# Patient Record
Sex: MALE | Race: WHITE | NOT HISPANIC OR LATINO | Employment: OTHER | URBAN - METROPOLITAN AREA
[De-identification: names, ages, dates, MRNs, and addresses within clinical notes are randomized per-mention and may not be internally consistent; named-entity substitution may affect disease eponyms.]

---

## 2017-01-04 ENCOUNTER — OFFICE VISIT (OUTPATIENT)
Dept: FAMILY MEDICINE CLINIC | Facility: CLINIC | Age: 57
End: 2017-01-04

## 2017-01-04 VITALS
WEIGHT: 274 LBS | HEART RATE: 74 BPM | TEMPERATURE: 97.6 F | DIASTOLIC BLOOD PRESSURE: 88 MMHG | SYSTOLIC BLOOD PRESSURE: 130 MMHG | RESPIRATION RATE: 16 BRPM | OXYGEN SATURATION: 98 % | BODY MASS INDEX: 39.22 KG/M2 | HEIGHT: 70 IN

## 2017-01-04 DIAGNOSIS — I10 BENIGN ESSENTIAL HYPERTENSION: ICD-10-CM

## 2017-01-04 DIAGNOSIS — E11.9 CONTROLLED TYPE 2 DIABETES MELLITUS WITHOUT COMPLICATION, WITHOUT LONG-TERM CURRENT USE OF INSULIN (HCC): Primary | ICD-10-CM

## 2017-01-04 DIAGNOSIS — E78.49 OTHER HYPERLIPIDEMIA: ICD-10-CM

## 2017-01-04 LAB — HBA1C MFR BLD: 8.3 %

## 2017-01-04 PROCEDURE — 83036 HEMOGLOBIN GLYCOSYLATED A1C: CPT | Performed by: FAMILY MEDICINE

## 2017-01-04 PROCEDURE — 90662 IIV NO PRSV INCREASED AG IM: CPT | Performed by: FAMILY MEDICINE

## 2017-01-04 PROCEDURE — 36415 COLL VENOUS BLD VENIPUNCTURE: CPT | Performed by: FAMILY MEDICINE

## 2017-01-04 PROCEDURE — 99214 OFFICE O/P EST MOD 30 MIN: CPT | Performed by: FAMILY MEDICINE

## 2017-01-04 PROCEDURE — 90471 IMMUNIZATION ADMIN: CPT | Performed by: FAMILY MEDICINE

## 2017-01-04 RX ORDER — ESOMEPRAZOLE MAGNESIUM 40 MG/1
40 CAPSULE, DELAYED RELEASE ORAL
Qty: 30 CAPSULE | Refills: 5 | Status: SHIPPED | OUTPATIENT
Start: 2017-01-04 | End: 2017-07-13 | Stop reason: SDUPTHER

## 2017-01-04 RX ORDER — DANTROLENE SODIUM 100 MG/1
100 CAPSULE ORAL 2 TIMES DAILY
Qty: 60 CAPSULE | Refills: 5 | Status: SHIPPED | OUTPATIENT
Start: 2017-01-04 | End: 2017-08-10 | Stop reason: SDUPTHER

## 2017-01-04 RX ORDER — AMLODIPINE BESYLATE AND BENAZEPRIL HYDROCHLORIDE 5; 10 MG/1; MG/1
1 CAPSULE ORAL DAILY
Qty: 30 CAPSULE | Refills: 5 | Status: SHIPPED | OUTPATIENT
Start: 2017-01-04 | End: 2017-09-19 | Stop reason: SDUPTHER

## 2017-01-04 RX ORDER — ATORVASTATIN CALCIUM 10 MG/1
10 TABLET, FILM COATED ORAL DAILY
Qty: 30 TABLET | Refills: 5 | Status: SHIPPED | OUTPATIENT
Start: 2017-01-04 | End: 2017-11-15 | Stop reason: SDUPTHER

## 2017-01-04 RX ORDER — CITALOPRAM 20 MG/1
20 TABLET ORAL DAILY
Qty: 30 TABLET | Refills: 5 | Status: SHIPPED | OUTPATIENT
Start: 2017-01-04 | End: 2017-08-16 | Stop reason: SDUPTHER

## 2017-01-04 RX ORDER — GLYBURIDE 5 MG/1
5 TABLET ORAL 2 TIMES DAILY WITH MEALS
Qty: 60 TABLET | Refills: 5 | Status: SHIPPED | OUTPATIENT
Start: 2017-01-04 | End: 2017-10-21 | Stop reason: SDUPTHER

## 2017-01-04 RX ORDER — BACLOFEN 20 MG/1
20 TABLET ORAL 2 TIMES DAILY
Qty: 60 TABLET | Refills: 5 | Status: SHIPPED | OUTPATIENT
Start: 2017-01-04 | End: 2017-07-13 | Stop reason: SDUPTHER

## 2017-01-04 NOTE — MR AVS SNAPSHOT
Kiran Belcher   1/4/2017 12:00 PM   Office Visit    Provider:  Nadira Fernandez DO   Department:  [facility]   Dept Phone:  [phone]                Your Full Care Plan              Where to Get Your Medications      These medications were sent to [facility] - [address] - [phone] PH - [phone] FX  [address] [address]     Phone:  [phone]     amLODIPine-benazepril 5-10 MG per capsule    atorvastatin 10 MG tablet    baclofen 20 MG tablet    citalopram 20 MG tablet    dantrolene 100 MG capsule    esomeprazole 40 MG capsule    glyBURIDE 5 MG tablet    metFORMIN 500 MG tablet            Your Updated Medication List          This list is accurate as of: 1/4/17 12:11 PM.  Always use your most recent med list.                ACCU-CHEK MAX PLUS test strip   Generic drug:  glucose blood       amLODIPine-benazepril 5-10 MG per capsule   Commonly known as:  LOTREL 5-10   Take 1 capsule by mouth Daily.       atorvastatin 10 MG tablet   Commonly known as:  LIPITOR   Take 1 tablet by mouth Daily.       baclofen 20 MG tablet   Commonly known as:  LIORESAL   Take 1 tablet by mouth 2 (Two) Times a Day.       citalopram 20 MG tablet   Commonly known as:  CeleXA   Take 1 tablet by mouth Daily.       dantrolene 100 MG capsule   Commonly known as:  DANTRIUM   Take 1 capsule by mouth 2 (Two) Times a Day.       esomeprazole 40 MG capsule   Commonly known as:  nexIUM   Take 1 capsule by mouth Every Morning Before Breakfast.       glyBURIDE 5 MG tablet   Commonly known as:  DIAbeta   Take 1 tablet by mouth 2 (Two) Times a Day With Meals.       metFORMIN 500 MG tablet   Commonly known as:  GLUCOPHAGE   Take 2 tablets by mouth 2 (Two) Times a Day With Meals.       TOVIAZ 4 MG tablet sustained-release 24 hour capsule   Generic drug:  fesoterodine fumarate               We Performed the Following     "CBC & Differential     Comprehensive Metabolic Panel     High Dose FluZone     Lipid Panel     POC Glycosylated Hemoglobin (Hb A1C)       You Were Diagnosed With        Codes Comments    Controlled type 2 diabetes mellitus without complication, without long-term current use of insulin    -  Primary ICD-10-CM: E11.9  ICD-9-CM: 250.00     Other hyperlipidemia     ICD-10-CM: E78.4  ICD-9-CM: 272.4     Benign essential hypertension     ICD-10-CM: I10  ICD-9-CM: 401.1       Instructions     None    Patient Instructions History      [employer] Signup     [employer] allows you to send messages to your doctor, view your test results, renew your prescriptions, schedule appointments, and more. To sign up, go to [employer] and click on the Sign Up Now link in the New User? box. Enter your [employer] Activation Code exactly as it appears below along with the last four digits of your Social Security Number and your Date of Birth () to complete the sign-up process. If you do not sign up before the expiration date, you must request a new code.    [employer] Activation Code: [email or web address]  Expires: 2017 12:10 PM    If you have questions, you can email [email or web address] or call [phone] to talk to our [employer] staff. Remember, [employer] is NOT to be used for urgent needs. For medical emergencies, dial 911.               Other Info from Your Visit           Your Appointments     May 15, 2017  1:00 PM EDT   Follow Up with Nadira Fernandez DO   [facility] (--)    [facility] [address] [facility] [address]  [facility] [address]   [phone]           Arrive 15 minutes prior to appointment.              Allergies     No Known Allergies      Reason for Visit     Diabetes           Vital Signs     Blood Pressure Pulse Temperature Respirations Height Weight    130/88 74 97.6 °F (36.4 °C) 16 70\" (177.8 cm) 274 lb (124 kg)    Oxygen Saturation Body Mass Index Smoking Status    "          98% 39.31 kg/m2 Never Smoker         Problems and Diagnoses Noted     Benign essential hypertension    High cholesterol or triglycerides    Controlled type 2 diabetes mellitus without complication, without long-term current use of insulin    -  Primary      Immunizations Administered     Name Date    Influenza Split High Dose Preservative Free IM       Results     POC Glycosylated Hemoglobin (Hb A1C)      Component Value Standard Range & Units    Hemoglobin A1C 8.3 %

## 2017-01-04 NOTE — PROGRESS NOTES
Subjective   Kiran Belcher is a 56 y.o. male.     Diabetes   He presents for his follow-up diabetic visit. He has type 2 diabetes mellitus. His disease course has been stable. Pertinent negatives for hypoglycemia include no confusion, dizziness, mood changes or nervousness/anxiousness. Pertinent negatives for diabetes include no blurred vision, no chest pain, no fatigue, no foot paresthesias, no polydipsia, no polyphagia, no polyuria, no visual change and no weakness. There are no hypoglycemic complications. Symptoms are stable. There are no diabetic complications. Risk factors for coronary artery disease include diabetes mellitus, dyslipidemia, obesity, male sex, hypertension and family history. Current diabetic treatment includes oral agent (dual therapy). He is compliant with treatment all of the time. His weight is stable. He is following a generally healthy diet. When asked about meal planning, he reported none. He has not had a previous visit with a dietitian. He participates in exercise intermittently. There is no change in his home blood glucose trend. An ACE inhibitor/angiotensin II receptor blocker is being taken. He does not see a podiatrist.Eye exam is not current.   Hypertension   This is a chronic problem. The current episode started more than 1 year ago. The problem is unchanged. The problem is controlled. Pertinent negatives include no anxiety, blurred vision, chest pain, malaise/fatigue, neck pain, palpitations, peripheral edema, PND or shortness of breath. There are no associated agents to hypertension. Risk factors for coronary artery disease include diabetes mellitus, dyslipidemia, family history, obesity, male gender and sedentary lifestyle. Past treatments include ACE inhibitors and calcium channel blockers. The current treatment provides significant improvement. There are no compliance problems.    Hyperlipidemia   This is a chronic problem. The current episode started more than 1 year ago.  The problem is controlled. Recent lipid tests were reviewed and are normal. Exacerbating diseases include diabetes. There are no known factors aggravating his hyperlipidemia. Pertinent negatives include no chest pain, focal weakness, leg pain, myalgias or shortness of breath. Current antihyperlipidemic treatment includes statins. The current treatment provides significant improvement of lipids. There are no compliance problems.  Risk factors for coronary artery disease include diabetes mellitus, dyslipidemia, family history, obesity, male sex and hypertension.        The following portions of the patient's history were reviewed and updated as appropriate: allergies, current medications, past family history, past medical history, past social history, past surgical history and problem list.    Review of Systems   Constitutional: Negative for appetite change, chills, diaphoresis, fatigue, fever, malaise/fatigue and unexpected weight change.   HENT: Negative for congestion, ear pain, mouth sores, postnasal drip, rhinorrhea, sinus pressure, sneezing, sore throat and trouble swallowing.    Eyes: Negative for blurred vision, pain, redness and visual disturbance.   Respiratory: Negative for apnea, cough, chest tightness, shortness of breath and wheezing.    Cardiovascular: Negative for chest pain, palpitations, leg swelling and PND.   Gastrointestinal: Negative for abdominal distention, blood in stool, constipation, diarrhea and nausea.   Endocrine: Negative for cold intolerance, polydipsia, polyphagia and polyuria.   Genitourinary: Negative for difficulty urinating, dysuria, enuresis, flank pain and urgency.   Musculoskeletal: Negative for arthralgias, back pain, joint swelling, myalgias and neck pain.   Skin: Negative for color change, rash and wound.   Neurological: Negative for dizziness, focal weakness, syncope, weakness, light-headedness and numbness.   Hematological: Negative for adenopathy.   Psychiatric/Behavioral:  Negative for agitation, behavioral problems and confusion. The patient is not nervous/anxious.        Objective   Physical Exam   Constitutional: He is oriented to person, place, and time. He appears well-developed and well-nourished. No distress.   HENT:   Right Ear: External ear normal.   Left Ear: External ear normal.   Nose: Nose normal.   Mouth/Throat: Oropharynx is clear and moist.   Eyes: Conjunctivae and EOM are normal. Pupils are equal, round, and reactive to light.   Neck: Normal range of motion. Neck supple. No thyromegaly present.   Cardiovascular: Normal rate, regular rhythm and normal heart sounds.    No murmur heard.  Pulmonary/Chest: Effort normal and breath sounds normal. No respiratory distress. He has no wheezes.   Abdominal: Soft. Bowel sounds are normal. He exhibits no distension and no mass. There is no tenderness. There is no rebound and no guarding. No hernia.   Musculoskeletal: Normal range of motion. He exhibits no edema or tenderness.   Lymphadenopathy:     He has no cervical adenopathy.   Neurological: He is alert and oriented to person, place, and time. He has normal reflexes.   Skin: Skin is warm and dry. No rash noted. He is not diaphoretic. No erythema. No pallor.   Psychiatric: He has a normal mood and affect. His behavior is normal. Judgment and thought content normal.   Nursing note and vitals reviewed.      Assessment/Plan   Kiran was seen today for diabetes.    Diagnoses and all orders for this visit:    Controlled type 2 diabetes mellitus without complication, without long-term current use of insulin  -     POC Glycosylated Hemoglobin (Hb A1C)  -     CBC & Differential    Other hyperlipidemia  -     Comprehensive Metabolic Panel  -     Lipid Panel    Benign essential hypertension    Other orders  -     High Dose FluZone  -     metFORMIN (GLUCOPHAGE) 500 MG tablet; Take 2 tablets by mouth 2 (Two) Times a Day With Meals.  -     glyBURIDE (DIAbeta) 5 MG tablet; Take 1 tablet by  mouth 2 (Two) Times a Day With Meals.  -     esomeprazole (nexIUM) 40 MG capsule; Take 1 capsule by mouth Every Morning Before Breakfast.  -     dantrolene (DANTRIUM) 100 MG capsule; Take 1 capsule by mouth 2 (Two) Times a Day.  -     citalopram (CeleXA) 20 MG tablet; Take 1 tablet by mouth Daily.  -     baclofen (LIORESAL) 20 MG tablet; Take 1 tablet by mouth 2 (Two) Times a Day.  -     atorvastatin (LIPITOR) 10 MG tablet; Take 1 tablet by mouth Daily.  -     amLODIPine-benazepril (LOTREL 5-10) 5-10 MG per capsule; Take 1 capsule by mouth Daily.

## 2017-01-05 LAB
ALBUMIN SERPL-MCNC: 4.2 G/DL (ref 3.5–5.5)
ALBUMIN/GLOB SERPL: 1.8 {RATIO} (ref 1.1–2.5)
ALP SERPL-CCNC: 77 IU/L (ref 39–117)
ALT SERPL-CCNC: 75 IU/L (ref 0–44)
AST SERPL-CCNC: 57 IU/L (ref 0–40)
BASOPHILS # BLD AUTO: 0 X10E3/UL (ref 0–0.2)
BASOPHILS NFR BLD AUTO: 0 %
BILIRUB SERPL-MCNC: 0.4 MG/DL (ref 0–1.2)
BUN SERPL-MCNC: 12 MG/DL (ref 6–24)
BUN/CREAT SERPL: 18 (ref 9–20)
CALCIUM SERPL-MCNC: 9 MG/DL (ref 8.7–10.2)
CHLORIDE SERPL-SCNC: 98 MMOL/L (ref 96–106)
CHOLEST SERPL-MCNC: 149 MG/DL (ref 100–199)
CO2 SERPL-SCNC: 23 MMOL/L (ref 18–29)
CREAT SERPL-MCNC: 0.67 MG/DL (ref 0.76–1.27)
EOSINOPHIL # BLD AUTO: 0.2 X10E3/UL (ref 0–0.4)
EOSINOPHIL NFR BLD AUTO: 2 %
ERYTHROCYTE [DISTWIDTH] IN BLOOD BY AUTOMATED COUNT: 13.3 % (ref 12.3–15.4)
GLOBULIN SER CALC-MCNC: 2.4 G/DL (ref 1.5–4.5)
GLUCOSE SERPL-MCNC: 176 MG/DL (ref 65–99)
HCT VFR BLD AUTO: 39.4 % (ref 37.5–51)
HDLC SERPL-MCNC: 29 MG/DL
HGB BLD-MCNC: 13.3 G/DL (ref 12.6–17.7)
IMM GRANULOCYTES # BLD: 0 X10E3/UL (ref 0–0.1)
IMM GRANULOCYTES NFR BLD: 0 %
LDLC SERPL CALC-MCNC: 80 MG/DL (ref 0–99)
LYMPHOCYTES # BLD AUTO: 1.9 X10E3/UL (ref 0.7–3.1)
LYMPHOCYTES NFR BLD AUTO: 22 %
MCH RBC QN AUTO: 30.9 PG (ref 26.6–33)
MCHC RBC AUTO-ENTMCNC: 33.8 G/DL (ref 31.5–35.7)
MCV RBC AUTO: 92 FL (ref 79–97)
MONOCYTES # BLD AUTO: 0.4 X10E3/UL (ref 0.1–0.9)
MONOCYTES NFR BLD AUTO: 5 %
NEUTROPHILS # BLD AUTO: 6.2 X10E3/UL (ref 1.4–7)
NEUTROPHILS NFR BLD AUTO: 71 %
PLATELET # BLD AUTO: 283 X10E3/UL (ref 150–379)
POTASSIUM SERPL-SCNC: 4.1 MMOL/L (ref 3.5–5.2)
PROT SERPL-MCNC: 6.6 G/DL (ref 6–8.5)
RBC # BLD AUTO: 4.3 X10E6/UL (ref 4.14–5.8)
SODIUM SERPL-SCNC: 140 MMOL/L (ref 134–144)
TRIGL SERPL-MCNC: 202 MG/DL (ref 0–149)
VLDLC SERPL CALC-MCNC: 40 MG/DL (ref 5–40)
WBC # BLD AUTO: 8.8 X10E3/UL (ref 3.4–10.8)

## 2017-05-15 ENCOUNTER — OFFICE VISIT (OUTPATIENT)
Dept: FAMILY MEDICINE CLINIC | Facility: CLINIC | Age: 57
End: 2017-05-15

## 2017-05-15 VITALS
DIASTOLIC BLOOD PRESSURE: 92 MMHG | SYSTOLIC BLOOD PRESSURE: 142 MMHG | HEIGHT: 70 IN | HEART RATE: 76 BPM | RESPIRATION RATE: 16 BRPM | OXYGEN SATURATION: 98 %

## 2017-05-15 DIAGNOSIS — E78.49 OTHER HYPERLIPIDEMIA: ICD-10-CM

## 2017-05-15 DIAGNOSIS — I10 ESSENTIAL HYPERTENSION: ICD-10-CM

## 2017-05-15 DIAGNOSIS — E11.9 CONTROLLED TYPE 2 DIABETES MELLITUS WITHOUT COMPLICATION, WITHOUT LONG-TERM CURRENT USE OF INSULIN (HCC): Primary | ICD-10-CM

## 2017-05-15 LAB
ALBUMIN SERPL-MCNC: 4.4 G/DL (ref 3.2–4.8)
ALBUMIN/GLOB SERPL: 1.6 G/DL (ref 1.5–2.5)
ALP SERPL-CCNC: 83 U/L (ref 25–100)
ALT SERPL-CCNC: 74 U/L (ref 7–40)
AST SERPL-CCNC: 56 U/L (ref 0–33)
BASOPHILS # BLD AUTO: 0.02 10*3/MM3 (ref 0–0.2)
BASOPHILS NFR BLD AUTO: 0.3 % (ref 0–1)
BILIRUB SERPL-MCNC: 0.5 MG/DL (ref 0.3–1.2)
BUN SERPL-MCNC: 11 MG/DL (ref 9–23)
BUN/CREAT SERPL: 18.3 (ref 7–25)
CALCIUM SERPL-MCNC: 9.8 MG/DL (ref 8.7–10.4)
CHLORIDE SERPL-SCNC: 102 MMOL/L (ref 99–109)
CHOLEST SERPL-MCNC: 164 MG/DL (ref 0–200)
CO2 SERPL-SCNC: 22 MMOL/L (ref 20–31)
CREAT SERPL-MCNC: 0.6 MG/DL (ref 0.6–1.3)
EOSINOPHIL # BLD AUTO: 0.25 10*3/MM3 (ref 0.1–0.3)
EOSINOPHIL NFR BLD AUTO: 3.2 % (ref 0–3)
ERYTHROCYTE [DISTWIDTH] IN BLOOD BY AUTOMATED COUNT: 13.4 % (ref 11.3–14.5)
GLOBULIN SER CALC-MCNC: 2.7 GM/DL
GLUCOSE SERPL-MCNC: 164 MG/DL (ref 70–100)
HBA1C MFR BLD: 8.5 %
HCT VFR BLD AUTO: 40.5 % (ref 38.9–50.9)
HDLC SERPL-MCNC: 33 MG/DL (ref 40–60)
HGB BLD-MCNC: 13.3 G/DL (ref 13.1–17.5)
IMM GRANULOCYTES # BLD: 0.03 10*3/MM3 (ref 0–0.03)
IMM GRANULOCYTES NFR BLD: 0.4 % (ref 0–0.6)
LDLC SERPL CALC-MCNC: 91 MG/DL (ref 0–100)
LYMPHOCYTES # BLD AUTO: 1.94 10*3/MM3 (ref 0.6–4.8)
LYMPHOCYTES NFR BLD AUTO: 24.9 % (ref 24–44)
MCH RBC QN AUTO: 31.5 PG (ref 27–31)
MCHC RBC AUTO-ENTMCNC: 32.8 G/DL (ref 32–36)
MCV RBC AUTO: 96 FL (ref 80–99)
MONOCYTES # BLD AUTO: 0.5 10*3/MM3 (ref 0–1)
MONOCYTES NFR BLD AUTO: 6.4 % (ref 0–12)
NEUTROPHILS # BLD AUTO: 5.04 10*3/MM3 (ref 1.5–8.3)
NEUTROPHILS NFR BLD AUTO: 64.8 % (ref 41–71)
PLATELET # BLD AUTO: 258 10*3/MM3 (ref 150–450)
POTASSIUM SERPL-SCNC: 4.2 MMOL/L (ref 3.5–5.5)
PROT SERPL-MCNC: 7.1 G/DL (ref 5.7–8.2)
RBC # BLD AUTO: 4.22 10*6/MM3 (ref 4.2–5.76)
SODIUM SERPL-SCNC: 141 MMOL/L (ref 132–146)
TRIGL SERPL-MCNC: 202 MG/DL (ref 0–150)
TSH SERPL DL<=0.005 MIU/L-ACNC: 1.03 MIU/ML (ref 0.35–5.35)
VLDLC SERPL CALC-MCNC: 40.4 MG/DL
WBC # BLD AUTO: 7.78 10*3/MM3 (ref 3.5–10.8)

## 2017-05-15 PROCEDURE — 99214 OFFICE O/P EST MOD 30 MIN: CPT | Performed by: FAMILY MEDICINE

## 2017-05-15 PROCEDURE — 36415 COLL VENOUS BLD VENIPUNCTURE: CPT | Performed by: FAMILY MEDICINE

## 2017-05-15 PROCEDURE — 83036 HEMOGLOBIN GLYCOSYLATED A1C: CPT | Performed by: FAMILY MEDICINE

## 2017-05-15 RX ORDER — METHENAMINE HIPPURATE 1000 MG/1
TABLET ORAL
COMMUNITY
Start: 2017-04-10 | End: 2018-04-05

## 2017-07-07 RX ORDER — BLOOD SUGAR DIAGNOSTIC
STRIP MISCELLANEOUS
Qty: 100 EACH | Refills: 5 | Status: SHIPPED | OUTPATIENT
Start: 2017-07-07 | End: 2018-01-29 | Stop reason: SDUPTHER

## 2017-07-13 RX ORDER — BACLOFEN 20 MG/1
TABLET ORAL
Qty: 60 TABLET | Refills: 2 | Status: SHIPPED | OUTPATIENT
Start: 2017-07-13 | End: 2017-10-21 | Stop reason: SDUPTHER

## 2017-07-13 RX ORDER — ESOMEPRAZOLE MAGNESIUM 40 MG/1
CAPSULE, DELAYED RELEASE ORAL
Qty: 30 CAPSULE | Refills: 2 | Status: SHIPPED | OUTPATIENT
Start: 2017-07-13 | End: 2017-10-21 | Stop reason: SDUPTHER

## 2017-07-24 ENCOUNTER — OFFICE VISIT (OUTPATIENT)
Dept: FAMILY MEDICINE CLINIC | Facility: CLINIC | Age: 57
End: 2017-07-24

## 2017-07-24 VITALS
RESPIRATION RATE: 16 BRPM | DIASTOLIC BLOOD PRESSURE: 88 MMHG | OXYGEN SATURATION: 98 % | TEMPERATURE: 97.6 F | SYSTOLIC BLOOD PRESSURE: 126 MMHG | HEART RATE: 72 BPM

## 2017-07-24 DIAGNOSIS — I10 ESSENTIAL HYPERTENSION: ICD-10-CM

## 2017-07-24 DIAGNOSIS — E78.49 OTHER HYPERLIPIDEMIA: ICD-10-CM

## 2017-07-24 DIAGNOSIS — E11.9 CONTROLLED TYPE 2 DIABETES MELLITUS WITHOUT COMPLICATION, WITHOUT LONG-TERM CURRENT USE OF INSULIN (HCC): Primary | ICD-10-CM

## 2017-07-24 DIAGNOSIS — L98.9 LESION OF SKIN OF FACE: ICD-10-CM

## 2017-07-24 LAB — HBA1C MFR BLD: 6.8 %

## 2017-07-24 PROCEDURE — 99214 OFFICE O/P EST MOD 30 MIN: CPT | Performed by: FAMILY MEDICINE

## 2017-07-24 PROCEDURE — 83036 HEMOGLOBIN GLYCOSYLATED A1C: CPT | Performed by: FAMILY MEDICINE

## 2017-07-24 NOTE — PROGRESS NOTES
Subjective   Kiran Belcher is a 56 y.o. male.     Diabetes   He presents for his follow-up diabetic visit. He has type 2 diabetes mellitus. His disease course has been stable. There are no hypoglycemic associated symptoms. Pertinent negatives for hypoglycemia include no confusion, dizziness, mood changes or nervousness/anxiousness. Pertinent negatives for diabetes include no blurred vision, no chest pain, no fatigue, no foot paresthesias, no polydipsia, no polyphagia, no polyuria, no visual change and no weakness. There are no hypoglycemic complications. Symptoms are stable. There are no diabetic complications. Risk factors for coronary artery disease include diabetes mellitus, dyslipidemia, obesity, male sex, hypertension and family history. Current diabetic treatment includes oral agent (dual therapy). He is compliant with treatment all of the time. His weight is stable. He is following a generally healthy diet. When asked about meal planning, he reported none. He has not had a previous visit with a dietitian. He participates in exercise intermittently. There is no change in his home blood glucose trend. An ACE inhibitor/angiotensin II receptor blocker is being taken. He does not see a podiatrist.Eye exam is not current.   Hypertension   This is a chronic problem. The current episode started more than 1 year ago. The problem is unchanged. The problem is controlled. Pertinent negatives include no anxiety, blurred vision, chest pain, malaise/fatigue, neck pain, palpitations, peripheral edema, PND or shortness of breath. There are no associated agents to hypertension. Risk factors for coronary artery disease include diabetes mellitus, dyslipidemia, family history, obesity, male gender and sedentary lifestyle. Past treatments include ACE inhibitors and calcium channel blockers. The current treatment provides significant improvement. There are no compliance problems.    Hyperlipidemia   This is a chronic problem.  The current episode started more than 1 year ago. The problem is controlled. Recent lipid tests were reviewed and are normal. Exacerbating diseases include diabetes. There are no known factors aggravating his hyperlipidemia. Pertinent negatives include no chest pain, focal weakness, leg pain, myalgias or shortness of breath. Current antihyperlipidemic treatment includes statins. The current treatment provides significant improvement of lipids. There are no compliance problems.  Risk factors for coronary artery disease include diabetes mellitus, dyslipidemia, family history, obesity, male sex and hypertension.        The following portions of the patient's history were reviewed and updated as appropriate: allergies, current medications, past family history, past medical history, past social history, past surgical history and problem list.    Review of Systems   Constitutional: Negative for appetite change, chills, diaphoresis, fatigue, fever, malaise/fatigue and unexpected weight change.   HENT: Negative for congestion, ear pain, mouth sores, postnasal drip, rhinorrhea, sinus pressure, sneezing, sore throat and trouble swallowing.    Eyes: Negative for blurred vision, pain, redness and visual disturbance.   Respiratory: Negative for apnea, cough, chest tightness, shortness of breath and wheezing.    Cardiovascular: Negative for chest pain, palpitations, leg swelling and PND.   Gastrointestinal: Negative for abdominal distention, blood in stool, constipation, diarrhea and nausea.   Endocrine: Negative for cold intolerance, polydipsia, polyphagia and polyuria.   Genitourinary: Negative for difficulty urinating, dysuria, enuresis, flank pain and urgency.   Musculoskeletal: Negative for arthralgias, back pain, joint swelling, myalgias and neck pain.   Skin: Negative for color change, rash and wound.   Neurological: Negative for dizziness, focal weakness, syncope, weakness, light-headedness and numbness.   Hematological:  Negative for adenopathy.   Psychiatric/Behavioral: Negative for agitation, behavioral problems and confusion. The patient is not nervous/anxious.        Objective   Physical Exam   Constitutional: He is oriented to person, place, and time. He appears well-developed and well-nourished. No distress.   HENT:   Right Ear: External ear normal.   Left Ear: External ear normal.   Nose: Nose normal.   Mouth/Throat: Oropharynx is clear and moist.   Eyes: Conjunctivae and EOM are normal. Pupils are equal, round, and reactive to light.   Neck: Normal range of motion. Neck supple. No thyromegaly present.   Cardiovascular: Normal rate, regular rhythm and normal heart sounds.    No murmur heard.  Pulmonary/Chest: Effort normal and breath sounds normal. No respiratory distress. He has no wheezes.   Abdominal: Soft. Bowel sounds are normal. He exhibits no distension and no mass. There is no tenderness. There is no rebound and no guarding. No hernia.   Musculoskeletal: Normal range of motion. He exhibits no edema or tenderness.   Lymphadenopathy:     He has no cervical adenopathy.   Neurological: He is alert and oriented to person, place, and time. He has normal reflexes.   Skin: Skin is warm and dry. No rash noted. He is not diaphoretic. No erythema. No pallor.   Large skin tag under right eye   Psychiatric: He has a normal mood and affect. His behavior is normal. Judgment and thought content normal.   Nursing note and vitals reviewed.      Assessment/Plan   Kiran was seen today for diabetes.    Diagnoses and all orders for this visit:    Controlled type 2 diabetes mellitus without complication, without long-term current use of insulin  -     POC Glycosylated Hemoglobin (Hb A1C)    Essential hypertension    Other hyperlipidemia    Lesion of skin of face  -     Ambulatory Referral to Dermatology        I personally spent over half of a total 25 minutes face to face with the patient in counseling and discussion and/or coordination  of care as described above.

## 2017-08-10 RX ORDER — DANTROLENE SODIUM 100 MG/1
CAPSULE ORAL
Qty: 60 CAPSULE | Refills: 4 | Status: SHIPPED | OUTPATIENT
Start: 2017-08-10 | End: 2018-01-29 | Stop reason: SDUPTHER

## 2017-08-16 RX ORDER — CITALOPRAM 20 MG/1
TABLET ORAL
Qty: 30 TABLET | Refills: 2 | Status: SHIPPED | OUTPATIENT
Start: 2017-08-16 | End: 2017-11-22 | Stop reason: SDUPTHER

## 2017-09-19 RX ORDER — AMLODIPINE BESYLATE AND BENAZEPRIL HYDROCHLORIDE 5; 10 MG/1; MG/1
CAPSULE ORAL
Qty: 30 CAPSULE | Refills: 4 | Status: SHIPPED | OUTPATIENT
Start: 2017-09-19 | End: 2017-12-29 | Stop reason: SDUPTHER

## 2017-10-23 RX ORDER — GLYBURIDE 5 MG/1
TABLET ORAL
Qty: 60 TABLET | Refills: 1 | Status: SHIPPED | OUTPATIENT
Start: 2017-10-23 | End: 2017-12-29 | Stop reason: SDUPTHER

## 2017-10-23 RX ORDER — ESOMEPRAZOLE MAGNESIUM 40 MG/1
CAPSULE, DELAYED RELEASE ORAL
Qty: 30 CAPSULE | Refills: 1 | Status: SHIPPED | OUTPATIENT
Start: 2017-10-23 | End: 2017-12-29 | Stop reason: SDUPTHER

## 2017-10-23 RX ORDER — BACLOFEN 20 MG/1
TABLET ORAL
Qty: 60 TABLET | Refills: 1 | Status: SHIPPED | OUTPATIENT
Start: 2017-10-23 | End: 2017-12-29 | Stop reason: SDUPTHER

## 2017-10-30 ENCOUNTER — OFFICE VISIT (OUTPATIENT)
Dept: FAMILY MEDICINE CLINIC | Facility: CLINIC | Age: 57
End: 2017-10-30

## 2017-10-30 VITALS
WEIGHT: 272 LBS | OXYGEN SATURATION: 97 % | BODY MASS INDEX: 39.03 KG/M2 | HEART RATE: 76 BPM | DIASTOLIC BLOOD PRESSURE: 74 MMHG | SYSTOLIC BLOOD PRESSURE: 118 MMHG | RESPIRATION RATE: 16 BRPM

## 2017-10-30 DIAGNOSIS — E11.9 CONTROLLED TYPE 2 DIABETES MELLITUS WITHOUT COMPLICATION, WITHOUT LONG-TERM CURRENT USE OF INSULIN (HCC): Primary | ICD-10-CM

## 2017-10-30 DIAGNOSIS — Z23 IMMUNIZATION DUE: ICD-10-CM

## 2017-10-30 DIAGNOSIS — E78.49 OTHER HYPERLIPIDEMIA: ICD-10-CM

## 2017-10-30 DIAGNOSIS — I10 ESSENTIAL HYPERTENSION: ICD-10-CM

## 2017-10-30 DIAGNOSIS — C64.1 RENAL CELL CARCINOMA OF RIGHT KIDNEY (HCC): ICD-10-CM

## 2017-10-30 LAB — HBA1C MFR BLD: 6.4 %

## 2017-10-30 PROCEDURE — 90662 IIV NO PRSV INCREASED AG IM: CPT | Performed by: FAMILY MEDICINE

## 2017-10-30 PROCEDURE — 36415 COLL VENOUS BLD VENIPUNCTURE: CPT | Performed by: FAMILY MEDICINE

## 2017-10-30 PROCEDURE — 83036 HEMOGLOBIN GLYCOSYLATED A1C: CPT | Performed by: FAMILY MEDICINE

## 2017-10-30 PROCEDURE — G0008 ADMIN INFLUENZA VIRUS VAC: HCPCS | Performed by: FAMILY MEDICINE

## 2017-10-30 PROCEDURE — 99214 OFFICE O/P EST MOD 30 MIN: CPT | Performed by: FAMILY MEDICINE

## 2017-10-30 NOTE — PROGRESS NOTES
Subjective   Kiran Belcher is a 57 y.o. male.     Diabetes   He presents for his follow-up diabetic visit. He has type 2 diabetes mellitus. His disease course has been stable. There are no hypoglycemic associated symptoms. Pertinent negatives for hypoglycemia include no confusion, dizziness, mood changes or nervousness/anxiousness. Pertinent negatives for diabetes include no blurred vision, no chest pain, no fatigue, no foot paresthesias, no polydipsia, no polyphagia, no polyuria, no visual change and no weakness. There are no hypoglycemic complications. Symptoms are stable. There are no diabetic complications. Risk factors for coronary artery disease include diabetes mellitus, dyslipidemia, obesity, male sex, hypertension and family history. Current diabetic treatment includes oral agent (dual therapy). He is compliant with treatment all of the time. His weight is stable. He is following a generally healthy diet. When asked about meal planning, he reported none. He has not had a previous visit with a dietitian. He participates in exercise intermittently. There is no change in his home blood glucose trend. An ACE inhibitor/angiotensin II receptor blocker is being taken. He does not see a podiatrist.Eye exam is not current.   Hypertension   This is a chronic problem. The current episode started more than 1 year ago. The problem is unchanged. The problem is controlled. Pertinent negatives include no anxiety, blurred vision, chest pain, malaise/fatigue, neck pain, palpitations, peripheral edema, PND or shortness of breath. There are no associated agents to hypertension. Risk factors for coronary artery disease include diabetes mellitus, dyslipidemia, family history, obesity, male gender and sedentary lifestyle. Past treatments include ACE inhibitors and calcium channel blockers. The current treatment provides significant improvement. There are no compliance problems.    Hyperlipidemia   This is a chronic problem.  The current episode started more than 1 year ago. The problem is controlled. Recent lipid tests were reviewed and are normal. Exacerbating diseases include diabetes. There are no known factors aggravating his hyperlipidemia. Pertinent negatives include no chest pain, focal weakness, leg pain, myalgias or shortness of breath. Current antihyperlipidemic treatment includes statins. The current treatment provides significant improvement of lipids. There are no compliance problems.  Risk factors for coronary artery disease include diabetes mellitus, dyslipidemia, family history, obesity, male sex and hypertension.   Needs referred to Urology. Has recently been diagnosed with possible renal cell carcinoma. Pt. Wants second opinion.    The following portions of the patient's history were reviewed and updated as appropriate: allergies, current medications, past family history, past medical history, past social history, past surgical history and problem list.    Review of Systems   Constitutional: Negative.  Negative for fatigue and malaise/fatigue.   HENT: Negative.    Eyes: Negative.  Negative for blurred vision.   Respiratory: Negative.  Negative for shortness of breath.    Cardiovascular: Negative.  Negative for chest pain, palpitations and PND.   Gastrointestinal: Negative.    Endocrine: Negative.  Negative for polydipsia, polyphagia and polyuria.   Genitourinary: Negative.    Musculoskeletal: Negative.  Negative for myalgias and neck pain.   Skin: Negative.    Allergic/Immunologic: Negative.    Neurological: Negative.  Negative for dizziness, focal weakness and weakness.   Hematological: Negative.    Psychiatric/Behavioral: Negative.  Negative for confusion. The patient is not nervous/anxious.    All other systems reviewed and are negative.      Objective   Physical Exam   Constitutional: He is oriented to person, place, and time. He appears well-developed and well-nourished. No distress.   HENT:   Right Ear: External  ear normal.   Left Ear: External ear normal.   Nose: Nose normal.   Mouth/Throat: Oropharynx is clear and moist.   Eyes: Conjunctivae and EOM are normal. Pupils are equal, round, and reactive to light.   Neck: Normal range of motion. Neck supple. No thyromegaly present.   Cardiovascular: Normal rate, regular rhythm and normal heart sounds.    No murmur heard.  Pulmonary/Chest: Effort normal and breath sounds normal. No respiratory distress. He has no wheezes.   Abdominal: Soft. Bowel sounds are normal. He exhibits no distension and no mass. There is no tenderness. There is no rebound and no guarding. No hernia.   Musculoskeletal: Normal range of motion. He exhibits no edema or tenderness.   Lymphadenopathy:     He has no cervical adenopathy.   Neurological: He is alert and oriented to person, place, and time. He has normal reflexes.   Skin: Skin is warm and dry. No rash noted. He is not diaphoretic. No erythema. No pallor.   Psychiatric: He has a normal mood and affect. His behavior is normal. Judgment and thought content normal.   Nursing note and vitals reviewed.      Assessment/Plan   Kiran was seen today for diabetes.    Diagnoses and all orders for this visit:    Controlled type 2 diabetes mellitus without complication, without long-term current use of insulin  -     POC Glycosylated Hemoglobin (Hb A1C)  -     CBC & Differential  -     Comprehensive Metabolic Panel    Essential hypertension    Other hyperlipidemia  -     Lipid Panel    Renal cell carcinoma of right kidney  -     Ambulatory Referral to Urology    Immunization due  -     Flu Vaccine High Dose PF 65YR+ ([id number])        I personally spent over half of a total 25 minutes face to face with the patient in counseling and discussion and/or coordination of care as described above.

## 2017-10-31 LAB
ALBUMIN SERPL-MCNC: 4.4 G/DL (ref 3.2–4.8)
ALBUMIN/GLOB SERPL: 1.8 G/DL (ref 1.5–2.5)
ALP SERPL-CCNC: 86 U/L (ref 25–100)
ALT SERPL-CCNC: 63 U/L (ref 7–40)
AST SERPL-CCNC: 37 U/L (ref 0–33)
BASOPHILS # BLD AUTO: 0.01 10*3/MM3 (ref 0–0.2)
BASOPHILS NFR BLD AUTO: 0.1 % (ref 0–1)
BILIRUB SERPL-MCNC: 0.5 MG/DL (ref 0.3–1.2)
BUN SERPL-MCNC: 12 MG/DL (ref 9–23)
BUN/CREAT SERPL: 20 (ref 7–25)
CALCIUM SERPL-MCNC: 9.3 MG/DL (ref 8.7–10.4)
CHLORIDE SERPL-SCNC: 102 MMOL/L (ref 99–109)
CHOLEST SERPL-MCNC: 151 MG/DL (ref 0–200)
CO2 SERPL-SCNC: 21 MMOL/L (ref 20–31)
CREAT SERPL-MCNC: 0.6 MG/DL (ref 0.6–1.3)
EOSINOPHIL # BLD AUTO: 0.45 10*3/MM3 (ref 0–0.3)
EOSINOPHIL NFR BLD AUTO: 5.3 % (ref 0–3)
ERYTHROCYTE [DISTWIDTH] IN BLOOD BY AUTOMATED COUNT: 13.4 % (ref 11.3–14.5)
GFR SERPLBLD CREATININE-BSD FMLA CKD-EPI: 140 ML/MIN/1.73
GFR SERPLBLD CREATININE-BSD FMLA CKD-EPI: >150 ML/MIN/1.73
GLOBULIN SER CALC-MCNC: 2.4 GM/DL
GLUCOSE SERPL-MCNC: 97 MG/DL (ref 70–100)
HCT VFR BLD AUTO: 40 % (ref 38.9–50.9)
HDLC SERPL-MCNC: 33 MG/DL (ref 40–60)
HGB BLD-MCNC: 12.9 G/DL (ref 13.1–17.5)
IMM GRANULOCYTES # BLD: 0.02 10*3/MM3 (ref 0–0.03)
IMM GRANULOCYTES NFR BLD: 0.2 % (ref 0–0.6)
LDLC SERPL CALC-MCNC: 81 MG/DL (ref 0–100)
LYMPHOCYTES # BLD AUTO: 2.2 10*3/MM3 (ref 0.6–4.8)
LYMPHOCYTES NFR BLD AUTO: 25.8 % (ref 24–44)
MCH RBC QN AUTO: 30.6 PG (ref 27–31)
MCHC RBC AUTO-ENTMCNC: 32.3 G/DL (ref 32–36)
MCV RBC AUTO: 95 FL (ref 80–99)
MONOCYTES # BLD AUTO: 0.49 10*3/MM3 (ref 0–1)
MONOCYTES NFR BLD AUTO: 5.7 % (ref 0–12)
NEUTROPHILS # BLD AUTO: 5.37 10*3/MM3 (ref 1.5–8.3)
NEUTROPHILS NFR BLD AUTO: 62.9 % (ref 41–71)
PLATELET # BLD AUTO: 285 10*3/MM3 (ref 150–450)
POTASSIUM SERPL-SCNC: 4.1 MMOL/L (ref 3.5–5.5)
PROT SERPL-MCNC: 6.8 G/DL (ref 5.7–8.2)
RBC # BLD AUTO: 4.21 10*6/MM3 (ref 4.2–5.76)
SODIUM SERPL-SCNC: 137 MMOL/L (ref 132–146)
TRIGL SERPL-MCNC: 187 MG/DL (ref 0–150)
VLDLC SERPL CALC-MCNC: 37.4 MG/DL
WBC # BLD AUTO: 8.54 10*3/MM3 (ref 3.5–10.8)

## 2017-11-16 ENCOUNTER — OFFICE VISIT (OUTPATIENT)
Dept: SLEEP MEDICINE | Facility: HOSPITAL | Age: 57
End: 2017-11-16

## 2017-11-16 VITALS
HEIGHT: 70 IN | HEART RATE: 78 BPM | WEIGHT: 272 LBS | BODY MASS INDEX: 38.94 KG/M2 | SYSTOLIC BLOOD PRESSURE: 140 MMHG | DIASTOLIC BLOOD PRESSURE: 84 MMHG | OXYGEN SATURATION: 96 %

## 2017-11-16 DIAGNOSIS — G47.33 OSA ON CPAP: Primary | ICD-10-CM

## 2017-11-16 DIAGNOSIS — Z99.89 OSA ON CPAP: Primary | ICD-10-CM

## 2017-11-16 DIAGNOSIS — IMO0001 CLASS 2 OBESITY DUE TO EXCESS CALORIES WITH SERIOUS COMORBIDITY AND BODY MASS INDEX (BMI) OF 39.0 TO 39.9 IN ADULT: ICD-10-CM

## 2017-11-16 PROCEDURE — 99213 OFFICE O/P EST LOW 20 MIN: CPT | Performed by: INTERNAL MEDICINE

## 2017-11-16 RX ORDER — ATORVASTATIN CALCIUM 10 MG/1
TABLET, FILM COATED ORAL
Qty: 90 TABLET | Refills: 3 | Status: SHIPPED | OUTPATIENT
Start: 2017-11-16 | End: 2018-01-29 | Stop reason: SDUPTHER

## 2017-11-16 NOTE — PROGRESS NOTES
Subjective:     Chief Complaint:   Chief Complaint   Patient presents with   • Follow-up       HPI:    Kiran Belcher is a 57 y.o. male here for follow-up of obstructive sleep apnea.    He has obstructive sleep apnea which is being treated with CPAP at a pressure of 12.  He is having no problem with his mask or machine and he denies any daytime somnolence.  He is here for an annual follow-up.    Further details are as follows:    Since last visit sleep problem has: remained the same  Currently using PAP: yes Hours of usage during the night: 9    Amount of sleep per night : 9 hours  Average length of time it takes to fall asleep : 60 minutes  Number of awakenings per night : 2     He feels fatigue (tiredness, exhaustion, lethargy) in the daytime even when not sleepy? Infrequently  He feels sleepy (or struggles to stay awake) in the daytime? No    Washington Scale scored as 4/24.    Type of mask: nasal pillow    He (since starting PAP or since last visit) has problems with the following:   Pressure from the mask 0 - No Problem  Skin irritation from the mask 0 - No Problem  Mask coming off face 0 - No Problem  Air leaks from the mask 0 - No Problem  Dry mouth or throat 0 - No Problem  Nasal congestion 0 - No Problem    I reviewed his PAP download:  Average pressure: 12  Average AHI:  2  Average minutes in large leak per night: 0      Current medications are:   Current Outpatient Prescriptions:   •  ACCU-CHEK MAX PLUS test strip, USE TO TEST BLOOD SUGAR ONCE DAILY, Disp: 100 each, Rfl: 5  •  amLODIPine-benazepril (LOTREL 5-10) 5-10 MG per capsule, TAKE ONE CAPSULE BY MOUTH DAILY, Disp: 30 capsule, Rfl: 4  •  atorvastatin (LIPITOR) 10 MG tablet, TAKE ONE TABLET BY MOUTH DAILY, Disp: 90 tablet, Rfl: 3  •  baclofen (LIORESAL) 20 MG tablet, TAKE ONE TABLET BY MOUTH TWICE A DAY, Disp: 60 tablet, Rfl: 1  •  citalopram (CeleXA) 20 MG tablet, TAKE ONE TABLET BY MOUTH DAILY, Disp: 30 tablet, Rfl: 2  •  dantrolene (DANTRIUM)  100 MG capsule, TAKE ONE CAPSULE BY MOUTH TWICE A DAY, Disp: 60 capsule, Rfl: 4  •  esomeprazole (nexIUM) 40 MG capsule, TAKE ONE CAPSULE BY MOUTH EVERY MORNING BEFORE BREAKFAST, Disp: 30 capsule, Rfl: 1  •  fesoterodine fumarate (TOVIAZ) 4 MG tablet sustained-release 24 hour capsule, Take  by mouth., Disp: , Rfl:   •  glyBURIDE (DIAbeta) 5 MG tablet, TAKE ONE TABLET BY MOUTH TWICE A DAY WITH MEALS, Disp: 60 tablet, Rfl: 1  •  metFORMIN (GLUCOPHAGE) 500 MG tablet, TAKE TWO TABLETS BY MOUTH TWICE A DAY WITH MEALS, Disp: 120 tablet, Rfl: 1  •  methenamine (HIPREX) 1 G tablet, , Disp: , Rfl: .      The patient's relevant past medical, surgical, family and social history were reviewed and updated in Epic as appropriate.     ROS:    Review of Systems   Constitutional: Negative for fatigue.   HENT: Negative for congestion.    Respiratory: Positive for apnea.          Objective:    Physical Exam   Constitutional: He is oriented to person, place, and time. He appears well-developed and well-nourished.   HENT:   Head: Normocephalic and atraumatic.   Mouth/Throat: Oropharynx is clear and moist.   Class IV airway   Neck: Neck supple. No thyromegaly present.   Cardiovascular: Normal rate and regular rhythm.  Exam reveals no gallop and no friction rub.    No murmur heard.  Pulmonary/Chest: Effort normal. No respiratory distress. He has no wheezes. He has no rales.   Musculoskeletal: He exhibits no edema.   Neurological: He is alert and oriented to person, place, and time.   Skin: Skin is warm and dry.   Psychiatric: He has a normal mood and affect. His behavior is normal.   Vitals reviewed.      Data:    Patient's PAP download was personally reviewed including raw data and results.    Assessment:    Problem List Items Addressed This Visit        Pulmonary Problems    NISA on CPAP - Primary    Relevant Orders    PAP Therapy       Other    Obesity          Successful treatment of obstructive sleep apnea with CPAP 12 based upon his  clinical response and download.    Plan:     1. No change in CPAP settings necessary  2. Continue with nasal pillow mask  3. Long-term weight loss  4. I renewed supplies for the next year  5. Routine follow-up      Discussed in detail with the patient.  He will call prior to his follow up visit for any new problems.    Signed by  Low Cummings MD

## 2017-11-22 RX ORDER — CITALOPRAM 20 MG/1
TABLET ORAL
Qty: 30 TABLET | Refills: 3 | Status: SHIPPED | OUTPATIENT
Start: 2017-11-22 | End: 2018-01-29 | Stop reason: SDUPTHER

## 2017-12-31 ENCOUNTER — TELEPHONE (OUTPATIENT)
Dept: FAMILY MEDICINE CLINIC | Facility: CLINIC | Age: 57
End: 2017-12-31

## 2017-12-31 RX ORDER — BACLOFEN 20 MG/1
TABLET ORAL
Qty: 60 TABLET | Refills: 0 | Status: SHIPPED | OUTPATIENT
Start: 2017-12-31 | End: 2018-01-29 | Stop reason: SDUPTHER

## 2017-12-31 RX ORDER — ESOMEPRAZOLE MAGNESIUM 40 MG/1
CAPSULE, DELAYED RELEASE ORAL
Qty: 30 CAPSULE | Refills: 0 | Status: SHIPPED | OUTPATIENT
Start: 2017-12-31 | End: 2018-01-29 | Stop reason: SDUPTHER

## 2017-12-31 RX ORDER — AMLODIPINE BESYLATE AND BENAZEPRIL HYDROCHLORIDE 5; 10 MG/1; MG/1
CAPSULE ORAL
Qty: 90 CAPSULE | Refills: 3 | Status: SHIPPED | OUTPATIENT
Start: 2017-12-31 | End: 2018-01-29 | Stop reason: SDUPTHER

## 2017-12-31 RX ORDER — GLYBURIDE 5 MG/1
TABLET ORAL
Qty: 60 TABLET | Refills: 0 | Status: SHIPPED | OUTPATIENT
Start: 2017-12-31 | End: 2018-01-29 | Stop reason: SDUPTHER

## 2017-12-31 NOTE — TELEPHONE ENCOUNTER
----- Message from Dede Hernandez sent at 12/29/2017 12:48 PM EST -----  Regarding: refill  Contact: [phone]  NEEDS REFILL ON metFORMIN (GLUCOPHAGE) 500 MG tablet TAKE TWO TABLETS BY MOUTH TWICE A DAY WITH MEALS, AND, glyBURIDE (DIAbeta) 5 MG tablet TAKE ONE TABLET BY MOUTH TWICE A DAY WITH MEALS, AND baclofen (LIORESAL) 20 MG tablet TAKE ONE TABLET BY MOUTH TWICE A DAY AND, esomeprazole (nexIUM) 40 MG capsule TAKE ONE CAPSULE BY MOUTH EVERY MORNING BEFORE BREAKFAST.    [place] [facility]

## 2018-01-29 ENCOUNTER — OFFICE VISIT (OUTPATIENT)
Dept: FAMILY MEDICINE CLINIC | Facility: CLINIC | Age: 58
End: 2018-01-29

## 2018-01-29 VITALS
OXYGEN SATURATION: 98 % | HEART RATE: 72 BPM | DIASTOLIC BLOOD PRESSURE: 82 MMHG | RESPIRATION RATE: 16 BRPM | SYSTOLIC BLOOD PRESSURE: 136 MMHG

## 2018-01-29 DIAGNOSIS — E78.49 OTHER HYPERLIPIDEMIA: ICD-10-CM

## 2018-01-29 DIAGNOSIS — I10 BENIGN ESSENTIAL HYPERTENSION: ICD-10-CM

## 2018-01-29 DIAGNOSIS — K21.9 GASTROESOPHAGEAL REFLUX DISEASE WITHOUT ESOPHAGITIS: ICD-10-CM

## 2018-01-29 DIAGNOSIS — E11.9 CONTROLLED TYPE 2 DIABETES MELLITUS WITHOUT COMPLICATION, WITHOUT LONG-TERM CURRENT USE OF INSULIN (HCC): ICD-10-CM

## 2018-01-29 DIAGNOSIS — Z00.00 HEALTH CARE MAINTENANCE: Primary | ICD-10-CM

## 2018-01-29 LAB — HBA1C MFR BLD: 7.1 %

## 2018-01-29 PROCEDURE — 36415 COLL VENOUS BLD VENIPUNCTURE: CPT | Performed by: FAMILY MEDICINE

## 2018-01-29 PROCEDURE — 99396 PREV VISIT EST AGE 40-64: CPT | Performed by: FAMILY MEDICINE

## 2018-01-29 PROCEDURE — G0439 PPPS, SUBSEQ VISIT: HCPCS | Performed by: FAMILY MEDICINE

## 2018-01-29 PROCEDURE — 96160 PT-FOCUSED HLTH RISK ASSMT: CPT | Performed by: FAMILY MEDICINE

## 2018-01-29 PROCEDURE — 83036 HEMOGLOBIN GLYCOSYLATED A1C: CPT | Performed by: FAMILY MEDICINE

## 2018-01-29 RX ORDER — GLYBURIDE 5 MG/1
5 TABLET ORAL 2 TIMES DAILY WITH MEALS
Qty: 180 TABLET | Refills: 1 | Status: SHIPPED | OUTPATIENT
Start: 2018-01-29 | End: 2018-08-08 | Stop reason: SDUPTHER

## 2018-01-29 RX ORDER — AMLODIPINE BESYLATE AND BENAZEPRIL HYDROCHLORIDE 5; 10 MG/1; MG/1
1 CAPSULE ORAL DAILY
Qty: 90 CAPSULE | Refills: 1 | Status: SHIPPED | OUTPATIENT
Start: 2018-01-29 | End: 2018-12-07 | Stop reason: SDUPTHER

## 2018-01-29 RX ORDER — ATORVASTATIN CALCIUM 10 MG/1
10 TABLET, FILM COATED ORAL DAILY
Qty: 90 TABLET | Refills: 3 | Status: SHIPPED | OUTPATIENT
Start: 2018-01-29 | End: 2018-12-07 | Stop reason: SDUPTHER

## 2018-01-29 RX ORDER — ESOMEPRAZOLE MAGNESIUM 40 MG/1
40 CAPSULE, DELAYED RELEASE ORAL
Qty: 90 CAPSULE | Refills: 1 | Status: SHIPPED | OUTPATIENT
Start: 2018-01-29 | End: 2018-08-14 | Stop reason: SDUPTHER

## 2018-01-29 RX ORDER — DANTROLENE SODIUM 100 MG/1
100 CAPSULE ORAL
Qty: 180 CAPSULE | Refills: 1 | Status: SHIPPED | OUTPATIENT
Start: 2018-01-29 | End: 2018-08-14 | Stop reason: SDUPTHER

## 2018-01-29 RX ORDER — BACLOFEN 20 MG/1
20 TABLET ORAL
Qty: 180 TABLET | Refills: 1 | Status: SHIPPED | OUTPATIENT
Start: 2018-01-29 | End: 2018-08-14 | Stop reason: SDUPTHER

## 2018-01-29 RX ORDER — CITALOPRAM 20 MG/1
20 TABLET ORAL DAILY
Qty: 90 TABLET | Refills: 1 | Status: ON HOLD | OUTPATIENT
Start: 2018-01-29 | End: 2018-09-24

## 2018-01-29 NOTE — PROGRESS NOTES
QUICK REFERENCE INFORMATION:  The ABCs of the Annual Wellness Visit    Subsequent Medicare Wellness Visit    HEALTH RISK ASSESSMENT    1960    Recent Hospitalizations:  No hospitalization(s) within the last year..        Current Medical Providers:  Patient Care Team:  Nadira Fernandez DO as PCP - General  Urology  Sleep Specialist      Smoking Status:  History   Smoking Status   • Never Smoker   Smokeless Tobacco   • Never Used       Alcohol Consumption:  History   Alcohol Use No       Depression Screen:   PHQ-2/PHQ-9 Depression Screening 1/29/2018   Little interest or pleasure in doing things 0   Feeling down, depressed, or hopeless 0   Total Score 0       Health Habits and Functional and Cognitive Screening:  Functional & Cognitive Status 1/29/2018   Do you have difficulty preparing food and eating? No   Do you have difficulty bathing yourself, getting dressed or grooming yourself? No   Do you have difficulty using the toilet? No   Do you have difficulty moving around from place to place? No   Do you have trouble with steps or getting out of a bed or a chair? No   In the past year have you fallen or experienced a near fall? No   Current Diet Well Balanced Diet   Dental Exam Up to date   Eye Exam Up to date   Exercise (times per week) 2 times per week   Current Exercise Activities Include Cardiovasular Workout on Exercise Equipment   Do you need help using the phone?  No   Are you deaf or do you have serious difficulty hearing?  No   Do you need help with transportation? No   Do you need help shopping? No   Do you need help preparing meals?  No   Do you need help with housework?  No   Do you need help with laundry? No   Do you need help taking your medications? No   Do you need help managing money? No   Have you felt unusual stress, anger or loneliness in the last month? No   Who do you live with? Spouse   If you need help, do you have trouble finding someone available to you? No   Have you been bothered in the  last four weeks by sexual problems? No   Do you have difficulty concentrating, remembering or making decisions? No           Does the patient have evidence of cognitive impairment? No    Aspirin use counseling: Taking ASA appropriately as indicated      Recent Lab Results:  CMP:  Lab Results   Component Value Date    GLU 97 10/30/2017    BUN 12 10/30/2017    CREATININE 0.60 10/30/2017    EGFRIFNONA 140 10/30/2017    EGFRIFAFRI >150 10/30/2017    BCR 20.0 10/30/2017     10/30/2017    K 4.1 10/30/2017    CO2 21.0 10/30/2017    CALCIUM 9.3 10/30/2017    PROTENTOTREF 6.8 10/30/2017    ALBUMIN 4.40 10/30/2017    LABGLOBREF 2.4 10/30/2017    LABIL2 1.8 10/30/2017    BILITOT 0.5 10/30/2017    ALKPHOS 86 10/30/2017    AST 37 (H) 10/30/2017    ALT 63 (H) 10/30/2017     Lipid Panel:  Lab Results   Component Value Date    TRIG 187 (H) 10/30/2017    HDL 33 (L) 10/30/2017    VLDL 37.4 10/30/2017     HbA1c:  Lab Results   Component Value Date    HGBA1C 7.1 01/29/2018       Visual Acuity:   Visual Acuity Screening    Right eye Left eye Both eyes   Without correction:      With correction: 20/20 20/20 20/20   Comments: Per optometry    Hearing Screening Comments: Normal bilat finger rub test    Age-appropriate Screening Schedule:  Refer to the list below for future screening recommendations based on patient's age, sex and/or medical conditions. Orders for these recommended tests are listed in the plan section. The patient has been provided with a written plan.    Health Maintenance   Topic Date Due   • DIABETIC EYE EXAM  01/04/2018   • HEMOGLOBIN A1C  04/30/2018   • URINE MICROALBUMIN  07/24/2018   • LIPID PANEL  10/30/2018   • DIABETIC FOOT EXAM  01/29/2019   • COLONOSCOPY  07/01/2026   • TDAP/TD VACCINES (2 - Td) 07/01/2026   • PNEUMOCOCCAL VACCINE (19-64 MEDIUM RISK)  Completed   • INFLUENZA VACCINE  Completed        Subjective   History of Present Illness    Kiran Belcher is a 57 y.o. male who presents for an  Subsequent Wellness Visit.    The following portions of the patient's history were reviewed and updated as appropriate: allergies, current medications, past family history, past medical history, past social history, past surgical history and problem list.    Outpatient Medications Prior to Visit   Medication Sig Dispense Refill   • fesoterodine fumarate (TOVIAZ) 4 MG tablet sustained-release 24 hour capsule Take  by mouth.     • methenamine (HIPREX) 1 G tablet      • ACCU-CHEK MAX PLUS test strip USE TO TEST BLOOD SUGAR ONCE DAILY 100 each 5   • amLODIPine-benazepril (LOTREL 5-10) 5-10 MG per capsule TAKE ONE CAPSULE BY MOUTH DAILY 90 capsule 3   • atorvastatin (LIPITOR) 10 MG tablet TAKE ONE TABLET BY MOUTH DAILY 90 tablet 3   • baclofen (LIORESAL) 20 MG tablet TAKE ONE TABLET BY MOUTH TWICE A DAY 60 tablet 0   • citalopram (CeleXA) 20 MG tablet TAKE ONE TABLET BY MOUTH DAILY 30 tablet 3   • dantrolene (DANTRIUM) 100 MG capsule TAKE ONE CAPSULE BY MOUTH TWICE A DAY 60 capsule 4   • esomeprazole (nexIUM) 40 MG capsule TAKE ONE CAPSULE BY MOUTH EVERY MORNING BEFORE BREAKFAST 30 capsule 0   • glyBURIDE (DIAbeta) 5 MG tablet TAKE ONE TABLET BY MOUTH TWICE A DAY WITH MEALS 60 tablet 0   • metFORMIN (GLUCOPHAGE) 500 MG tablet TAKE TWO TABLETS BY MOUTH TWICE A DAY WITH MEALS 120 tablet 0     No facility-administered medications prior to visit.        Patient Active Problem List   Diagnosis   • Benign essential hypertension   • Depression   • Gastroesophageal reflux disease without esophagitis   • Hyperlipidemia   • NISA on CPAP   • Uncontrolled type 2 diabetes mellitus   • Spasm   • Obesity       Advance Care Planning:  has NO advance directive - not interested in additional information    Identification of Risk Factors:  Risk factors include: weight , unhealthy diet, cardiovascular risk, inactivity, increased fall risk and polypharmacy.    Review of Systems   Constitutional: Negative.    HENT: Negative.    Eyes:  Negative.    Respiratory: Negative.    Cardiovascular: Negative.    Gastrointestinal: Negative.    Endocrine: Negative.    Genitourinary: Negative.    Musculoskeletal: Negative.    Skin: Negative.    Allergic/Immunologic: Negative.    Neurological: Negative.    Hematological: Negative.    Psychiatric/Behavioral: Negative.    All other systems reviewed and are negative.      Compared to one year ago, the patient feels his physical health is the same.  Compared to one year ago, the patient feels his mental health is the same.    Objective     Physical Exam   Constitutional: He is oriented to person, place, and time. He appears well-developed and well-nourished. No distress.   HENT:   Right Ear: External ear normal.   Left Ear: External ear normal.   Nose: Nose normal.   Mouth/Throat: Oropharynx is clear and moist.   Eyes: Conjunctivae and EOM are normal. Pupils are equal, round, and reactive to light.   Neck: Normal range of motion. Neck supple. No thyromegaly present.   Cardiovascular: Normal rate, regular rhythm and normal heart sounds.    No murmur heard.  Pulmonary/Chest: Effort normal and breath sounds normal. No respiratory distress. He has no wheezes.   Abdominal: Soft. Bowel sounds are normal. He exhibits no distension and no mass. There is no tenderness. There is no rebound and no guarding. No hernia.   Musculoskeletal: Normal range of motion. He exhibits no edema or tenderness.    Kiran had a diabetic foot exam performed today.   During the foot exam he had a monofilament test performed.    Neurological Sensory Findings - Unaltered hot/cold right ankle/foot discrimination and unaltered hot/cold left ankle/foot discrimination. Unaltered sharp/dull right ankle/foot discrimination and unaltered sharp/dull left ankle/foot discrimination. No right ankle/foot altered proprioception and no left ankle/foot altered proprioception    Vascular Status -  His exam exhibits no right foot edema.Right foot vasculature  normal: normal. His exam exhibits no left foot edema.Left foot vasculature normal: normal.  Lymphadenopathy:     He has no cervical adenopathy.   Neurological: He is alert and oriented to person, place, and time. He has normal reflexes.   Skin: Skin is warm and dry. No rash noted. He is not diaphoretic. No erythema. No pallor.   Psychiatric: He has a normal mood and affect. His behavior is normal. Judgment and thought content normal.   Nursing note and vitals reviewed.      Vitals:    01/29/18 1305   BP: 136/82   Pulse: 72   Resp: 16   SpO2: 98%   PainSc: 0-No pain       There is no height or weight on file to calculate BMI.  Discussed the patient's BMI with him. BMI is above normal parameters. Follow-up plan includes:  nutrition counseling.    Assessment/Plan   Patient Self-Management and Personalized Health Advice  The patient has been provided with information about: diet, exercise, weight management, prevention of cardiac or vascular disease and fall prevention and preventive services including:   · Diabetes screening, see lab orders, Fall Risk assessment done, Nutrition counseling provided.    Visit Diagnoses:    ICD-10-CM ICD-9-CM   1. Health care maintenance Z00.00 V70.0   2. Controlled type 2 diabetes mellitus without complication, without long-term current use of insulin E11.9 250.00   3. Benign essential hypertension I10 401.1   4. Other hyperlipidemia E78.4 272.4   5. Gastroesophageal reflux disease without esophagitis K21.9 530.81       Orders Placed This Encounter   Procedures   • Comprehensive Metabolic Panel   • Lipid Panel   • TSH   • POC Glycosylated Hemoglobin (Hb A1C)   • CBC & Differential     Order Specific Question:   Manual Differential     Answer:   No       Outpatient Encounter Prescriptions as of 1/29/2018   Medication Sig Dispense Refill   • amLODIPine-benazepril (LOTREL 5-10) 5-10 MG per capsule Take 1 capsule by mouth Daily. 90 capsule 1   • atorvastatin (LIPITOR) 10 MG tablet Take 1  tablet by mouth Daily. 90 tablet 3   • baclofen (LIORESAL) 20 MG tablet Take 1 tablet by mouth 2 (Two) Times a Day. 180 tablet 1   • citalopram (CeleXA) 20 MG tablet Take 1 tablet by mouth Daily. 90 tablet 1   • dantrolene (DANTRIUM) 100 MG capsule Take 1 capsule by mouth 2 (Two) Times a Day. 180 capsule 1   • esomeprazole (nexIUM) 40 MG capsule Take 1 capsule by mouth Every Morning Before Breakfast. 90 capsule 1   • fesoterodine fumarate (TOVIAZ) 4 MG tablet sustained-release 24 hour capsule Take  by mouth.     • glucose blood (ACCU-CHEK MAX PLUS) test strip 1 each by Other route 2 (Two) Times a Day. Use as instructed 200 each 3   • glyBURIDE (DIAbeta) 5 MG tablet Take 1 tablet by mouth 2 (Two) Times a Day With Meals. 180 tablet 1   • metFORMIN (GLUCOPHAGE) 500 MG tablet Take 2 tabs bid 360 tablet 1   • methenamine (HIPREX) 1 G tablet      • [DISCONTINUED] ACCU-CHEK MAX PLUS test strip USE TO TEST BLOOD SUGAR ONCE DAILY 100 each 5   • [DISCONTINUED] amLODIPine-benazepril (LOTREL 5-10) 5-10 MG per capsule TAKE ONE CAPSULE BY MOUTH DAILY 90 capsule 3   • [DISCONTINUED] atorvastatin (LIPITOR) 10 MG tablet TAKE ONE TABLET BY MOUTH DAILY 90 tablet 3   • [DISCONTINUED] baclofen (LIORESAL) 20 MG tablet TAKE ONE TABLET BY MOUTH TWICE A DAY 60 tablet 0   • [DISCONTINUED] citalopram (CeleXA) 20 MG tablet TAKE ONE TABLET BY MOUTH DAILY 30 tablet 3   • [DISCONTINUED] dantrolene (DANTRIUM) 100 MG capsule TAKE ONE CAPSULE BY MOUTH TWICE A DAY 60 capsule 4   • [DISCONTINUED] esomeprazole (nexIUM) 40 MG capsule TAKE ONE CAPSULE BY MOUTH EVERY MORNING BEFORE BREAKFAST 30 capsule 0   • [DISCONTINUED] glyBURIDE (DIAbeta) 5 MG tablet TAKE ONE TABLET BY MOUTH TWICE A DAY WITH MEALS 60 tablet 0   • [DISCONTINUED] metFORMIN (GLUCOPHAGE) 500 MG tablet TAKE TWO TABLETS BY MOUTH TWICE A DAY WITH MEALS 120 tablet 0     No facility-administered encounter medications on file as of 1/29/2018.        Reviewed use of high risk medication in  the elderly: yes  Reviewed for potential of harmful drug interactions in the elderly: yes    Follow Up:  Return in about 4 months (around 5/29/2018).     An After Visit Summary and PPPS with all of these plans were given to the patient.

## 2018-01-30 LAB
ALBUMIN SERPL-MCNC: 4.3 G/DL (ref 3.2–4.8)
ALBUMIN/GLOB SERPL: 1.7 G/DL (ref 1.5–2.5)
ALP SERPL-CCNC: 84 U/L (ref 25–100)
ALT SERPL-CCNC: 80 U/L (ref 7–40)
AST SERPL-CCNC: 56 U/L (ref 0–33)
BASOPHILS # BLD AUTO: 0.02 10*3/MM3 (ref 0–0.2)
BASOPHILS NFR BLD AUTO: 0.2 % (ref 0–1)
BILIRUB SERPL-MCNC: 0.5 MG/DL (ref 0.3–1.2)
BUN SERPL-MCNC: 12 MG/DL (ref 9–23)
BUN/CREAT SERPL: 20 (ref 7–25)
CALCIUM SERPL-MCNC: 9.2 MG/DL (ref 8.7–10.4)
CHLORIDE SERPL-SCNC: 98 MMOL/L (ref 99–109)
CHOLEST SERPL-MCNC: 159 MG/DL (ref 0–200)
CO2 SERPL-SCNC: 27 MMOL/L (ref 20–31)
CREAT SERPL-MCNC: 0.6 MG/DL (ref 0.6–1.3)
EOSINOPHIL # BLD AUTO: 0.46 10*3/MM3 (ref 0–0.3)
EOSINOPHIL NFR BLD AUTO: 5.2 % (ref 0–3)
ERYTHROCYTE [DISTWIDTH] IN BLOOD BY AUTOMATED COUNT: 13.3 % (ref 11.3–14.5)
GFR SERPLBLD CREATININE-BSD FMLA CKD-EPI: 140 ML/MIN/1.73
GFR SERPLBLD CREATININE-BSD FMLA CKD-EPI: >150 ML/MIN/1.73
GLOBULIN SER CALC-MCNC: 2.6 GM/DL
GLUCOSE SERPL-MCNC: 100 MG/DL (ref 70–100)
HCT VFR BLD AUTO: 41 % (ref 38.9–50.9)
HDLC SERPL-MCNC: 32 MG/DL (ref 40–60)
HGB BLD-MCNC: 13.6 G/DL (ref 13.1–17.5)
IMM GRANULOCYTES # BLD: 0.02 10*3/MM3 (ref 0–0.03)
IMM GRANULOCYTES NFR BLD: 0.2 % (ref 0–0.6)
LDLC SERPL CALC-MCNC: 85 MG/DL (ref 0–100)
LYMPHOCYTES # BLD AUTO: 1.96 10*3/MM3 (ref 0.6–4.8)
LYMPHOCYTES NFR BLD AUTO: 22.3 % (ref 24–44)
MCH RBC QN AUTO: 31.3 PG (ref 27–31)
MCHC RBC AUTO-ENTMCNC: 33.2 G/DL (ref 32–36)
MCV RBC AUTO: 94.5 FL (ref 80–99)
MONOCYTES # BLD AUTO: 0.48 10*3/MM3 (ref 0–1)
MONOCYTES NFR BLD AUTO: 5.5 % (ref 0–12)
NEUTROPHILS # BLD AUTO: 5.84 10*3/MM3 (ref 1.5–8.3)
NEUTROPHILS NFR BLD AUTO: 66.6 % (ref 41–71)
PLATELET # BLD AUTO: 262 10*3/MM3 (ref 150–450)
POTASSIUM SERPL-SCNC: 4 MMOL/L (ref 3.5–5.5)
PROT SERPL-MCNC: 6.9 G/DL (ref 5.7–8.2)
RBC # BLD AUTO: 4.34 10*6/MM3 (ref 4.2–5.76)
SODIUM SERPL-SCNC: 136 MMOL/L (ref 132–146)
TRIGL SERPL-MCNC: 208 MG/DL (ref 0–150)
TSH SERPL DL<=0.005 MIU/L-ACNC: 0.93 MIU/ML (ref 0.35–5.35)
VLDLC SERPL CALC-MCNC: 41.6 MG/DL
WBC # BLD AUTO: 8.78 10*3/MM3 (ref 3.5–10.8)

## 2018-04-05 ENCOUNTER — OFFICE VISIT (OUTPATIENT)
Dept: FAMILY MEDICINE CLINIC | Facility: CLINIC | Age: 58
End: 2018-04-05

## 2018-04-05 VITALS
HEIGHT: 70 IN | DIASTOLIC BLOOD PRESSURE: 80 MMHG | BODY MASS INDEX: 38.94 KG/M2 | RESPIRATION RATE: 16 BRPM | OXYGEN SATURATION: 97 % | HEART RATE: 102 BPM | TEMPERATURE: 97.8 F | SYSTOLIC BLOOD PRESSURE: 124 MMHG | WEIGHT: 272 LBS

## 2018-04-05 DIAGNOSIS — J01.00 ACUTE MAXILLARY SINUSITIS, RECURRENCE NOT SPECIFIED: Primary | ICD-10-CM

## 2018-04-05 PROCEDURE — 99213 OFFICE O/P EST LOW 20 MIN: CPT | Performed by: NURSE PRACTITIONER

## 2018-04-05 RX ORDER — AMOXICILLIN 500 MG/1
500 TABLET, FILM COATED ORAL 2 TIMES DAILY
Qty: 20 TABLET | Refills: 0 | Status: SHIPPED | OUTPATIENT
Start: 2018-04-05 | End: 2018-05-17

## 2018-04-05 NOTE — PROGRESS NOTES
Subjective   Kiran Belcher is a 57 y.o. male.     History of Present Illness 4 day history of cough, nasal drainage, sore throat. No fever, sob, some wheezing. Treated with Robitussin and anti-histamine. Wife is sick too. He always gets bronchitis if he gets a head cold.    The following portions of the patient's history were reviewed and updated as appropriate: allergies, current medications, past family history, past medical history, past social history, past surgical history and problem list.    Review of Systems   Constitutional: Negative for appetite change, fever and unexpected weight loss.   HENT: Positive for congestion, postnasal drip, rhinorrhea, sinus pressure, sneezing and sore throat. Negative for nosebleeds and trouble swallowing.    Eyes: Negative for visual disturbance.   Respiratory: Positive for cough. Negative for shortness of breath and wheezing.    Cardiovascular: Negative for chest pain, palpitations and leg swelling.   Gastrointestinal: Negative for abdominal pain, blood in stool, constipation, diarrhea, nausea and vomiting.   Endocrine: Negative for polydipsia, polyphagia and polyuria.   Genitourinary: Negative for urinary incontinence, dysuria, frequency and hematuria.   Musculoskeletal: Negative for arthralgias, gait problem, joint swelling and myalgias.   Skin: Negative for rash.   Neurological: Negative for dizziness, seizures, syncope, numbness and headaches.   Hematological: Negative for adenopathy. Does not bruise/bleed easily.   Psychiatric/Behavioral: Negative for sleep disturbance and depressed mood. The patient is not nervous/anxious.        Objective   Physical Exam   Constitutional: He is oriented to person, place, and time. He appears well-developed and well-nourished. No distress.   HENT:   Head: Normocephalic and atraumatic.   Right Ear: Tympanic membrane and external ear normal.   Left Ear: Tympanic membrane and external ear normal.   Nose: Nose normal.   Mouth/Throat:  Oropharynx is clear and moist. No oropharyngeal exudate.   Eyes: Conjunctivae are normal. Pupils are equal, round, and reactive to light. Right eye exhibits no discharge. Left eye exhibits no discharge. No scleral icterus.   Neck: Neck supple. No tracheal deviation present. No thyromegaly present.   Cardiovascular: Normal rate, regular rhythm and normal heart sounds.  Exam reveals no gallop and no friction rub.    No murmur heard.  Pulmonary/Chest: Effort normal. No respiratory distress. He has no wheezes.   Decreased breath sounds and effort bilat.   Abdominal: Soft. Bowel sounds are normal. He exhibits no distension and no mass. There is no tenderness.   Musculoskeletal: He exhibits no edema or deformity.   Lymphadenopathy:     He has no cervical adenopathy.   Neurological: He is alert and oriented to person, place, and time. Coordination normal.   Skin: Skin is warm and dry. Capillary refill takes less than 2 seconds. No rash noted. No erythema.   Psychiatric: He has a normal mood and affect. His speech is normal and behavior is normal. Judgment and thought content normal.   Nursing note and vitals reviewed.        Assessment/Plan   Kiran was seen today for cough and nasal congestion.    Diagnoses and all orders for this visit:    Acute maxillary sinusitis, recurrence not specified  -     amoxicillin (AMOXIL) 500 MG tablet; Take 1 tablet by mouth 2 (Two) Times a Day.    Discussed the nature of the disease including, risks, complications, implications, management, safe and proper use of medications. Encouraged therapeutic lifestyle changes including low calorie diet with plenty of fruits and vegetables, daily exercise, medication compliance, and keeping scheduled follow up appointments with me and any other providers. Encouraged patient to have appointment for complete physical, fasting labs, appropriate screenings, and immunizations on an annual basis.  Follow up symptoms persist or worsen.

## 2018-05-17 ENCOUNTER — OFFICE VISIT (OUTPATIENT)
Dept: FAMILY MEDICINE CLINIC | Facility: CLINIC | Age: 58
End: 2018-05-17

## 2018-05-17 VITALS
DIASTOLIC BLOOD PRESSURE: 84 MMHG | HEIGHT: 70 IN | SYSTOLIC BLOOD PRESSURE: 120 MMHG | WEIGHT: 270 LBS | TEMPERATURE: 98.6 F | BODY MASS INDEX: 38.65 KG/M2 | OXYGEN SATURATION: 97 % | HEART RATE: 71 BPM | RESPIRATION RATE: 18 BRPM

## 2018-05-17 DIAGNOSIS — H61.21 CERUMEN DEBRIS ON TYMPANIC MEMBRANE, RIGHT: Primary | ICD-10-CM

## 2018-05-17 PROCEDURE — 99213 OFFICE O/P EST LOW 20 MIN: CPT | Performed by: NURSE PRACTITIONER

## 2018-05-17 PROCEDURE — 69210 REMOVE IMPACTED EAR WAX UNI: CPT | Performed by: NURSE PRACTITIONER

## 2018-05-17 NOTE — PROGRESS NOTES
"Subjective   Kiran Belcher is a 57 y.o. male.   Chief Complaint   Patient presents with   • Earache      History of Present Illness   Right ear has been stopped up for 3-4 weeks. Have been using peroxide to irrigate. Decreased hearing in right ear.     The following portions of the patient's history were reviewed and updated as appropriate: allergies, current medications, past family history, past medical history, past social history, past surgical history and problem list.    Review of Systems   Constitutional: Negative.    HENT: Positive for hearing loss.         Hearing loss x 3-4 weeks.    Eyes: Negative.    Gastrointestinal: Negative.    Neurological: Negative.        Objective   No Known Allergies  Visit Vitals  /84   Pulse 71   Temp 98.6 °F (37 °C) (Tympanic)   Resp 18   Ht 177.8 cm (70\")   Wt 122 kg (270 lb)   SpO2 97%   BMI 38.74 kg/m²       Physical Exam   Constitutional: He is oriented to person, place, and time. He appears well-developed and well-nourished.   HENT:   Right Ear: Decreased hearing is noted.   Right ear - hearing loss   Used [employer] ear wash system.   Removed small amount of cerumen with a cerumen spoon.   TM intact.   Patient tolerated procedure well.   Reports able to hear better in the right ear.      Cardiovascular: Normal rate and regular rhythm.    Pulmonary/Chest: Effort normal and breath sounds normal.   Neurological: He is alert and oriented to person, place, and time.   Skin: Skin is warm. Capillary refill takes less than 2 seconds.   Psychiatric: He has a normal mood and affect. His behavior is normal.   Vitals reviewed.      Assessment/Plan   Kiran was seen today for earache.    Diagnoses and all orders for this visit:    Cerumen debris on tympanic membrane, right    removed cerumen to right.   Follow up as needed.       See cerumen instructions.          Patient Instructions   Earwax Buildup  Your ears make a substance called earwax. It may also be called cerumen. " Sometimes, too much earwax builds up in your ear canal. This can cause ear pain and make it harder for you to hear.  CAUSES  This condition is caused by too much earwax production or buildup.  RISK FACTORS  The following factors may make you more likely to develop this condition:  · Cleaning your ears often with swabs.  · Having narrow ear canals.  · Having earwax that is overly thick or sticky.  · Having eczema.  · Being dehydrated.  SYMPTOMS  Symptoms of this condition include:  · Reduced hearing.  · Ear drainage.  · Ear pain.  · Ear itch.  · A feeling of fullness in the ear or feeling that the ear is plugged.  · Ringing in the ear.  · Coughing.  DIAGNOSIS  Your health care provider can diagnose this condition based on your symptoms and medical history. Your health care provider will also do an ear exam to look inside your ear with a scope (otoscope). You may also have a hearing test.  TREATMENT  Treatment for this condition includes:  · Over-the-counter or prescription ear drops to soften the earwax.  · Earwax removal by a health care provider. This may be done:  ¨ By flushing the ear with body-temperature water.  ¨ With a medical instrument that has a loop at the end (earwax curette).  ¨ With a suction device.  HOME CARE INSTRUCTIONS  · Take over-the-counter and prescription medicines only as told by your health care provider.  · Do not put any objects, including an ear swab, into your ear. You can clean the opening of your ear canal with a washcloth.  · Drink enough water to keep your urine clear or pale yellow.  · If you have frequent earwax buildup or you use hearing aids, consider seeing your health care provider every 6-12 months for routine preventive ear cleanings. Keep all follow-up visits as told by your health care provider.  SEEK MEDICAL CARE IF:  · You have ear pain.  · Your condition does not improve with treatment.  · You have hearing loss.  · You have blood, pus, or other fluid coming from your  ear.  This information is not intended to replace advice given to you by your health care provider. Make sure you discuss any questions you have with your health care provider.  Document Released: 01/25/2006 Document Revised: 04/10/2017 Document Reviewed: 08/03/2016  [employer] Interactive Patient Education © 2017 [employer]        Sun Vasquez, APRN

## 2018-05-17 NOTE — PATIENT INSTRUCTIONS
Earwax Buildup  Your ears make a substance called earwax. It may also be called cerumen. Sometimes, too much earwax builds up in your ear canal. This can cause ear pain and make it harder for you to hear.  CAUSES  This condition is caused by too much earwax production or buildup.  RISK FACTORS  The following factors may make you more likely to develop this condition:  · Cleaning your ears often with swabs.  · Having narrow ear canals.  · Having earwax that is overly thick or sticky.  · Having eczema.  · Being dehydrated.  SYMPTOMS  Symptoms of this condition include:  · Reduced hearing.  · Ear drainage.  · Ear pain.  · Ear itch.  · A feeling of fullness in the ear or feeling that the ear is plugged.  · Ringing in the ear.  · Coughing.  DIAGNOSIS  Your health care provider can diagnose this condition based on your symptoms and medical history. Your health care provider will also do an ear exam to look inside your ear with a scope (otoscope). You may also have a hearing test.  TREATMENT  Treatment for this condition includes:  · Over-the-counter or prescription ear drops to soften the earwax.  · Earwax removal by a health care provider. This may be done:  ¨ By flushing the ear with body-temperature water.  ¨ With a medical instrument that has a loop at the end (earwax curette).  ¨ With a suction device.  HOME CARE INSTRUCTIONS  · Take over-the-counter and prescription medicines only as told by your health care provider.  · Do not put any objects, including an ear swab, into your ear. You can clean the opening of your ear canal with a washcloth.  · Drink enough water to keep your urine clear or pale yellow.  · If you have frequent earwax buildup or you use hearing aids, consider seeing your health care provider every 6-12 months for routine preventive ear cleanings. Keep all follow-up visits as told by your health care provider.  SEEK MEDICAL CARE IF:  · You have ear pain.  · Your condition does not improve with  treatment.  · You have hearing loss.  · You have blood, pus, or other fluid coming from your ear.  This information is not intended to replace advice given to you by your health care provider. Make sure you discuss any questions you have with your health care provider.  Document Released: 01/25/2006 Document Revised: 04/10/2017 Document Reviewed: 08/03/2016  [employer] Interactive Patient Education © 2017 [employer]

## 2018-05-22 DIAGNOSIS — H61.23 BILATERAL IMPACTED CERUMEN: Primary | ICD-10-CM

## 2018-05-30 ENCOUNTER — OFFICE VISIT (OUTPATIENT)
Dept: FAMILY MEDICINE CLINIC | Facility: CLINIC | Age: 58
End: 2018-05-30

## 2018-05-30 VITALS
RESPIRATION RATE: 16 BRPM | SYSTOLIC BLOOD PRESSURE: 126 MMHG | BODY MASS INDEX: 38.6 KG/M2 | TEMPERATURE: 97.6 F | OXYGEN SATURATION: 98 % | DIASTOLIC BLOOD PRESSURE: 82 MMHG | WEIGHT: 269 LBS | HEART RATE: 82 BPM

## 2018-05-30 DIAGNOSIS — E11.9 TYPE 2 DIABETES MELLITUS WITHOUT COMPLICATION, WITHOUT LONG-TERM CURRENT USE OF INSULIN (HCC): Primary | ICD-10-CM

## 2018-05-30 DIAGNOSIS — E78.2 MIXED HYPERLIPIDEMIA: ICD-10-CM

## 2018-05-30 DIAGNOSIS — I10 BENIGN ESSENTIAL HYPERTENSION: ICD-10-CM

## 2018-05-30 LAB — HBA1C MFR BLD: 7 %

## 2018-05-30 PROCEDURE — 83036 HEMOGLOBIN GLYCOSYLATED A1C: CPT | Performed by: FAMILY MEDICINE

## 2018-05-30 PROCEDURE — 99214 OFFICE O/P EST MOD 30 MIN: CPT | Performed by: FAMILY MEDICINE

## 2018-05-30 PROCEDURE — 36415 COLL VENOUS BLD VENIPUNCTURE: CPT | Performed by: FAMILY MEDICINE

## 2018-05-30 NOTE — PROGRESS NOTES
Subjective   Kiran Belcher is a 57 y.o. male.     Diabetes   He presents for his follow-up diabetic visit. He has type 2 diabetes mellitus. His disease course has been stable. There are no hypoglycemic associated symptoms. Pertinent negatives for hypoglycemia include no dizziness, mood changes or nervousness/anxiousness. Pertinent negatives for diabetes include no blurred vision, no chest pain, no fatigue, no foot paresthesias, no polydipsia, no polyphagia, no polyuria, no visual change and no weakness. There are no hypoglycemic complications. Symptoms are stable. There are no diabetic complications. Risk factors for coronary artery disease include diabetes mellitus, dyslipidemia, obesity, male sex, hypertension and family history. Current diabetic treatment includes oral agent (dual therapy). He is compliant with treatment all of the time. His weight is stable. He is following a generally healthy diet. When asked about meal planning, he reported none. He has not had a previous visit with a dietitian. He participates in exercise intermittently. There is no change in his home blood glucose trend. An ACE inhibitor/angiotensin II receptor blocker is being taken. He does not see a podiatrist.Eye exam is not current.   Hypertension   This is a chronic problem. The current episode started more than 1 year ago. The problem is unchanged. The problem is controlled. Pertinent negatives include no anxiety, blurred vision, chest pain, malaise/fatigue, neck pain, palpitations, peripheral edema, PND or shortness of breath. There are no associated agents to hypertension. Risk factors for coronary artery disease include diabetes mellitus, dyslipidemia, family history, obesity, male gender and sedentary lifestyle. Current antihypertension treatment includes ACE inhibitors and calcium channel blockers. The current treatment provides significant improvement. There are no compliance problems.    Hyperlipidemia   This is a chronic  problem. The current episode started more than 1 year ago. The problem is controlled. Recent lipid tests were reviewed and are normal. Exacerbating diseases include diabetes. There are no known factors aggravating his hyperlipidemia. Pertinent negatives include no chest pain, focal weakness, leg pain, myalgias or shortness of breath. Current antihyperlipidemic treatment includes statins. The current treatment provides significant improvement of lipids. There are no compliance problems.  Risk factors for coronary artery disease include diabetes mellitus, dyslipidemia, family history, obesity, male sex and hypertension.        The following portions of the patient's history were reviewed and updated as appropriate: allergies, current medications, past family history, past medical history, past social history, past surgical history and problem list.    Review of Systems   Constitutional: Negative.  Negative for fatigue and malaise/fatigue.   HENT: Negative.    Eyes: Negative.  Negative for blurred vision, double vision and visual disturbance.   Respiratory: Negative.  Negative for cough, chest tightness, shortness of breath and wheezing.    Cardiovascular: Negative.  Negative for chest pain, palpitations and PND.   Gastrointestinal: Negative.  Negative for abdominal distention, abdominal pain, blood in stool, constipation, diarrhea and nausea.   Endocrine: Negative.  Negative for cold intolerance, heat intolerance, polydipsia, polyphagia and polyuria.   Genitourinary: Negative.  Negative for urinary incontinence, dysuria, frequency and urgency.   Musculoskeletal: Negative.  Negative for myalgias and neck pain.   Skin: Negative.  Negative for rash.   Allergic/Immunologic: Negative.    Neurological: Negative.  Negative for dizziness, focal weakness and weakness.   Hematological: Negative.  Negative for adenopathy.   Psychiatric/Behavioral: Negative.  Negative for suicidal ideas and depressed mood. The patient is not  nervous/anxious.    All other systems reviewed and are negative.      Objective   Physical Exam   Constitutional: He is oriented to person, place, and time. He appears well-developed and well-nourished.   HENT:   Head: Normocephalic.   Right Ear: External ear normal.   Left Ear: External ear normal.   Nose: Nose normal.   Mouth/Throat: Oropharynx is clear and moist. No oropharyngeal exudate.   Eyes: Conjunctivae and EOM are normal. Pupils are equal, round, and reactive to light.   Neck: Normal range of motion. Neck supple. No thyromegaly present.   Cardiovascular: Normal rate, regular rhythm, normal heart sounds and intact distal pulses.    No murmur heard.  Pulmonary/Chest: Effort normal and breath sounds normal. No respiratory distress. He exhibits no tenderness.   Abdominal: Soft. Bowel sounds are normal. He exhibits no distension and no mass. There is no tenderness. There is no rebound and no guarding.   Musculoskeletal: Normal range of motion.   Lymphadenopathy:     He has no cervical adenopathy.   Neurological: He is alert and oriented to person, place, and time. He has normal reflexes. He displays normal reflexes. He exhibits normal muscle tone. Coordination normal.   Skin: Skin is warm and dry. No rash noted. He is not diaphoretic. No erythema.   Psychiatric: He has a normal mood and affect. His behavior is normal. Judgment and thought content normal.   Nursing note and vitals reviewed.        Assessment/Plan   Kiran was seen today for diabetes, hypertension and hyperlipidemia.    Diagnoses and all orders for this visit:    Type 2 diabetes mellitus without complication, without long-term current use of insulin  -     POC Glycosylated Hemoglobin (Hb A1C)  -     CBC & Differential  -     Comprehensive Metabolic Panel  -     Lipid Panel    Benign essential hypertension    Mixed hyperlipidemia    Discussed the nature of the disease including, risks, complications, implications, management, safe and proper use  of medications. Encouraged therapeutic lifestyle changes including low calorie diet with plenty of fruits and vegetables, daily exercise, medication compliance, and keeping scheduled follow up appointments with me and any other providers. Encouraged patient to have appointment for complete physical, fasting labs, appropriate screenings, and immunizations on an annual basis.  I personally spent over half of a total 25 minutes face to face with the patient in counseling and discussion and/or coordination of care as described above.

## 2018-05-31 LAB
ALBUMIN SERPL-MCNC: 4.4 G/DL (ref 3.2–4.8)
ALBUMIN/GLOB SERPL: 1.6 G/DL (ref 1.5–2.5)
ALP SERPL-CCNC: 79 U/L (ref 25–100)
ALT SERPL-CCNC: 86 U/L (ref 7–40)
AST SERPL-CCNC: 58 U/L (ref 0–33)
BASOPHILS # BLD AUTO: 0.01 10*3/MM3 (ref 0–0.2)
BASOPHILS NFR BLD AUTO: 0.1 % (ref 0–1)
BILIRUB SERPL-MCNC: 0.6 MG/DL (ref 0.3–1.2)
BUN SERPL-MCNC: 13 MG/DL (ref 9–23)
BUN/CREAT SERPL: 18.6 (ref 7–25)
CALCIUM SERPL-MCNC: 9.3 MG/DL (ref 8.7–10.4)
CHLORIDE SERPL-SCNC: 101 MMOL/L (ref 99–109)
CHOLEST SERPL-MCNC: 154 MG/DL (ref 0–200)
CO2 SERPL-SCNC: 31 MMOL/L (ref 20–31)
CREAT SERPL-MCNC: 0.7 MG/DL (ref 0.6–1.3)
EOSINOPHIL # BLD AUTO: 0.23 10*3/MM3 (ref 0–0.3)
EOSINOPHIL NFR BLD AUTO: 3 % (ref 0–3)
ERYTHROCYTE [DISTWIDTH] IN BLOOD BY AUTOMATED COUNT: 13.3 % (ref 11.3–14.5)
GFR SERPLBLD CREATININE-BSD FMLA CKD-EPI: 117 ML/MIN/1.73
GFR SERPLBLD CREATININE-BSD FMLA CKD-EPI: 142 ML/MIN/1.73
GLOBULIN SER CALC-MCNC: 2.7 GM/DL
GLUCOSE SERPL-MCNC: 116 MG/DL (ref 70–100)
HCT VFR BLD AUTO: 39.9 % (ref 38.9–50.9)
HDLC SERPL-MCNC: 32 MG/DL (ref 40–60)
HGB BLD-MCNC: 13.1 G/DL (ref 13.1–17.5)
IMM GRANULOCYTES # BLD: 0.01 10*3/MM3 (ref 0–0.03)
IMM GRANULOCYTES NFR BLD: 0.1 % (ref 0–0.6)
LDLC SERPL CALC-MCNC: 84 MG/DL (ref 0–100)
LYMPHOCYTES # BLD AUTO: 1.94 10*3/MM3 (ref 0.6–4.8)
LYMPHOCYTES NFR BLD AUTO: 25.3 % (ref 24–44)
MCH RBC QN AUTO: 31 PG (ref 27–31)
MCHC RBC AUTO-ENTMCNC: 32.8 G/DL (ref 32–36)
MCV RBC AUTO: 94.3 FL (ref 80–99)
MONOCYTES # BLD AUTO: 0.48 10*3/MM3 (ref 0–1)
MONOCYTES NFR BLD AUTO: 6.3 % (ref 0–12)
NEUTROPHILS # BLD AUTO: 5.01 10*3/MM3 (ref 1.5–8.3)
NEUTROPHILS NFR BLD AUTO: 65.2 % (ref 41–71)
PLATELET # BLD AUTO: 258 10*3/MM3 (ref 150–450)
POTASSIUM SERPL-SCNC: 4.2 MMOL/L (ref 3.5–5.5)
PROT SERPL-MCNC: 7.1 G/DL (ref 5.7–8.2)
RBC # BLD AUTO: 4.23 10*6/MM3 (ref 4.2–5.76)
SODIUM SERPL-SCNC: 139 MMOL/L (ref 132–146)
TRIGL SERPL-MCNC: 190 MG/DL (ref 0–150)
VLDLC SERPL CALC-MCNC: 38 MG/DL
WBC # BLD AUTO: 7.68 10*3/MM3 (ref 3.5–10.8)

## 2018-08-09 RX ORDER — GLYBURIDE 5 MG/1
TABLET ORAL
Qty: 180 TABLET | Refills: 0 | Status: SHIPPED | OUTPATIENT
Start: 2018-08-09 | End: 2018-08-14 | Stop reason: SDUPTHER

## 2018-08-15 RX ORDER — ESOMEPRAZOLE MAGNESIUM 40 MG/1
CAPSULE, DELAYED RELEASE ORAL
Qty: 90 CAPSULE | Refills: 0 | Status: SHIPPED | OUTPATIENT
Start: 2018-08-15 | End: 2018-12-07 | Stop reason: SDUPTHER

## 2018-08-15 RX ORDER — GLYBURIDE 5 MG/1
TABLET ORAL
Qty: 180 TABLET | Refills: 0 | Status: SHIPPED | OUTPATIENT
Start: 2018-08-15 | End: 2019-04-12 | Stop reason: SDUPTHER

## 2018-08-15 RX ORDER — DANTROLENE SODIUM 100 MG/1
CAPSULE ORAL
Qty: 180 CAPSULE | Refills: 0 | Status: SHIPPED | OUTPATIENT
Start: 2018-08-15 | End: 2018-12-07 | Stop reason: SDUPTHER

## 2018-08-15 RX ORDER — BACLOFEN 20 MG/1
TABLET ORAL
Qty: 180 TABLET | Refills: 0 | Status: SHIPPED | OUTPATIENT
Start: 2018-08-15 | End: 2018-12-07

## 2018-09-12 ENCOUNTER — OFFICE VISIT (OUTPATIENT)
Dept: FAMILY MEDICINE CLINIC | Facility: CLINIC | Age: 58
End: 2018-09-12

## 2018-09-12 VITALS
HEART RATE: 111 BPM | HEIGHT: 70 IN | SYSTOLIC BLOOD PRESSURE: 154 MMHG | DIASTOLIC BLOOD PRESSURE: 72 MMHG | RESPIRATION RATE: 18 BRPM | OXYGEN SATURATION: 95 % | BODY MASS INDEX: 38.65 KG/M2 | WEIGHT: 270 LBS | TEMPERATURE: 100.2 F

## 2018-09-12 DIAGNOSIS — H65.01 RIGHT ACUTE SEROUS OTITIS MEDIA, RECURRENCE NOT SPECIFIED: Primary | ICD-10-CM

## 2018-09-12 DIAGNOSIS — J01.00 ACUTE NON-RECURRENT MAXILLARY SINUSITIS: ICD-10-CM

## 2018-09-12 PROCEDURE — 99214 OFFICE O/P EST MOD 30 MIN: CPT | Performed by: NURSE PRACTITIONER

## 2018-09-12 RX ORDER — METHENAMINE HIPPURATE 1000 MG/1
1 TABLET ORAL DAILY
COMMUNITY
Start: 2018-08-15 | End: 2020-06-16

## 2018-09-12 RX ORDER — FESOTERODINE FUMARATE 8 MG/1
TABLET, FILM COATED, EXTENDED RELEASE ORAL
COMMUNITY
Start: 2018-06-15 | End: 2018-12-07

## 2018-09-12 RX ORDER — CEFDINIR 300 MG/1
300 CAPSULE ORAL 2 TIMES DAILY
Qty: 20 CAPSULE | Refills: 0 | Status: SHIPPED | OUTPATIENT
Start: 2018-09-12 | End: 2018-09-18 | Stop reason: HOSPADM

## 2018-09-12 NOTE — PATIENT INSTRUCTIONS
Otitis Media, Adult  Otitis media occurs when there is inflammation and fluid in the middle ear. Your middle ear is a part of the ear that contains bones for hearing as well as air that helps send sounds to your brain.  What are the causes?  This condition is caused by a blockage in the eustachian tube. This tube drains fluid from the ear to the back of the nose (nasopharynx). A blockage in this tube can be caused by an object or by swelling (edema) in the tube. Problems that can cause a blockage include:  · A cold or other upper respiratory infection.  · Allergies.  · An irritant, such as tobacco smoke.  · Enlarged adenoids. The adenoids are areas of soft tissue located high in the back of the throat, behind the nose and the roof of the mouth.  · A mass in the nasopharynx.  · Damage to the ear caused by pressure changes (barotrauma).    What are the signs or symptoms?  Symptoms of this condition include:  · Ear pain.  · A fever.  · Decreased hearing.  · A headache.  · Tiredness (lethargy).  · Fluid leaking from the ear.  · Ringing in the ear.    How is this diagnosed?  This condition is diagnosed with a physical exam. During the exam your health care provider will use an instrument called an otoscope to look into your ear and check for redness, swelling, and fluid. He or she will also ask about your symptoms.  Your health care provider may also order tests, such as:  · A test to check the movement of the eardrum (pneumatic otoscopy). This test is done by squeezing a small amount of air into the ear.  · A test that changes air pressure in the middle ear to check how well the eardrum moves and whether the eustachian tube is working (tympanogram).    How is this treated?  This condition usually goes away on its own within 3-5 days. But if the condition is caused by a bacteria infection and does not go away own its own, or keeps coming back, your health care provider may:  · Prescribe antibiotic medicines to treat the  infection.  · Prescribe or recommend medicines to control pain.    Follow these instructions at home:  · Take over-the-counter and prescription medicines only as told by your health care provider.  · If you were prescribed an antibiotic medicine, take it as told by your health care provider. Do not stop taking the antibiotic even if you start to feel better.  · Keep all follow-up visits as told by your health care provider. This is important.  Contact a health care provider if:  · You have bleeding from your nose.  · There is a lump on your neck.  · You are not getting better in 5 days.  · You feel worse instead of better.  Get help right away if:  · You have severe pain that is not controlled with medicine.  · You have swelling, redness, or pain around your ear.  · You have stiffness in your neck.  · A part of your face is paralyzed.  · The bone behind your ear (mastoid) is tender when you touch it.  · You develop a severe headache.  Summary  · Otitis media is redness, soreness, and swelling of the middle ear.  · This condition usually goes away on its own within 3-5 days.  · If the problem does not go away in 3-5 days, your health care provider may prescribe or recommend medicines to treat your symptoms.  · If you were prescribed an antibiotic medicine, take it as told by your health care provider.  This information is not intended to replace advice given to you by your health care provider. Make sure you discuss any questions you have with your health care provider.  Document Released: 09/22/2005 Document Revised: 12/08/2017 Document Reviewed: 12/08/2017  [employer] Interactive Patient Education © 2018 [employer]  Cefdinir capsules  What is this medicine?  CEFDINIR (SEF di ner) is a cephalosporin antibiotic. It is used to treat certain kinds of bacterial infections. It will not work for colds, flu, or other viral infections.  This medicine may be used for other purposes; ask your health care provider or  pharmacist if you have questions.  COMMON BRAND NAME(S): Scott  What should I tell my health care provider before I take this medicine?  They need to know if you have any of these conditions:  -bleeding problems  -kidney disease  -stomach or intestine problems (especially colitis)  -an unusual or allergic reaction to cefdinir, other cephalosporin antibiotics, penicillin, penicillamine, other foods, dyes or preservatives  -pregnant or trying to get pregnant  -breast-feeding  How should I use this medicine?  Take this medicine by mouth. Swallow it with a drink of water. Follow the directions on the prescription label. You can take it with or without food. If it upsets your stomach it may help to take it with food. Take your doses at regular intervals. Do not take it more often than directed. Finish all the medicine you are prescribed even if you think your infection is better.  Talk to your pediatrician regarding the use of this medicine in children. Special care may be needed.  Overdosage: If you think you have taken too much of this medicine contact a poison control center or emergency room at once.  NOTE: This medicine is only for you. Do not share this medicine with others.  What if I miss a dose?  If you miss a dose, take it as soon as you can. If it is almost time for your next dose, take only that dose. Do not take double or extra doses.  What may interact with this medicine?  -antacids that contain aluminum or magnesium  -iron supplements  -other antibiotics  -probenecid  This list may not describe all possible interactions. Give your health care provider a list of all the medicines, herbs, non-prescription drugs, or dietary supplements you use. Also tell them if you smoke, drink alcohol, or use illegal drugs. Some items may interact with your medicine.  What should I watch for while using this medicine?  Tell your doctor or health care professional if your symptoms do not get better in a few days.  If you  are diabetic you may get a false-positive result for sugar in your urine. Check with your doctor or health care professional before you change your diet or the dose of your diabetes medicine.  What side effects may I notice from receiving this medicine?  Side effects that you should report to your doctor or health care professional as soon as possible:  -allergic reactions like skin rash, itching or hives, swelling of the face, lips, or tongue  -bloody or watery diarrhea  -breathing problems  -fever  -redness, blistering, peeling or loosening of the skin, including inside the mouth  -seizures  -trouble passing urine or change in the amount of urine  -unusual bleeding or bruising  -unusually weak or tired  Side effects that usually do not require medical attention (report to your doctor or health care professional if they continue or are bothersome):  -constipation  -diarrhea  -dizziness  -dry mouth  -headache  -loss of appetite  -nausea, vomiting  -stomach pain  -stool discoloration  -tiredness  -vaginal discharge, itching, or odor in women  This list may not describe all possible side effects. Call your doctor for medical advice about side effects. You may report side effects to FDA at 3-037-FDA-6741.  Where should I keep my medicine?  Keep out of the reach of children.  Store at room temperature between 15 and 30 degrees C (59 and 86 degrees F). Throw the medicine away after the expiration date.  NOTE: This sheet is a summary. It may not cover all possible information. If you have questions about this medicine, talk to your doctor, pharmacist, or health care provider.  © 2018 [employer] (2017-04-24 15:52:44)

## 2018-09-13 ENCOUNTER — APPOINTMENT (OUTPATIENT)
Dept: GENERAL RADIOLOGY | Facility: HOSPITAL | Age: 58
End: 2018-09-13

## 2018-09-13 ENCOUNTER — HOSPITAL ENCOUNTER (INPATIENT)
Facility: HOSPITAL | Age: 58
LOS: 4 days | Discharge: HOME | End: 2018-09-18
Attending: EMERGENCY MEDICINE | Admitting: INTERNAL MEDICINE

## 2018-09-13 DIAGNOSIS — A41.9 SEPSIS, DUE TO UNSPECIFIED ORGANISM: ICD-10-CM

## 2018-09-13 DIAGNOSIS — Z74.09 IMPAIRED FUNCTIONAL MOBILITY, BALANCE, GAIT, AND ENDURANCE: ICD-10-CM

## 2018-09-13 DIAGNOSIS — H60.501 ACUTE OTITIS EXTERNA OF RIGHT EAR, UNSPECIFIED TYPE: ICD-10-CM

## 2018-09-13 DIAGNOSIS — Z86.39 HISTORY OF DIABETES MELLITUS, TYPE II: ICD-10-CM

## 2018-09-13 DIAGNOSIS — Z78.9 FAILURE OF OUTPATIENT TREATMENT: Primary | ICD-10-CM

## 2018-09-13 LAB
ALBUMIN SERPL-MCNC: 4.68 G/DL (ref 3.2–4.8)
ALBUMIN/GLOB SERPL: 1.5 G/DL (ref 1.5–2.5)
ALP SERPL-CCNC: 94 U/L (ref 25–100)
ALT SERPL W P-5'-P-CCNC: 42 U/L (ref 7–40)
ANION GAP SERPL CALCULATED.3IONS-SCNC: 11 MMOL/L (ref 3–11)
AST SERPL-CCNC: 28 U/L (ref 0–33)
BASOPHILS # BLD AUTO: 0.02 10*3/MM3 (ref 0–0.2)
BASOPHILS NFR BLD AUTO: 0.1 % (ref 0–1)
BILIRUB SERPL-MCNC: 0.5 MG/DL (ref 0.3–1.2)
BUN BLD-MCNC: 11 MG/DL (ref 9–23)
BUN/CREAT SERPL: 13.9 (ref 7–25)
CALCIUM SPEC-SCNC: 9.7 MG/DL (ref 8.7–10.4)
CHLORIDE SERPL-SCNC: 94 MMOL/L (ref 99–109)
CO2 SERPL-SCNC: 26 MMOL/L (ref 20–31)
CREAT BLD-MCNC: 0.79 MG/DL (ref 0.6–1.3)
DEPRECATED RDW RBC AUTO: 45.3 FL (ref 37–54)
EOSINOPHIL # BLD AUTO: 0.16 10*3/MM3 (ref 0–0.3)
EOSINOPHIL NFR BLD AUTO: 0.9 % (ref 0–3)
ERYTHROCYTE [DISTWIDTH] IN BLOOD BY AUTOMATED COUNT: 13.2 % (ref 11.3–14.5)
GFR SERPL CREATININE-BSD FRML MDRD: 102 ML/MIN/1.73
GLOBULIN UR ELPH-MCNC: 3 GM/DL
GLUCOSE BLD-MCNC: 188 MG/DL (ref 70–100)
HCT VFR BLD AUTO: 40 % (ref 38.9–50.9)
HGB BLD-MCNC: 13.1 G/DL (ref 13.1–17.5)
IMM GRANULOCYTES # BLD: 0.08 10*3/MM3 (ref 0–0.03)
IMM GRANULOCYTES NFR BLD: 0.4 % (ref 0–0.6)
LIPASE SERPL-CCNC: 39 U/L (ref 6–51)
LYMPHOCYTES # BLD AUTO: 1.66 10*3/MM3 (ref 0.6–4.8)
LYMPHOCYTES NFR BLD AUTO: 9.2 % (ref 24–44)
MCH RBC QN AUTO: 30.9 PG (ref 27–31)
MCHC RBC AUTO-ENTMCNC: 32.8 G/DL (ref 32–36)
MCV RBC AUTO: 94.3 FL (ref 80–99)
MONOCYTES # BLD AUTO: 1.32 10*3/MM3 (ref 0–1)
MONOCYTES NFR BLD AUTO: 7.3 % (ref 0–12)
NEUTROPHILS # BLD AUTO: 14.8 10*3/MM3 (ref 1.5–8.3)
NEUTROPHILS NFR BLD AUTO: 82.5 % (ref 41–71)
PLATELET # BLD AUTO: 330 10*3/MM3 (ref 150–450)
PMV BLD AUTO: 9.7 FL (ref 6–12)
POTASSIUM BLD-SCNC: 3.6 MMOL/L (ref 3.5–5.5)
PROT SERPL-MCNC: 7.7 G/DL (ref 5.7–8.2)
RBC # BLD AUTO: 4.24 10*6/MM3 (ref 4.2–5.76)
SODIUM BLD-SCNC: 131 MMOL/L (ref 132–146)
TROPONIN I SERPL-MCNC: <0.006 NG/ML
WBC NRBC COR # BLD: 17.96 10*3/MM3 (ref 3.5–10.8)

## 2018-09-13 PROCEDURE — 87040 BLOOD CULTURE FOR BACTERIA: CPT | Performed by: EMERGENCY MEDICINE

## 2018-09-13 PROCEDURE — 71046 X-RAY EXAM CHEST 2 VIEWS: CPT

## 2018-09-13 PROCEDURE — 99285 EMERGENCY DEPT VISIT HI MDM: CPT

## 2018-09-13 PROCEDURE — 83690 ASSAY OF LIPASE: CPT | Performed by: EMERGENCY MEDICINE

## 2018-09-13 PROCEDURE — 85025 COMPLETE CBC W/AUTO DIFF WBC: CPT | Performed by: EMERGENCY MEDICINE

## 2018-09-13 PROCEDURE — 83880 ASSAY OF NATRIURETIC PEPTIDE: CPT | Performed by: NURSE PRACTITIONER

## 2018-09-13 PROCEDURE — 84484 ASSAY OF TROPONIN QUANT: CPT | Performed by: EMERGENCY MEDICINE

## 2018-09-13 PROCEDURE — 93005 ELECTROCARDIOGRAM TRACING: CPT | Performed by: EMERGENCY MEDICINE

## 2018-09-13 PROCEDURE — 69210 REMOVE IMPACTED EAR WAX UNI: CPT

## 2018-09-13 PROCEDURE — 80053 COMPREHEN METABOLIC PANEL: CPT | Performed by: EMERGENCY MEDICINE

## 2018-09-13 NOTE — PROGRESS NOTES
Subjective   Kiran Belcher is a 57 y.o. male.     Earache    There is pain in the right ear. This is a new problem. The current episode started in the past 7 days. Episode frequency: daily. The problem has been gradually worsening. There has been no fever. The pain is mild. Associated symptoms include coughing, headaches and a sore throat. Pertinent negatives include no abdominal pain, diarrhea, ear discharge, neck pain, rash or vomiting. He has tried nothing for the symptoms.       The following portions of the patient's history were reviewed and updated as appropriate: allergies, current medications, past family history, past medical history, past social history, past surgical history and problem list.     No Known Allergies     Current Outpatient Prescriptions on File Prior to Visit   Medication Sig   • amLODIPine-benazepril (LOTREL 5-10) 5-10 MG per capsule Take 1 capsule by mouth Daily.   • atorvastatin (LIPITOR) 10 MG tablet Take 1 tablet by mouth Daily.   • baclofen (LIORESAL) 20 MG tablet TAKE ONE TABLET BY MOUTH TWICE A DAY   • citalopram (CeleXA) 20 MG tablet Take 1 tablet by mouth Daily.   • dantrolene (DANTRIUM) 100 MG capsule TAKE ONE CAPSULE BY MOUTH TWICE A DAY   • esomeprazole (nexIUM) 40 MG capsule TAKE ONE CAPSULE BY MOUTH EVERY MORNING BEFORE BREAKFAST   • fesoterodine fumarate (TOVIAZ) 4 MG tablet sustained-release 24 hour capsule Take  by mouth.   • glucose blood (ACCU-CHEK MAX PLUS) test strip 1 each by Other route 2 (Two) Times a Day. Use as instructed   • glyBURIDE (DIAbeta) 5 MG tablet TAKE ONE TABLET BY MOUTH TWICE A DAY WITH MEALS   • metFORMIN (GLUCOPHAGE) 500 MG tablet TAKE TWO TABLETS BY MOUTH TWICE A DAY     No current facility-administered medications on file prior to visit.      Review of Systems   Constitutional: Negative for appetite change, chills, diaphoresis, fatigue and fever.   HENT: Positive for congestion, ear pain, postnasal drip, sinus pressure and sore throat.  Negative for ear discharge, facial swelling and trouble swallowing.    Respiratory: Positive for cough. Negative for chest tightness, shortness of breath, wheezing and stridor.    Cardiovascular: Negative.    Gastrointestinal: Negative for abdominal distention, abdominal pain, constipation, diarrhea and vomiting.   Musculoskeletal: Negative for arthralgias, myalgias and neck pain.   Skin: Negative for rash.   Neurological: Positive for headaches. Negative for dizziness.       Objective   Physical Exam   Constitutional: He is oriented to person, place, and time. He appears well-developed and well-nourished. He is cooperative. No distress.   HENT:   Head: Normocephalic and atraumatic.   Right Ear: External ear normal. Tympanic membrane is bulging. Tympanic membrane is not erythematous. A middle ear effusion is present.   Left Ear: External ear normal. Tympanic membrane is injected and bulging. A middle ear effusion is present.   Nose: Mucosal edema present. Right sinus exhibits maxillary sinus tenderness. Left sinus exhibits maxillary sinus tenderness.   Mouth/Throat: Uvula is midline and mucous membranes are normal. Posterior oropharyngeal erythema (moderate with cobblestoning) present.   Neck: Normal range of motion. Neck supple. No thyromegaly present.   Cardiovascular: Normal rate, regular rhythm and normal heart sounds.    Pulmonary/Chest: Effort normal and breath sounds normal.   Lymphadenopathy:     He has no cervical adenopathy.   Neurological: He is alert and oriented to person, place, and time.   Skin: Skin is warm and dry. No rash noted. He is not diaphoretic.   Psychiatric: He has a normal mood and affect. His behavior is normal. Judgment and thought content normal.   Vitals reviewed.    Vitals:    09/12/18 1709   BP: 154/72   Pulse: 111   Resp: 18   Temp: 100.2 °F (37.9 °C)   SpO2: 95%     Assessment/Plan   Kiran was seen today for earache.    Diagnoses and all orders for this visit:    Right acute  serous otitis media, recurrence not specified  -     cefdinir (OMNICEF) 300 MG capsule; Take 1 capsule by mouth 2 (Two) Times a Day for 10 days.    Acute non-recurrent maxillary sinusitis  -     cefdinir (OMNICEF) 300 MG capsule; Take 1 capsule by mouth 2 (Two) Times a Day for 10 days.    Discussed the nature of the medical condition(s) risks, complications, implications, management, safe and proper use of medications. Encouraged medication compliance, and keeping scheduled follow up appointments with me and any other providers.

## 2018-09-14 ENCOUNTER — APPOINTMENT (OUTPATIENT)
Dept: CT IMAGING | Facility: HOSPITAL | Age: 58
End: 2018-09-14

## 2018-09-14 PROBLEM — A41.9 SEPSIS (HCC): Status: ACTIVE | Noted: 2018-09-14

## 2018-09-14 PROBLEM — Z87.828 HISTORY OF SPINAL CORD INJURY: Chronic | Status: ACTIVE | Noted: 2018-09-14

## 2018-09-14 PROBLEM — Z87.828 HISTORY OF SPINAL CORD INJURY: Status: ACTIVE | Noted: 2018-09-14

## 2018-09-14 PROBLEM — Z78.9 FAILURE OF OUTPATIENT TREATMENT: Status: ACTIVE | Noted: 2018-09-14

## 2018-09-14 PROBLEM — N28.9 KIDNEY DISORDER: Chronic | Status: ACTIVE | Noted: 2018-09-14

## 2018-09-14 PROBLEM — H61.23 EXCESSIVE CERUMEN IN BOTH EAR CANALS: Chronic | Status: ACTIVE | Noted: 2018-09-14

## 2018-09-14 PROBLEM — H60.91 RIGHT OTITIS EXTERNA: Status: ACTIVE | Noted: 2018-09-14

## 2018-09-14 LAB
ANION GAP SERPL CALCULATED.3IONS-SCNC: 8 MMOL/L (ref 3–11)
BACTERIA UR QL AUTO: ABNORMAL /HPF
BILIRUB UR QL STRIP: ABNORMAL
BNP SERPL-MCNC: 8 PG/ML (ref 0–100)
BUN BLD-MCNC: 11 MG/DL (ref 9–23)
BUN/CREAT SERPL: 13.8 (ref 7–25)
CALCIUM SPEC-SCNC: 9.2 MG/DL (ref 8.7–10.4)
CHLORIDE SERPL-SCNC: 98 MMOL/L (ref 99–109)
CLARITY UR: CLEAR
CO2 SERPL-SCNC: 27 MMOL/L (ref 20–31)
COLOR UR: ABNORMAL
CREAT BLD-MCNC: 0.8 MG/DL (ref 0.6–1.3)
CRP SERPL-MCNC: 8.14 MG/DL (ref 0–1)
DEPRECATED RDW RBC AUTO: 44.7 FL (ref 37–54)
ERYTHROCYTE [DISTWIDTH] IN BLOOD BY AUTOMATED COUNT: 13.2 % (ref 11.3–14.5)
ERYTHROCYTE [SEDIMENTATION RATE] IN BLOOD: 39 MM/HR (ref 0–20)
GFR SERPL CREATININE-BSD FRML MDRD: 100 ML/MIN/1.73
GLUCOSE BLD-MCNC: 127 MG/DL (ref 70–100)
GLUCOSE BLDC GLUCOMTR-MCNC: 124 MG/DL (ref 70–130)
GLUCOSE BLDC GLUCOMTR-MCNC: 127 MG/DL (ref 70–130)
GLUCOSE BLDC GLUCOMTR-MCNC: 148 MG/DL (ref 70–130)
GLUCOSE BLDC GLUCOMTR-MCNC: 153 MG/DL (ref 70–130)
GLUCOSE BLDC GLUCOMTR-MCNC: 175 MG/DL (ref 70–130)
GLUCOSE UR STRIP-MCNC: NEGATIVE MG/DL
HBA1C MFR BLD: 7 % (ref 4.8–5.6)
HCT VFR BLD AUTO: 38.2 % (ref 38.9–50.9)
HGB BLD-MCNC: 12.6 G/DL (ref 13.1–17.5)
HGB UR QL STRIP.AUTO: NEGATIVE
HYALINE CASTS UR QL AUTO: ABNORMAL /LPF
KETONES UR QL STRIP: ABNORMAL
LEUKOCYTE ESTERASE UR QL STRIP.AUTO: ABNORMAL
MAGNESIUM SERPL-MCNC: 1.8 MG/DL (ref 1.3–2.7)
MCH RBC QN AUTO: 30.7 PG (ref 27–31)
MCHC RBC AUTO-ENTMCNC: 33 G/DL (ref 32–36)
MCV RBC AUTO: 93.2 FL (ref 80–99)
NITRITE UR QL STRIP: NEGATIVE
PH UR STRIP.AUTO: 6.5 [PH] (ref 5–8)
PLATELET # BLD AUTO: 309 10*3/MM3 (ref 150–450)
PMV BLD AUTO: 9.6 FL (ref 6–12)
POTASSIUM BLD-SCNC: 3.8 MMOL/L (ref 3.5–5.5)
PROCALCITONIN SERPL-MCNC: 0.22 NG/ML
PROT UR QL STRIP: ABNORMAL
RBC # BLD AUTO: 4.1 10*6/MM3 (ref 4.2–5.76)
RBC # UR: ABNORMAL /HPF
REF LAB TEST METHOD: ABNORMAL
SODIUM BLD-SCNC: 133 MMOL/L (ref 132–146)
SP GR UR STRIP: 1.03 (ref 1–1.03)
SQUAMOUS #/AREA URNS HPF: ABNORMAL /HPF
TSH SERPL DL<=0.05 MIU/L-ACNC: 2.26 MIU/ML (ref 0.35–5.35)
UROBILINOGEN UR QL STRIP: ABNORMAL
WBC NRBC COR # BLD: 15.79 10*3/MM3 (ref 3.5–10.8)
WBC UR QL AUTO: ABNORMAL /HPF

## 2018-09-14 PROCEDURE — 82962 GLUCOSE BLOOD TEST: CPT

## 2018-09-14 PROCEDURE — 81001 URINALYSIS AUTO W/SCOPE: CPT | Performed by: EMERGENCY MEDICINE

## 2018-09-14 PROCEDURE — G0378 HOSPITAL OBSERVATION PER HR: HCPCS

## 2018-09-14 PROCEDURE — 97162 PT EVAL MOD COMPLEX 30 MIN: CPT | Performed by: PHYSICAL THERAPIST

## 2018-09-14 PROCEDURE — 94660 CPAP INITIATION&MGMT: CPT

## 2018-09-14 PROCEDURE — 94799 UNLISTED PULMONARY SVC/PX: CPT

## 2018-09-14 PROCEDURE — 80048 BASIC METABOLIC PNL TOTAL CA: CPT | Performed by: NURSE PRACTITIONER

## 2018-09-14 PROCEDURE — 99220 PR INITIAL OBSERVATION CARE/DAY 70 MINUTES: CPT | Performed by: INTERNAL MEDICINE

## 2018-09-14 PROCEDURE — 85027 COMPLETE CBC AUTOMATED: CPT | Performed by: NURSE PRACTITIONER

## 2018-09-14 PROCEDURE — 0 IOPAMIDOL PER 1 ML: Performed by: EMERGENCY MEDICINE

## 2018-09-14 PROCEDURE — 25010000002 HEPARIN (PORCINE) PER 1000 UNITS: Performed by: NURSE PRACTITIONER

## 2018-09-14 PROCEDURE — 63710000001 INSULIN LISPRO (HUMAN) PER 5 UNITS: Performed by: NURSE PRACTITIONER

## 2018-09-14 PROCEDURE — 84443 ASSAY THYROID STIM HORMONE: CPT | Performed by: NURSE PRACTITIONER

## 2018-09-14 PROCEDURE — 86140 C-REACTIVE PROTEIN: CPT | Performed by: NURSE PRACTITIONER

## 2018-09-14 PROCEDURE — G8979 MOBILITY GOAL STATUS: HCPCS | Performed by: PHYSICAL THERAPIST

## 2018-09-14 PROCEDURE — 25010000002 PIPERACILLIN SOD-TAZOBACTAM PER 1 G: Performed by: EMERGENCY MEDICINE

## 2018-09-14 PROCEDURE — 25010000002 VANCOMYCIN PER 500 MG: Performed by: EMERGENCY MEDICINE

## 2018-09-14 PROCEDURE — 83036 HEMOGLOBIN GLYCOSYLATED A1C: CPT | Performed by: NURSE PRACTITIONER

## 2018-09-14 PROCEDURE — 84145 PROCALCITONIN (PCT): CPT | Performed by: NURSE PRACTITIONER

## 2018-09-14 PROCEDURE — G8978 MOBILITY CURRENT STATUS: HCPCS | Performed by: PHYSICAL THERAPIST

## 2018-09-14 PROCEDURE — 87086 URINE CULTURE/COLONY COUNT: CPT | Performed by: EMERGENCY MEDICINE

## 2018-09-14 PROCEDURE — 85652 RBC SED RATE AUTOMATED: CPT | Performed by: NURSE PRACTITIONER

## 2018-09-14 PROCEDURE — 70481 CT ORBIT/EAR/FOSSA W/DYE: CPT

## 2018-09-14 PROCEDURE — 83735 ASSAY OF MAGNESIUM: CPT | Performed by: NURSE PRACTITIONER

## 2018-09-14 RX ORDER — METHENAMINE HIPPURATE 1000 MG/1
1 TABLET ORAL 2 TIMES DAILY
Status: DISCONTINUED | OUTPATIENT
Start: 2018-09-14 | End: 2018-09-18 | Stop reason: HOSPADM

## 2018-09-14 RX ORDER — ATORVASTATIN CALCIUM 10 MG/1
10 TABLET, FILM COATED ORAL DAILY
Status: DISCONTINUED | OUTPATIENT
Start: 2018-09-14 | End: 2018-09-18 | Stop reason: HOSPADM

## 2018-09-14 RX ORDER — FLUTICASONE PROPIONATE 50 MCG
2 SPRAY, SUSPENSION (ML) NASAL DAILY
Status: DISCONTINUED | OUTPATIENT
Start: 2018-09-14 | End: 2018-09-18 | Stop reason: HOSPADM

## 2018-09-14 RX ORDER — PANTOPRAZOLE SODIUM 40 MG/1
40 TABLET, DELAYED RELEASE ORAL
Status: DISCONTINUED | OUTPATIENT
Start: 2018-09-14 | End: 2018-09-18 | Stop reason: HOSPADM

## 2018-09-14 RX ORDER — LISINOPRIL 10 MG/1
10 TABLET ORAL
Status: DISCONTINUED | OUTPATIENT
Start: 2018-09-14 | End: 2018-09-18 | Stop reason: HOSPADM

## 2018-09-14 RX ORDER — FAMOTIDINE 20 MG/1
20 TABLET, FILM COATED ORAL 2 TIMES DAILY PRN
Status: DISCONTINUED | OUTPATIENT
Start: 2018-09-14 | End: 2018-09-18 | Stop reason: HOSPADM

## 2018-09-14 RX ORDER — CIPROFLOXACIN AND DEXAMETHASONE 3; 1 MG/ML; MG/ML
4 SUSPENSION/ DROPS AURICULAR (OTIC) 2 TIMES DAILY
Status: DISCONTINUED | OUTPATIENT
Start: 2018-09-14 | End: 2018-09-18 | Stop reason: HOSPADM

## 2018-09-14 RX ORDER — BISACODYL 10 MG
10 SUPPOSITORY, RECTAL RECTAL DAILY PRN
Status: DISCONTINUED | OUTPATIENT
Start: 2018-09-14 | End: 2018-09-18 | Stop reason: HOSPADM

## 2018-09-14 RX ORDER — CITALOPRAM 20 MG/1
20 TABLET ORAL DAILY
Status: DISCONTINUED | OUTPATIENT
Start: 2018-09-14 | End: 2018-09-15

## 2018-09-14 RX ORDER — ACETAMINOPHEN 325 MG/1
650 TABLET ORAL ONCE
Status: COMPLETED | OUTPATIENT
Start: 2018-09-14 | End: 2018-09-14

## 2018-09-14 RX ORDER — ACETAMINOPHEN 325 MG/1
650 TABLET ORAL EVERY 4 HOURS PRN
Status: DISCONTINUED | OUTPATIENT
Start: 2018-09-14 | End: 2018-09-18 | Stop reason: HOSPADM

## 2018-09-14 RX ORDER — BACLOFEN 10 MG/1
20 TABLET ORAL 2 TIMES DAILY
Status: DISCONTINUED | OUTPATIENT
Start: 2018-09-14 | End: 2018-09-18 | Stop reason: HOSPADM

## 2018-09-14 RX ORDER — DOCUSATE SODIUM 100 MG/1
100 CAPSULE, LIQUID FILLED ORAL DAILY
Status: DISCONTINUED | OUTPATIENT
Start: 2018-09-14 | End: 2018-09-18 | Stop reason: HOSPADM

## 2018-09-14 RX ORDER — ECHINACEA PURPUREA EXTRACT 125 MG
2 TABLET ORAL AS NEEDED
Status: DISCONTINUED | OUTPATIENT
Start: 2018-09-14 | End: 2018-09-18 | Stop reason: HOSPADM

## 2018-09-14 RX ORDER — ONDANSETRON 2 MG/ML
4 INJECTION INTRAMUSCULAR; INTRAVENOUS EVERY 6 HOURS PRN
Status: DISCONTINUED | OUTPATIENT
Start: 2018-09-14 | End: 2018-09-18 | Stop reason: HOSPADM

## 2018-09-14 RX ORDER — DANTROLENE SODIUM 25 MG/1
100 CAPSULE ORAL 2 TIMES DAILY
Status: DISCONTINUED | OUTPATIENT
Start: 2018-09-14 | End: 2018-09-18 | Stop reason: HOSPADM

## 2018-09-14 RX ORDER — AMLODIPINE BESYLATE 5 MG/1
5 TABLET ORAL
Status: DISCONTINUED | OUTPATIENT
Start: 2018-09-14 | End: 2018-09-18 | Stop reason: HOSPADM

## 2018-09-14 RX ORDER — OXYBUTYNIN CHLORIDE 5 MG/1
5 TABLET ORAL 2 TIMES DAILY
Status: DISCONTINUED | OUTPATIENT
Start: 2018-09-14 | End: 2018-09-18 | Stop reason: HOSPADM

## 2018-09-14 RX ORDER — NICOTINE POLACRILEX 4 MG
15 LOZENGE BUCCAL
Status: DISCONTINUED | OUTPATIENT
Start: 2018-09-14 | End: 2018-09-18 | Stop reason: HOSPADM

## 2018-09-14 RX ORDER — ONDANSETRON 4 MG/1
4 TABLET, FILM COATED ORAL EVERY 6 HOURS PRN
Status: DISCONTINUED | OUTPATIENT
Start: 2018-09-14 | End: 2018-09-18 | Stop reason: HOSPADM

## 2018-09-14 RX ORDER — CETIRIZINE HYDROCHLORIDE 10 MG/1
10 TABLET ORAL NIGHTLY
Status: DISCONTINUED | OUTPATIENT
Start: 2018-09-14 | End: 2018-09-18 | Stop reason: HOSPADM

## 2018-09-14 RX ORDER — SODIUM CHLORIDE 0.9 % (FLUSH) 0.9 %
1-10 SYRINGE (ML) INJECTION AS NEEDED
Status: DISCONTINUED | OUTPATIENT
Start: 2018-09-14 | End: 2018-09-18 | Stop reason: HOSPADM

## 2018-09-14 RX ORDER — HEPARIN SODIUM 5000 [USP'U]/ML
5000 INJECTION, SOLUTION INTRAVENOUS; SUBCUTANEOUS EVERY 12 HOURS SCHEDULED
Status: DISCONTINUED | OUTPATIENT
Start: 2018-09-14 | End: 2018-09-18 | Stop reason: HOSPADM

## 2018-09-14 RX ORDER — DEXTROSE MONOHYDRATE 25 G/50ML
25 INJECTION, SOLUTION INTRAVENOUS
Status: DISCONTINUED | OUTPATIENT
Start: 2018-09-14 | End: 2018-09-18 | Stop reason: HOSPADM

## 2018-09-14 RX ADMIN — HEPARIN SODIUM 5000 UNITS: 5000 INJECTION, SOLUTION INTRAVENOUS; SUBCUTANEOUS at 08:56

## 2018-09-14 RX ADMIN — BACLOFEN 20 MG: 10 TABLET ORAL at 21:40

## 2018-09-14 RX ADMIN — OXYBUTYNIN CHLORIDE 5 MG: 5 TABLET ORAL at 08:55

## 2018-09-14 RX ADMIN — HEPARIN SODIUM 5000 UNITS: 5000 INJECTION, SOLUTION INTRAVENOUS; SUBCUTANEOUS at 21:40

## 2018-09-14 RX ADMIN — INSULIN LISPRO 2 UNITS: 100 INJECTION, SOLUTION INTRAVENOUS; SUBCUTANEOUS at 21:40

## 2018-09-14 RX ADMIN — DANTROLENE SODIUM 100 MG: 25 CAPSULE ORAL at 08:57

## 2018-09-14 RX ADMIN — VANCOMYCIN HYDROCHLORIDE 3000 MG: 1 INJECTION, POWDER, LYOPHILIZED, FOR SOLUTION INTRAVENOUS at 01:56

## 2018-09-14 RX ADMIN — METHENAMINE HIPPURATE 1 G: 1 TABLET ORAL at 08:57

## 2018-09-14 RX ADMIN — DOCUSATE SODIUM 100 MG: 100 CAPSULE, LIQUID FILLED ORAL at 08:56

## 2018-09-14 RX ADMIN — FLUTICASONE PROPIONATE 2 SPRAY: 50 SPRAY, METERED NASAL at 08:57

## 2018-09-14 RX ADMIN — ACETAMINOPHEN 650 MG: 325 TABLET ORAL at 21:54

## 2018-09-14 RX ADMIN — AMLODIPINE BESYLATE 5 MG: 5 TABLET ORAL at 08:55

## 2018-09-14 RX ADMIN — TAZOBACTAM SODIUM AND PIPERACILLIN SODIUM 4.5 G: 500; 4 INJECTION, SOLUTION INTRAVENOUS at 00:27

## 2018-09-14 RX ADMIN — CETIRIZINE HYDROCHLORIDE 10 MG: 10 TABLET, FILM COATED ORAL at 21:40

## 2018-09-14 RX ADMIN — INSULIN LISPRO 2 UNITS: 100 INJECTION, SOLUTION INTRAVENOUS; SUBCUTANEOUS at 12:42

## 2018-09-14 RX ADMIN — ATORVASTATIN CALCIUM 10 MG: 10 TABLET, FILM COATED ORAL at 08:55

## 2018-09-14 RX ADMIN — LISINOPRIL 10 MG: 10 TABLET ORAL at 08:55

## 2018-09-14 RX ADMIN — IOPAMIDOL 50 ML: 755 INJECTION, SOLUTION INTRAVENOUS at 01:37

## 2018-09-14 RX ADMIN — BACLOFEN 20 MG: 10 TABLET ORAL at 08:55

## 2018-09-14 RX ADMIN — CIPROFLOXACIN AND DEXAMETHASONE 4 DROP: 3; 1 SUSPENSION/ DROPS AURICULAR (OTIC) at 08:57

## 2018-09-14 RX ADMIN — CITALOPRAM HYDROBROMIDE 20 MG: 20 TABLET ORAL at 08:55

## 2018-09-14 RX ADMIN — OXYBUTYNIN CHLORIDE 5 MG: 5 TABLET ORAL at 21:40

## 2018-09-14 RX ADMIN — ACETAMINOPHEN 650 MG: 325 TABLET ORAL at 00:32

## 2018-09-14 RX ADMIN — METHENAMINE HIPPURATE 1 G: 1 TABLET ORAL at 21:40

## 2018-09-14 RX ADMIN — CIPROFLOXACIN AND DEXAMETHASONE 4 DROP: 3; 1 SUSPENSION/ DROPS AURICULAR (OTIC) at 21:40

## 2018-09-14 RX ADMIN — DANTROLENE SODIUM 100 MG: 25 CAPSULE ORAL at 21:40

## 2018-09-15 LAB
CRP SERPL-MCNC: 8.82 MG/DL (ref 0–1)
DEPRECATED RDW RBC AUTO: 46 FL (ref 37–54)
ERYTHROCYTE [DISTWIDTH] IN BLOOD BY AUTOMATED COUNT: 13.4 % (ref 11.3–14.5)
ERYTHROCYTE [SEDIMENTATION RATE] IN BLOOD: 82 MM/HR (ref 0–20)
GLUCOSE BLDC GLUCOMTR-MCNC: 120 MG/DL (ref 70–130)
GLUCOSE BLDC GLUCOMTR-MCNC: 140 MG/DL (ref 70–130)
GLUCOSE BLDC GLUCOMTR-MCNC: 172 MG/DL (ref 70–130)
GLUCOSE BLDC GLUCOMTR-MCNC: 186 MG/DL (ref 70–130)
HCT VFR BLD AUTO: 39 % (ref 38.9–50.9)
HGB BLD-MCNC: 12.9 G/DL (ref 13.1–17.5)
MCH RBC QN AUTO: 31 PG (ref 27–31)
MCHC RBC AUTO-ENTMCNC: 33.1 G/DL (ref 32–36)
MCV RBC AUTO: 93.8 FL (ref 80–99)
PLATELET # BLD AUTO: 353 10*3/MM3 (ref 150–450)
PMV BLD AUTO: 10.1 FL (ref 6–12)
PROCALCITONIN SERPL-MCNC: 0.27 NG/ML
RBC # BLD AUTO: 4.16 10*6/MM3 (ref 4.2–5.76)
WBC NRBC COR # BLD: 18.5 10*3/MM3 (ref 3.5–10.8)

## 2018-09-15 PROCEDURE — G0378 HOSPITAL OBSERVATION PER HR: HCPCS

## 2018-09-15 PROCEDURE — 86140 C-REACTIVE PROTEIN: CPT | Performed by: INTERNAL MEDICINE

## 2018-09-15 PROCEDURE — 82962 GLUCOSE BLOOD TEST: CPT

## 2018-09-15 PROCEDURE — 25010000002 LEVOFLOXACIN PER 250 MG: Performed by: INTERNAL MEDICINE

## 2018-09-15 PROCEDURE — 85652 RBC SED RATE AUTOMATED: CPT | Performed by: INTERNAL MEDICINE

## 2018-09-15 PROCEDURE — 87040 BLOOD CULTURE FOR BACTERIA: CPT | Performed by: INTERNAL MEDICINE

## 2018-09-15 PROCEDURE — 85027 COMPLETE CBC AUTOMATED: CPT | Performed by: FAMILY MEDICINE

## 2018-09-15 PROCEDURE — 94660 CPAP INITIATION&MGMT: CPT

## 2018-09-15 PROCEDURE — 99233 SBSQ HOSP IP/OBS HIGH 50: CPT | Performed by: INTERNAL MEDICINE

## 2018-09-15 PROCEDURE — 94799 UNLISTED PULMONARY SVC/PX: CPT

## 2018-09-15 PROCEDURE — 84145 PROCALCITONIN (PCT): CPT | Performed by: INTERNAL MEDICINE

## 2018-09-15 PROCEDURE — 25010000002 HEPARIN (PORCINE) PER 1000 UNITS: Performed by: NURSE PRACTITIONER

## 2018-09-15 PROCEDURE — 25010000002 PIPERACILLIN SOD-TAZOBACTAM PER 1 G

## 2018-09-15 RX ORDER — LEVOFLOXACIN 5 MG/ML
750 INJECTION, SOLUTION INTRAVENOUS EVERY 24 HOURS
Status: DISCONTINUED | OUTPATIENT
Start: 2018-09-15 | End: 2018-09-15

## 2018-09-15 RX ORDER — LEVOFLOXACIN 5 MG/ML
750 INJECTION, SOLUTION INTRAVENOUS EVERY 24 HOURS
Status: DISCONTINUED | OUTPATIENT
Start: 2018-09-15 | End: 2018-09-18

## 2018-09-15 RX ORDER — L.ACID,PARA/B.BIFIDUM/S.THERM 8B CELL
1 CAPSULE ORAL DAILY
Status: DISCONTINUED | OUTPATIENT
Start: 2018-09-15 | End: 2018-09-18 | Stop reason: HOSPADM

## 2018-09-15 RX ADMIN — METHENAMINE HIPPURATE 1 G: 1 TABLET ORAL at 10:20

## 2018-09-15 RX ADMIN — Medication 1 CAPSULE: at 10:19

## 2018-09-15 RX ADMIN — LEVOFLOXACIN 750 MG: 5 INJECTION, SOLUTION INTRAVENOUS at 16:37

## 2018-09-15 RX ADMIN — INSULIN LISPRO 2 UNITS: 100 INJECTION, SOLUTION INTRAVENOUS; SUBCUTANEOUS at 12:28

## 2018-09-15 RX ADMIN — BACLOFEN 20 MG: 10 TABLET ORAL at 21:16

## 2018-09-15 RX ADMIN — HEPARIN SODIUM 5000 UNITS: 5000 INJECTION, SOLUTION INTRAVENOUS; SUBCUTANEOUS at 21:15

## 2018-09-15 RX ADMIN — BACLOFEN 20 MG: 10 TABLET ORAL at 10:21

## 2018-09-15 RX ADMIN — HEPARIN SODIUM 5000 UNITS: 5000 INJECTION, SOLUTION INTRAVENOUS; SUBCUTANEOUS at 10:20

## 2018-09-15 RX ADMIN — METHENAMINE HIPPURATE 1 G: 1 TABLET ORAL at 21:16

## 2018-09-15 RX ADMIN — INSULIN LISPRO 2 UNITS: 100 INJECTION, SOLUTION INTRAVENOUS; SUBCUTANEOUS at 21:15

## 2018-09-15 RX ADMIN — OXYBUTYNIN CHLORIDE 5 MG: 5 TABLET ORAL at 10:20

## 2018-09-15 RX ADMIN — FLUTICASONE PROPIONATE 2 SPRAY: 50 SPRAY, METERED NASAL at 10:18

## 2018-09-15 RX ADMIN — DOCUSATE SODIUM 100 MG: 100 CAPSULE, LIQUID FILLED ORAL at 10:18

## 2018-09-15 RX ADMIN — PANTOPRAZOLE SODIUM 40 MG: 40 TABLET, DELAYED RELEASE ORAL at 06:14

## 2018-09-15 RX ADMIN — AMLODIPINE BESYLATE 5 MG: 5 TABLET ORAL at 10:19

## 2018-09-15 RX ADMIN — OXYBUTYNIN CHLORIDE 5 MG: 5 TABLET ORAL at 21:16

## 2018-09-15 RX ADMIN — CIPROFLOXACIN AND DEXAMETHASONE 4 DROP: 3; 1 SUSPENSION/ DROPS AURICULAR (OTIC) at 10:18

## 2018-09-15 RX ADMIN — TAZOBACTAM SODIUM AND PIPERACILLIN SODIUM 3.38 G: 375; 3 INJECTION, SOLUTION INTRAVENOUS at 12:27

## 2018-09-15 RX ADMIN — CIPROFLOXACIN AND DEXAMETHASONE 4 DROP: 3; 1 SUSPENSION/ DROPS AURICULAR (OTIC) at 21:15

## 2018-09-15 RX ADMIN — DANTROLENE SODIUM 100 MG: 25 CAPSULE ORAL at 10:16

## 2018-09-15 RX ADMIN — ATORVASTATIN CALCIUM 10 MG: 10 TABLET, FILM COATED ORAL at 10:19

## 2018-09-15 RX ADMIN — DANTROLENE SODIUM 100 MG: 25 CAPSULE ORAL at 21:16

## 2018-09-15 RX ADMIN — LISINOPRIL 10 MG: 10 TABLET ORAL at 10:21

## 2018-09-15 RX ADMIN — CETIRIZINE HYDROCHLORIDE 10 MG: 10 TABLET, FILM COATED ORAL at 21:16

## 2018-09-16 LAB
ANION GAP SERPL CALCULATED.3IONS-SCNC: 8 MMOL/L (ref 3–11)
BACTERIA SPEC AEROBE CULT: NORMAL
BASOPHILS # BLD AUTO: 0.02 10*3/MM3 (ref 0–0.2)
BASOPHILS NFR BLD AUTO: 0.1 % (ref 0–1)
BUN BLD-MCNC: 13 MG/DL (ref 9–23)
BUN/CREAT SERPL: 18.8 (ref 7–25)
CALCIUM SPEC-SCNC: 8.6 MG/DL (ref 8.7–10.4)
CHLORIDE SERPL-SCNC: 99 MMOL/L (ref 99–109)
CO2 SERPL-SCNC: 29 MMOL/L (ref 20–31)
CREAT BLD-MCNC: 0.69 MG/DL (ref 0.6–1.3)
DEPRECATED RDW RBC AUTO: 45.7 FL (ref 37–54)
EOSINOPHIL # BLD AUTO: 0.54 10*3/MM3 (ref 0–0.3)
EOSINOPHIL NFR BLD AUTO: 3.9 % (ref 0–3)
ERYTHROCYTE [DISTWIDTH] IN BLOOD BY AUTOMATED COUNT: 13.3 % (ref 11.3–14.5)
GFR SERPL CREATININE-BSD FRML MDRD: 119 ML/MIN/1.73
GLUCOSE BLD-MCNC: 102 MG/DL (ref 70–100)
GLUCOSE BLDC GLUCOMTR-MCNC: 126 MG/DL (ref 70–130)
GLUCOSE BLDC GLUCOMTR-MCNC: 188 MG/DL (ref 70–130)
GLUCOSE BLDC GLUCOMTR-MCNC: 261 MG/DL (ref 70–130)
GLUCOSE BLDC GLUCOMTR-MCNC: 267 MG/DL (ref 70–130)
HCT VFR BLD AUTO: 37.4 % (ref 38.9–50.9)
HGB BLD-MCNC: 12.4 G/DL (ref 13.1–17.5)
IMM GRANULOCYTES # BLD: 0.09 10*3/MM3 (ref 0–0.03)
IMM GRANULOCYTES NFR BLD: 0.7 % (ref 0–0.6)
LYMPHOCYTES # BLD AUTO: 2.8 10*3/MM3 (ref 0.6–4.8)
LYMPHOCYTES NFR BLD AUTO: 20.4 % (ref 24–44)
MCH RBC QN AUTO: 30.8 PG (ref 27–31)
MCHC RBC AUTO-ENTMCNC: 33.2 G/DL (ref 32–36)
MCV RBC AUTO: 92.8 FL (ref 80–99)
MONOCYTES # BLD AUTO: 1.44 10*3/MM3 (ref 0–1)
MONOCYTES NFR BLD AUTO: 10.5 % (ref 0–12)
NEUTROPHILS # BLD AUTO: 8.95 10*3/MM3 (ref 1.5–8.3)
NEUTROPHILS NFR BLD AUTO: 65.1 % (ref 41–71)
PLATELET # BLD AUTO: 330 10*3/MM3 (ref 150–450)
PMV BLD AUTO: 9.6 FL (ref 6–12)
POTASSIUM BLD-SCNC: 3.6 MMOL/L (ref 3.5–5.5)
RBC # BLD AUTO: 4.03 10*6/MM3 (ref 4.2–5.76)
SODIUM BLD-SCNC: 136 MMOL/L (ref 132–146)
WBC NRBC COR # BLD: 13.75 10*3/MM3 (ref 3.5–10.8)

## 2018-09-16 PROCEDURE — 25010000002 HEPARIN (PORCINE) PER 1000 UNITS: Performed by: NURSE PRACTITIONER

## 2018-09-16 PROCEDURE — 94660 CPAP INITIATION&MGMT: CPT

## 2018-09-16 PROCEDURE — 99232 SBSQ HOSP IP/OBS MODERATE 35: CPT | Performed by: INTERNAL MEDICINE

## 2018-09-16 PROCEDURE — 85025 COMPLETE CBC W/AUTO DIFF WBC: CPT | Performed by: INTERNAL MEDICINE

## 2018-09-16 PROCEDURE — 82962 GLUCOSE BLOOD TEST: CPT

## 2018-09-16 PROCEDURE — 80048 BASIC METABOLIC PNL TOTAL CA: CPT | Performed by: INTERNAL MEDICINE

## 2018-09-16 PROCEDURE — G0378 HOSPITAL OBSERVATION PER HR: HCPCS

## 2018-09-16 PROCEDURE — 94799 UNLISTED PULMONARY SVC/PX: CPT

## 2018-09-16 PROCEDURE — 25010000002 LEVOFLOXACIN PER 250 MG: Performed by: INTERNAL MEDICINE

## 2018-09-16 RX ADMIN — OXYBUTYNIN CHLORIDE 5 MG: 5 TABLET ORAL at 09:44

## 2018-09-16 RX ADMIN — HEPARIN SODIUM 5000 UNITS: 5000 INJECTION, SOLUTION INTRAVENOUS; SUBCUTANEOUS at 20:13

## 2018-09-16 RX ADMIN — BACLOFEN 20 MG: 10 TABLET ORAL at 09:44

## 2018-09-16 RX ADMIN — METHENAMINE HIPPURATE 1 G: 1 TABLET ORAL at 20:13

## 2018-09-16 RX ADMIN — Medication 1 CAPSULE: at 09:44

## 2018-09-16 RX ADMIN — FLUTICASONE PROPIONATE 2 SPRAY: 50 SPRAY, METERED NASAL at 09:44

## 2018-09-16 RX ADMIN — PANTOPRAZOLE SODIUM 40 MG: 40 TABLET, DELAYED RELEASE ORAL at 05:29

## 2018-09-16 RX ADMIN — DANTROLENE SODIUM 100 MG: 25 CAPSULE ORAL at 09:44

## 2018-09-16 RX ADMIN — HEPARIN SODIUM 5000 UNITS: 5000 INJECTION, SOLUTION INTRAVENOUS; SUBCUTANEOUS at 09:45

## 2018-09-16 RX ADMIN — CIPROFLOXACIN AND DEXAMETHASONE 4 DROP: 3; 1 SUSPENSION/ DROPS AURICULAR (OTIC) at 20:14

## 2018-09-16 RX ADMIN — CETIRIZINE HYDROCHLORIDE 10 MG: 10 TABLET, FILM COATED ORAL at 20:13

## 2018-09-16 RX ADMIN — INSULIN LISPRO 2 UNITS: 100 INJECTION, SOLUTION INTRAVENOUS; SUBCUTANEOUS at 18:04

## 2018-09-16 RX ADMIN — AMLODIPINE BESYLATE 5 MG: 5 TABLET ORAL at 09:44

## 2018-09-16 RX ADMIN — INSULIN LISPRO 4 UNITS: 100 INJECTION, SOLUTION INTRAVENOUS; SUBCUTANEOUS at 12:37

## 2018-09-16 RX ADMIN — CIPROFLOXACIN AND DEXAMETHASONE 4 DROP: 3; 1 SUSPENSION/ DROPS AURICULAR (OTIC) at 09:44

## 2018-09-16 RX ADMIN — METHENAMINE HIPPURATE 1 G: 1 TABLET ORAL at 09:44

## 2018-09-16 RX ADMIN — OXYBUTYNIN CHLORIDE 5 MG: 5 TABLET ORAL at 20:13

## 2018-09-16 RX ADMIN — LISINOPRIL 10 MG: 10 TABLET ORAL at 09:44

## 2018-09-16 RX ADMIN — LEVOFLOXACIN 750 MG: 5 INJECTION, SOLUTION INTRAVENOUS at 18:05

## 2018-09-16 RX ADMIN — BACLOFEN 20 MG: 10 TABLET ORAL at 20:13

## 2018-09-16 RX ADMIN — ATORVASTATIN CALCIUM 10 MG: 10 TABLET, FILM COATED ORAL at 09:44

## 2018-09-16 RX ADMIN — DOCUSATE SODIUM 100 MG: 100 CAPSULE, LIQUID FILLED ORAL at 09:44

## 2018-09-16 RX ADMIN — DANTROLENE SODIUM 100 MG: 25 CAPSULE ORAL at 20:13

## 2018-09-16 RX ADMIN — INSULIN LISPRO 4 UNITS: 100 INJECTION, SOLUTION INTRAVENOUS; SUBCUTANEOUS at 20:15

## 2018-09-17 LAB
DEPRECATED RDW RBC AUTO: 44.3 FL (ref 37–54)
ERYTHROCYTE [DISTWIDTH] IN BLOOD BY AUTOMATED COUNT: 13.2 % (ref 11.3–14.5)
GLUCOSE BLDC GLUCOMTR-MCNC: 125 MG/DL (ref 70–130)
GLUCOSE BLDC GLUCOMTR-MCNC: 225 MG/DL (ref 70–130)
GLUCOSE BLDC GLUCOMTR-MCNC: 235 MG/DL (ref 70–130)
GLUCOSE BLDC GLUCOMTR-MCNC: 338 MG/DL (ref 70–130)
HCT VFR BLD AUTO: 36.4 % (ref 38.9–50.9)
HGB BLD-MCNC: 12 G/DL (ref 13.1–17.5)
MCH RBC QN AUTO: 30.6 PG (ref 27–31)
MCHC RBC AUTO-ENTMCNC: 33 G/DL (ref 32–36)
MCV RBC AUTO: 92.9 FL (ref 80–99)
PLATELET # BLD AUTO: 365 10*3/MM3 (ref 150–450)
PMV BLD AUTO: 9.5 FL (ref 6–12)
RBC # BLD AUTO: 3.92 10*6/MM3 (ref 4.2–5.76)
WBC NRBC COR # BLD: 15.01 10*3/MM3 (ref 3.5–10.8)

## 2018-09-17 PROCEDURE — 25010000002 LEVOFLOXACIN PER 250 MG: Performed by: INTERNAL MEDICINE

## 2018-09-17 PROCEDURE — 25010000002 HEPARIN (PORCINE) PER 1000 UNITS: Performed by: NURSE PRACTITIONER

## 2018-09-17 PROCEDURE — 87081 CULTURE SCREEN ONLY: CPT | Performed by: INTERNAL MEDICINE

## 2018-09-17 PROCEDURE — 94660 CPAP INITIATION&MGMT: CPT

## 2018-09-17 PROCEDURE — 82962 GLUCOSE BLOOD TEST: CPT

## 2018-09-17 PROCEDURE — 97116 GAIT TRAINING THERAPY: CPT

## 2018-09-17 PROCEDURE — 85027 COMPLETE CBC AUTOMATED: CPT | Performed by: INTERNAL MEDICINE

## 2018-09-17 PROCEDURE — 94799 UNLISTED PULMONARY SVC/PX: CPT

## 2018-09-17 PROCEDURE — 99232 SBSQ HOSP IP/OBS MODERATE 35: CPT | Performed by: INTERNAL MEDICINE

## 2018-09-17 RX ORDER — DOXYCYCLINE 100 MG/1
100 CAPSULE ORAL EVERY 12 HOURS SCHEDULED
Status: DISCONTINUED | OUTPATIENT
Start: 2018-09-17 | End: 2018-09-18 | Stop reason: HOSPADM

## 2018-09-17 RX ADMIN — BACLOFEN 20 MG: 10 TABLET ORAL at 09:47

## 2018-09-17 RX ADMIN — INSULIN LISPRO 3 UNITS: 100 INJECTION, SOLUTION INTRAVENOUS; SUBCUTANEOUS at 12:13

## 2018-09-17 RX ADMIN — DOCUSATE SODIUM 100 MG: 100 CAPSULE, LIQUID FILLED ORAL at 09:47

## 2018-09-17 RX ADMIN — Medication 1 CAPSULE: at 09:47

## 2018-09-17 RX ADMIN — LISINOPRIL 10 MG: 10 TABLET ORAL at 09:47

## 2018-09-17 RX ADMIN — PANTOPRAZOLE SODIUM 40 MG: 40 TABLET, DELAYED RELEASE ORAL at 05:06

## 2018-09-17 RX ADMIN — LEVOFLOXACIN 750 MG: 5 INJECTION, SOLUTION INTRAVENOUS at 17:41

## 2018-09-17 RX ADMIN — ATORVASTATIN CALCIUM 10 MG: 10 TABLET, FILM COATED ORAL at 09:48

## 2018-09-17 RX ADMIN — CIPROFLOXACIN AND DEXAMETHASONE 4 DROP: 3; 1 SUSPENSION/ DROPS AURICULAR (OTIC) at 09:48

## 2018-09-17 RX ADMIN — INSULIN LISPRO 3 UNITS: 100 INJECTION, SOLUTION INTRAVENOUS; SUBCUTANEOUS at 17:41

## 2018-09-17 RX ADMIN — DOXYCYCLINE 100 MG: 100 CAPSULE ORAL at 13:18

## 2018-09-17 RX ADMIN — DOXYCYCLINE 100 MG: 100 CAPSULE ORAL at 20:06

## 2018-09-17 RX ADMIN — OXYBUTYNIN CHLORIDE 5 MG: 5 TABLET ORAL at 20:07

## 2018-09-17 RX ADMIN — AMLODIPINE BESYLATE 5 MG: 5 TABLET ORAL at 09:47

## 2018-09-17 RX ADMIN — CETIRIZINE HYDROCHLORIDE 10 MG: 10 TABLET, FILM COATED ORAL at 20:07

## 2018-09-17 RX ADMIN — BACLOFEN 20 MG: 10 TABLET ORAL at 20:06

## 2018-09-17 RX ADMIN — INSULIN LISPRO 5 UNITS: 100 INJECTION, SOLUTION INTRAVENOUS; SUBCUTANEOUS at 21:15

## 2018-09-17 RX ADMIN — DANTROLENE SODIUM 100 MG: 25 CAPSULE ORAL at 20:06

## 2018-09-17 RX ADMIN — SALINE NASAL SPRAY 2 SPRAY: 1.5 SOLUTION NASAL at 09:48

## 2018-09-17 RX ADMIN — METHENAMINE HIPPURATE 1 G: 1 TABLET ORAL at 09:48

## 2018-09-17 RX ADMIN — METHENAMINE HIPPURATE 1 G: 1 TABLET ORAL at 20:07

## 2018-09-17 RX ADMIN — CIPROFLOXACIN AND DEXAMETHASONE 4 DROP: 3; 1 SUSPENSION/ DROPS AURICULAR (OTIC) at 20:06

## 2018-09-17 RX ADMIN — OXYBUTYNIN CHLORIDE 5 MG: 5 TABLET ORAL at 09:47

## 2018-09-17 RX ADMIN — ACETAMINOPHEN 650 MG: 325 TABLET ORAL at 20:14

## 2018-09-17 RX ADMIN — DANTROLENE SODIUM 100 MG: 25 CAPSULE ORAL at 09:49

## 2018-09-17 RX ADMIN — HEPARIN SODIUM 5000 UNITS: 5000 INJECTION, SOLUTION INTRAVENOUS; SUBCUTANEOUS at 20:06

## 2018-09-17 RX ADMIN — HEPARIN SODIUM 5000 UNITS: 5000 INJECTION, SOLUTION INTRAVENOUS; SUBCUTANEOUS at 09:48

## 2018-09-18 VITALS
WEIGHT: 221.8 LBS | TEMPERATURE: 97.9 F | HEART RATE: 102 BPM | SYSTOLIC BLOOD PRESSURE: 126 MMHG | DIASTOLIC BLOOD PRESSURE: 71 MMHG | OXYGEN SATURATION: 97 % | HEIGHT: 70 IN | BODY MASS INDEX: 31.75 KG/M2 | RESPIRATION RATE: 18 BRPM

## 2018-09-18 PROBLEM — A41.9 SEPSIS: Status: RESOLVED | Noted: 2018-09-14 | Resolved: 2018-09-18

## 2018-09-18 PROBLEM — Z78.9 FAILURE OF OUTPATIENT TREATMENT: Status: RESOLVED | Noted: 2018-09-14 | Resolved: 2018-09-18

## 2018-09-18 LAB
DEPRECATED RDW RBC AUTO: 44.3 FL (ref 37–54)
ERYTHROCYTE [DISTWIDTH] IN BLOOD BY AUTOMATED COUNT: 12.9 % (ref 11.3–14.5)
GLUCOSE BLDC GLUCOMTR-MCNC: 155 MG/DL (ref 70–130)
GLUCOSE BLDC GLUCOMTR-MCNC: 164 MG/DL (ref 70–130)
GLUCOSE BLDC GLUCOMTR-MCNC: 185 MG/DL (ref 70–130)
HCT VFR BLD AUTO: 37.2 % (ref 38.9–50.9)
HGB BLD-MCNC: 12.1 G/DL (ref 13.1–17.5)
MCH RBC QN AUTO: 30.4 PG (ref 27–31)
MCHC RBC AUTO-ENTMCNC: 32.5 G/DL (ref 32–36)
MCV RBC AUTO: 93.5 FL (ref 80–99)
PLATELET # BLD AUTO: 385 10*3/MM3 (ref 150–450)
PMV BLD AUTO: 9.7 FL (ref 6–12)
RBC # BLD AUTO: 3.98 10*6/MM3 (ref 4.2–5.76)
WBC NRBC COR # BLD: 14.51 10*3/MM3 (ref 3.5–10.8)

## 2018-09-18 PROCEDURE — 85027 COMPLETE CBC AUTOMATED: CPT | Performed by: INTERNAL MEDICINE

## 2018-09-18 PROCEDURE — 94660 CPAP INITIATION&MGMT: CPT

## 2018-09-18 PROCEDURE — 99239 HOSP IP/OBS DSCHRG MGMT >30: CPT | Performed by: INTERNAL MEDICINE

## 2018-09-18 PROCEDURE — 82962 GLUCOSE BLOOD TEST: CPT

## 2018-09-18 PROCEDURE — 97116 GAIT TRAINING THERAPY: CPT | Performed by: PHYSICAL THERAPIST

## 2018-09-18 PROCEDURE — 94799 UNLISTED PULMONARY SVC/PX: CPT

## 2018-09-18 PROCEDURE — 25010000002 HEPARIN (PORCINE) PER 1000 UNITS: Performed by: NURSE PRACTITIONER

## 2018-09-18 RX ORDER — LEVOFLOXACIN 750 MG/1
750 TABLET ORAL EVERY 24 HOURS
Qty: 7 TABLET | Refills: 0 | Status: ON HOLD | OUTPATIENT
Start: 2018-09-18 | End: 2018-09-24

## 2018-09-18 RX ORDER — L.ACID,PARA/B.BIFIDUM/S.THERM 8B CELL
1 CAPSULE ORAL DAILY
Qty: 30 CAPSULE | Refills: 0 | Status: SHIPPED | OUTPATIENT
Start: 2018-09-19 | End: 2018-12-07 | Stop reason: SDUPTHER

## 2018-09-18 RX ORDER — LEVOFLOXACIN 750 MG/1
750 TABLET ORAL EVERY 24 HOURS
Status: DISCONTINUED | OUTPATIENT
Start: 2018-09-18 | End: 2018-09-18 | Stop reason: HOSPADM

## 2018-09-18 RX ORDER — DOXYCYCLINE 100 MG/1
100 CAPSULE ORAL EVERY 12 HOURS SCHEDULED
Qty: 14 CAPSULE | Refills: 0 | Status: SHIPPED | OUTPATIENT
Start: 2018-09-18 | End: 2018-09-28 | Stop reason: HOSPADM

## 2018-09-18 RX ORDER — CIPROFLOXACIN AND DEXAMETHASONE 3; 1 MG/ML; MG/ML
4 SUSPENSION/ DROPS AURICULAR (OTIC) 2 TIMES DAILY
Qty: 7.5 ML | Refills: 0 | Status: SHIPPED | OUTPATIENT
Start: 2018-09-18 | End: 2018-09-21

## 2018-09-18 RX ADMIN — METHENAMINE HIPPURATE 1 G: 1 TABLET ORAL at 08:51

## 2018-09-18 RX ADMIN — CIPROFLOXACIN AND DEXAMETHASONE 4 DROP: 3; 1 SUSPENSION/ DROPS AURICULAR (OTIC) at 08:51

## 2018-09-18 RX ADMIN — SALINE NASAL SPRAY 2 SPRAY: 1.5 SOLUTION NASAL at 08:52

## 2018-09-18 RX ADMIN — ACETAMINOPHEN 650 MG: 325 TABLET ORAL at 06:17

## 2018-09-18 RX ADMIN — INSULIN LISPRO 2 UNITS: 100 INJECTION, SOLUTION INTRAVENOUS; SUBCUTANEOUS at 12:53

## 2018-09-18 RX ADMIN — Medication 1 CAPSULE: at 08:50

## 2018-09-18 RX ADMIN — ATORVASTATIN CALCIUM 10 MG: 10 TABLET, FILM COATED ORAL at 08:50

## 2018-09-18 RX ADMIN — BACLOFEN 20 MG: 10 TABLET ORAL at 08:50

## 2018-09-18 RX ADMIN — ACETAMINOPHEN 650 MG: 325 TABLET ORAL at 17:05

## 2018-09-18 RX ADMIN — LISINOPRIL 10 MG: 10 TABLET ORAL at 08:50

## 2018-09-18 RX ADMIN — SALINE NASAL SPRAY 2 SPRAY: 1.5 SOLUTION NASAL at 00:55

## 2018-09-18 RX ADMIN — DANTROLENE SODIUM 100 MG: 25 CAPSULE ORAL at 08:50

## 2018-09-18 RX ADMIN — AMLODIPINE BESYLATE 5 MG: 5 TABLET ORAL at 08:50

## 2018-09-18 RX ADMIN — OXYBUTYNIN CHLORIDE 5 MG: 5 TABLET ORAL at 08:50

## 2018-09-18 RX ADMIN — HEPARIN SODIUM 5000 UNITS: 5000 INJECTION, SOLUTION INTRAVENOUS; SUBCUTANEOUS at 08:51

## 2018-09-18 RX ADMIN — DOCUSATE SODIUM 100 MG: 100 CAPSULE, LIQUID FILLED ORAL at 08:50

## 2018-09-18 RX ADMIN — LEVOFLOXACIN 750 MG: 750 TABLET, FILM COATED ORAL at 17:05

## 2018-09-18 RX ADMIN — PANTOPRAZOLE SODIUM 40 MG: 40 TABLET, DELAYED RELEASE ORAL at 05:48

## 2018-09-18 RX ADMIN — DOXYCYCLINE 100 MG: 100 CAPSULE ORAL at 08:50

## 2018-09-18 RX ADMIN — INSULIN LISPRO 2 UNITS: 100 INJECTION, SOLUTION INTRAVENOUS; SUBCUTANEOUS at 08:52

## 2018-09-19 ENCOUNTER — TRANSITIONAL CARE MANAGEMENT TELEPHONE ENCOUNTER (OUTPATIENT)
Dept: FAMILY MEDICINE CLINIC | Facility: CLINIC | Age: 58
End: 2018-09-19

## 2018-09-19 LAB
BACTERIA SPEC AEROBE CULT: NORMAL
BACTERIA SPEC AEROBE CULT: NORMAL
MRSA SPEC QL CULT: NORMAL

## 2018-09-20 LAB
BACTERIA SPEC AEROBE CULT: NORMAL
BACTERIA SPEC AEROBE CULT: NORMAL

## 2018-09-21 ENCOUNTER — HOSPITAL ENCOUNTER (EMERGENCY)
Facility: HOSPITAL | Age: 58
Discharge: HOME | End: 2018-09-21
Attending: EMERGENCY MEDICINE | Admitting: EMERGENCY MEDICINE

## 2018-09-21 VITALS
BODY MASS INDEX: 38.8 KG/M2 | OXYGEN SATURATION: 96 % | WEIGHT: 271 LBS | HEIGHT: 70 IN | SYSTOLIC BLOOD PRESSURE: 140 MMHG | DIASTOLIC BLOOD PRESSURE: 86 MMHG | RESPIRATION RATE: 18 BRPM | HEART RATE: 109 BPM | TEMPERATURE: 98 F

## 2018-09-21 DIAGNOSIS — M79.604 PAIN IN BOTH LOWER EXTREMITIES: Primary | ICD-10-CM

## 2018-09-21 DIAGNOSIS — R53.1 GENERALIZED WEAKNESS: ICD-10-CM

## 2018-09-21 DIAGNOSIS — M79.605 PAIN IN BOTH LOWER EXTREMITIES: Primary | ICD-10-CM

## 2018-09-21 PROCEDURE — 99283 EMERGENCY DEPT VISIT LOW MDM: CPT

## 2018-09-23 PROCEDURE — 99284 EMERGENCY DEPT VISIT MOD MDM: CPT

## 2018-09-23 PROCEDURE — 99283 EMERGENCY DEPT VISIT LOW MDM: CPT

## 2018-09-24 ENCOUNTER — APPOINTMENT (OUTPATIENT)
Dept: CT IMAGING | Facility: HOSPITAL | Age: 58
End: 2018-09-24

## 2018-09-24 ENCOUNTER — APPOINTMENT (OUTPATIENT)
Dept: NUCLEAR MEDICINE | Facility: HOSPITAL | Age: 58
End: 2018-09-24

## 2018-09-24 ENCOUNTER — APPOINTMENT (OUTPATIENT)
Dept: GENERAL RADIOLOGY | Facility: HOSPITAL | Age: 58
End: 2018-09-24

## 2018-09-24 ENCOUNTER — HOSPITAL ENCOUNTER (INPATIENT)
Facility: HOSPITAL | Age: 58
LOS: 4 days | Discharge: NURSING, REHAB OR LONG-TERM CARE | End: 2018-09-28
Attending: EMERGENCY MEDICINE | Admitting: HOSPITALIST

## 2018-09-24 ENCOUNTER — APPOINTMENT (OUTPATIENT)
Dept: NEUROLOGY | Facility: HOSPITAL | Age: 58
End: 2018-09-24
Attending: PSYCHIATRY & NEUROLOGY

## 2018-09-24 DIAGNOSIS — D72.829 LEUKOCYTOSIS, UNSPECIFIED TYPE: Primary | ICD-10-CM

## 2018-09-24 DIAGNOSIS — R29.898 WEAKNESS OF BOTH LOWER EXTREMITIES: ICD-10-CM

## 2018-09-24 DIAGNOSIS — Z78.9 IMPAIRED MOBILITY AND ADLS: ICD-10-CM

## 2018-09-24 DIAGNOSIS — R13.13 PHARYNGEAL DYSPHAGIA: ICD-10-CM

## 2018-09-24 DIAGNOSIS — Z74.09 IMPAIRED FUNCTIONAL MOBILITY, BALANCE, GAIT, AND ENDURANCE: ICD-10-CM

## 2018-09-24 DIAGNOSIS — Z74.09 IMPAIRED MOBILITY AND ADLS: ICD-10-CM

## 2018-09-24 PROBLEM — M79.10 MYALGIA: Status: ACTIVE | Noted: 2018-09-24

## 2018-09-24 PROBLEM — F33.1 MODERATE EPISODE OF RECURRENT MAJOR DEPRESSIVE DISORDER (HCC): Status: ACTIVE | Noted: 2018-09-24

## 2018-09-24 PROBLEM — S91.209A TOENAIL AVULSION: Status: ACTIVE | Noted: 2018-09-24

## 2018-09-24 PROBLEM — R13.10 DYSPHAGIA: Status: ACTIVE | Noted: 2018-09-24

## 2018-09-24 LAB
ALBUMIN SERPL-MCNC: 4.32 G/DL (ref 3.2–4.8)
ALBUMIN SERPL-MCNC: 4.61 G/DL (ref 3.2–4.8)
ALBUMIN/GLOB SERPL: 1.4 G/DL (ref 1.5–2.5)
ALBUMIN/GLOB SERPL: 1.5 G/DL (ref 1.5–2.5)
ALP SERPL-CCNC: 74 U/L (ref 25–100)
ALP SERPL-CCNC: 80 U/L (ref 25–100)
ALT SERPL W P-5'-P-CCNC: 29 U/L (ref 7–40)
ALT SERPL W P-5'-P-CCNC: 29 U/L (ref 7–40)
ANION GAP SERPL CALCULATED.3IONS-SCNC: 13 MMOL/L (ref 3–11)
ANION GAP SERPL CALCULATED.3IONS-SCNC: 9 MMOL/L (ref 3–11)
AST SERPL-CCNC: 18 U/L (ref 0–33)
AST SERPL-CCNC: 19 U/L (ref 0–33)
BASOPHILS # BLD AUTO: 0.02 10*3/MM3 (ref 0–0.2)
BASOPHILS NFR BLD AUTO: 0.1 % (ref 0–1)
BILIRUB SERPL-MCNC: 0.4 MG/DL (ref 0.3–1.2)
BILIRUB SERPL-MCNC: 0.5 MG/DL (ref 0.3–1.2)
BILIRUB UR QL STRIP: ABNORMAL
BUN BLD-MCNC: 16 MG/DL (ref 9–23)
BUN BLD-MCNC: 16 MG/DL (ref 9–23)
BUN/CREAT SERPL: 21.1 (ref 7–25)
BUN/CREAT SERPL: 21.9 (ref 7–25)
CALCIUM SPEC-SCNC: 10.1 MG/DL (ref 8.7–10.4)
CALCIUM SPEC-SCNC: 9.8 MG/DL (ref 8.7–10.4)
CHLORIDE SERPL-SCNC: 101 MMOL/L (ref 99–109)
CHLORIDE SERPL-SCNC: 102 MMOL/L (ref 99–109)
CK SERPL-CCNC: 37 U/L (ref 26–174)
CLARITY UR: CLEAR
CO2 SERPL-SCNC: 27 MMOL/L (ref 20–31)
CO2 SERPL-SCNC: 27 MMOL/L (ref 20–31)
COLOR UR: ABNORMAL
CREAT BLD-MCNC: 0.73 MG/DL (ref 0.6–1.3)
CREAT BLD-MCNC: 0.76 MG/DL (ref 0.6–1.3)
CRP SERPL-MCNC: 6.57 MG/DL (ref 0–1)
CRP SERPL-MCNC: 7.22 MG/DL (ref 0–1)
D-LACTATE SERPL-SCNC: 1.6 MMOL/L (ref 0.5–2)
D-LACTATE SERPL-SCNC: 2.4 MMOL/L (ref 0.5–2)
DEPRECATED RDW RBC AUTO: 45.4 FL (ref 37–54)
DEPRECATED RDW RBC AUTO: 46 FL (ref 37–54)
DEPRECATED RDW RBC AUTO: 46.2 FL (ref 37–54)
EOSINOPHIL # BLD AUTO: 0.13 10*3/MM3 (ref 0–0.3)
EOSINOPHIL NFR BLD AUTO: 0.7 % (ref 0–3)
EOSINOPHIL NFR BLD AUTO: 0.8 % (ref 0–3)
EOSINOPHIL NFR BLD AUTO: 0.8 % (ref 0–3)
ERYTHROCYTE [DISTWIDTH] IN BLOOD BY AUTOMATED COUNT: 13.1 % (ref 11.3–14.5)
ERYTHROCYTE [DISTWIDTH] IN BLOOD BY AUTOMATED COUNT: 13.3 % (ref 11.3–14.5)
ERYTHROCYTE [DISTWIDTH] IN BLOOD BY AUTOMATED COUNT: 13.5 % (ref 11.3–14.5)
ERYTHROCYTE [SEDIMENTATION RATE] IN BLOOD: 100 MM/HR (ref 0–20)
ERYTHROCYTE [SEDIMENTATION RATE] IN BLOOD: 97 MM/HR (ref 0–20)
GFR SERPL CREATININE-BSD FRML MDRD: 106 ML/MIN/1.73
GFR SERPL CREATININE-BSD FRML MDRD: 112 ML/MIN/1.73
GLOBULIN UR ELPH-MCNC: 3 GM/DL
GLOBULIN UR ELPH-MCNC: 3.1 GM/DL
GLUCOSE BLD-MCNC: 106 MG/DL (ref 70–100)
GLUCOSE BLD-MCNC: 121 MG/DL (ref 70–100)
GLUCOSE UR STRIP-MCNC: NEGATIVE MG/DL
HCT VFR BLD AUTO: 37.7 % (ref 38.9–50.9)
HCT VFR BLD AUTO: 37.8 % (ref 38.9–50.9)
HCT VFR BLD AUTO: 39 % (ref 38.9–50.9)
HGB BLD-MCNC: 12.2 G/DL (ref 13.1–17.5)
HGB BLD-MCNC: 12.4 G/DL (ref 13.1–17.5)
HGB BLD-MCNC: 12.6 G/DL (ref 13.1–17.5)
HGB UR QL STRIP.AUTO: NEGATIVE
HOLD SPECIMEN: NORMAL
IMM GRANULOCYTES # BLD: 0.07 10*3/MM3 (ref 0–0.03)
IMM GRANULOCYTES # BLD: 0.08 10*3/MM3 (ref 0–0.03)
IMM GRANULOCYTES # BLD: 0.09 10*3/MM3 (ref 0–0.03)
IMM GRANULOCYTES NFR BLD: 0.4 % (ref 0–0.6)
IMM GRANULOCYTES NFR BLD: 0.5 % (ref 0–0.6)
IMM GRANULOCYTES NFR BLD: 0.5 % (ref 0–0.6)
INR PPP: 0.99 (ref 0.91–1.09)
KETONES UR QL STRIP: ABNORMAL
LEUKOCYTE ESTERASE UR QL STRIP.AUTO: NEGATIVE
LYMPHOCYTES # BLD AUTO: 1.7 10*3/MM3 (ref 0.6–4.8)
LYMPHOCYTES # BLD AUTO: 2.26 10*3/MM3 (ref 0.6–4.8)
LYMPHOCYTES # BLD AUTO: 2.26 10*3/MM3 (ref 0.6–4.8)
LYMPHOCYTES NFR BLD AUTO: 10.2 % (ref 24–44)
LYMPHOCYTES NFR BLD AUTO: 13 % (ref 24–44)
LYMPHOCYTES NFR BLD AUTO: 13.2 % (ref 24–44)
MAGNESIUM SERPL-MCNC: 1.7 MG/DL (ref 1.3–2.7)
MCH RBC QN AUTO: 30.4 PG (ref 27–31)
MCH RBC QN AUTO: 30.5 PG (ref 27–31)
MCH RBC QN AUTO: 30.8 PG (ref 27–31)
MCHC RBC AUTO-ENTMCNC: 32.3 G/DL (ref 32–36)
MCHC RBC AUTO-ENTMCNC: 32.4 G/DL (ref 32–36)
MCHC RBC AUTO-ENTMCNC: 32.8 G/DL (ref 32–36)
MCV RBC AUTO: 93.8 FL (ref 80–99)
MCV RBC AUTO: 94 FL (ref 80–99)
MCV RBC AUTO: 94.4 FL (ref 80–99)
MONOCYTES # BLD AUTO: 1.17 10*3/MM3 (ref 0–1)
MONOCYTES # BLD AUTO: 1.2 10*3/MM3 (ref 0–1)
MONOCYTES # BLD AUTO: 1.22 10*3/MM3 (ref 0–1)
MONOCYTES NFR BLD AUTO: 6.9 % (ref 0–12)
MONOCYTES NFR BLD AUTO: 7 % (ref 0–12)
MONOCYTES NFR BLD AUTO: 7.1 % (ref 0–12)
MYOGLOBIN SERPL-MCNC: 27 NG/ML (ref 3–110)
NEUTROPHILS # BLD AUTO: 13.49 10*3/MM3 (ref 1.5–8.3)
NEUTROPHILS # BLD AUTO: 13.59 10*3/MM3 (ref 1.5–8.3)
NEUTROPHILS # BLD AUTO: 13.8 10*3/MM3 (ref 1.5–8.3)
NEUTROPHILS NFR BLD AUTO: 78.8 % (ref 41–71)
NEUTROPHILS NFR BLD AUTO: 79.3 % (ref 41–71)
NEUTROPHILS NFR BLD AUTO: 81.9 % (ref 41–71)
NITRITE UR QL STRIP: NEGATIVE
PH UR STRIP.AUTO: 6 [PH] (ref 5–8)
PLATELET # BLD AUTO: 394 10*3/MM3 (ref 150–450)
PLATELET # BLD AUTO: 480 10*3/MM3 (ref 150–450)
PLATELET # BLD AUTO: 481 10*3/MM3 (ref 150–450)
PMV BLD AUTO: 10.4 FL (ref 6–12)
PMV BLD AUTO: 9.4 FL (ref 6–12)
PMV BLD AUTO: 9.9 FL (ref 6–12)
POTASSIUM BLD-SCNC: 4.2 MMOL/L (ref 3.5–5.5)
POTASSIUM BLD-SCNC: 4.3 MMOL/L (ref 3.5–5.5)
PROCALCITONIN SERPL-MCNC: 0.08 NG/ML
PROT SERPL-MCNC: 7.3 G/DL (ref 5.7–8.2)
PROT SERPL-MCNC: 7.7 G/DL (ref 5.7–8.2)
PROT UR QL STRIP: NEGATIVE
PROTHROMBIN TIME: 10.4 SECONDS (ref 9.6–11.5)
RBC # BLD AUTO: 4.01 10*6/MM3 (ref 4.2–5.76)
RBC # BLD AUTO: 4.03 10*6/MM3 (ref 4.2–5.76)
RBC # BLD AUTO: 4.13 10*6/MM3 (ref 4.2–5.76)
SODIUM BLD-SCNC: 138 MMOL/L (ref 132–146)
SODIUM BLD-SCNC: 141 MMOL/L (ref 132–146)
SP GR UR STRIP: 1.03 (ref 1–1.03)
T4 FREE SERPL-MCNC: 0.99 NG/DL (ref 0.89–1.76)
TSH SERPL DL<=0.05 MIU/L-ACNC: 1.58 MIU/ML (ref 0.35–5.35)
UROBILINOGEN UR QL STRIP: ABNORMAL
WBC NRBC COR # BLD: 16.61 10*3/MM3 (ref 3.5–10.8)
WBC NRBC COR # BLD: 17.12 10*3/MM3 (ref 3.5–10.8)
WBC NRBC COR # BLD: 17.41 10*3/MM3 (ref 3.5–10.8)

## 2018-09-24 PROCEDURE — 80053 COMPREHEN METABOLIC PANEL: CPT | Performed by: EMERGENCY MEDICINE

## 2018-09-24 PROCEDURE — 82550 ASSAY OF CK (CPK): CPT | Performed by: EMERGENCY MEDICINE

## 2018-09-24 PROCEDURE — 95886 MUSC TEST DONE W/N TEST COMP: CPT

## 2018-09-24 PROCEDURE — 84439 ASSAY OF FREE THYROXINE: CPT | Performed by: EMERGENCY MEDICINE

## 2018-09-24 PROCEDURE — 83735 ASSAY OF MAGNESIUM: CPT | Performed by: EMERGENCY MEDICINE

## 2018-09-24 PROCEDURE — 78315 BONE IMAGING 3 PHASE: CPT

## 2018-09-24 PROCEDURE — 83874 ASSAY OF MYOGLOBIN: CPT | Performed by: FAMILY MEDICINE

## 2018-09-24 PROCEDURE — 74230 X-RAY XM SWLNG FUNCJ C+: CPT

## 2018-09-24 PROCEDURE — 72125 CT NECK SPINE W/O DYE: CPT

## 2018-09-24 PROCEDURE — 97162 PT EVAL MOD COMPLEX 30 MIN: CPT

## 2018-09-24 PROCEDURE — 71045 X-RAY EXAM CHEST 1 VIEW: CPT

## 2018-09-24 PROCEDURE — A9503 TC99M MEDRONATE: HCPCS | Performed by: INTERNAL MEDICINE

## 2018-09-24 PROCEDURE — 87040 BLOOD CULTURE FOR BACTERIA: CPT | Performed by: FAMILY MEDICINE

## 2018-09-24 PROCEDURE — 80053 COMPREHEN METABOLIC PANEL: CPT | Performed by: FAMILY MEDICINE

## 2018-09-24 PROCEDURE — 94799 UNLISTED PULMONARY SVC/PX: CPT

## 2018-09-24 PROCEDURE — 97166 OT EVAL MOD COMPLEX 45 MIN: CPT | Performed by: OCCUPATIONAL THERAPIST

## 2018-09-24 PROCEDURE — 83605 ASSAY OF LACTIC ACID: CPT | Performed by: FAMILY MEDICINE

## 2018-09-24 PROCEDURE — 70450 CT HEAD/BRAIN W/O DYE: CPT

## 2018-09-24 PROCEDURE — 95911 NRV CNDJ TEST 9-10 STUDIES: CPT

## 2018-09-24 PROCEDURE — 93005 ELECTROCARDIOGRAM TRACING: CPT | Performed by: EMERGENCY MEDICINE

## 2018-09-24 PROCEDURE — 84443 ASSAY THYROID STIM HORMONE: CPT | Performed by: EMERGENCY MEDICINE

## 2018-09-24 PROCEDURE — 99223 1ST HOSP IP/OBS HIGH 75: CPT | Performed by: PSYCHIATRY & NEUROLOGY

## 2018-09-24 PROCEDURE — 85610 PROTHROMBIN TIME: CPT | Performed by: EMERGENCY MEDICINE

## 2018-09-24 PROCEDURE — 85025 COMPLETE CBC W/AUTO DIFF WBC: CPT | Performed by: FAMILY MEDICINE

## 2018-09-24 PROCEDURE — 85025 COMPLETE CBC W/AUTO DIFF WBC: CPT | Performed by: EMERGENCY MEDICINE

## 2018-09-24 PROCEDURE — 81003 URINALYSIS AUTO W/O SCOPE: CPT | Performed by: EMERGENCY MEDICINE

## 2018-09-24 PROCEDURE — 92610 EVALUATE SWALLOWING FUNCTION: CPT

## 2018-09-24 PROCEDURE — 0 TECHNETIUM MEDRONATE KIT: Performed by: INTERNAL MEDICINE

## 2018-09-24 PROCEDURE — 72131 CT LUMBAR SPINE W/O DYE: CPT

## 2018-09-24 PROCEDURE — 86140 C-REACTIVE PROTEIN: CPT | Performed by: FAMILY MEDICINE

## 2018-09-24 PROCEDURE — 84145 PROCALCITONIN (PCT): CPT | Performed by: FAMILY MEDICINE

## 2018-09-24 PROCEDURE — 99223 1ST HOSP IP/OBS HIGH 75: CPT | Performed by: FAMILY MEDICINE

## 2018-09-24 PROCEDURE — 25010000002 CEFEPIME 2 G/NS 100 ML SOLUTION: Performed by: INTERNAL MEDICINE

## 2018-09-24 PROCEDURE — 92611 MOTION FLUOROSCOPY/SWALLOW: CPT

## 2018-09-24 RX ORDER — TC 99M MEDRONATE 20 MG/10ML
19.67 INJECTION, POWDER, LYOPHILIZED, FOR SOLUTION INTRAVENOUS
Status: COMPLETED | OUTPATIENT
Start: 2018-09-24 | End: 2018-09-24

## 2018-09-24 RX ORDER — DANTROLENE SODIUM 25 MG/1
100 CAPSULE ORAL 2 TIMES DAILY
Status: DISCONTINUED | OUTPATIENT
Start: 2018-09-24 | End: 2018-09-28 | Stop reason: HOSPADM

## 2018-09-24 RX ORDER — SODIUM CHLORIDE 0.9 % (FLUSH) 0.9 %
1-10 SYRINGE (ML) INJECTION AS NEEDED
Status: DISCONTINUED | OUTPATIENT
Start: 2018-09-24 | End: 2018-09-28 | Stop reason: HOSPADM

## 2018-09-24 RX ORDER — SODIUM CHLORIDE 9 MG/ML
100 INJECTION, SOLUTION INTRAVENOUS ONCE
Status: COMPLETED | OUTPATIENT
Start: 2018-09-24 | End: 2018-09-24

## 2018-09-24 RX ORDER — OXYBUTYNIN CHLORIDE 5 MG/1
5 TABLET, EXTENDED RELEASE ORAL DAILY
Status: DISCONTINUED | OUTPATIENT
Start: 2018-09-24 | End: 2018-09-28 | Stop reason: HOSPADM

## 2018-09-24 RX ORDER — ATORVASTATIN CALCIUM 10 MG/1
10 TABLET, FILM COATED ORAL NIGHTLY
Status: DISCONTINUED | OUTPATIENT
Start: 2018-09-24 | End: 2018-09-28 | Stop reason: HOSPADM

## 2018-09-24 RX ORDER — BACLOFEN 10 MG/1
10 TABLET ORAL 2 TIMES DAILY
Status: DISCONTINUED | OUTPATIENT
Start: 2018-09-24 | End: 2018-09-28 | Stop reason: HOSPADM

## 2018-09-24 RX ORDER — CIPROFLOXACIN AND DEXAMETHASONE 3; 1 MG/ML; MG/ML
4 SUSPENSION/ DROPS AURICULAR (OTIC) 2 TIMES DAILY
Status: DISCONTINUED | OUTPATIENT
Start: 2018-09-24 | End: 2018-09-28 | Stop reason: HOSPADM

## 2018-09-24 RX ORDER — PANTOPRAZOLE SODIUM 40 MG/1
40 TABLET, DELAYED RELEASE ORAL EVERY MORNING
Status: DISCONTINUED | OUTPATIENT
Start: 2018-09-24 | End: 2018-09-28 | Stop reason: HOSPADM

## 2018-09-24 RX ORDER — HYDROCODONE BITARTRATE AND ACETAMINOPHEN 5; 325 MG/1; MG/1
1 TABLET ORAL EVERY 6 HOURS PRN
Status: DISCONTINUED | OUTPATIENT
Start: 2018-09-24 | End: 2018-09-28 | Stop reason: HOSPADM

## 2018-09-24 RX ORDER — CEFTRIAXONE SODIUM 1 G/50ML
1 INJECTION, SOLUTION INTRAVENOUS
Status: DISCONTINUED | OUTPATIENT
Start: 2018-09-24 | End: 2018-09-24

## 2018-09-24 RX ORDER — ESCITALOPRAM OXALATE 10 MG/1
10 TABLET ORAL DAILY
Status: DISCONTINUED | OUTPATIENT
Start: 2018-09-24 | End: 2018-09-28 | Stop reason: HOSPADM

## 2018-09-24 RX ADMIN — BACLOFEN 10 MG: 10 TABLET ORAL at 21:31

## 2018-09-24 RX ADMIN — ATORVASTATIN CALCIUM 10 MG: 10 TABLET, FILM COATED ORAL at 21:31

## 2018-09-24 RX ADMIN — ESCITALOPRAM OXALATE 10 MG: 10 TABLET ORAL at 11:45

## 2018-09-24 RX ADMIN — BARIUM SULFATE 50 ML: 400 SUSPENSION ORAL at 10:40

## 2018-09-24 RX ADMIN — LISINOPRIL: 10 TABLET ORAL at 11:44

## 2018-09-24 RX ADMIN — DANTROLENE SODIUM 100 MG: 25 CAPSULE ORAL at 21:31

## 2018-09-24 RX ADMIN — BARIUM SULFATE 55 ML: 0.81 POWDER, FOR SUSPENSION ORAL at 10:40

## 2018-09-24 RX ADMIN — OXYBUTYNIN CHLORIDE 5 MG: 5 TABLET, EXTENDED RELEASE ORAL at 11:45

## 2018-09-24 RX ADMIN — CIPROFLOXACIN AND DEXAMETHASONE 4 DROP: 3; 1 SUSPENSION/ DROPS AURICULAR (OTIC) at 21:31

## 2018-09-24 RX ADMIN — DANTROLENE SODIUM 100 MG: 25 CAPSULE ORAL at 11:45

## 2018-09-24 RX ADMIN — BARIUM SULFATE 20 ML: 400 PASTE ORAL at 10:40

## 2018-09-24 RX ADMIN — CEFEPIME 2 G: 2 INJECTION, POWDER, FOR SOLUTION INTRAVENOUS at 14:21

## 2018-09-24 RX ADMIN — SODIUM CHLORIDE 100 ML/HR: 9 INJECTION, SOLUTION INTRAVENOUS at 12:07

## 2018-09-24 RX ADMIN — SODIUM CHLORIDE 500 ML: 9 INJECTION, SOLUTION INTRAVENOUS at 01:00

## 2018-09-24 RX ADMIN — CIPROFLOXACIN AND DEXAMETHASONE 4 DROP: 3; 1 SUSPENSION/ DROPS AURICULAR (OTIC) at 12:07

## 2018-09-24 RX ADMIN — BACLOFEN 10 MG: 10 TABLET ORAL at 11:45

## 2018-09-24 RX ADMIN — Medication 19.67 MILLICURIE: at 13:32

## 2018-09-25 LAB
DEPRECATED RDW RBC AUTO: 45.6 FL (ref 37–54)
ERYTHROCYTE [DISTWIDTH] IN BLOOD BY AUTOMATED COUNT: 13 % (ref 11.3–14.5)
GLUCOSE BLDC GLUCOMTR-MCNC: 124 MG/DL (ref 70–130)
GLUCOSE BLDC GLUCOMTR-MCNC: 147 MG/DL (ref 70–130)
GLUCOSE BLDC GLUCOMTR-MCNC: 171 MG/DL (ref 70–130)
HCT VFR BLD AUTO: 36.8 % (ref 38.9–50.9)
HGB BLD-MCNC: 11.7 G/DL (ref 13.1–17.5)
MCH RBC QN AUTO: 30.5 PG (ref 27–31)
MCHC RBC AUTO-ENTMCNC: 31.8 G/DL (ref 32–36)
MCV RBC AUTO: 96.1 FL (ref 80–99)
PLATELET # BLD AUTO: 444 10*3/MM3 (ref 150–450)
PMV BLD AUTO: 9.4 FL (ref 6–12)
RBC # BLD AUTO: 3.83 10*6/MM3 (ref 4.2–5.76)
VIT B12 BLD-MCNC: 207 PG/ML (ref 211–911)
WBC NRBC COR # BLD: 14.98 10*3/MM3 (ref 3.5–10.8)

## 2018-09-25 PROCEDURE — 82607 VITAMIN B-12: CPT | Performed by: PSYCHIATRY & NEUROLOGY

## 2018-09-25 PROCEDURE — 25010000002 CEFEPIME 2 G/NS 100 ML SOLUTION: Performed by: INTERNAL MEDICINE

## 2018-09-25 PROCEDURE — 84165 PROTEIN E-PHORESIS SERUM: CPT | Performed by: PSYCHIATRY & NEUROLOGY

## 2018-09-25 PROCEDURE — 84155 ASSAY OF PROTEIN SERUM: CPT | Performed by: PSYCHIATRY & NEUROLOGY

## 2018-09-25 PROCEDURE — 63710000001 INSULIN LISPRO (HUMAN) PER 5 UNITS: Performed by: INTERNAL MEDICINE

## 2018-09-25 PROCEDURE — 94660 CPAP INITIATION&MGMT: CPT

## 2018-09-25 PROCEDURE — 82962 GLUCOSE BLOOD TEST: CPT

## 2018-09-25 PROCEDURE — 99232 SBSQ HOSP IP/OBS MODERATE 35: CPT | Performed by: INTERNAL MEDICINE

## 2018-09-25 PROCEDURE — 85027 COMPLETE CBC AUTOMATED: CPT | Performed by: INTERNAL MEDICINE

## 2018-09-25 PROCEDURE — 25010000002 ENOXAPARIN PER 10 MG: Performed by: INTERNAL MEDICINE

## 2018-09-25 PROCEDURE — 92526 ORAL FUNCTION THERAPY: CPT

## 2018-09-25 PROCEDURE — 94799 UNLISTED PULMONARY SVC/PX: CPT

## 2018-09-25 PROCEDURE — 99232 SBSQ HOSP IP/OBS MODERATE 35: CPT | Performed by: PSYCHIATRY & NEUROLOGY

## 2018-09-25 RX ORDER — NICOTINE POLACRILEX 4 MG
15 LOZENGE BUCCAL
Status: DISCONTINUED | OUTPATIENT
Start: 2018-09-25 | End: 2018-09-28 | Stop reason: HOSPADM

## 2018-09-25 RX ORDER — DEXTROSE MONOHYDRATE 25 G/50ML
25 INJECTION, SOLUTION INTRAVENOUS
Status: DISCONTINUED | OUTPATIENT
Start: 2018-09-25 | End: 2018-09-28 | Stop reason: HOSPADM

## 2018-09-25 RX ADMIN — OXYBUTYNIN CHLORIDE 5 MG: 5 TABLET, EXTENDED RELEASE ORAL at 10:07

## 2018-09-25 RX ADMIN — DANTROLENE SODIUM 100 MG: 25 CAPSULE ORAL at 21:23

## 2018-09-25 RX ADMIN — LISINOPRIL: 10 TABLET ORAL at 10:07

## 2018-09-25 RX ADMIN — ENOXAPARIN SODIUM 40 MG: 40 INJECTION SUBCUTANEOUS at 12:04

## 2018-09-25 RX ADMIN — BACLOFEN 10 MG: 10 TABLET ORAL at 21:22

## 2018-09-25 RX ADMIN — DANTROLENE SODIUM 100 MG: 25 CAPSULE ORAL at 10:07

## 2018-09-25 RX ADMIN — MUPIROCIN: 20 OINTMENT TOPICAL at 10:07

## 2018-09-25 RX ADMIN — BACLOFEN 10 MG: 10 TABLET ORAL at 10:07

## 2018-09-25 RX ADMIN — HYDROCODONE BITARTRATE AND ACETAMINOPHEN 1 TABLET: 5; 325 TABLET ORAL at 21:23

## 2018-09-25 RX ADMIN — ESCITALOPRAM OXALATE 10 MG: 10 TABLET ORAL at 10:07

## 2018-09-25 RX ADMIN — ATORVASTATIN CALCIUM 10 MG: 10 TABLET, FILM COATED ORAL at 21:22

## 2018-09-25 RX ADMIN — INSULIN LISPRO 2 UNITS: 100 INJECTION, SOLUTION INTRAVENOUS; SUBCUTANEOUS at 12:04

## 2018-09-25 RX ADMIN — CIPROFLOXACIN AND DEXAMETHASONE 4 DROP: 3; 1 SUSPENSION/ DROPS AURICULAR (OTIC) at 21:23

## 2018-09-25 RX ADMIN — CIPROFLOXACIN AND DEXAMETHASONE 4 DROP: 3; 1 SUSPENSION/ DROPS AURICULAR (OTIC) at 10:07

## 2018-09-25 RX ADMIN — CEFEPIME 2 G: 2 INJECTION, POWDER, FOR SOLUTION INTRAVENOUS at 02:01

## 2018-09-25 RX ADMIN — HYDROCODONE BITARTRATE AND ACETAMINOPHEN 1 TABLET: 5; 325 TABLET ORAL at 04:05

## 2018-09-25 RX ADMIN — PANTOPRAZOLE SODIUM 40 MG: 40 TABLET, DELAYED RELEASE ORAL at 04:05

## 2018-09-25 RX ADMIN — CEFEPIME 2 G: 2 INJECTION, POWDER, FOR SOLUTION INTRAVENOUS at 12:04

## 2018-09-26 LAB
ALBUMIN SERPL-MCNC: 2.9 G/DL (ref 2.9–4.4)
ALBUMIN/GLOB SERPL: 0.8 {RATIO} (ref 0.7–1.7)
ALPHA1 GLOB FLD ELPH-MCNC: 0.4 G/DL (ref 0–0.4)
ALPHA2 GLOB SERPL ELPH-MCNC: 1.2 G/DL (ref 0.4–1)
ANION GAP SERPL CALCULATED.3IONS-SCNC: 8 MMOL/L (ref 3–11)
B-GLOBULIN SERPL ELPH-MCNC: 1.3 G/DL (ref 0.7–1.3)
BUN BLD-MCNC: 17 MG/DL (ref 9–23)
BUN/CREAT SERPL: 25.4 (ref 7–25)
CALCIUM SPEC-SCNC: 9.1 MG/DL (ref 8.7–10.4)
CHLORIDE SERPL-SCNC: 99 MMOL/L (ref 99–109)
CO2 SERPL-SCNC: 29 MMOL/L (ref 20–31)
CREAT BLD-MCNC: 0.67 MG/DL (ref 0.6–1.3)
DEPRECATED RDW RBC AUTO: 45.6 FL (ref 37–54)
ERYTHROCYTE [DISTWIDTH] IN BLOOD BY AUTOMATED COUNT: 13.1 % (ref 11.3–14.5)
GAMMA GLOB SERPL ELPH-MCNC: 1 G/DL (ref 0.4–1.8)
GFR SERPL CREATININE-BSD FRML MDRD: 123 ML/MIN/1.73
GLOBULIN SER CALC-MCNC: 3.8 G/DL (ref 2.2–3.9)
GLUCOSE BLD-MCNC: 130 MG/DL (ref 70–100)
GLUCOSE BLDC GLUCOMTR-MCNC: 176 MG/DL (ref 70–130)
GLUCOSE BLDC GLUCOMTR-MCNC: 177 MG/DL (ref 70–130)
GLUCOSE BLDC GLUCOMTR-MCNC: 189 MG/DL (ref 70–130)
GLUCOSE BLDC GLUCOMTR-MCNC: 198 MG/DL (ref 70–130)
HCT VFR BLD AUTO: 38.3 % (ref 38.9–50.9)
HGB BLD-MCNC: 12.3 G/DL (ref 13.1–17.5)
Lab: ABNORMAL
M-SPIKE: ABNORMAL G/DL
MCH RBC QN AUTO: 30.4 PG (ref 27–31)
MCHC RBC AUTO-ENTMCNC: 32.1 G/DL (ref 32–36)
MCV RBC AUTO: 94.6 FL (ref 80–99)
PLATELET # BLD AUTO: 465 10*3/MM3 (ref 150–450)
PMV BLD AUTO: 9.4 FL (ref 6–12)
POTASSIUM BLD-SCNC: 4.3 MMOL/L (ref 3.5–5.5)
PROT PATTERN SERPL ELPH-IMP: ABNORMAL
PROT SERPL-MCNC: 6.7 G/DL (ref 6–8.5)
RBC # BLD AUTO: 4.05 10*6/MM3 (ref 4.2–5.76)
SODIUM BLD-SCNC: 136 MMOL/L (ref 132–146)
WBC NRBC COR # BLD: 16.58 10*3/MM3 (ref 3.5–10.8)

## 2018-09-26 PROCEDURE — 94799 UNLISTED PULMONARY SVC/PX: CPT

## 2018-09-26 PROCEDURE — 92526 ORAL FUNCTION THERAPY: CPT

## 2018-09-26 PROCEDURE — 82962 GLUCOSE BLOOD TEST: CPT

## 2018-09-26 PROCEDURE — 25010000002 ENOXAPARIN PER 10 MG: Performed by: INTERNAL MEDICINE

## 2018-09-26 PROCEDURE — 85027 COMPLETE CBC AUTOMATED: CPT | Performed by: INTERNAL MEDICINE

## 2018-09-26 PROCEDURE — 80048 BASIC METABOLIC PNL TOTAL CA: CPT | Performed by: INTERNAL MEDICINE

## 2018-09-26 PROCEDURE — 25010000002 CEFEPIME 2 G/NS 100 ML SOLUTION: Performed by: INTERNAL MEDICINE

## 2018-09-26 PROCEDURE — 25010000002 CYANOCOBALAMIN PER 1000 MCG: Performed by: PSYCHIATRY & NEUROLOGY

## 2018-09-26 PROCEDURE — 99232 SBSQ HOSP IP/OBS MODERATE 35: CPT | Performed by: PSYCHIATRY & NEUROLOGY

## 2018-09-26 PROCEDURE — 97530 THERAPEUTIC ACTIVITIES: CPT

## 2018-09-26 PROCEDURE — 94660 CPAP INITIATION&MGMT: CPT

## 2018-09-26 RX ORDER — CYANOCOBALAMIN 1000 UG/ML
1000 INJECTION, SOLUTION INTRAMUSCULAR; SUBCUTANEOUS
Status: DISCONTINUED | OUTPATIENT
Start: 2018-09-26 | End: 2018-09-28 | Stop reason: HOSPADM

## 2018-09-26 RX ADMIN — INSULIN LISPRO 2 UNITS: 100 INJECTION, SOLUTION INTRAVENOUS; SUBCUTANEOUS at 09:24

## 2018-09-26 RX ADMIN — CYANOCOBALAMIN 1000 MCG: 1000 INJECTION, SOLUTION INTRAMUSCULAR; SUBCUTANEOUS at 09:36

## 2018-09-26 RX ADMIN — PANTOPRAZOLE SODIUM 40 MG: 40 TABLET, DELAYED RELEASE ORAL at 06:48

## 2018-09-26 RX ADMIN — DANTROLENE SODIUM 100 MG: 25 CAPSULE ORAL at 09:26

## 2018-09-26 RX ADMIN — CEFEPIME 2 G: 2 INJECTION, POWDER, FOR SOLUTION INTRAVENOUS at 12:23

## 2018-09-26 RX ADMIN — HYDROCODONE BITARTRATE AND ACETAMINOPHEN 1 TABLET: 5; 325 TABLET ORAL at 21:47

## 2018-09-26 RX ADMIN — ENOXAPARIN SODIUM 40 MG: 40 INJECTION SUBCUTANEOUS at 11:10

## 2018-09-26 RX ADMIN — INSULIN LISPRO 2 UNITS: 100 INJECTION, SOLUTION INTRAVENOUS; SUBCUTANEOUS at 21:46

## 2018-09-26 RX ADMIN — MUPIROCIN: 20 OINTMENT TOPICAL at 09:26

## 2018-09-26 RX ADMIN — INSULIN LISPRO 2 UNITS: 100 INJECTION, SOLUTION INTRAVENOUS; SUBCUTANEOUS at 12:29

## 2018-09-26 RX ADMIN — ATORVASTATIN CALCIUM 10 MG: 10 TABLET, FILM COATED ORAL at 21:47

## 2018-09-26 RX ADMIN — ESCITALOPRAM OXALATE 10 MG: 10 TABLET ORAL at 09:25

## 2018-09-26 RX ADMIN — OXYBUTYNIN CHLORIDE 5 MG: 5 TABLET, EXTENDED RELEASE ORAL at 09:25

## 2018-09-26 RX ADMIN — BACLOFEN 10 MG: 10 TABLET ORAL at 21:47

## 2018-09-26 RX ADMIN — CEFEPIME 2 G: 2 INJECTION, POWDER, FOR SOLUTION INTRAVENOUS at 00:05

## 2018-09-26 RX ADMIN — CIPROFLOXACIN AND DEXAMETHASONE 4 DROP: 3; 1 SUSPENSION/ DROPS AURICULAR (OTIC) at 09:26

## 2018-09-26 RX ADMIN — CIPROFLOXACIN AND DEXAMETHASONE 4 DROP: 3; 1 SUSPENSION/ DROPS AURICULAR (OTIC) at 21:48

## 2018-09-26 RX ADMIN — HYDROCODONE BITARTRATE AND ACETAMINOPHEN 1 TABLET: 5; 325 TABLET ORAL at 17:38

## 2018-09-26 RX ADMIN — INSULIN LISPRO 2 UNITS: 100 INJECTION, SOLUTION INTRAVENOUS; SUBCUTANEOUS at 17:34

## 2018-09-26 RX ADMIN — BACLOFEN 10 MG: 10 TABLET ORAL at 09:25

## 2018-09-26 RX ADMIN — LISINOPRIL: 10 TABLET ORAL at 09:25

## 2018-09-26 RX ADMIN — DANTROLENE SODIUM 100 MG: 25 CAPSULE ORAL at 21:47

## 2018-09-27 LAB
GLUCOSE BLDC GLUCOMTR-MCNC: 124 MG/DL (ref 70–130)
GLUCOSE BLDC GLUCOMTR-MCNC: 152 MG/DL (ref 70–130)
GLUCOSE BLDC GLUCOMTR-MCNC: 152 MG/DL (ref 70–130)
GLUCOSE BLDC GLUCOMTR-MCNC: 171 MG/DL (ref 70–130)

## 2018-09-27 PROCEDURE — 97535 SELF CARE MNGMENT TRAINING: CPT | Performed by: OCCUPATIONAL THERAPIST

## 2018-09-27 PROCEDURE — 94799 UNLISTED PULMONARY SVC/PX: CPT

## 2018-09-27 PROCEDURE — 99232 SBSQ HOSP IP/OBS MODERATE 35: CPT | Performed by: INTERNAL MEDICINE

## 2018-09-27 PROCEDURE — 82962 GLUCOSE BLOOD TEST: CPT

## 2018-09-27 PROCEDURE — 94660 CPAP INITIATION&MGMT: CPT

## 2018-09-27 PROCEDURE — 25010000002 CEFEPIME 2 G/NS 100 ML SOLUTION: Performed by: INTERNAL MEDICINE

## 2018-09-27 PROCEDURE — 25010000002 ENOXAPARIN PER 10 MG: Performed by: INTERNAL MEDICINE

## 2018-09-27 PROCEDURE — 92526 ORAL FUNCTION THERAPY: CPT

## 2018-09-27 RX ADMIN — CEFEPIME 2 G: 2 INJECTION, POWDER, FOR SOLUTION INTRAVENOUS at 12:06

## 2018-09-27 RX ADMIN — MUPIROCIN: 20 OINTMENT TOPICAL at 20:45

## 2018-09-27 RX ADMIN — DANTROLENE SODIUM 100 MG: 25 CAPSULE ORAL at 08:15

## 2018-09-27 RX ADMIN — INSULIN LISPRO 2 UNITS: 100 INJECTION, SOLUTION INTRAVENOUS; SUBCUTANEOUS at 17:08

## 2018-09-27 RX ADMIN — OXYBUTYNIN CHLORIDE 5 MG: 5 TABLET, EXTENDED RELEASE ORAL at 08:11

## 2018-09-27 RX ADMIN — PANTOPRAZOLE SODIUM 40 MG: 40 TABLET, DELAYED RELEASE ORAL at 03:34

## 2018-09-27 RX ADMIN — LISINOPRIL: 10 TABLET ORAL at 08:09

## 2018-09-27 RX ADMIN — ENOXAPARIN SODIUM 40 MG: 40 INJECTION SUBCUTANEOUS at 12:06

## 2018-09-27 RX ADMIN — MUPIROCIN 1 APPLICATION: 20 OINTMENT TOPICAL at 08:12

## 2018-09-27 RX ADMIN — INSULIN LISPRO 2 UNITS: 100 INJECTION, SOLUTION INTRAVENOUS; SUBCUTANEOUS at 20:45

## 2018-09-27 RX ADMIN — BACLOFEN 10 MG: 10 TABLET ORAL at 08:11

## 2018-09-27 RX ADMIN — DANTROLENE SODIUM 100 MG: 25 CAPSULE ORAL at 20:45

## 2018-09-27 RX ADMIN — INSULIN LISPRO 2 UNITS: 100 INJECTION, SOLUTION INTRAVENOUS; SUBCUTANEOUS at 12:06

## 2018-09-27 RX ADMIN — HYDROCODONE BITARTRATE AND ACETAMINOPHEN 1 TABLET: 5; 325 TABLET ORAL at 03:33

## 2018-09-27 RX ADMIN — BACLOFEN 10 MG: 10 TABLET ORAL at 20:45

## 2018-09-27 RX ADMIN — ATORVASTATIN CALCIUM 10 MG: 10 TABLET, FILM COATED ORAL at 20:45

## 2018-09-27 RX ADMIN — HYDROCODONE BITARTRATE AND ACETAMINOPHEN 1 TABLET: 5; 325 TABLET ORAL at 18:06

## 2018-09-27 RX ADMIN — CIPROFLOXACIN AND DEXAMETHASONE 4 DROP: 3; 1 SUSPENSION/ DROPS AURICULAR (OTIC) at 20:45

## 2018-09-27 RX ADMIN — ESCITALOPRAM OXALATE 10 MG: 10 TABLET ORAL at 08:11

## 2018-09-27 RX ADMIN — CEFEPIME 2 G: 2 INJECTION, POWDER, FOR SOLUTION INTRAVENOUS at 00:29

## 2018-09-27 RX ADMIN — CIPROFLOXACIN AND DEXAMETHASONE 4 DROP: 3; 1 SUSPENSION/ DROPS AURICULAR (OTIC) at 08:11

## 2018-09-28 ENCOUNTER — APPOINTMENT (OUTPATIENT)
Dept: GENERAL RADIOLOGY | Facility: HOSPITAL | Age: 58
End: 2018-09-28

## 2018-09-28 VITALS
SYSTOLIC BLOOD PRESSURE: 172 MMHG | BODY MASS INDEX: 40.92 KG/M2 | TEMPERATURE: 97.1 F | WEIGHT: 270 LBS | RESPIRATION RATE: 18 BRPM | HEART RATE: 82 BPM | DIASTOLIC BLOOD PRESSURE: 91 MMHG | HEIGHT: 68 IN | OXYGEN SATURATION: 97 %

## 2018-09-28 LAB
BASOPHILS # BLD AUTO: 0.01 10*3/MM3 (ref 0–0.2)
BASOPHILS NFR BLD AUTO: 0.1 % (ref 0–1)
DEPRECATED RDW RBC AUTO: 45 FL (ref 37–54)
EOSINOPHIL # BLD AUTO: 0.17 10*3/MM3 (ref 0–0.3)
EOSINOPHIL NFR BLD AUTO: 1.1 % (ref 0–3)
ERYTHROCYTE [DISTWIDTH] IN BLOOD BY AUTOMATED COUNT: 13.1 % (ref 11.3–14.5)
GLUCOSE BLDC GLUCOMTR-MCNC: 167 MG/DL (ref 70–130)
GLUCOSE BLDC GLUCOMTR-MCNC: 169 MG/DL (ref 70–130)
HCT VFR BLD AUTO: 35.5 % (ref 38.9–50.9)
HGB BLD-MCNC: 11.4 G/DL (ref 13.1–17.5)
IMM GRANULOCYTES # BLD: 0.04 10*3/MM3 (ref 0–0.03)
IMM GRANULOCYTES NFR BLD: 0.3 % (ref 0–0.6)
LYMPHOCYTES # BLD AUTO: 2.26 10*3/MM3 (ref 0.6–4.8)
LYMPHOCYTES NFR BLD AUTO: 14.5 % (ref 24–44)
MCH RBC QN AUTO: 30.2 PG (ref 27–31)
MCHC RBC AUTO-ENTMCNC: 32.1 G/DL (ref 32–36)
MCV RBC AUTO: 93.9 FL (ref 80–99)
MONOCYTES # BLD AUTO: 1.17 10*3/MM3 (ref 0–1)
MONOCYTES NFR BLD AUTO: 7.5 % (ref 0–12)
NEUTROPHILS # BLD AUTO: 12.03 10*3/MM3 (ref 1.5–8.3)
NEUTROPHILS NFR BLD AUTO: 76.8 % (ref 41–71)
PLATELET # BLD AUTO: 437 10*3/MM3 (ref 150–450)
PMV BLD AUTO: 10.2 FL (ref 6–12)
RBC # BLD AUTO: 3.78 10*6/MM3 (ref 4.2–5.76)
WBC NRBC COR # BLD: 15.64 10*3/MM3 (ref 3.5–10.8)

## 2018-09-28 PROCEDURE — 25010000002 CEFEPIME 2 G/NS 100 ML SOLUTION: Performed by: INTERNAL MEDICINE

## 2018-09-28 PROCEDURE — 94799 UNLISTED PULMONARY SVC/PX: CPT

## 2018-09-28 PROCEDURE — 85025 COMPLETE CBC W/AUTO DIFF WBC: CPT | Performed by: INTERNAL MEDICINE

## 2018-09-28 PROCEDURE — 94660 CPAP INITIATION&MGMT: CPT

## 2018-09-28 PROCEDURE — 74230 X-RAY XM SWLNG FUNCJ C+: CPT

## 2018-09-28 PROCEDURE — 99239 HOSP IP/OBS DSCHRG MGMT >30: CPT | Performed by: HOSPITALIST

## 2018-09-28 PROCEDURE — 92611 MOTION FLUOROSCOPY/SWALLOW: CPT

## 2018-09-28 PROCEDURE — 82962 GLUCOSE BLOOD TEST: CPT

## 2018-09-28 RX ORDER — ESCITALOPRAM OXALATE 10 MG/1
10 TABLET ORAL DAILY
Start: 2018-09-29 | End: 2018-12-07 | Stop reason: SDUPTHER

## 2018-09-28 RX ORDER — CYANOCOBALAMIN 1000 UG/ML
1000 INJECTION, SOLUTION INTRAMUSCULAR; SUBCUTANEOUS
Start: 2018-10-24 | End: 2019-04-12

## 2018-09-28 RX ORDER — CIPROFLOXACIN AND DEXAMETHASONE 3; 1 MG/ML; MG/ML
4 SUSPENSION/ DROPS AURICULAR (OTIC) 2 TIMES DAILY
Refills: 0
Start: 2018-09-28 | End: 2018-10-01

## 2018-09-28 RX ORDER — HYDROCODONE BITARTRATE AND ACETAMINOPHEN 5; 325 MG/1; MG/1
1 TABLET ORAL EVERY 6 HOURS PRN
Start: 2018-09-28 | End: 2018-10-01

## 2018-09-28 RX ADMIN — PANTOPRAZOLE SODIUM 40 MG: 40 TABLET, DELAYED RELEASE ORAL at 09:12

## 2018-09-28 RX ADMIN — CEFEPIME 2 G: 2 INJECTION, POWDER, FOR SOLUTION INTRAVENOUS at 01:26

## 2018-09-28 RX ADMIN — ESCITALOPRAM OXALATE 10 MG: 10 TABLET ORAL at 09:13

## 2018-09-28 RX ADMIN — BACLOFEN 10 MG: 10 TABLET ORAL at 09:11

## 2018-09-28 RX ADMIN — OXYBUTYNIN CHLORIDE 5 MG: 5 TABLET, EXTENDED RELEASE ORAL at 09:12

## 2018-09-28 RX ADMIN — BARIUM SULFATE 100 ML: 0.81 POWDER, FOR SUSPENSION ORAL at 12:31

## 2018-09-28 RX ADMIN — LISINOPRIL: 10 TABLET ORAL at 09:11

## 2018-09-28 RX ADMIN — BARIUM SULFATE 20 ML: 400 PASTE ORAL at 12:30

## 2018-09-28 RX ADMIN — INSULIN LISPRO 2 UNITS: 100 INJECTION, SOLUTION INTRAVENOUS; SUBCUTANEOUS at 09:10

## 2018-09-28 RX ADMIN — MUPIROCIN: 20 OINTMENT TOPICAL at 09:13

## 2018-09-28 RX ADMIN — DANTROLENE SODIUM 100 MG: 25 CAPSULE ORAL at 09:12

## 2018-09-28 RX ADMIN — CIPROFLOXACIN AND DEXAMETHASONE 4 DROP: 3; 1 SUSPENSION/ DROPS AURICULAR (OTIC) at 09:10

## 2018-09-28 RX ADMIN — HYDROCODONE BITARTRATE AND ACETAMINOPHEN 1 TABLET: 5; 325 TABLET ORAL at 01:26

## 2018-09-29 LAB
BACTERIA SPEC AEROBE CULT: NORMAL
BACTERIA SPEC AEROBE CULT: NORMAL

## 2018-11-26 RX ORDER — CITALOPRAM 20 MG/1
TABLET ORAL
Qty: 30 TABLET | Refills: 2 | Status: SHIPPED | OUTPATIENT
Start: 2018-11-26 | End: 2018-12-07

## 2018-11-27 ENCOUNTER — OFFICE VISIT (OUTPATIENT)
Dept: SLEEP MEDICINE | Facility: HOSPITAL | Age: 58
End: 2018-11-27

## 2018-11-27 VITALS
WEIGHT: 270 LBS | SYSTOLIC BLOOD PRESSURE: 143 MMHG | BODY MASS INDEX: 40.92 KG/M2 | DIASTOLIC BLOOD PRESSURE: 76 MMHG | OXYGEN SATURATION: 97 % | HEIGHT: 68 IN | HEART RATE: 81 BPM

## 2018-11-27 DIAGNOSIS — G47.33 OSA (OBSTRUCTIVE SLEEP APNEA): Primary | ICD-10-CM

## 2018-11-27 PROCEDURE — 99213 OFFICE O/P EST LOW 20 MIN: CPT | Performed by: NURSE PRACTITIONER

## 2018-11-27 NOTE — PATIENT INSTRUCTIONS

## 2018-11-27 NOTE — PROGRESS NOTES
Subjective: Follow-up        Chief Complaint:   Chief Complaint   Patient presents with   • Follow-up       HPI:    Kiran Belcher is a 58 y.o. male here for follow-up of capo.  Patient was last seen 11/16/17 for obstructive sleep apnea.  He is here today for his yearly follow-up.  He is doing very well with CPAP therapy.  He is sleeping approximately 8 hours or more nightly and does feel refreshed upon awakening.  His Trout score is 8/24.  He is doing well on current settings and sees no reason for changes.  Wishes to continue CPAP therapy.        Current medications are:   Current Outpatient Medications:   •  amLODIPine-benazepril (LOTREL 5-10) 5-10 MG per capsule, Take 1 capsule by mouth Daily., Disp: 90 capsule, Rfl: 1  •  atorvastatin (LIPITOR) 10 MG tablet, Take 1 tablet by mouth Daily., Disp: 90 tablet, Rfl: 3  •  baclofen (LIORESAL) 20 MG tablet, TAKE ONE TABLET BY MOUTH TWICE A DAY, Disp: 180 tablet, Rfl: 0  •  citalopram (CeleXA) 20 MG tablet, TAKE ONE TABLET BY MOUTH DAILY, Disp: 30 tablet, Rfl: 2  •  cyanocobalamin 1000 MCG/ML injection, Inject 1 mL into the appropriate muscle as directed by prescriber Every 28 (Twenty-Eight) Days., Disp: , Rfl:   •  dantrolene (DANTRIUM) 100 MG capsule, TAKE ONE CAPSULE BY MOUTH TWICE A DAY, Disp: 180 capsule, Rfl: 0  •  escitalopram (LEXAPRO) 10 MG tablet, Take 1 tablet by mouth Daily., Disp: , Rfl:   •  esomeprazole (nexIUM) 40 MG capsule, TAKE ONE CAPSULE BY MOUTH EVERY MORNING BEFORE BREAKFAST, Disp: 90 capsule, Rfl: 0  •  fesoterodine fumarate (TOVIAZ) 4 MG tablet sustained-release 24 hour capsule, Take 8 mg by mouth Daily., Disp: , Rfl:   •  glucose blood (ACCU-CHEK MAX PLUS) test strip, 1 each by Other route 2 (Two) Times a Day. Use as instructed, Disp: 200 each, Rfl: 3  •  glyBURIDE (DIAbeta) 5 MG tablet, TAKE ONE TABLET BY MOUTH TWICE A DAY WITH MEALS, Disp: 180 tablet, Rfl: 0  •  lactobacillus acidophilus (RISAQUAD) capsule capsule, Take 1 capsule by  mouth Daily., Disp: 30 capsule, Rfl: 0  •  metFORMIN (GLUCOPHAGE) 500 MG tablet, TAKE TWO TABLETS BY MOUTH TWICE A DAY, Disp: 360 tablet, Rfl: 0  •  methenamine (HIPREX) 1 g tablet, Take 1 g by mouth Daily., Disp: , Rfl:   •  mupirocin (BACTROBAN) 2 % ointment, Apply  topically to the appropriate area as directed Every 12 (Twelve) Hours., Disp: , Rfl:   •  TOVIAZ 8 MG tablet sustained-release 24 hour tablet, , Disp: , Rfl: .      The patient's relevant past medical, surgical, family and social history were reviewed and updated in Epic as appropriate.       Review of Systems   Constitutional: Positive for activity change.   Eyes: Positive for visual disturbance.   Respiratory: Positive for apnea.    Gastrointestinal:        Reflux   Endocrine: Positive for polydipsia and polyuria.   Musculoskeletal: Positive for arthralgias, back pain, gait problem, joint swelling and myalgias.   Neurological: Positive for weakness.   Psychiatric/Behavioral: Positive for dysphoric mood and sleep disturbance. The patient is nervous/anxious.          Objective:    Physical Exam   Constitutional: He is oriented to person, place, and time. He appears well-developed and well-nourished.   HENT:   Head: Normocephalic and atraumatic.   Mouth/Throat: Oropharynx is clear and moist.   Mallampati 4 anatomy   Eyes: Conjunctivae are normal.   Neck: Neck supple.   Cardiovascular: Normal rate and regular rhythm.   Pulmonary/Chest: Effort normal and breath sounds normal.   Neurological: He is alert and oriented to person, place, and time.   Skin: Skin is warm and dry.   Psychiatric: He has a normal mood and affect. His behavior is normal. Judgment and thought content normal.   Nursing note and vitals reviewed.  Download has been reviewed with patient.  Greater than 4 hour usage 51.1%.  AHI 2.2.  CPAP setting of 12 cm H2O.  ASSESSMENT/PLAN    Kiran was seen today for follow-up.    Diagnoses and all orders for this visit:    NISA (obstructive sleep  apnea)  -     CPAP Therapy            1. Counseled patient regarding multimodal approach with healthy nutrition, healthy sleep, regular physical activity, social activities, counseling, and medications. Encouraged to practice lateral sleep position. Avoid alcohol and sedatives close to bedtime.  2. Refill supplies ×1 year.  Return to clinic one year or sooner as symptoms warrant.    I have reviewed the results of my evaluation and impression and discussed my recommendations in detail with the patient.      Signed by  Janna Kramer, FARNAZ    November 27, 2018      CC: Nadira Fernandez DO          No ref. provider found

## 2018-12-07 ENCOUNTER — OFFICE VISIT (OUTPATIENT)
Dept: FAMILY MEDICINE CLINIC | Facility: CLINIC | Age: 58
End: 2018-12-07

## 2018-12-07 VITALS
SYSTOLIC BLOOD PRESSURE: 136 MMHG | OXYGEN SATURATION: 98 % | HEIGHT: 68 IN | WEIGHT: 271 LBS | DIASTOLIC BLOOD PRESSURE: 82 MMHG | RESPIRATION RATE: 16 BRPM | BODY MASS INDEX: 41.07 KG/M2 | HEART RATE: 84 BPM

## 2018-12-07 DIAGNOSIS — E11.9 TYPE 2 DIABETES MELLITUS WITHOUT COMPLICATION, WITHOUT LONG-TERM CURRENT USE OF INSULIN (HCC): Primary | ICD-10-CM

## 2018-12-07 DIAGNOSIS — E78.2 MIXED HYPERLIPIDEMIA: ICD-10-CM

## 2018-12-07 DIAGNOSIS — I10 ESSENTIAL HYPERTENSION: ICD-10-CM

## 2018-12-07 LAB — HBA1C MFR BLD: 5.9 %

## 2018-12-07 PROCEDURE — 83036 HEMOGLOBIN GLYCOSYLATED A1C: CPT | Performed by: FAMILY MEDICINE

## 2018-12-07 PROCEDURE — 99214 OFFICE O/P EST MOD 30 MIN: CPT | Performed by: FAMILY MEDICINE

## 2018-12-07 RX ORDER — ESOMEPRAZOLE MAGNESIUM 40 MG/1
40 CAPSULE, DELAYED RELEASE ORAL
Qty: 90 CAPSULE | Refills: 1 | Status: SHIPPED | OUTPATIENT
Start: 2018-12-07 | End: 2019-04-12 | Stop reason: SDUPTHER

## 2018-12-07 RX ORDER — AMLODIPINE BESYLATE AND BENAZEPRIL HYDROCHLORIDE 5; 10 MG/1; MG/1
1 CAPSULE ORAL DAILY
Qty: 90 CAPSULE | Refills: 1 | Status: SHIPPED | OUTPATIENT
Start: 2018-12-07 | End: 2019-04-12 | Stop reason: SDUPTHER

## 2018-12-07 RX ORDER — ATORVASTATIN CALCIUM 10 MG/1
10 TABLET, FILM COATED ORAL DAILY
Qty: 90 TABLET | Refills: 3 | Status: SHIPPED | OUTPATIENT
Start: 2018-12-07 | End: 2019-04-12 | Stop reason: SDUPTHER

## 2018-12-07 RX ORDER — DANTROLENE SODIUM 100 MG/1
100 CAPSULE ORAL 2 TIMES DAILY
Qty: 180 CAPSULE | Refills: 0 | Status: SHIPPED | OUTPATIENT
Start: 2018-12-07 | End: 2019-04-12 | Stop reason: SDUPTHER

## 2018-12-07 RX ORDER — ESCITALOPRAM OXALATE 10 MG/1
10 TABLET ORAL DAILY
Qty: 90 TABLET | Refills: 1
Start: 2018-12-07 | End: 2019-04-12 | Stop reason: ALTCHOICE

## 2018-12-07 RX ORDER — L.ACID,PARA/B.BIFIDUM/S.THERM 8B CELL
1 CAPSULE ORAL DAILY
Qty: 90 CAPSULE | Refills: 1 | Status: SHIPPED | OUTPATIENT
Start: 2018-12-07

## 2018-12-07 NOTE — PROGRESS NOTES
Subjective   Kiran Belcher is a 58 y.o. male.     Diabetes   He presents for his follow-up (doing well) diabetic visit. He has type 2 diabetes mellitus. His disease course has been stable. There are no hypoglycemic associated symptoms. Pertinent negatives for hypoglycemia include no dizziness, mood changes or nervousness/anxiousness. Pertinent negatives for diabetes include no blurred vision, no chest pain, no fatigue, no foot paresthesias, no polydipsia, no polyphagia, no polyuria, no visual change, no weakness and no weight loss. There are no hypoglycemic complications. Symptoms are stable. There are no diabetic complications. Risk factors for coronary artery disease include diabetes mellitus, dyslipidemia, obesity, male sex, hypertension and family history. Current diabetic treatment includes oral agent (dual therapy). He is compliant with treatment all of the time. His weight is stable. He is following a generally healthy diet. When asked about meal planning, he reported none. He has not had a previous visit with a dietitian. He participates in exercise intermittently. There is no change in his home blood glucose trend. An ACE inhibitor/angiotensin II receptor blocker is being taken. He does not see a podiatrist.Eye exam is not current.   Hypertension   This is a chronic problem. The current episode started more than 1 year ago. The problem is unchanged. The problem is controlled. Pertinent negatives include no anxiety, blurred vision, chest pain, malaise/fatigue, neck pain, palpitations, peripheral edema, PND or shortness of breath. There are no associated agents to hypertension. Risk factors for coronary artery disease include diabetes mellitus, dyslipidemia, family history, obesity, male gender and sedentary lifestyle. Current antihypertension treatment includes ACE inhibitors and calcium channel blockers. The current treatment provides significant improvement. There are no compliance problems.     Hyperlipidemia   This is a chronic problem. The current episode started more than 1 year ago. The problem is controlled. Recent lipid tests were reviewed and are normal. Exacerbating diseases include diabetes. There are no known factors aggravating his hyperlipidemia. Pertinent negatives include no chest pain, focal weakness, leg pain, myalgias or shortness of breath. Current antihyperlipidemic treatment includes statins. The current treatment provides significant improvement of lipids. There are no compliance problems.  Risk factors for coronary artery disease include diabetes mellitus, dyslipidemia, family history, obesity, male sex and hypertension.        The following portions of the patient's history were reviewed and updated as appropriate: allergies, current medications, past family history, past medical history, past social history, past surgical history and problem list.    Review of Systems   Constitutional: Negative.  Negative for activity change, fatigue, fever, malaise/fatigue, unexpected weight gain and unexpected weight loss.   HENT: Negative.  Negative for congestion, sneezing and sore throat.    Eyes: Negative.  Negative for blurred vision, double vision and visual disturbance.   Respiratory: Negative.  Negative for cough, chest tightness, shortness of breath and wheezing.    Cardiovascular: Negative.  Negative for chest pain, palpitations, leg swelling and PND.   Gastrointestinal: Negative.  Negative for abdominal distention, abdominal pain, blood in stool, constipation, diarrhea and nausea.   Endocrine: Negative.  Negative for cold intolerance, heat intolerance, polydipsia, polyphagia and polyuria.   Genitourinary: Negative.  Negative for urinary incontinence, dysuria, frequency and urgency.   Musculoskeletal: Negative.  Negative for arthralgias, myalgias and neck pain.   Skin: Negative.  Negative for rash.   Allergic/Immunologic: Negative.    Neurological: Negative.  Negative for dizziness,  focal weakness, syncope, weakness, numbness and memory problem.   Hematological: Negative.  Negative for adenopathy.   Psychiatric/Behavioral: Negative.  Negative for suicidal ideas and depressed mood. The patient is not nervous/anxious.    All other systems reviewed and are negative.      Objective   Physical Exam   Constitutional: He is oriented to person, place, and time. He appears well-developed and well-nourished.   HENT:   Head: Normocephalic.   Right Ear: External ear normal.   Left Ear: External ear normal.   Nose: Nose normal.   Mouth/Throat: Oropharynx is clear and moist. No oropharyngeal exudate.   Eyes: Conjunctivae and EOM are normal. Pupils are equal, round, and reactive to light.   Neck: Normal range of motion. Neck supple. No thyromegaly present.   Cardiovascular: Normal rate, regular rhythm, normal heart sounds and intact distal pulses.   No murmur heard.  Pulmonary/Chest: Effort normal and breath sounds normal. No respiratory distress. He exhibits no tenderness.   Abdominal: Soft. Bowel sounds are normal. He exhibits no distension and no mass. There is no tenderness. There is no rebound and no guarding.   Musculoskeletal: Normal range of motion.   Lymphadenopathy:     He has no cervical adenopathy.   Neurological: He is alert and oriented to person, place, and time. He has normal reflexes. He displays normal reflexes. He exhibits normal muscle tone. Coordination normal.   Skin: Skin is warm and dry. No rash noted. He is not diaphoretic. No erythema.   Psychiatric: He has a normal mood and affect. His behavior is normal. Judgment and thought content normal.   Nursing note and vitals reviewed.        Assessment/Plan   Kiran was seen today for diabetes.    Diagnoses and all orders for this visit:    Type 2 diabetes mellitus without complication, without long-term current use of insulin (CMS/[facility])  -     POC Glycosylated Hemoglobin (Hb A1C)    Essential hypertension    Mixed hyperlipidemia    Other  orders  -     escitalopram (LEXAPRO) 10 MG tablet; Take 1 tablet by mouth Daily.  -     lactobacillus acidophilus (RISAQUAD) capsule capsule; Take 1 capsule by mouth Daily.  -     amLODIPine-benazepril (LOTREL 5-10) 5-10 MG per capsule; Take 1 capsule by mouth Daily.  -     atorvastatin (LIPITOR) 10 MG tablet; Take 1 tablet by mouth Daily.  -     dantrolene (DANTRIUM) 100 MG capsule; Take 1 capsule by mouth 2 (Two) Times a Day.  -     esomeprazole (nexIUM) 40 MG capsule; Take 1 capsule by mouth Every Morning Before Breakfast.        Cut Metformin to 500 bid and Glyburide to 5 mg qd

## 2018-12-25 ENCOUNTER — HOSPITAL ENCOUNTER (EMERGENCY)
Facility: HOSPITAL | Age: 58
Discharge: HOME | End: 2018-12-25
Attending: EMERGENCY MEDICINE | Admitting: EMERGENCY MEDICINE

## 2018-12-25 VITALS
OXYGEN SATURATION: 97 % | HEART RATE: 88 BPM | DIASTOLIC BLOOD PRESSURE: 84 MMHG | HEIGHT: 69 IN | TEMPERATURE: 98.2 F | WEIGHT: 260 LBS | BODY MASS INDEX: 38.51 KG/M2 | RESPIRATION RATE: 18 BRPM | SYSTOLIC BLOOD PRESSURE: 178 MMHG

## 2018-12-25 DIAGNOSIS — B35.4 TINEA CORPORIS: Primary | ICD-10-CM

## 2018-12-25 PROCEDURE — 99283 EMERGENCY DEPT VISIT LOW MDM: CPT

## 2018-12-25 RX ORDER — CLOTRIMAZOLE 1 G/ML
SOLUTION TOPICAL 2 TIMES DAILY
Qty: 60 ML | Refills: 0 | Status: SHIPPED | OUTPATIENT
Start: 2018-12-25 | End: 2019-04-12

## 2019-01-08 RX ORDER — BACLOFEN 20 MG/1
TABLET ORAL
Qty: 180 TABLET | Refills: 0 | Status: SHIPPED | OUTPATIENT
Start: 2019-01-08 | End: 2019-04-20 | Stop reason: SDUPTHER

## 2019-03-19 RX ORDER — CITALOPRAM 20 MG/1
TABLET ORAL
Qty: 90 TABLET | Refills: 1 | Status: SHIPPED | OUTPATIENT
Start: 2019-03-19 | End: 2019-04-12 | Stop reason: SDUPTHER

## 2019-04-12 ENCOUNTER — LAB REQUISITION (OUTPATIENT)
Dept: LAB | Facility: HOSPITAL | Age: 59
End: 2019-04-12

## 2019-04-12 ENCOUNTER — OFFICE VISIT (OUTPATIENT)
Dept: FAMILY MEDICINE CLINIC | Facility: CLINIC | Age: 59
End: 2019-04-12

## 2019-04-12 VITALS
HEART RATE: 80 BPM | OXYGEN SATURATION: 97 % | DIASTOLIC BLOOD PRESSURE: 78 MMHG | HEIGHT: 69 IN | BODY MASS INDEX: 38.51 KG/M2 | WEIGHT: 260 LBS | RESPIRATION RATE: 18 BRPM | SYSTOLIC BLOOD PRESSURE: 140 MMHG

## 2019-04-12 DIAGNOSIS — Z00.00 ROUTINE GENERAL MEDICAL EXAMINATION AT A HEALTH CARE FACILITY: ICD-10-CM

## 2019-04-12 DIAGNOSIS — F32.0 CURRENT MILD EPISODE OF MAJOR DEPRESSIVE DISORDER WITHOUT PRIOR EPISODE (HCC): ICD-10-CM

## 2019-04-12 DIAGNOSIS — E11.9 TYPE 2 DIABETES MELLITUS WITHOUT COMPLICATION, WITHOUT LONG-TERM CURRENT USE OF INSULIN (HCC): ICD-10-CM

## 2019-04-12 DIAGNOSIS — I10 ESSENTIAL HYPERTENSION: ICD-10-CM

## 2019-04-12 DIAGNOSIS — E78.2 MIXED HYPERLIPIDEMIA: ICD-10-CM

## 2019-04-12 DIAGNOSIS — Z00.00 MEDICARE ANNUAL WELLNESS VISIT, SUBSEQUENT: Primary | ICD-10-CM

## 2019-04-12 LAB
ALBUMIN SERPL-MCNC: 4.4 G/DL (ref 3.5–5.2)
ALBUMIN/GLOB SERPL: 1.5 G/DL
ALP SERPL-CCNC: 102 U/L (ref 39–117)
ALT SERPL W P-5'-P-CCNC: 48 U/L (ref 1–41)
ANION GAP SERPL CALCULATED.3IONS-SCNC: 15 MMOL/L
AST SERPL-CCNC: 41 U/L (ref 1–40)
BASOPHILS # BLD AUTO: 0.02 10*3/MM3 (ref 0–0.2)
BASOPHILS NFR BLD AUTO: 0.2 % (ref 0–1.5)
BILIRUB SERPL-MCNC: 0.4 MG/DL (ref 0.2–1.2)
BUN BLD-MCNC: 11 MG/DL (ref 6–20)
BUN/CREAT SERPL: 15.9 (ref 7–25)
CALCIUM SPEC-SCNC: 9.4 MG/DL (ref 8.6–10.5)
CHLORIDE SERPL-SCNC: 97 MMOL/L (ref 98–107)
CHOLEST SERPL-MCNC: 154 MG/DL (ref 0–200)
CO2 SERPL-SCNC: 24 MMOL/L (ref 22–29)
CREAT BLD-MCNC: 0.69 MG/DL (ref 0.76–1.27)
DEPRECATED RDW RBC AUTO: 49.2 FL (ref 37–54)
EOSINOPHIL # BLD AUTO: 0.39 10*3/MM3 (ref 0–0.4)
EOSINOPHIL NFR BLD AUTO: 4 % (ref 0.3–6.2)
ERYTHROCYTE [DISTWIDTH] IN BLOOD BY AUTOMATED COUNT: 14.4 % (ref 12.3–15.4)
GFR SERPL CREATININE-BSD FRML MDRD: 119 ML/MIN/1.73
GLOBULIN UR ELPH-MCNC: 2.9 GM/DL
GLUCOSE BLD-MCNC: 149 MG/DL (ref 65–99)
HBA1C MFR BLD: 8.6 %
HCT VFR BLD AUTO: 40.6 % (ref 37.5–51)
HDLC SERPL-MCNC: 29 MG/DL (ref 40–60)
HGB BLD-MCNC: 13 G/DL (ref 13–17.7)
IMM GRANULOCYTES # BLD AUTO: 0.05 10*3/MM3 (ref 0–0.05)
IMM GRANULOCYTES NFR BLD AUTO: 0.5 % (ref 0–0.5)
LDLC SERPL CALC-MCNC: 75 MG/DL (ref 0–100)
LDLC/HDLC SERPL: 2.6 {RATIO}
LYMPHOCYTES # BLD AUTO: 2 10*3/MM3 (ref 0.7–3.1)
LYMPHOCYTES NFR BLD AUTO: 20.6 % (ref 19.6–45.3)
MCH RBC QN AUTO: 30.2 PG (ref 26.6–33)
MCHC RBC AUTO-ENTMCNC: 32 G/DL (ref 31.5–35.7)
MCV RBC AUTO: 94.2 FL (ref 79–97)
MONOCYTES # BLD AUTO: 0.53 10*3/MM3 (ref 0.1–0.9)
MONOCYTES NFR BLD AUTO: 5.5 % (ref 5–12)
NEUTROPHILS # BLD AUTO: 6.78 10*3/MM3 (ref 1.4–7)
NEUTROPHILS NFR BLD AUTO: 69.7 % (ref 42.7–76)
PLATELET # BLD AUTO: 313 10*3/MM3 (ref 140–450)
PMV BLD AUTO: 10.6 FL (ref 6–12)
POTASSIUM BLD-SCNC: 4 MMOL/L (ref 3.5–5.2)
PROT SERPL-MCNC: 7.3 G/DL (ref 6–8.5)
RBC # BLD AUTO: 4.31 10*6/MM3 (ref 4.14–5.8)
SODIUM BLD-SCNC: 136 MMOL/L (ref 136–145)
TRIGL SERPL-MCNC: 248 MG/DL (ref 0–150)
TSH SERPL DL<=0.05 MIU/L-ACNC: 1.47 MIU/ML (ref 0.27–4.2)
VLDLC SERPL-MCNC: 49.6 MG/DL
WBC NRBC COR # BLD: 9.72 10*3/MM3 (ref 3.4–10.8)

## 2019-04-12 PROCEDURE — G0444 DEPRESSION SCREEN ANNUAL: HCPCS | Performed by: FAMILY MEDICINE

## 2019-04-12 PROCEDURE — 84443 ASSAY THYROID STIM HORMONE: CPT

## 2019-04-12 PROCEDURE — 80053 COMPREHEN METABOLIC PANEL: CPT

## 2019-04-12 PROCEDURE — 80061 LIPID PANEL: CPT

## 2019-04-12 PROCEDURE — 83036 HEMOGLOBIN GLYCOSYLATED A1C: CPT | Performed by: FAMILY MEDICINE

## 2019-04-12 PROCEDURE — 36415 COLL VENOUS BLD VENIPUNCTURE: CPT | Performed by: FAMILY MEDICINE

## 2019-04-12 PROCEDURE — 85025 COMPLETE CBC W/AUTO DIFF WBC: CPT

## 2019-04-12 PROCEDURE — 96160 PT-FOCUSED HLTH RISK ASSMT: CPT | Performed by: FAMILY MEDICINE

## 2019-04-12 PROCEDURE — G0439 PPPS, SUBSEQ VISIT: HCPCS | Performed by: FAMILY MEDICINE

## 2019-04-12 PROCEDURE — 99396 PREV VISIT EST AGE 40-64: CPT | Performed by: FAMILY MEDICINE

## 2019-04-12 RX ORDER — DANTROLENE SODIUM 100 MG/1
100 CAPSULE ORAL 2 TIMES DAILY
Qty: 180 CAPSULE | Refills: 0 | Status: SHIPPED | OUTPATIENT
Start: 2019-04-12 | End: 2019-07-29 | Stop reason: SDUPTHER

## 2019-04-12 RX ORDER — AMLODIPINE BESYLATE AND BENAZEPRIL HYDROCHLORIDE 5; 10 MG/1; MG/1
1 CAPSULE ORAL DAILY
Qty: 90 CAPSULE | Refills: 1 | Status: SHIPPED | OUTPATIENT
Start: 2019-04-12 | End: 2019-06-06 | Stop reason: DRUGHIGH

## 2019-04-12 RX ORDER — CITALOPRAM 20 MG/1
20 TABLET ORAL DAILY
Qty: 90 TABLET | Refills: 1 | Status: SHIPPED | OUTPATIENT
Start: 2019-04-12 | End: 2019-10-11 | Stop reason: SDUPTHER

## 2019-04-12 RX ORDER — ESOMEPRAZOLE MAGNESIUM 40 MG/1
40 CAPSULE, DELAYED RELEASE ORAL
Qty: 90 CAPSULE | Refills: 1 | Status: SHIPPED | OUTPATIENT
Start: 2019-04-12 | End: 2019-10-11 | Stop reason: SDUPTHER

## 2019-04-12 RX ORDER — GLYBURIDE 5 MG/1
TABLET ORAL
Qty: 180 TABLET | Refills: 0 | Status: CANCELLED | OUTPATIENT
Start: 2019-04-12

## 2019-04-12 RX ORDER — GLYBURIDE 5 MG/1
5 TABLET ORAL 2 TIMES DAILY WITH MEALS
Qty: 180 TABLET | Refills: 0 | Status: SHIPPED | OUTPATIENT
Start: 2019-04-12 | End: 2019-09-27 | Stop reason: SDUPTHER

## 2019-04-12 RX ORDER — ATORVASTATIN CALCIUM 10 MG/1
10 TABLET, FILM COATED ORAL DAILY
Qty: 90 TABLET | Refills: 3 | Status: SHIPPED | OUTPATIENT
Start: 2019-04-12 | End: 2019-10-11 | Stop reason: SDUPTHER

## 2019-04-12 NOTE — PROGRESS NOTES
Subsequent Medicare Wellness Visit   The ABC's of the Annual Wellness Visit    Chief Complaint   Patient presents with   • Medicare Wellness-subsequent       HPI:  Kiran Belcher, -1960, is a 58 y.o. male who presents for a Subsequent Medicare Wellness Visit.  Patient continues to use his power wheelchair.  He needs a new battery for this.  He has a long-term history of hemiplegia and closed head injury.  Recent Hospitalizations:  No hospitalization(s) within the last year..    Current Medical Providers:  Patient Care Team:  Nadira Fernandez DO as PCP - General  Urology-    Health Habits and Functional and Cognitive Screening and Depression Screening:  Functional & Cognitive Status 2019   Do you have difficulty preparing food and eating? No   Do you have difficulty bathing yourself, getting dressed or grooming yourself? -   Do you have difficulty using the toilet? No   Do you have difficulty moving around from place to place? No   Do you have trouble with steps or getting out of a bed or a chair? -   In the past year have you fallen or experienced a near fall? No   Current Diet Well Balanced Diet   Dental Exam Up to date   Eye Exam Up to date   Exercise (times per week) 2 times per week   Current Exercise Activities Include Aerobics   Do you need help using the phone?  No   Are you deaf or do you have serious difficulty hearing?  No   Do you need help with transportation? No   Do you need help shopping? No   Do you need help preparing meals?  No   Do you need help with housework?  No   Do you need help with laundry? No   Do you need help taking your medications? No   Do you need help managing money? No   Do you ever drive or ride in a car without wearing a seat belt? No   Have you felt unusual stress, anger or loneliness in the last month? No   Who do you live with? Spouse   If you need help, do you have trouble finding someone available to you? No   Have you been bothered in the last four weeks by  sexual problems? No   Do you have difficulty concentrating, remembering or making decisions? No       Compared to one year ago, the patient feels his physical health is the same and his mental health is the same.    Depression Screen:  PHQ-2/PHQ-9 Depression Screening 4/12/2019   Little interest or pleasure in doing things 0   Feeling down, depressed, or hopeless 0   Trouble falling or staying asleep, or sleeping too much 0   Feeling tired or having little energy 0   Poor appetite or overeating 0   Feeling bad about yourself - or that you are a failure or have let yourself or your family down 0   Trouble concentrating on things, such as reading the newspaper or watching television 0   Moving or speaking so slowly that other people could have noticed. Or the opposite - being so fidgety or restless that you have been moving around a lot more than usual 0   Thoughts that you would be better off dead, or of hurting yourself in some way 0   Total Score 0   If you checked off any problems, how difficult have these problems made it for you to do your work, take care of things at home, or get along with other people? Not difficult at all         Past Medical/Family/Social History:  The following portions of the patient's history were reviewed and updated as appropriate: allergies, current medications, past family history, past medical history, past social history, past surgical history and problem list.    Allergies   Allergen Reactions   • Levofloxacin Unknown (See Comments)     tendinopathy         Current Outpatient Medications:   •  amLODIPine-benazepril (LOTREL 5-10) 5-10 MG per capsule, Take 1 capsule by mouth Daily., Disp: 90 capsule, Rfl: 1  •  atorvastatin (LIPITOR) 10 MG tablet, Take 1 tablet by mouth Daily., Disp: 90 tablet, Rfl: 3  •  baclofen (LIORESAL) 20 MG tablet, TAKE ONE TABLET BY MOUTH TWICE A DAY, Disp: 180 tablet, Rfl: 0  •  citalopram (CeleXA) 20 MG tablet, Take 1 tablet by mouth Daily., Disp: 90  "tablet, Rfl: 1  •  dantrolene (DANTRIUM) 100 MG capsule, Take 1 capsule by mouth 2 (Two) Times a Day., Disp: 180 capsule, Rfl: 0  •  esomeprazole (nexIUM) 40 MG capsule, Take 1 capsule by mouth Every Morning Before Breakfast., Disp: 90 capsule, Rfl: 1  •  fesoterodine fumarate (TOVIAZ) 4 MG tablet sustained-release 24 hour capsule, Take 8 mg by mouth Daily., Disp: , Rfl:   •  glucose blood (ACCU-CHEK MAX PLUS) test strip, 1 each by Other route 2 (Two) Times a Day. Use as instructed, Disp: 200 each, Rfl: 3  •  glyBURIDE (DIAbeta) 5 MG tablet, Take 1 tablet by mouth 2 (Two) Times a Day With Meals., Disp: 180 tablet, Rfl: 0  •  lactobacillus acidophilus (RISAQUAD) capsule capsule, Take 1 capsule by mouth Daily., Disp: 90 capsule, Rfl: 1  •  metFORMIN (GLUCOPHAGE) 500 MG tablet, Take 1 tablet by mouth 3 (Three) Times a Day., Disp: 270 tablet, Rfl: 0  •  methenamine (HIPREX) 1 g tablet, Take 1 g by mouth Daily., Disp: , Rfl:     Aspirin use counseling: Taking ASA appropriately as indicated    Current medication list contains high risk medications. No harmful drug interactions have been identified.  Plan of action monitor meds    Family History   Problem Relation Age of Onset   • Hypertension Mother    • Heart disease Father    • Coronary artery disease Father    • Diabetes Other    • Hypertension Other        Social History     Tobacco Use   • Smoking status: Never Smoker   • Smokeless tobacco: Never Used   Substance Use Topics   • Alcohol use: No       Past Surgical History:   Procedure Laterality Date   • CERVICAL DISC SURGERY      around 5/2008, fall/injury tripped over a safety gate for their dogs, reported SCI \"semi quadraplegic\" since. [facility]. [facility] after.   • SPINE SURGERY         Patient Active Problem List   Diagnosis   • Benign essential hypertension   • Depression   • Gastroesophageal reflux disease without esophagitis   • Hyperlipidemia   • NISA on CPAP   • Diabetes mellitus, type II (CMS/HCC)   • Spasm " "  • Obesity   • Right otitis externa   • Excessive cerumen in both ear canals   • Hx of spinal cord injury   • Kidney disorder   • Lower extremity weakness   • Myalgia   • Moderate episode of recurrent major depressive disorder (CMS/HCC)   • Dysphagia   • Toenail avulsion       Review of Systems   Constitutional: Negative.  Negative for activity change, fatigue, fever and unexpected weight change.   HENT: Negative.  Negative for congestion, sneezing and sore throat.    Eyes: Negative.  Negative for visual disturbance.   Respiratory: Negative.  Negative for cough, chest tightness, shortness of breath and wheezing.    Cardiovascular: Negative.  Negative for chest pain, palpitations and leg swelling.   Gastrointestinal: Negative.  Negative for abdominal distention, abdominal pain, blood in stool, constipation, diarrhea and nausea.   Endocrine: Negative.  Negative for cold intolerance and heat intolerance.   Genitourinary: Negative.  Negative for dysuria, frequency and urgency.   Musculoskeletal: Negative.  Negative for arthralgias and myalgias.   Skin: Negative.  Negative for rash.   Allergic/Immunologic: Negative.    Neurological: Negative.  Negative for dizziness, syncope and numbness.   Hematological: Negative.  Negative for adenopathy.   Psychiatric/Behavioral: Negative.  Negative for suicidal ideas. The patient is not nervous/anxious.        Objective     Vitals:    04/12/19 1313   BP: 140/78   BP Location: Left arm   Patient Position: Sitting   Cuff Size: Adult   Pulse: 80   Resp: 18   SpO2: 97%   Weight: 118 kg (260 lb)   Height: 175.3 cm (69\")   PainSc: 0-No pain       Patient's Body mass index is 38.4 kg/m². BMI is above normal parameters. Recommendations include: nutrition counseling.       Visual Acuity Screening    Right eye Left eye Both eyes   Without correction:      With correction: 20/20 20/20 20/20   Comments: Per ophth    Hearing Screening Comments: Normal hearing    The patient has no evidence of " cognitve impairment.     Physical Exam   Constitutional: He is oriented to person, place, and time. He appears well-developed and well-nourished.   HENT:   Head: Normocephalic.   Right Ear: External ear normal.   Left Ear: External ear normal.   Nose: Nose normal.   Mouth/Throat: Oropharynx is clear and moist. No oropharyngeal exudate.   Eyes: Conjunctivae and EOM are normal. Pupils are equal, round, and reactive to light.   Neck: Normal range of motion. Neck supple. No thyromegaly present.   Cardiovascular: Normal rate, regular rhythm, normal heart sounds and intact distal pulses.   No murmur heard.  Pulmonary/Chest: Effort normal and breath sounds normal. No respiratory distress. He exhibits no tenderness.   Abdominal: Soft. Bowel sounds are normal. He exhibits no distension and no mass. There is no tenderness. There is no rebound and no guarding.   Musculoskeletal: Normal range of motion.   Lymphadenopathy:     He has no cervical adenopathy.   Neurological: He is alert and oriented to person, place, and time. He has normal reflexes. He displays normal reflexes. He exhibits normal muscle tone. Coordination normal.   Skin: Skin is warm and dry. No rash noted. He is not diaphoretic. No erythema.   Psychiatric: He has a normal mood and affect. His behavior is normal. Judgment and thought content normal.   Nursing note and vitals reviewed.      Recent Lab Results:  Lab Results   Component Value Date     (H) 05/30/2018     Lab Results   Component Value Date    TRIG 190 (H) 05/30/2018    HDL 32 (L) 05/30/2018    VLDL 38 05/30/2018       Assessment/Plan   Age-appropriate Screening Schedule:  Refer to the list below for future screening recommendations based on patient's age, sex and/or medical conditions.      Health Maintenance   Topic Date Due   • DIABETIC EYE EXAM  01/04/2018   • URINE MICROALBUMIN  07/24/2018   • DIABETIC FOOT EXAM  01/29/2019   • LIPID PANEL  05/30/2019   • HEMOGLOBIN A1C  06/07/2019   •  INFLUENZA VACCINE  08/01/2019   • COLONOSCOPY  07/01/2026   • TDAP/TD VACCINES (2 - Td) 07/01/2026   • PNEUMOCOCCAL VACCINE (19-64 MEDIUM RISK)  Completed   • ZOSTER VACCINE  Discontinued       Medicare Risks and Personalized Health Plan:  Cardiovascular risk;  med monitoring  Diabetic Lab screening is due;   Glucose quantitative ordered  Obesity/Overweight condition;  Recommend plant based whole foods, info givcen; rec in office nutrition class  Polypharmacy; Patient advised to followup with his primary care provider       CMS-Preventive Services Quick Reference  Medicare Preventive Services Addressed:  Annual Wellness Visit (AWV)  Cardiovascular Disease Screening Tests (may do this order every 5 years in beneficiaries without signs or symptoms of cardiovascular disease)    Advance Care Planning:  Patient does not have an advance directive - not interested in additional information    Diagnoses and all orders for this visit:    1. Medicare annual wellness visit, subsequent (Primary)  -     CBC & Differential  -     Comprehensive Metabolic Panel  -     Lipid Panel  -     TSH  -     CBC Auto Differential    2. Type 2 diabetes mellitus without complication, without long-term current use of insulin (CMS/[facility])  -     POC Glycosylated Hemoglobin (Hb A1C)  -     glyBURIDE (DIAbeta) 5 MG tablet; Take 1 tablet by mouth 2 (Two) Times a Day With Meals.  Dispense: 180 tablet; Refill: 0  -     metFORMIN (GLUCOPHAGE) 500 MG tablet; Take 1 tablet by mouth 3 (Three) Times a Day.  Dispense: 270 tablet; Refill: 0  -     CBC & Differential  -     Comprehensive Metabolic Panel  -     CBC Auto Differential    3. Essential hypertension  -     amLODIPine-benazepril (LOTREL 5-10) 5-10 MG per capsule; Take 1 capsule by mouth Daily.  Dispense: 90 capsule; Refill: 1    4. Mixed hyperlipidemia  -     atorvastatin (LIPITOR) 10 MG tablet; Take 1 tablet by mouth Daily.  Dispense: 90 tablet; Refill: 3  -     Lipid Panel  -     TSH    5. Current  mild episode of major depressive disorder without prior episode (CMS/HCC)  -     citalopram (CeleXA) 20 MG tablet; Take 1 tablet by mouth Daily.  Dispense: 90 tablet; Refill: 1    Other orders  -     dantrolene (DANTRIUM) 100 MG capsule; Take 1 capsule by mouth 2 (Two) Times a Day.  Dispense: 180 capsule; Refill: 0  -     esomeprazole (nexIUM) 40 MG capsule; Take 1 capsule by mouth Every Morning Before Breakfast.  Dispense: 90 capsule; Refill: 1      Annual depression screen complete. 15 minutes.    An After Visit Summary and PPPS with all of these plans were given to the patient.      Follow Up:  Return in about 3 months (around 7/12/2019).

## 2019-04-23 RX ORDER — BACLOFEN 20 MG/1
TABLET ORAL
Qty: 180 TABLET | Refills: 0 | Status: SHIPPED | OUTPATIENT
Start: 2019-04-23 | End: 2019-08-07 | Stop reason: SDUPTHER

## 2019-05-29 ENCOUNTER — OFFICE VISIT (OUTPATIENT)
Dept: FAMILY MEDICINE CLINIC | Facility: CLINIC | Age: 59
End: 2019-05-29

## 2019-05-29 VITALS
HEART RATE: 88 BPM | OXYGEN SATURATION: 97 % | RESPIRATION RATE: 18 BRPM | SYSTOLIC BLOOD PRESSURE: 170 MMHG | DIASTOLIC BLOOD PRESSURE: 94 MMHG

## 2019-05-29 DIAGNOSIS — I10 ESSENTIAL HYPERTENSION: ICD-10-CM

## 2019-05-29 PROBLEM — Z90.5 H/O KIDNEY REMOVAL: Status: ACTIVE | Noted: 2019-05-20

## 2019-05-29 PROCEDURE — 99214 OFFICE O/P EST MOD 30 MIN: CPT | Performed by: NURSE PRACTITIONER

## 2019-05-29 RX ORDER — AMLODIPINE BESYLATE AND BENAZEPRIL HYDROCHLORIDE 10; 20 MG/1; MG/1
1 CAPSULE ORAL DAILY
Qty: 30 CAPSULE | Refills: 5 | Status: SHIPPED | OUTPATIENT
Start: 2019-05-29 | End: 2020-01-15

## 2019-05-29 NOTE — PROGRESS NOTES
"Subjective   Kiran Belcher is a 58 y.o. male.   Chief Complaint   Patient presents with   • Hypertension     bp has been elevated since having right kidney removed on 5-20-19      History of Present Illness   Patient is here with his wife. He had his right kidney removed on 05/20/19 for kidney CA  with Dr. Bam Alfaro at  - urology.   He is here today with complaints of elevated blood pressure reading. The home health RN mentioned it on Monday. And it was high today.   Denies: pain or fever.   Incision is healing well. Has been taking ibuprofen for discomfort.   Weakness has resolved as well as the diarrhea.   \" I feel good\" denies: Headache.     He has taken his blood pressure medications today.     The following portions of the patient's history were reviewed and updated as appropriate: allergies, current medications, past family history, past medical history, past social history, past surgical history and problem list.    Review of Systems   Constitutional: Negative.    HENT: Negative.    Gastrointestinal: Positive for diarrhea.        Diarrhea has resolved.      Musculoskeletal: Negative.         Slight incisional tenderness - taking ibuprofen for the pain.      Skin: Positive for wound.        Surgical wound    Allergic/Immunologic: Negative.    Neurological: Negative.  Negative for dizziness and headaches.   Hematological: Negative.    Psychiatric/Behavioral: Negative.      Past Surgical History:   Procedure Laterality Date   • CERVICAL DISC SURGERY      around 5/2008, fall/injury tripped over a safety gate for their dogs, reported SCI \"semi quadraplegic\" since. [facility]. [facility] after.   • SPINE SURGERY       Past Medical History:   Diagnosis Date   • Cancer (CMS/HCC)     hx spot on right kidney, for ~8 years, told cancer but no hx biopsy and no interventions and just watching it. Dr. vega used to watch this cyst, then started with  urology a year ago after he retired.   • Decreased mobility    • " "Depression    • Diabetes mellitus (CMS/[facility])    • Esophageal reflux    • Glaucoma    • History of methicillin resistant Staphylococcus aureus infection    • History of obstructive sleep apnea    • History of quadriplegia     of unknown etiology - C5-C7, incomplete    • History of spinal cord injury/quadriplegia 2008 9/14/2018   • Hyperlipidemia    • Hypertension    • Kidney disorder 9/14/2018   • Paraplegia (CMS/[facility])        Objective   Allergies   Allergen Reactions   • Levofloxacin Unknown (See Comments)     tendinopathy     Visit Vitals  /94 (BP Location: Left arm, Patient Position: Sitting)   Pulse 88   Resp 18   SpO2 97%       Physical Exam   Constitutional: He is oriented to person, place, and time. He appears well-developed and well-nourished.   Cardiovascular: Normal rate and regular rhythm.   Pulmonary/Chest: Effort normal and breath sounds normal.   Neurological: He is alert and oriented to person, place, and time.   Skin: Skin is warm and dry. Capillary refill takes less than 2 seconds.        Incisions are well approximated - no drainage   Borders are bright pink.   Patient is diabetic.      Psychiatric: He has a normal mood and affect. His behavior is normal.   Vitals reviewed.      Assessment/Plan   Kiran was seen today for hypertension.    Diagnoses and all orders for this visit:    Essential hypertension  -     amLODIPine-benazepril (LOTREL) 10-20 MG per capsule; Take 1 capsule by mouth Daily.    Paged Dr. Bam Alfaro - he called back at 16:08 discussed the patient - blood pressure medication and readings. He checked his last kidney function levels that were completed before discharge - \" his GFR is good, go ahead and increase his medication. Have him keep his follow up appointment on 06/05/19. Will recheck his labs then\"..     Will increase his Lotrel to 10/20mg daily. He has been provided with Blood pressure record pamphlet so he can record his levels over the next weeks  He is going to taken a " second dose of lotrel 5/10 today.     Keep follow up appointment with Dr. Alfaro on 06/05/19  And with Dr. Fernandez on 07/12/19 earlier if needed.     See HTN patient instructions .       Patient Instructions   Hypertension  Hypertension is another name for high blood pressure. High blood pressure forces your heart to work harder to pump blood. This can cause problems over time.  There are two numbers in a blood pressure reading. There is a top number (systolic) over a bottom number (diastolic). It is best to have a blood pressure below 120/80. Healthy choices can help lower your blood pressure. You may need medicine to help lower your blood pressure if:  · Your blood pressure cannot be lowered with healthy choices.  · Your blood pressure is higher than 130/80.    Follow these instructions at home:  Eating and drinking  · If directed, follow the DASH eating plan. This diet includes:  ? Filling half of your plate at each meal with fruits and vegetables.  ? Filling one quarter of your plate at each meal with whole grains. Whole grains include whole wheat pasta, brown rice, and whole grain bread.  ? Eating or drinking low-fat dairy products, such as skim milk or low-fat yogurt.  ? Filling one quarter of your plate at each meal with low-fat (lean) proteins. Low-fat proteins include fish, skinless chicken, eggs, beans, and tofu.  ? Avoiding fatty meat, cured and processed meat, or chicken with skin.  ? Avoiding premade or processed food.  · Eat less than 1,500 mg of salt (sodium) a day.  · Limit alcohol use to no more than 1 drink a day for nonpregnant women and 2 drinks a day for men. One drink equals 12 oz of beer, 5 oz of wine, or 1½ oz of hard liquor.  Lifestyle  · Work with your doctor to stay at a healthy weight or to lose weight. Ask your doctor what the best weight is for you.  · Get at least 30 minutes of exercise that causes your heart to beat faster (aerobic exercise) most days of the week. This may include  walking, swimming, or biking.  · Get at least 30 minutes of exercise that strengthens your muscles (resistance exercise) at least 3 days a week. This may include lifting weights or pilates.  · Do not use any products that contain nicotine or tobacco. This includes cigarettes and e-cigarettes. If you need help quitting, ask your doctor.  · Check your blood pressure at home as told by your doctor.  · Keep all follow-up visits as told by your doctor. This is important.  Medicines  · Take over-the-counter and prescription medicines only as told by your doctor. Follow directions carefully.  · Do not skip doses of blood pressure medicine. The medicine does not work as well if you skip doses. Skipping doses also puts you at risk for problems.  · Ask your doctor about side effects or reactions to medicines that you should watch for.  Contact a doctor if:  · You think you are having a reaction to the medicine you are taking.  · You have headaches that keep coming back (recurring).  · You feel dizzy.  · You have swelling in your ankles.  · You have trouble with your vision.  Get help right away if:  · You get a very bad headache.  · You start to feel confused.  · You feel weak or numb.  · You feel faint.  · You get very bad pain in your:  ? Chest.  ? Belly (abdomen).  · You throw up (vomit) more than once.  · You have trouble breathing.  Summary  · Hypertension is another name for high blood pressure.  · Making healthy choices can help lower blood pressure. If your blood pressure cannot be controlled with healthy choices, you may need to take medicine.  This information is not intended to replace advice given to you by your health care provider. Make sure you discuss any questions you have with your health care provider.  Document Released: 06/05/2009 Document Revised: 11/15/2017 Document Reviewed: 11/15/2017  [employer] Interactive Patient Education © 2019 [employer]        Sun Vasquez APRN

## 2019-05-29 NOTE — PATIENT INSTRUCTIONS
Hypertension  Hypertension is another name for high blood pressure. High blood pressure forces your heart to work harder to pump blood. This can cause problems over time.  There are two numbers in a blood pressure reading. There is a top number (systolic) over a bottom number (diastolic). It is best to have a blood pressure below 120/80. Healthy choices can help lower your blood pressure. You may need medicine to help lower your blood pressure if:  · Your blood pressure cannot be lowered with healthy choices.  · Your blood pressure is higher than 130/80.    Follow these instructions at home:  Eating and drinking  · If directed, follow the DASH eating plan. This diet includes:  ? Filling half of your plate at each meal with fruits and vegetables.  ? Filling one quarter of your plate at each meal with whole grains. Whole grains include whole wheat pasta, brown rice, and whole grain bread.  ? Eating or drinking low-fat dairy products, such as skim milk or low-fat yogurt.  ? Filling one quarter of your plate at each meal with low-fat (lean) proteins. Low-fat proteins include fish, skinless chicken, eggs, beans, and tofu.  ? Avoiding fatty meat, cured and processed meat, or chicken with skin.  ? Avoiding premade or processed food.  · Eat less than 1,500 mg of salt (sodium) a day.  · Limit alcohol use to no more than 1 drink a day for nonpregnant women and 2 drinks a day for men. One drink equals 12 oz of beer, 5 oz of wine, or 1½ oz of hard liquor.  Lifestyle  · Work with your doctor to stay at a healthy weight or to lose weight. Ask your doctor what the best weight is for you.  · Get at least 30 minutes of exercise that causes your heart to beat faster (aerobic exercise) most days of the week. This may include walking, swimming, or biking.  · Get at least 30 minutes of exercise that strengthens your muscles (resistance exercise) at least 3 days a week. This may include lifting weights or pilates.  · Do not use any  products that contain nicotine or tobacco. This includes cigarettes and e-cigarettes. If you need help quitting, ask your doctor.  · Check your blood pressure at home as told by your doctor.  · Keep all follow-up visits as told by your doctor. This is important.  Medicines  · Take over-the-counter and prescription medicines only as told by your doctor. Follow directions carefully.  · Do not skip doses of blood pressure medicine. The medicine does not work as well if you skip doses. Skipping doses also puts you at risk for problems.  · Ask your doctor about side effects or reactions to medicines that you should watch for.  Contact a doctor if:  · You think you are having a reaction to the medicine you are taking.  · You have headaches that keep coming back (recurring).  · You feel dizzy.  · You have swelling in your ankles.  · You have trouble with your vision.  Get help right away if:  · You get a very bad headache.  · You start to feel confused.  · You feel weak or numb.  · You feel faint.  · You get very bad pain in your:  ? Chest.  ? Belly (abdomen).  · You throw up (vomit) more than once.  · You have trouble breathing.  Summary  · Hypertension is another name for high blood pressure.  · Making healthy choices can help lower blood pressure. If your blood pressure cannot be controlled with healthy choices, you may need to take medicine.  This information is not intended to replace advice given to you by your health care provider. Make sure you discuss any questions you have with your health care provider.  Document Released: 06/05/2009 Document Revised: 11/15/2017 Document Reviewed: 11/15/2017  [employer] Patient Education © 2019 [employer]

## 2019-06-03 ENCOUNTER — TELEPHONE (OUTPATIENT)
Dept: FAMILY MEDICINE CLINIC | Facility: CLINIC | Age: 59
End: 2019-06-03

## 2019-06-03 NOTE — TELEPHONE ENCOUNTER
Contacted pt and advised needs to be seen per Rebecca. Pt voiced understanding and waned an appt tomorrow with Rebecca. I advised pt she doesn't have any appts tomorrow but he could come in and see NP. Pt voiced understanding.  ----- Message from Nadira Fernandez DO sent at 6/3/2019  1:47 PM EDT -----  Regarding: FW: BP RUNNING HIGH  Contact: [phone]  Patient needs to be seen   ----- Message -----  From: Daisy Cam MA  Sent: 6/3/2019  11:36 AM  To: Nadira Fernandez DO  Subject: FW: BP RUNNING HIGH                                  ----- Message -----  From: Marisel West, RegSched Rep  Sent: 6/3/2019  11:32 AM  To: Daisy Cam MA  Subject: BP RUNNING HIGH                                  [facility]    PT HAD SURGERY A COUPLE WEEKS AGO AND SINCE THEN HIS BP HAS BEEN HIGH. HE CAME IN LAST WEEK AND SAW ROSITA, SHE TOLD HIM TO TAKE 2 OF HIS BP BILLS AND IT HAS NOT HELPED. HE WOULD LIKE TO KNOW IF HE SHOULD BE SEEN AGAIN OR IF DR FERNANDEZ WOULD LIKE TO SEND IN SOMETHING DIFFERENT.

## 2019-06-06 ENCOUNTER — OFFICE VISIT (OUTPATIENT)
Dept: FAMILY MEDICINE CLINIC | Facility: CLINIC | Age: 59
End: 2019-06-06

## 2019-06-06 ENCOUNTER — TELEPHONE (OUTPATIENT)
Dept: FAMILY MEDICINE CLINIC | Facility: CLINIC | Age: 59
End: 2019-06-06

## 2019-06-06 VITALS
HEART RATE: 94 BPM | HEIGHT: 69 IN | OXYGEN SATURATION: 97 % | RESPIRATION RATE: 21 BRPM | SYSTOLIC BLOOD PRESSURE: 142 MMHG | WEIGHT: 260 LBS | BODY MASS INDEX: 38.51 KG/M2 | DIASTOLIC BLOOD PRESSURE: 79 MMHG

## 2019-06-06 DIAGNOSIS — I10 ESSENTIAL HYPERTENSION: Primary | ICD-10-CM

## 2019-06-06 PROBLEM — H61.23 EXCESSIVE CERUMEN IN BOTH EAR CANALS: Chronic | Status: RESOLVED | Noted: 2018-09-14 | Resolved: 2019-06-06

## 2019-06-06 PROCEDURE — 99213 OFFICE O/P EST LOW 20 MIN: CPT | Performed by: NURSE PRACTITIONER

## 2019-06-06 RX ORDER — FESOTERODINE FUMARATE 8 MG/1
TABLET, FILM COATED, EXTENDED RELEASE ORAL
COMMUNITY
Start: 2019-06-05

## 2019-06-06 NOTE — TELEPHONE ENCOUNTER
Called patient and informed him of the change to his medication and that he is to take only one pill instead of 2. Patient states he understands.   ----- Message from FARNAZ Jordan sent at 6/6/2019 12:44 PM EDT -----  Regarding: medication  Please call patient and make sure he understands that the blood pressure medication that is called in to the pharmacy is for Lotrel 10/20 one pill once a day. He has been taking two of the Lotrel 5/10 pills daily. I told the pharmacy, but I want to make sure that he knows to only take one pill a day now of the 10/20.   Thanks,  Manuela

## 2019-06-06 NOTE — PROGRESS NOTES
Subjective   Kiran Belcher is a 58 y.o. male.     Mr. Belcher presents today for f/u hypertension. Patient recently had nephrectomy 5/20/19 due to renal cancer and has had elevated blood pressure readings in the post-operative period. Patient was seen here at the clinic on 5/29/19 and his blood pressure medication was increased from Lotrel 5/10 one tablet daily to two tablets daily. Patient states he saw his surgeon yesterday and was told that he was healing well and did not need to follow-up for 6 months. Patient has brought in a log of his blood pressure readings (see scanned documents) today and wants to discuss the elevated results he has been getting when he checks his blood pressure at home. Patient states that he is using a digital arm cuff and has been checking blood pressure only on the left arm. Patient reports that he feels well, denies chest pain, SOA or other complaints.        The following portions of the patient's history were reviewed and updated as appropriate: allergies, current medications, past family history, past medical history, past social history, past surgical history and problem list.    Allergies as of 06/06/2019 - Reviewed 06/06/2019   Allergen Reaction Noted   • Levofloxacin Unknown (See Comments) 09/24/2018       Current Outpatient Medications on File Prior to Visit   Medication Sig   • amLODIPine-benazepril (LOTREL) 10-20 MG per capsule Take 1 capsule by mouth Daily.   • atorvastatin (LIPITOR) 10 MG tablet Take 1 tablet by mouth Daily.   • baclofen (LIORESAL) 20 MG tablet TAKE ONE TABLET BY MOUTH TWICE A DAY   • citalopram (CeleXA) 20 MG tablet Take 1 tablet by mouth Daily.   • dantrolene (DANTRIUM) 100 MG capsule Take 1 capsule by mouth 2 (Two) Times a Day.   • esomeprazole (nexIUM) 40 MG capsule Take 1 capsule by mouth Every Morning Before Breakfast.   • fesoterodine fumarate (TOVIAZ) 4 MG tablet sustained-release 24 hour capsule Take 8 mg by mouth Daily.   • glucose blood  (ACCU-CHEK MAX PLUS) test strip 1 each by Other route 2 (Two) Times a Day. Use as instructed   • glyBURIDE (DIAbeta) 5 MG tablet Take 1 tablet by mouth 2 (Two) Times a Day With Meals.   • lactobacillus acidophilus (RISAQUAD) capsule capsule Take 1 capsule by mouth Daily.   • metFORMIN (GLUCOPHAGE) 500 MG tablet Take 1 tablet by mouth 3 (Three) Times a Day.   • methenamine (HIPREX) 1 g tablet Take 1 g by mouth Daily.   • TOVIAZ 8 MG tablet sustained-release 24 hour tablet    • [DISCONTINUED] amLODIPine-benazepril (LOTREL 5-10) 5-10 MG per capsule Take 1 capsule by mouth Daily.     No current facility-administered medications on file prior to visit.        Review of Systems   Constitutional: Negative.    Respiratory: Negative.    Cardiovascular: Negative.    Gastrointestinal: Negative.    Genitourinary: Negative.    Neurological: Negative.        Objective   Physical Exam   Constitutional: He is oriented to person, place, and time. He appears well-developed and well-nourished. No distress.   Cardiovascular: Normal rate, regular rhythm and normal heart sounds.   Pulmonary/Chest: Effort normal and breath sounds normal.   Neurological: He is alert and oriented to person, place, and time.   Skin: Skin is warm and dry. He is not diaphoretic.   Psychiatric: He has a normal mood and affect. His behavior is normal. Judgment and thought content normal.   Vitals reviewed.    Vitals:    06/06/19 1146   BP: 142/79   Pulse: 94   Resp: 21   SpO2: 97%     Body mass index is 38.4 kg/m².    Assessment/Plan   Kiran was seen today for follow-up.    Diagnoses and all orders for this visit:    Essential hypertension    Continue Lotrel 10/20 one tablet daily.   Continue to monitor blood pressures and keep a log of readings.  Report abnormal results.    Discussed the nature of the medical condition(s) risks, complications, implications, management, safe and proper use of medications. Encouraged medication compliance, and keeping  scheduled follow up appointments with me and any other providers.     F/U with Dr. Fernandez for routine care and management of chronic conditions.

## 2019-06-06 NOTE — PATIENT INSTRUCTIONS
"Hypertension  Hypertension, commonly called high blood pressure, is when the force of blood pumping through the arteries is too strong. The arteries are the blood vessels that carry blood from the heart throughout the body. Hypertension forces the heart to work harder to pump blood and may cause arteries to become narrow or stiff. Having untreated or uncontrolled hypertension can cause heart attacks, strokes, kidney disease, and other problems.  A blood pressure reading consists of a higher number over a lower number. Ideally, your blood pressure should be below 120/80. The first (\"top\") number is called the systolic pressure. It is a measure of the pressure in your arteries as your heart beats. The second (\"bottom\") number is called the diastolic pressure. It is a measure of the pressure in your arteries as the heart relaxes.  What are the causes?  The cause of this condition is not known.  What increases the risk?  Some risk factors for high blood pressure are under your control. Others are not.  Factors you can change  · Smoking.  · Having type 2 diabetes mellitus, high cholesterol, or both.  · Not getting enough exercise or physical activity.  · Being overweight.  · Having too much fat, sugar, calories, or salt (sodium) in your diet.  · Drinking too much alcohol.  Factors that are difficult or impossible to change  · Having chronic kidney disease.  · Having a family history of high blood pressure.  · Age. Risk increases with age.  · Race. You may be at higher risk if you are -American.  · Gender. Men are at higher risk than women before age 45. After age 65, women are at higher risk than men.  · Having obstructive sleep apnea.  · Stress.  What are the signs or symptoms?  Extremely high blood pressure (hypertensive crisis) may cause:  · Headache.  · Anxiety.  · Shortness of breath.  · Nosebleed.  · Nausea and vomiting.  · Severe chest pain.  · Jerky movements you cannot control (seizures).    How is this " diagnosed?  This condition is diagnosed by measuring your blood pressure while you are seated, with your arm resting on a surface. The cuff of the blood pressure monitor will be placed directly against the skin of your upper arm at the level of your heart. It should be measured at least twice using the same arm. Certain conditions can cause a difference in blood pressure between your right and left arms.  Certain factors can cause blood pressure readings to be lower or higher than normal (elevated) for a short period of time:  · When your blood pressure is higher when you are in a health care provider's office than when you are at home, this is called white coat hypertension. Most people with this condition do not need medicines.  · When your blood pressure is higher at home than when you are in a health care provider's office, this is called masked hypertension. Most people with this condition may need medicines to control blood pressure.    If you have a high blood pressure reading during one visit or you have normal blood pressure with other risk factors:  · You may be asked to return on a different day to have your blood pressure checked again.  · You may be asked to monitor your blood pressure at home for 1 week or longer.    If you are diagnosed with hypertension, you may have other blood or imaging tests to help your health care provider understand your overall risk for other conditions.  How is this treated?  This condition is treated by making healthy lifestyle changes, such as eating healthy foods, exercising more, and reducing your alcohol intake. Your health care provider may prescribe medicine if lifestyle changes are not enough to get your blood pressure under control, and if:  · Your systolic blood pressure is above 130.  · Your diastolic blood pressure is above 80.    Your personal target blood pressure may vary depending on your medical conditions, your age, and other factors.  Follow these  instructions at home:  Eating and drinking  · Eat a diet that is high in fiber and potassium, and low in sodium, added sugar, and fat. An example eating plan is called the DASH (Dietary Approaches to Stop Hypertension) diet. To eat this way:  ? Eat plenty of fresh fruits and vegetables. Try to fill half of your plate at each meal with fruits and vegetables.  ? Eat whole grains, such as whole wheat pasta, brown rice, or whole grain bread. Fill about one quarter of your plate with whole grains.  ? Eat or drink low-fat dairy products, such as skim milk or low-fat yogurt.  ? Avoid fatty cuts of meat, processed or cured meats, and poultry with skin. Fill about one quarter of your plate with lean proteins, such as fish, chicken without skin, beans, eggs, and tofu.  ? Avoid premade and processed foods. These tend to be higher in sodium, added sugar, and fat.  · Reduce your daily sodium intake. Most people with hypertension should eat less than 1,500 mg of sodium a day.  · Limit alcohol intake to no more than 1 drink a day for nonpregnant women and 2 drinks a day for men. One drink equals 12 oz of beer, 5 oz of wine, or 1½ oz of hard liquor.  Lifestyle  · Work with your health care provider to maintain a healthy body weight or to lose weight. Ask what an ideal weight is for you.  · Get at least 30 minutes of exercise that causes your heart to beat faster (aerobic exercise) most days of the week. Activities may include walking, swimming, or biking.  · Include exercise to strengthen your muscles (resistance exercise), such as pilates or lifting weights, as part of your weekly exercise routine. Try to do these types of exercises for 30 minutes at least 3 days a week.  · Do not use any products that contain nicotine or tobacco, such as cigarettes and e-cigarettes. If you need help quitting, ask your health care provider.  · Monitor your blood pressure at home as told by your health care provider.  · Keep all follow-up visits as  told by your health care provider. This is important.  Medicines  · Take over-the-counter and prescription medicines only as told by your health care provider. Follow directions carefully. Blood pressure medicines must be taken as prescribed.  · Do not skip doses of blood pressure medicine. Doing this puts you at risk for problems and can make the medicine less effective.  · Ask your health care provider about side effects or reactions to medicines that you should watch for.  Contact a health care provider if:  · You think you are having a reaction to a medicine you are taking.  · You have headaches that keep coming back (recurring).  · You feel dizzy.  · You have swelling in your ankles.  · You have trouble with your vision.  Get help right away if:  · You develop a severe headache or confusion.  · You have unusual weakness or numbness.  · You feel faint.  · You have severe pain in your chest or abdomen.  · You vomit repeatedly.  · You have trouble breathing.  Summary  · Hypertension is when the force of blood pumping through your arteries is too strong. If this condition is not controlled, it may put you at risk for serious complications.  · Your personal target blood pressure may vary depending on your medical conditions, your age, and other factors. For most people, a normal blood pressure is less than 120/80.  · Hypertension is treated with lifestyle changes, medicines, or a combination of both. Lifestyle changes include weight loss, eating a healthy, low-sodium diet, exercising more, and limiting alcohol.  This information is not intended to replace advice given to you by your health care provider. Make sure you discuss any questions you have with your health care provider.  Document Released: 12/18/2006 Document Revised: 11/15/2017 Document Reviewed: 11/15/2017  [employer] Interactive Patient Education © 2019 [employer]    How to Take Your Blood Pressure  Blood pressure is a measurement of how strongly your  blood is pressing against the walls of your arteries. Arteries are blood vessels that carry blood from your heart throughout your body. Your health care provider takes your blood pressure at each office visit. You can also take your own blood pressure at home with a blood pressure machine. You may need to take your own blood pressure:  · To confirm a diagnosis of high blood pressure (hypertension).  · To monitor your blood pressure over time.  · To make sure your blood pressure medicine is working.    Supplies needed:  To take your blood pressure, you will need a blood pressure machine. You can buy a blood pressure machine, or blood pressure monitor, at most [employer] or online. There are several types of home blood pressure monitors. When choosing one, consider the following:  · Choose a monitor that has an arm cuff.  · Choose a monitor that wraps snugly around your upper arm. You should be able to fit only one finger between your arm and the cuff.  · Do not choose a monitor that measures your blood pressure from your wrist or finger.    Your health care provider can suggest a reliable monitor that will meet your needs.  How to prepare  To get the most accurate reading, avoid the following for 30 minutes before you check your blood pressure:  · Drinking caffeine.  · Drinking alcohol.  · Eating.  · Smoking.  · Exercising.    Five minutes before you check your blood pressure:  · Empty your bladder.  · Sit quietly without talking in a dining chair, rather than in a soft couch or armchair.    How to take your blood pressure  To check your blood pressure, follow the instructions in the manual that came with your blood pressure monitor. If you have a digital blood pressure monitor, the instructions may be as follows:  1. Sit up straight.  2. Place your feet on the floor. Do not cross your ankles or legs.  3. Rest your left arm at the level of your heart on a table or desk or on the arm of a chair.  4. Pull up your shirt  "sleeve.  5. Wrap the blood pressure cuff around the upper part of your left arm, 1 inch (2.5 cm) above your elbow. It is best to wrap the cuff around bare skin.  6. Fit the cuff snugly around your arm. You should be able to place only one finger between the cuff and your arm.  7. Position the cord inside the groove of your elbow.  8. Press the power button.  9. Sit quietly while the cuff inflates and deflates.  10. Read the digital reading on the monitor screen and write it down (record it).  11. Wait 2-3 minutes, then repeat the steps, starting at step 1.    What does my blood pressure reading mean?  A blood pressure reading consists of a higher number over a lower number. Ideally, your blood pressure should be below 120/80. The first (\"top\") number is called the systolic pressure. It is a measure of the pressure in your arteries as your heart beats. The second (\"bottom\") number is called the diastolic pressure. It is a measure of the pressure in your arteries as the heart relaxes.  Blood pressure is classified into four stages. The following are the stages for adults who do not have a short-term serious illness or a chronic condition. Systolic pressure and diastolic pressure are measured in a unit called mm Hg.  Normal  · Systolic pressure: below 120.  · Diastolic pressure: below 80.  Elevated  · Systolic pressure: 120-129.  · Diastolic pressure: below 80.  Hypertension stage 1  · Systolic pressure: 130-139.  · Diastolic pressure: 80-89.  Hypertension stage 2  · Systolic pressure: 140 or above.  · Diastolic pressure: 90 or above.  You can have prehypertension or hypertension even if only the systolic or only the diastolic number in your reading is higher than normal.  Follow these instructions at home:  · Check your blood pressure as often as recommended by your health care provider.  · Take your monitor to the next appointment with your health care provider to make sure:  ? That you are using it " "correctly.  ? That it provides accurate readings.  · Be sure you understand what your goal blood pressure numbers are.  · Tell your health care provider if you are having any side effects from blood pressure medicine.  Contact a health care provider if:  · Your blood pressure is consistently high.  Get help right away if:  · Your systolic blood pressure is higher than 180.  · Your diastolic blood pressure is higher than 110.  This information is not intended to replace advice given to you by your health care provider. Make sure you discuss any questions you have with your health care provider.  Document Released: 05/26/2017 Document Revised: 08/08/2017 Document Reviewed: 05/26/2017  [employer] Interactive Patient Education © 2018 [employer]    DASH Eating Plan  DASH stands for \"Dietary Approaches to Stop Hypertension.\" The DASH eating plan is a healthy eating plan that has been shown to reduce high blood pressure (hypertension). It may also reduce your risk for type 2 diabetes, heart disease, and stroke. The DASH eating plan may also help with weight loss.  What are tips for following this plan?  General guidelines  · Avoid eating more than 2,300 mg (milligrams) of salt (sodium) a day. If you have hypertension, you may need to reduce your sodium intake to 1,500 mg a day.  · Limit alcohol intake to no more than 1 drink a day for nonpregnant women and 2 drinks a day for men. One drink equals 12 oz of beer, 5 oz of wine, or 1½ oz of hard liquor.  · Work with your health care provider to maintain a healthy body weight or to lose weight. Ask what an ideal weight is for you.  · Get at least 30 minutes of exercise that causes your heart to beat faster (aerobic exercise) most days of the week. Activities may include walking, swimming, or biking.  · Work with your health care provider or diet and nutrition specialist (dietitian) to adjust your eating plan to your individual calorie needs.  Reading food labels  · Check food " "labels for the amount of sodium per serving. Choose foods with less than 5 percent of the Daily Value of sodium. Generally, foods with less than 300 mg of sodium per serving fit into this eating plan.  · To find whole grains, look for the word \"whole\" as the first word in the ingredient list.  Shopping  · Buy products labeled as \"low-sodium\" or \"no salt added.\"  · Buy fresh foods. Avoid canned foods and premade or frozen meals.  Cooking  · Avoid adding salt when cooking. Use salt-free seasonings or herbs instead of table salt or sea salt. Check with your health care provider or pharmacist before using salt substitutes.  · Do not alejandro foods. Cook foods using healthy methods such as baking, boiling, grilling, and broiling instead.  · Cook with heart-healthy oils, such as olive, canola, soybean, or sunflower oil.  Meal planning    · Eat a balanced diet that includes:  ? 5 or more servings of fruits and vegetables each day. At each meal, try to fill half of your plate with fruits and vegetables.  ? Up to 6-8 servings of whole grains each day.  ? Less than 6 oz of lean meat, poultry, or fish each day. A 3-oz serving of meat is about the same size as a deck of cards. One egg equals 1 oz.  ? 2 servings of low-fat dairy each day.  ? A serving of nuts, seeds, or beans 5 times each week.  ? Heart-healthy fats. Healthy fats called Omega-3 fatty acids are found in foods such as flaxseeds and coldwater fish, like sardines, salmon, and mackerel.  · Limit how much you eat of the following:  ? Canned or prepackaged foods.  ? Food that is high in trans fat, such as fried foods.  ? Food that is high in saturated fat, such as fatty meat.  ? Sweets, desserts, sugary drinks, and other foods with added sugar.  ? Full-fat dairy products.  · Do not salt foods before eating.  · Try to eat at least 2 vegetarian meals each week.  · Eat more home-cooked food and less restaurant, buffet, and fast food.  · When eating at a restaurant, ask that " your food be prepared with less salt or no salt, if possible.  What foods are recommended?  The items listed may not be a complete list. Talk with your dietitian about what dietary choices are best for you.  Grains  Whole-grain or whole-wheat bread. Whole-grain or whole-wheat pasta. Brown rice. Oatmeal. Quinoa. Bulgur. Whole-grain and low-sodium cereals. Carmenza bread. Low-fat, low-sodium crackers. Whole-wheat flour tortillas.  Vegetables  Fresh or frozen vegetables (raw, steamed, roasted, or grilled). Low-sodium or reduced-sodium tomato and vegetable juice. Low-sodium or reduced-sodium tomato sauce and tomato paste. Low-sodium or reduced-sodium canned vegetables.  Fruits  All fresh, dried, or frozen fruit. Canned fruit in natural juice (without added sugar).  Meat and other protein foods  Skinless chicken or turkey. Ground chicken or turkey. Pork with fat trimmed off. Fish and seafood. Egg whites. Dried beans, peas, or lentils. Unsalted nuts, nut butters, and seeds. Unsalted canned beans. Lean cuts of beef with fat trimmed off. Low-sodium, lean deli meat.  Dairy  Low-fat (1%) or fat-free (skim) milk. Fat-free, low-fat, or reduced-fat cheeses. Nonfat, low-sodium ricotta or cottage cheese. Low-fat or nonfat yogurt. Low-fat, low-sodium cheese.  Fats and oils  Soft margarine without trans fats. Vegetable oil. Low-fat, reduced-fat, or light mayonnaise and salad dressings (reduced-sodium). Canola, safflower, olive, soybean, and sunflower oils. Avocado.  Seasoning and other foods  Herbs. Spices. Seasoning mixes without salt. Unsalted popcorn and pretzels. Fat-free sweets.  What foods are not recommended?  The items listed may not be a complete list. Talk with your dietitian about what dietary choices are best for you.  Grains  Baked goods made with fat, such as croissants, muffins, or some breads. Dry pasta or rice meal packs.  Vegetables  Creamed or fried vegetables. Vegetables in a cheese sauce. Regular canned vegetables  (not low-sodium or reduced-sodium). Regular canned tomato sauce and paste (not low-sodium or reduced-sodium). Regular tomato and vegetable juice (not low-sodium or reduced-sodium). Pickles. Olives.  Fruits  Canned fruit in a light or heavy syrup. Fried fruit. Fruit in cream or butter sauce.  Meat and other protein foods  Fatty cuts of meat. Ribs. Fried meat. Vicente. Sausage. Bologna and other processed lunch meats. Salami. Fatback. Hotdogs. Bratwurst. Salted nuts and seeds. Canned beans with added salt. Canned or smoked fish. Whole eggs or egg yolks. Chicken or turkey with skin.  Dairy  Whole or 2% milk, cream, and half-and-half. Whole or full-fat cream cheese. Whole-fat or sweetened yogurt. Full-fat cheese. Nondairy creamers. Whipped toppings. Processed cheese and cheese spreads.  Fats and oils  Butter. Stick margarine. Lard. Shortening. Ghee. Vicente fat. Tropical oils, such as coconut, palm kernel, or palm oil.  Seasoning and other foods  Salted popcorn and pretzels. Onion salt, garlic salt, seasoned salt, table salt, and sea salt. Worcestershire sauce. Tartar sauce. Barbecue sauce. Teriyaki sauce. Soy sauce, including reduced-sodium. Steak sauce. Canned and packaged gravies. Fish sauce. Oyster sauce. Cocktail sauce. Horseradish that you find on the shelf. Ketchup. Mustard. Meat flavorings and tenderizers. Bouillon cubes. Hot sauce and Tabasco sauce. Premade or packaged marinades. Premade or packaged taco seasonings. Relishes. Regular salad dressings.  Where to find more information:  · National Heart, Lung, and Blood [place]: www.nhlbi.nih.gov  · American Heart Association: www.heart.org  Summary  · The DASH eating plan is a healthy eating plan that has been shown to reduce high blood pressure (hypertension). It may also reduce your risk for type 2 diabetes, heart disease, and stroke.  · With the DASH eating plan, you should limit salt (sodium) intake to 2,300 mg a day. If you have hypertension, you may need to  reduce your sodium intake to 1,500 mg a day.  · When on the DASH eating plan, aim to eat more fresh fruits and vegetables, whole grains, lean proteins, low-fat dairy, and heart-healthy fats.  · Work with your health care provider or diet and nutrition specialist (dietitian) to adjust your eating plan to your individual calorie needs.  This information is not intended to replace advice given to you by your health care provider. Make sure you discuss any questions you have with your health care provider.  Document Released: 12/06/2012 Document Revised: 12/11/2017 Document Reviewed: 12/11/2017  [employer] Interactive Patient Education © 2019 [employer]

## 2019-07-12 ENCOUNTER — OFFICE VISIT (OUTPATIENT)
Dept: FAMILY MEDICINE CLINIC | Facility: CLINIC | Age: 59
End: 2019-07-12

## 2019-07-12 VITALS
SYSTOLIC BLOOD PRESSURE: 152 MMHG | HEART RATE: 88 BPM | RESPIRATION RATE: 18 BRPM | TEMPERATURE: 97.6 F | HEIGHT: 69 IN | DIASTOLIC BLOOD PRESSURE: 88 MMHG | OXYGEN SATURATION: 98 % | BODY MASS INDEX: 38.4 KG/M2

## 2019-07-12 DIAGNOSIS — I10 BENIGN ESSENTIAL HYPERTENSION: Primary | ICD-10-CM

## 2019-07-12 DIAGNOSIS — E11.9 DIABETES MELLITUS WITHOUT COMPLICATION (HCC): ICD-10-CM
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
## 2019-07-12 DIAGNOSIS — E78.2 MIXED HYPERLIPIDEMIA: ICD-10-CM

## 2019-07-12 DIAGNOSIS — C64.1 RENAL CELL CARCINOMA OF RIGHT KIDNEY (HCC): ICD-10-CM

## 2019-07-12 LAB — HBA1C MFR BLD: 7.2 %

## 2019-07-12 PROCEDURE — 99214 OFFICE O/P EST MOD 30 MIN: CPT | Performed by: FAMILY MEDICINE

## 2019-07-12 PROCEDURE — 83036 HEMOGLOBIN GLYCOSYLATED A1C: CPT | Performed by: FAMILY MEDICINE

## 2019-07-12 RX ORDER — TRIAMCINOLONE ACETONIDE 1 MG/G
CREAM TOPICAL 2 TIMES DAILY
Qty: 30 G | Refills: 0 | Status: SHIPPED | OUTPATIENT
Start: 2019-07-12 | End: 2020-12-16

## 2019-07-12 NOTE — PROGRESS NOTES
Subjective   Kiran Belcher is a 58 y.o. male.   Has recently had a right nephrectomy for renal cell carcinoma at . Continues f/u at . Has had labs before and after surgery.  Hypertension   This is a chronic problem. The current episode started more than 1 year ago. The problem is unchanged. The problem is controlled. Pertinent negatives include no anxiety, blurred vision, chest pain, malaise/fatigue, neck pain, palpitations, peripheral edema, PND or shortness of breath. There are no associated agents to hypertension. Risk factors for coronary artery disease include diabetes mellitus, dyslipidemia, family history, obesity, male gender and sedentary lifestyle. Current antihypertension treatment includes ACE inhibitors and calcium channel blockers. The current treatment provides significant improvement. There are no compliance problems.  Hypertensive end-organ damage includes kidney disease. Identifiable causes of hypertension include chronic renal disease.   Diabetes   He presents for his follow-up (Taking Metformin and Glyburide) diabetic visit. He has type 2 diabetes mellitus. His disease course has been stable. There are no hypoglycemic associated symptoms. Pertinent negatives for hypoglycemia include no dizziness, mood changes or nervousness/anxiousness. Pertinent negatives for diabetes include no blurred vision, no chest pain, no fatigue, no foot paresthesias, no polydipsia, no polyphagia, no polyuria, no visual change, no weakness and no weight loss. There are no hypoglycemic complications. Symptoms are stable. There are no diabetic complications. Risk factors for coronary artery disease include diabetes mellitus, dyslipidemia, obesity, male sex, hypertension and family history. Current diabetic treatment includes oral agent (dual therapy). He is compliant with treatment all of the time. His weight is stable. He is following a generally healthy diet. When asked about meal planning, he reported none. He  has not had a previous visit with a dietitian. He participates in exercise intermittently. There is no change in his home blood glucose trend. An ACE inhibitor/angiotensin II receptor blocker is being taken. He does not see a podiatrist.Eye exam is not current.   Hyperlipidemia   This is a chronic problem. The current episode started more than 1 year ago. The problem is controlled. Recent lipid tests were reviewed and are normal. Exacerbating diseases include chronic renal disease, diabetes and obesity. There are no known factors aggravating his hyperlipidemia. Pertinent negatives include no chest pain, focal weakness, leg pain, myalgias or shortness of breath. Current antihyperlipidemic treatment includes statins. The current treatment provides significant improvement of lipids. There are no compliance problems.  Risk factors for coronary artery disease include diabetes mellitus, dyslipidemia, family history, obesity, male sex and hypertension.      C/o insect bite on right leg 2 days ago. Has used neosporin little relief. Denies itching but has redness.  The following portions of the patient's history were reviewed and updated as appropriate: allergies, current medications, past family history, past medical history, past social history, past surgical history and problem list.  Vitals:    07/12/19 1420   BP: 152/88   Pulse: 88   Resp: 18   Temp: 97.6 °F (36.4 °C)   SpO2: 98%     Body mass index is 38.4 kg/m².  Review of Systems   Constitutional: Negative.  Negative for activity change, fatigue, fever, malaise/fatigue, unexpected weight gain and unexpected weight loss.   HENT: Negative.  Negative for congestion, sneezing and sore throat.    Eyes: Negative.  Negative for blurred vision, double vision and visual disturbance.   Respiratory: Negative.  Negative for cough, chest tightness, shortness of breath and wheezing.    Cardiovascular: Negative.  Negative for chest pain, palpitations, leg swelling and PND.    Gastrointestinal: Negative.  Negative for abdominal distention, abdominal pain, blood in stool, constipation, diarrhea and nausea.   Endocrine: Negative.  Negative for cold intolerance, heat intolerance, polydipsia, polyphagia and polyuria.   Genitourinary: Negative.  Negative for urinary incontinence, dysuria, frequency and urgency.   Musculoskeletal: Negative.  Negative for arthralgias, myalgias and neck pain.   Skin: Negative.  Negative for rash.   Allergic/Immunologic: Negative.    Neurological: Negative.  Negative for dizziness, focal weakness, syncope, weakness, numbness and memory problem.   Hematological: Negative.  Negative for adenopathy.   Psychiatric/Behavioral: Negative.  Negative for suicidal ideas and depressed mood. The patient is not nervous/anxious.    All other systems reviewed and are negative.      Objective   Physical Exam   Constitutional: He is oriented to person, place, and time. He appears well-developed and well-nourished. No distress.   HENT:   Right Ear: External ear normal.   Left Ear: External ear normal.   Nose: Nose normal.   Mouth/Throat: Oropharynx is clear and moist.   Eyes: Conjunctivae and EOM are normal. Pupils are equal, round, and reactive to light.   Neck: Normal range of motion. Neck supple. No thyromegaly present.   Cardiovascular: Normal rate, regular rhythm and normal heart sounds.   No murmur heard.  Pulmonary/Chest: Effort normal and breath sounds normal. No respiratory distress. He has no wheezes.   Abdominal: Soft. Bowel sounds are normal. He exhibits no distension and no mass. There is no tenderness. There is no rebound and no guarding. No hernia.   Musculoskeletal: Normal range of motion. He exhibits no edema or tenderness.   Lymphadenopathy:     He has no cervical adenopathy.   Neurological: He is alert and oriented to person, place, and time. He has normal reflexes.   Skin: Skin is warm and dry. No rash noted. He is not diaphoretic. No erythema. No pallor.    Psychiatric: He has a normal mood and affect. His behavior is normal. Judgment and thought content normal.   Nursing note and vitals reviewed.          Assessment/Plan   Kiran was seen today for hypertension and diabetes.    Diagnoses and all orders for this visit:    Benign essential hypertension    Diabetes mellitus without complication (CMS/HCC)  -     POC Glycosylated Hemoglobin (Hb A1C)  -     metFORMIN (GLUCOPHAGE) 500 MG tablet; Take 1 tablet by mouth 3 (Three) Times a Day.    Mixed hyperlipidemia    Insect bite, initial encounter    Renal cell carcinoma of right kidney (CMS/HCC)    Other orders  -     triamcinolone (KENALOG) 0.1 % cream; Apply  topically to the appropriate area as directed 2 (Two) Times a Day.        Decrease Glyburide to 2.5 mg bid. Continue Metformin.

## 2019-07-29 RX ORDER — DANTROLENE SODIUM 100 MG/1
CAPSULE ORAL
Qty: 180 CAPSULE | Refills: 0 | Status: SHIPPED | OUTPATIENT
Start: 2019-07-29 | End: 2019-11-12 | Stop reason: SDUPTHER

## 2019-08-09 RX ORDER — BACLOFEN 20 MG/1
TABLET ORAL
Qty: 180 TABLET | Refills: 0 | Status: SHIPPED | OUTPATIENT
Start: 2019-08-09 | End: 2019-11-18 | Stop reason: SDUPTHER

## 2019-08-14 LAB
ALBUMIN SERPL-MCNC: 4.4 G/DL (ref 3.5–5.2)
ALBUMIN/GLOB SERPL: 1.5 G/DL
ALP SERPL-CCNC: 102 U/L (ref 39–117)
AST SERPL-CCNC: 41 U/L (ref 1–40)
BASOPHILS # BLD AUTO: 0.02 10E3/MM3 (ref 0–0.2)
BASOPHILS NFR BLD AUTO: 0.2 % (ref 0–1.5)
BILIRUB SERPL-MCNC: 0.4 MG/DL (ref 0.2–1.2)
BUN SERPL-MCNC: 11 MG/DL (ref 6–20)
BUN/CREAT SERPL: 15.9 (ref 7–25)
CALCIUM SERPL-MCNC: 9.4 MG/DL (ref 8.6–10.5)
CHLORIDE SERPL-SCNC: 97 MMOL/L (ref 98–107)
CREAT SERPL-MCNC: 0.69 MG/DL (ref 0.76–1.27)
EOSINOPHIL # BLD AUTO: 0.39 10E3/MM3 (ref 0–0.4)
EOSINOPHIL NFR BLD AUTO: 4 % (ref 0.3–6.2)
ERYTHROCYTE [DISTWIDTH] IN BLOOD BY AUTOMATED COUNT: 14.4 % (ref 12.3–15.4)
GLOBULIN SER CALC-MCNC: 2.9 G/DL
GLUCOSE SERPL-MCNC: 149 MG/DL (ref 65–99)
HCT VFR BLD AUTO: 40.6 % (ref 37.5–51)
HGB BLD-MCNC: 13 G/DL (ref 13–17.7)
IMM GRANULOCYTES # BLD AUTO: 0.05 10E3/MM3 (ref 0–0.05)
IMM GRANULOCYTES NFR BLD AUTO: 0.5 % (ref 0–0.5)
LYMPHOCYTES # BLD AUTO: 2 10E3/MM3 (ref 0.7–3.1)
LYMPHOCYTES NFR BLD AUTO: 20.6 % (ref 19.6–45.3)
MCH RBC QN AUTO: 30.2 PG (ref 26.6–33)
MCHC RBC AUTO-ENTMCNC: 32 G/DL (ref 31.5–35.7)
MCV RBC AUTO: 94.2 FL (ref 79–97)
MONOCYTES # BLD AUTO: 0.53 10E3/MM3 (ref 0.1–0.9)
MONOCYTES NFR BLD AUTO: 5.5 % (ref 5–12)
NEUTROPHILS # BLD AUTO: 6.78 10E3/MM3 (ref 1.7–7)
NEUTROPHILS NFR BLD AUTO: 69.7 % (ref 42.7–76)
PLATELET # BLD AUTO: 313 10E3/MM3 (ref 140–450)
POTASSIUM SERPL-SCNC: 4 MMOL/L (ref 3.5–5.2)
PROT SERPL-MCNC: 7.3 G/DL (ref 6–8.5)
RBC # BLD AUTO: 4.31 10E6/MM3 (ref 4.14–5.8)
SODIUM SERPL-SCNC: 136 MMOL/L (ref 136–145)
TSH SERPL DL<=0.005 MIU/L-ACNC: 1.47 MIU/ML (ref 0.27–4.2)
WBC # BLD AUTO: 9.72 10E3/MM3 (ref 3.4–10.8)

## 2019-09-27 DIAGNOSIS — E11.9 TYPE 2 DIABETES MELLITUS WITHOUT COMPLICATION, WITHOUT LONG-TERM CURRENT USE OF INSULIN (HCC): ICD-10-CM

## 2019-09-30 RX ORDER — GLYBURIDE 5 MG/1
TABLET ORAL
Qty: 180 TABLET | Refills: 0 | Status: SHIPPED | OUTPATIENT
Start: 2019-09-30 | End: 2020-05-26

## 2019-10-11 ENCOUNTER — OFFICE VISIT (OUTPATIENT)
Dept: FAMILY MEDICINE CLINIC | Facility: CLINIC | Age: 59
End: 2019-10-11

## 2019-10-11 VITALS
OXYGEN SATURATION: 97 % | SYSTOLIC BLOOD PRESSURE: 154 MMHG | RESPIRATION RATE: 18 BRPM | HEIGHT: 69 IN | HEART RATE: 75 BPM | DIASTOLIC BLOOD PRESSURE: 90 MMHG | BODY MASS INDEX: 38.4 KG/M2

## 2019-10-11 DIAGNOSIS — E11.9 TYPE 2 DIABETES MELLITUS WITHOUT COMPLICATION, WITHOUT LONG-TERM CURRENT USE OF INSULIN (HCC): ICD-10-CM

## 2019-10-11 DIAGNOSIS — E11.9 TYPE 2 DIABETES MELLITUS WITHOUT COMPLICATION, UNSPECIFIED WHETHER LONG TERM INSULIN USE (HCC): Primary | Chronic | ICD-10-CM

## 2019-10-11 DIAGNOSIS — Z23 ENCOUNTER FOR IMMUNIZATION: ICD-10-CM

## 2019-10-11 DIAGNOSIS — E78.2 MIXED HYPERLIPIDEMIA: ICD-10-CM

## 2019-10-11 DIAGNOSIS — F32.0 CURRENT MILD EPISODE OF MAJOR DEPRESSIVE DISORDER WITHOUT PRIOR EPISODE (HCC): ICD-10-CM

## 2019-10-11 DIAGNOSIS — I10 ESSENTIAL HYPERTENSION: ICD-10-CM

## 2019-10-11 DIAGNOSIS — C64.1 RENAL CELL CARCINOMA OF RIGHT KIDNEY (HCC): ICD-10-CM

## 2019-10-11 DIAGNOSIS — E11.9 DIABETES MELLITUS WITHOUT COMPLICATION (HCC): ICD-10-CM

## 2019-10-11 LAB — HBA1C MFR BLD: 7.4 %

## 2019-10-11 PROCEDURE — 99214 OFFICE O/P EST MOD 30 MIN: CPT | Performed by: FAMILY MEDICINE

## 2019-10-11 PROCEDURE — 90653 IIV ADJUVANT VACCINE IM: CPT | Performed by: FAMILY MEDICINE

## 2019-10-11 PROCEDURE — G0008 ADMIN INFLUENZA VIRUS VAC: HCPCS | Performed by: FAMILY MEDICINE

## 2019-10-11 PROCEDURE — 83036 HEMOGLOBIN GLYCOSYLATED A1C: CPT | Performed by: FAMILY MEDICINE

## 2019-10-11 RX ORDER — ATORVASTATIN CALCIUM 10 MG/1
10 TABLET, FILM COATED ORAL DAILY
Qty: 90 TABLET | Refills: 3 | Status: SHIPPED | OUTPATIENT
Start: 2019-10-11 | End: 2020-06-16 | Stop reason: SDUPTHER

## 2019-10-11 RX ORDER — CITALOPRAM 20 MG/1
20 TABLET ORAL DAILY
Qty: 90 TABLET | Refills: 1 | Status: SHIPPED | OUTPATIENT
Start: 2019-10-11 | End: 2020-05-29

## 2019-10-11 RX ORDER — ESOMEPRAZOLE MAGNESIUM 40 MG/1
40 CAPSULE, DELAYED RELEASE ORAL
Qty: 90 CAPSULE | Refills: 1 | Status: SHIPPED | OUTPATIENT
Start: 2019-10-11 | End: 2020-06-16 | Stop reason: SDUPTHER

## 2019-10-11 NOTE — PROGRESS NOTES
Subjective   Kiran Belcher is a 58 y.o. male.   Has had recent right nephrectomy for RCC. Follows with nephrology/kidney specialist.  Hypertension   This is a chronic problem. The current episode started more than 1 year ago. The problem is unchanged. The problem is controlled. Pertinent negatives include no anxiety, blurred vision, chest pain, malaise/fatigue, neck pain, palpitations, peripheral edema, PND or shortness of breath. There are no associated agents to hypertension. Risk factors for coronary artery disease include diabetes mellitus, dyslipidemia, family history, obesity, male gender and sedentary lifestyle. Current antihypertension treatment includes ACE inhibitors and calcium channel blockers. The current treatment provides significant improvement. There are no compliance problems.  Hypertensive end-organ damage includes kidney disease. Identifiable causes of hypertension include chronic renal disease.   Diabetes   He presents for his follow-up (Taking Metformin and Glyburide) diabetic visit. He has type 2 diabetes mellitus. His disease course has been stable. There are no hypoglycemic associated symptoms. Pertinent negatives for hypoglycemia include no dizziness, mood changes or nervousness/anxiousness. Pertinent negatives for diabetes include no blurred vision, no chest pain, no fatigue, no foot paresthesias, no polydipsia, no polyphagia, no polyuria, no visual change, no weakness and no weight loss. There are no hypoglycemic complications. Symptoms are stable. There are no diabetic complications. Risk factors for coronary artery disease include diabetes mellitus, dyslipidemia, obesity, male sex, hypertension and family history. Current diabetic treatment includes oral agent (dual therapy). He is compliant with treatment all of the time. His weight is stable. He is following a generally healthy diet. When asked about meal planning, he reported none. He has not had a previous visit with a dietitian.  He participates in exercise intermittently. There is no change in his home blood glucose trend. An ACE inhibitor/angiotensin II receptor blocker is being taken. He does not see a podiatrist.Eye exam is not current.   Hyperlipidemia   This is a chronic problem. The current episode started more than 1 year ago. The problem is controlled. Recent lipid tests were reviewed and are normal. Exacerbating diseases include chronic renal disease, diabetes and obesity. There are no known factors aggravating his hyperlipidemia. Pertinent negatives include no chest pain, focal weakness, leg pain, myalgias or shortness of breath. Current antihyperlipidemic treatment includes statins. The current treatment provides significant improvement of lipids. There are no compliance problems.  Risk factors for coronary artery disease include diabetes mellitus, dyslipidemia, family history, obesity, male sex and hypertension.        The following portions of the patient's history were reviewed and updated as appropriate: allergies, current medications, past family history, past medical history, past social history, past surgical history and problem list.  Vitals:    10/11/19 1351   BP: 154/90   Pulse: 75   Resp: 18   SpO2: 97%     Body mass index is 38.4 kg/m².  Review of Systems   Constitutional: Negative.  Negative for activity change, fatigue, fever, malaise/fatigue, unexpected weight gain and unexpected weight loss.   HENT: Negative.  Negative for congestion, sneezing and sore throat.    Eyes: Negative.  Negative for blurred vision, double vision and visual disturbance.   Respiratory: Negative.  Negative for cough, chest tightness, shortness of breath and wheezing.    Cardiovascular: Negative.  Negative for chest pain, palpitations, leg swelling and PND.   Gastrointestinal: Negative.  Negative for abdominal distention, abdominal pain, blood in stool, constipation, diarrhea and nausea.   Endocrine: Negative.  Negative for cold intolerance,  heat intolerance, polydipsia, polyphagia and polyuria.   Genitourinary: Negative.  Negative for dysuria, frequency, urgency and urinary incontinence.   Musculoskeletal: Negative.  Negative for arthralgias, myalgias and neck pain.   Skin: Negative.  Negative for rash.   Allergic/Immunologic: Negative.    Neurological: Negative.  Negative for dizziness, focal weakness, syncope, weakness, numbness and memory problem.   Hematological: Negative.  Negative for adenopathy.   Psychiatric/Behavioral: Negative.  Negative for suicidal ideas and depressed mood. The patient is not nervous/anxious.    All other systems reviewed and are negative.      Objective   Physical Exam   Constitutional: He is oriented to person, place, and time. He appears well-developed and well-nourished. No distress.   HENT:   Right Ear: External ear normal.   Left Ear: External ear normal.   Nose: Nose normal.   Mouth/Throat: Oropharynx is clear and moist.   Eyes: Conjunctivae and EOM are normal. Pupils are equal, round, and reactive to light.   Neck: Normal range of motion. Neck supple. No thyromegaly present.   Cardiovascular: Normal rate, regular rhythm and normal heart sounds.   No murmur heard.  Pulmonary/Chest: Effort normal and breath sounds normal. No respiratory distress. He has no wheezes.   Abdominal: Soft. Bowel sounds are normal. He exhibits no distension and no mass. There is no tenderness. There is no rebound and no guarding. No hernia.   Musculoskeletal: Normal range of motion. He exhibits no edema or tenderness.   Lymphadenopathy:     He has no cervical adenopathy.   Neurological: He is alert and oriented to person, place, and time. He has normal reflexes.   Skin: Skin is warm and dry. No rash noted. He is not diaphoretic. No erythema. No pallor.   Psychiatric: He has a normal mood and affect. His behavior is normal. Judgment and thought content normal.   Nursing note and vitals reviewed.          Assessment/Plan   Kiran was seen  today for diabetes.    Diagnoses and all orders for this visit:    Type 2 diabetes mellitus without complication, unspecified whether long term insulin use (CMS/[facility])  -     POC Glycosylated Hemoglobin (Hb A1C)  -     FluZone High Dose 65YR+ (9943-5340)  -     CBC & Differential  -     Comprehensive Metabolic Panel  -     Lipid Panel    Mixed hyperlipidemia  -     atorvastatin (LIPITOR) 10 MG tablet; Take 1 tablet by mouth Daily.    Essential hypertension    Encounter for immunization   -     FluZone High Dose 65YR+ (1212-7211)    Renal cell carcinoma of right kidney (CMS/HCC)    Current mild episode of major depressive disorder without prior episode (CMS/HCC)  -     citalopram (CeleXA) 20 MG tablet; Take 1 tablet by mouth Daily.    Type 2 diabetes mellitus without complication, without long-term current use of insulin (CMS/HCC)    Diabetes mellitus without complication (CMS/HCC)  -     metFORMIN (GLUCOPHAGE) 500 MG tablet; Take 1 tablet by mouth 3 (Three) Times a Day.    Other orders  -     esomeprazole (nexIUM) 40 MG capsule; Take 1 capsule by mouth Every Morning Before Breakfast.

## 2019-10-12 LAB
ALBUMIN SERPL-MCNC: 4.4 G/DL (ref 3.5–5.2)
ALBUMIN/GLOB SERPL: 1.6 G/DL
ALP SERPL-CCNC: 105 U/L (ref 39–117)
ALT SERPL-CCNC: 39 U/L (ref 1–41)
AST SERPL-CCNC: 28 U/L (ref 1–40)
BASOPHILS # BLD AUTO: 0.02 10*3/MM3 (ref 0–0.2)
BASOPHILS NFR BLD AUTO: 0.2 % (ref 0–1.5)
BILIRUB SERPL-MCNC: 0.4 MG/DL (ref 0.2–1.2)
BUN SERPL-MCNC: 14 MG/DL (ref 6–20)
BUN/CREAT SERPL: 12.6 (ref 7–25)
CALCIUM SERPL-MCNC: 9.4 MG/DL (ref 8.6–10.5)
CHLORIDE SERPL-SCNC: 98 MMOL/L (ref 98–107)
CHOLEST SERPL-MCNC: 165 MG/DL (ref 0–200)
CO2 SERPL-SCNC: 23 MMOL/L (ref 22–29)
CREAT SERPL-MCNC: 1.11 MG/DL (ref 0.76–1.27)
EOSINOPHIL # BLD AUTO: 0.33 10*3/MM3 (ref 0–0.4)
EOSINOPHIL NFR BLD AUTO: 3.5 % (ref 0.3–6.2)
ERYTHROCYTE [DISTWIDTH] IN BLOOD BY AUTOMATED COUNT: 13 % (ref 12.3–15.4)
GLOBULIN SER CALC-MCNC: 2.7 GM/DL
GLUCOSE SERPL-MCNC: 215 MG/DL (ref 65–99)
HCT VFR BLD AUTO: 36.7 % (ref 37.5–51)
HDLC SERPL-MCNC: 30 MG/DL (ref 40–60)
HGB BLD-MCNC: 12.1 G/DL (ref 13–17.7)
IMM GRANULOCYTES # BLD AUTO: 0.03 10*3/MM3 (ref 0–0.05)
IMM GRANULOCYTES NFR BLD AUTO: 0.3 % (ref 0–0.5)
LDLC SERPL CALC-MCNC: 82 MG/DL (ref 0–100)
LYMPHOCYTES # BLD AUTO: 1.52 10*3/MM3 (ref 0.7–3.1)
LYMPHOCYTES NFR BLD AUTO: 16.3 % (ref 19.6–45.3)
MCH RBC QN AUTO: 30.2 PG (ref 26.6–33)
MCHC RBC AUTO-ENTMCNC: 33 G/DL (ref 31.5–35.7)
MCV RBC AUTO: 91.5 FL (ref 79–97)
MONOCYTES # BLD AUTO: 0.43 10*3/MM3 (ref 0.1–0.9)
MONOCYTES NFR BLD AUTO: 4.6 % (ref 5–12)
NEUTROPHILS # BLD AUTO: 7.01 10*3/MM3 (ref 1.7–7)
NEUTROPHILS NFR BLD AUTO: 75.1 % (ref 42.7–76)
NRBC BLD AUTO-RTO: 0 /100 WBC (ref 0–0.2)
PLATELET # BLD AUTO: 293 10*3/MM3 (ref 140–450)
POTASSIUM SERPL-SCNC: 4.2 MMOL/L (ref 3.5–5.2)
PROT SERPL-MCNC: 7.1 G/DL (ref 6–8.5)
RBC # BLD AUTO: 4.01 10*6/MM3 (ref 4.14–5.8)
SODIUM SERPL-SCNC: 138 MMOL/L (ref 136–145)
TRIGL SERPL-MCNC: 264 MG/DL (ref 0–150)
VLDLC SERPL CALC-MCNC: 52.8 MG/DL
WBC # BLD AUTO: 9.34 10*3/MM3 (ref 3.4–10.8)

## 2019-11-12 RX ORDER — DANTROLENE SODIUM 100 MG/1
CAPSULE ORAL
Qty: 180 CAPSULE | Refills: 0 | Status: SHIPPED | OUTPATIENT
Start: 2019-11-12 | End: 2020-03-10

## 2019-11-18 RX ORDER — BACLOFEN 20 MG/1
TABLET ORAL
Qty: 180 TABLET | Refills: 0 | Status: SHIPPED | OUTPATIENT
Start: 2019-11-18 | End: 2020-03-10

## 2019-11-22 ENCOUNTER — OFFICE VISIT (OUTPATIENT)
Dept: SLEEP MEDICINE | Facility: HOSPITAL | Age: 59
End: 2019-11-22

## 2019-11-22 VITALS
HEART RATE: 81 BPM | DIASTOLIC BLOOD PRESSURE: 74 MMHG | HEIGHT: 69 IN | OXYGEN SATURATION: 97 % | BODY MASS INDEX: 38.4 KG/M2 | SYSTOLIC BLOOD PRESSURE: 143 MMHG

## 2019-11-22 DIAGNOSIS — G47.33 OSA (OBSTRUCTIVE SLEEP APNEA): Primary | ICD-10-CM

## 2019-11-22 PROCEDURE — 99212 OFFICE O/P EST SF 10 MIN: CPT | Performed by: NURSE PRACTITIONER

## 2019-11-22 NOTE — PROGRESS NOTES
Chief Complaint:   Chief Complaint   Patient presents with   • Follow-up       HPI:    Kiran Belcher is a 59 y.o. male here for follow-up of sleep apnea.  Patient states he is doing well with CPAP therapy.  Patient is sleeping 8+ hours nightly and does feel rested upon awakening.  Patient has an [place] score of 4/24.  Patient has no concerns or complaints today and wishes to continue with CPAP therapy.        Current medications are:   Current Outpatient Medications:   •  amLODIPine-benazepril (LOTREL) 10-20 MG per capsule, Take 1 capsule by mouth Daily., Disp: 30 capsule, Rfl: 5  •  atorvastatin (LIPITOR) 10 MG tablet, Take 1 tablet by mouth Daily., Disp: 90 tablet, Rfl: 3  •  baclofen (LIORESAL) 20 MG tablet, TAKE ONE TABLET BY MOUTH TWICE A DAY, Disp: 180 tablet, Rfl: 0  •  citalopram (CeleXA) 20 MG tablet, Take 1 tablet by mouth Daily., Disp: 90 tablet, Rfl: 1  •  dantrolene (DANTRIUM) 100 MG capsule, TAKE ONE CAPSULE BY MOUTH TWICE A DAY, Disp: 180 capsule, Rfl: 0  •  esomeprazole (nexIUM) 40 MG capsule, Take 1 capsule by mouth Every Morning Before Breakfast., Disp: 90 capsule, Rfl: 1  •  glucose blood (ACCU-CHEK MAX PLUS) test strip, 1 each by Other route 2 (Two) Times a Day. Use as instructed, Disp: 200 each, Rfl: 3  •  glyburide (DIAbeta) 5 MG tablet, TAKE ONE TABLET BY MOUTH TWICE A DAY WITH MEALS, Disp: 180 tablet, Rfl: 0  •  lactobacillus acidophilus (RISAQUAD) capsule capsule, Take 1 capsule by mouth Daily., Disp: 90 capsule, Rfl: 1  •  metFORMIN (GLUCOPHAGE) 500 MG tablet, Take 1 tablet by mouth 3 (Three) Times a Day., Disp: 270 tablet, Rfl: 0  •  methenamine (HIPREX) 1 g tablet, Take 1 g by mouth Daily., Disp: , Rfl:   •  TOVIAZ 8 MG tablet sustained-release 24 hour tablet, , Disp: , Rfl:   •  triamcinolone (KENALOG) 0.1 % cream, Apply  topically to the appropriate area as directed 2 (Two) Times a Day., Disp: 30 g, Rfl: 0  •  fesoterodine fumarate (TOVIAZ) 4 MG tablet sustained-release 24  hour capsule, Take 8 mg by mouth Daily., Disp: , Rfl: .      The patient's relevant past medical, surgical, family and social history were reviewed and updated in Epic as appropriate.       Review of Systems   Eyes: Positive for visual disturbance.   Respiratory: Positive for apnea.    Gastrointestinal:        Heartburn   Endocrine: Positive for polydipsia and polyuria.   Musculoskeletal: Positive for arthralgias, back pain, gait problem, joint swelling and myalgias.   Psychiatric/Behavioral: Positive for dysphoric mood and sleep disturbance. The patient is nervous/anxious.    All other systems reviewed and are negative.        Objective:    Physical Exam   Constitutional: He appears well-developed and well-nourished.   HENT:   Head: Normocephalic and atraumatic.   Mouth/Throat: Oropharynx is clear and moist.   Class 4 airway   Eyes: Conjunctivae are normal.   Neck: Neck supple. No thyromegaly present.   Cardiovascular: Normal rate and regular rhythm.   Pulmonary/Chest: Effort normal and breath sounds normal.   Lymphadenopathy:     He has no cervical adenopathy.   Neurological: He is alert.   Skin: Skin is warm and dry.   Psychiatric: He has a normal mood and affect. His behavior is normal. Judgment and thought content normal.   Nursing note and vitals reviewed.  90/90 days of use.  Greater than 4-hour use 100%.  AHI 3.2.  CPAP pressure 12 cm H2 oh.  Download reviewed with patient.      ASSESSMENT/PLAN    Kiran was seen today for follow-up.    Diagnoses and all orders for this visit:    NISA (obstructive sleep apnea)  -     CPAP Therapy            1. Counseled patient regarding multimodal approach with healthy nutrition, healthy sleep, regular physical activity, social activities, counseling, and medications. Encouraged to practice lateral sleep position. Avoid alcohol and sedatives close to bedtime.  2. Refill supplies x1 year.  Return to clinic 1 year or sooner if symptoms warrant.    I have reviewed the results  of my evaluation and impression and discussed my recommendations in detail with the patient.      Signed by  Janna Kramer, APRN    November 22, 2019      CC: Nadira Fernandez DO          No ref. provider found

## 2019-12-05 ENCOUNTER — OFFICE VISIT (OUTPATIENT)
Dept: FAMILY MEDICINE CLINIC | Facility: CLINIC | Age: 59
End: 2019-12-05

## 2019-12-05 VITALS
HEIGHT: 69 IN | TEMPERATURE: 98.8 F | BODY MASS INDEX: 38.4 KG/M2 | DIASTOLIC BLOOD PRESSURE: 72 MMHG | RESPIRATION RATE: 18 BRPM | SYSTOLIC BLOOD PRESSURE: 138 MMHG | HEART RATE: 96 BPM | OXYGEN SATURATION: 96 %

## 2019-12-05 DIAGNOSIS — H69.83 EUSTACHIAN TUBE DYSFUNCTION, BILATERAL: Primary | ICD-10-CM

## 2019-12-05 PROCEDURE — 99213 OFFICE O/P EST LOW 20 MIN: CPT | Performed by: NURSE PRACTITIONER

## 2019-12-05 RX ORDER — FLUTICASONE PROPIONATE 50 MCG
2 SPRAY, SUSPENSION (ML) NASAL DAILY
Qty: 9.9 ML | Refills: 11 | Status: SHIPPED | OUTPATIENT
Start: 2019-12-05 | End: 2020-01-04

## 2019-12-05 NOTE — PROGRESS NOTES
Subjective   Kiran Belcher is a 59 y.o. male.     History of Present Illness Mr Belcher has been complaining of right ear fullness for 2 weeks. Had his ears flushed at [facility] 2 weeks ago but still feels like he is under water. No pain or fever. No ear dc. No URI symptoms.    Outpatient Encounter Medications as of 12/5/2019   Medication Sig Dispense Refill   • amLODIPine-benazepril (LOTREL) 10-20 MG per capsule Take 1 capsule by mouth Daily. 30 capsule 5   • atorvastatin (LIPITOR) 10 MG tablet Take 1 tablet by mouth Daily. 90 tablet 3   • baclofen (LIORESAL) 20 MG tablet TAKE ONE TABLET BY MOUTH TWICE A  tablet 0   • citalopram (CeleXA) 20 MG tablet Take 1 tablet by mouth Daily. 90 tablet 1   • dantrolene (DANTRIUM) 100 MG capsule TAKE ONE CAPSULE BY MOUTH TWICE A  capsule 0   • esomeprazole (nexIUM) 40 MG capsule Take 1 capsule by mouth Every Morning Before Breakfast. 90 capsule 1   • glucose blood (ACCU-CHEK MAX PLUS) test strip 1 each by Other route 2 (Two) Times a Day. Use as instructed 200 each 3   • glyburide (DIAbeta) 5 MG tablet TAKE ONE TABLET BY MOUTH TWICE A DAY WITH MEALS 180 tablet 0   • lactobacillus acidophilus (RISAQUAD) capsule capsule Take 1 capsule by mouth Daily. 90 capsule 1   • metFORMIN (GLUCOPHAGE) 500 MG tablet Take 1 tablet by mouth 3 (Three) Times a Day. 270 tablet 0   • methenamine (HIPREX) 1 g tablet Take 1 g by mouth Daily.     • TOVIAZ 8 MG tablet sustained-release 24 hour tablet      • triamcinolone (KENALOG) 0.1 % cream Apply  topically to the appropriate area as directed 2 (Two) Times a Day. 30 g 0   • fesoterodine fumarate (TOVIAZ) 4 MG tablet sustained-release 24 hour capsule Take 8 mg by mouth Daily.     • fluticasone (FLONASE) 50 MCG/ACT nasal spray 2 sprays into the nostril(s) as directed by provider Daily for 30 days. Administer 2 sprays in each nostril for each dose. 9.9 mL 11     No facility-administered encounter medications on file as of 12/5/2019.   "      The following portions of the patient's history were reviewed and updated as appropriate: allergies, current medications, past family history, past medical history, past social history, past surgical history and problem list.    Review of Systems   Constitutional: Negative for appetite change, fever and unexpected weight loss.   HENT: Positive for hearing loss. Negative for nosebleeds, sore throat and trouble swallowing.    Eyes: Negative for visual disturbance.   Respiratory: Negative for cough, shortness of breath and wheezing.    Cardiovascular: Negative for chest pain, palpitations and leg swelling.   Gastrointestinal: Negative for abdominal pain, blood in stool, constipation, diarrhea, nausea and vomiting.   Endocrine: Negative for polydipsia, polyphagia and polyuria.   Genitourinary: Negative for dysuria, frequency, hematuria and urinary incontinence.   Musculoskeletal: Negative for arthralgias, gait problem, joint swelling and myalgias.   Skin: Negative for rash.   Neurological: Negative for dizziness, seizures, syncope and numbness.   Hematological: Negative for adenopathy. Does not bruise/bleed easily.   Psychiatric/Behavioral: Negative for sleep disturbance and depressed mood. The patient is not nervous/anxious.        Objective     Visit Vitals  /72   Pulse 96   Temp 98.8 °F (37.1 °C) (Temporal)   Resp 18   Ht 175.3 cm (69\")   SpO2 96%   BMI 38.40 kg/m²       Physical Exam   Constitutional: He is oriented to person, place, and time. He appears well-developed and well-nourished. No distress.   HENT:   Head: Normocephalic and atraumatic.   Right Ear: Tympanic membrane and external ear normal.   Left Ear: Tympanic membrane and external ear normal.   Nose: Nose normal.   Mouth/Throat: Oropharynx is clear and moist. No oropharyngeal exudate.   Bilat ext canals patent. TM are clear but retracted.   Eyes: Conjunctivae are normal. Pupils are equal, round, and reactive to light. Right eye exhibits no " discharge. Left eye exhibits no discharge. No scleral icterus.   Neck: Neck supple.   Cardiovascular: Normal rate.   Pulmonary/Chest: No respiratory distress. He has no wheezes.   Abdominal: He exhibits no distension and no mass. There is no tenderness.   Musculoskeletal: He exhibits no edema or deformity.   Lymphadenopathy:     He has no cervical adenopathy.   Neurological: He is alert and oriented to person, place, and time. Coordination normal.   Skin: Skin is warm and dry. Capillary refill takes less than 2 seconds. No rash noted. No erythema.   Psychiatric: He has a normal mood and affect. His speech is normal and behavior is normal. Judgment and thought content normal.   Nursing note and vitals reviewed.        Assessment/Plan   Kiran was seen today for ear fullness.    Diagnoses and all orders for this visit:    Eustachian tube dysfunction, bilateral  -     fluticasone (FLONASE) 50 MCG/ACT nasal spray; 2 sprays into the nostril(s) as directed by provider Daily for 30 days. Administer 2 sprays in each nostril for each dose.    Use OTC Mucinex.  Follow up symptoms persist or worsen.

## 2019-12-05 NOTE — PATIENT INSTRUCTIONS
Eustachian Tube Dysfunction    Eustachian tube dysfunction refers to a condition in which a blockage develops in the narrow passage that connects the middle ear to the back of the nose (eustachian tube). The eustachian tube regulates air pressure in the middle ear by letting air move between the ear and nose. It also helps to drain fluid from the middle ear space.  Eustachian tube dysfunction can affect one or both ears. When the eustachian tube does not function properly, air pressure, fluid, or both can build up in the middle ear.  What are the causes?  This condition occurs when the eustachian tube becomes blocked or cannot open normally. Common causes of this condition include:  · Ear infections.  · Colds and other infections that affect the nose, mouth, and throat (upper respiratory tract).  · Allergies.  · Irritation from cigarette smoke.  · Irritation from stomach acid coming up into the esophagus (gastroesophageal reflux). The esophagus is the tube that carries food from the mouth to the stomach.  · Sudden changes in air pressure, such as from descending in an airplane or scuba diving.  · Abnormal growths in the nose or throat, such as:  ? Growths that line the nose (nasal polyps).  ? Abnormal growth of cells (tumors).  ? Enlarged tissue at the back of the throat (adenoids).  What increases the risk?  You are more likely to develop this condition if:  · You smoke.  · You are overweight.  · You are a child who has:  ? Certain birth defects of the mouth, such as cleft palate.  ? Large tonsils or adenoids.  What are the signs or symptoms?  Common symptoms of this condition include:  · A feeling of fullness in the ear.  · Ear pain.  · Clicking or popping noises in the ear.  · Ringing in the ear.  · Hearing loss.  · Loss of balance.  · Dizziness.  Symptoms may get worse when the air pressure around you changes, such as when you travel to an area of high elevation, fly on an airplane, or go scuba diving.  How is  "this diagnosed?  This condition may be diagnosed based on:  · Your symptoms.  · A physical exam of your ears, nose, and throat.  · Tests, such as those that measure:  ? The movement of your eardrum (tympanogram).  ? Your hearing (audiometry).  How is this treated?  Treatment depends on the cause and severity of your condition.  · In mild cases, you may relieve your symptoms by moving air into your ears. This is called \"popping the ears.\"  · In more severe cases, or if you have symptoms of fluid in your ears, treatment may include:  ? Medicines to relieve congestion (decongestants).  ? Medicines that treat allergies (antihistamines).  ? Nasal sprays or ear drops that contain medicines that reduce swelling (steroids).  ? A procedure to drain the fluid in your eardrum (myringotomy). In this procedure, a small tube is placed in the eardrum to:  § Drain the fluid.  § Restore the air in the middle ear space.  ? A procedure to insert a balloon device through the nose to inflate the opening of the eustachian tube (balloon dilation).  Follow these instructions at home:  Lifestyle  · Do not do any of the following until your health care provider approves:  ? Travel to high altitudes.  ? Fly in airplanes.  ? Work in a pressurized cabin or room.  ? Scuba dive.  · Do not use any products that contain nicotine or tobacco, such as cigarettes and e-cigarettes. If you need help quitting, ask your health care provider.  · Keep your ears dry. Wear fitted earplugs during showering and bathing. Dry your ears completely after.  General instructions  · Take over-the-counter and prescription medicines only as told by your health care provider.  · Use techniques to help pop your ears as recommended by your health care provider. These may include:  ? Chewing gum.  ? Yawning.  ? Frequent, forceful swallowing.  ? Closing your mouth, holding your nose closed, and gently blowing as if you are trying to blow air out of your nose.  · Keep all " follow-up visits as told by your health care provider. This is important.  Contact a health care provider if:  · Your symptoms do not go away after treatment.  · Your symptoms come back after treatment.  · You are unable to pop your ears.  · You have:  ? A fever.  ? Pain in your ear.  ? Pain in your head or neck.  ? Fluid draining from your ear.  · Your hearing suddenly changes.  · You become very dizzy.  · You lose your balance.  Summary  · Eustachian tube dysfunction refers to a condition in which a blockage develops in the eustachian tube.  · It can be caused by ear infections, allergies, inhaled irritants, or abnormal growths in the nose or throat.  · Symptoms include ear pain, hearing loss, or ringing in the ears.  · Mild cases are treated with maneuvers to unblock the ears, such as yawning or ear popping.  · Severe cases are treated with medicines. Surgery may also be done (rare).  This information is not intended to replace advice given to you by your health care provider. Make sure you discuss any questions you have with your health care provider.  Document Released: 01/13/2017 Document Revised: 04/09/2019 Document Reviewed: 04/09/2019  [employer] Interactive Patient Education © 2019 [employer]

## 2020-01-15 DIAGNOSIS — I10 ESSENTIAL HYPERTENSION: ICD-10-CM

## 2020-01-15 RX ORDER — AMLODIPINE BESYLATE AND BENAZEPRIL HYDROCHLORIDE 10; 20 MG/1; MG/1
CAPSULE ORAL
Qty: 90 CAPSULE | Refills: 1 | Status: SHIPPED | OUTPATIENT
Start: 2020-01-15 | End: 2020-06-16 | Stop reason: SDUPTHER

## 2020-03-10 RX ORDER — BACLOFEN 20 MG/1
TABLET ORAL
Qty: 180 TABLET | Refills: 0 | Status: SHIPPED | OUTPATIENT
Start: 2020-03-10 | End: 2020-06-16 | Stop reason: SDUPTHER

## 2020-03-10 RX ORDER — DANTROLENE SODIUM 100 MG/1
CAPSULE ORAL
Qty: 180 CAPSULE | Refills: 0 | Status: SHIPPED | OUTPATIENT
Start: 2020-03-10 | End: 2020-06-16 | Stop reason: SDUPTHER

## 2020-05-07 ENCOUNTER — TELEPHONE (OUTPATIENT)
Dept: FAMILY MEDICINE CLINIC | Facility: CLINIC | Age: 60
End: 2020-05-07

## 2020-05-07 NOTE — TELEPHONE ENCOUNTER
PT CALLED STATES HE HAS BEEN EXPERIENCING ACUTE DIARRHEA. PT IS REQUESTING A MEDICATION BE SENT IN TO COMBAT HIS SYMPTOMS.     CONTACT: [phone]    CONFIRMED PHARMACY:   [facility] [address] [place] [address] [phone] [facility] [phone]

## 2020-05-21 DIAGNOSIS — E11.9 DIABETES MELLITUS WITHOUT COMPLICATION (HCC): ICD-10-CM

## 2020-05-26 DIAGNOSIS — E11.9 TYPE 2 DIABETES MELLITUS WITHOUT COMPLICATION, WITHOUT LONG-TERM CURRENT USE OF INSULIN (HCC): ICD-10-CM

## 2020-05-26 RX ORDER — GLYBURIDE 5 MG/1
TABLET ORAL
Qty: 180 TABLET | Refills: 0 | Status: SHIPPED | OUTPATIENT
Start: 2020-05-26 | End: 2021-01-29

## 2020-05-28 DIAGNOSIS — F32.0 CURRENT MILD EPISODE OF MAJOR DEPRESSIVE DISORDER WITHOUT PRIOR EPISODE (HCC): ICD-10-CM

## 2020-05-29 RX ORDER — CITALOPRAM 20 MG/1
TABLET ORAL
Qty: 90 TABLET | Refills: 0 | Status: SHIPPED | OUTPATIENT
Start: 2020-05-29 | End: 2020-06-16 | Stop reason: SDUPTHER

## 2020-06-16 ENCOUNTER — OFFICE VISIT (OUTPATIENT)
Dept: FAMILY MEDICINE CLINIC | Facility: CLINIC | Age: 60
End: 2020-06-16

## 2020-06-16 ENCOUNTER — RESULTS ENCOUNTER (OUTPATIENT)
Dept: FAMILY MEDICINE CLINIC | Facility: CLINIC | Age: 60
End: 2020-06-16

## 2020-06-16 VITALS
RESPIRATION RATE: 18 BRPM | SYSTOLIC BLOOD PRESSURE: 140 MMHG | OXYGEN SATURATION: 97 % | BODY MASS INDEX: 38.4 KG/M2 | HEIGHT: 69 IN | TEMPERATURE: 98 F | HEART RATE: 94 BPM | DIASTOLIC BLOOD PRESSURE: 90 MMHG

## 2020-06-16 DIAGNOSIS — Z71.89 ADVANCE CARE PLANNING: ICD-10-CM

## 2020-06-16 DIAGNOSIS — Z00.00 MEDICARE ANNUAL WELLNESS VISIT, SUBSEQUENT: Primary | ICD-10-CM

## 2020-06-16 DIAGNOSIS — E78.2 MIXED HYPERLIPIDEMIA: ICD-10-CM

## 2020-06-16 DIAGNOSIS — E11.9 TYPE 2 DIABETES MELLITUS WITHOUT COMPLICATION, WITHOUT LONG-TERM CURRENT USE OF INSULIN (HCC): ICD-10-CM

## 2020-06-16 DIAGNOSIS — I10 ESSENTIAL HYPERTENSION: ICD-10-CM

## 2020-06-16 DIAGNOSIS — Z12.11 COLON CANCER SCREENING: ICD-10-CM

## 2020-06-16 DIAGNOSIS — F32.0 CURRENT MILD EPISODE OF MAJOR DEPRESSIVE DISORDER WITHOUT PRIOR EPISODE (HCC): ICD-10-CM

## 2020-06-16 LAB — HBA1C MFR BLD: 7.2 %

## 2020-06-16 PROCEDURE — 99396 PREV VISIT EST AGE 40-64: CPT | Performed by: FAMILY MEDICINE

## 2020-06-16 PROCEDURE — G0439 PPPS, SUBSEQ VISIT: HCPCS | Performed by: FAMILY MEDICINE

## 2020-06-16 PROCEDURE — 83036 HEMOGLOBIN GLYCOSYLATED A1C: CPT | Performed by: FAMILY MEDICINE

## 2020-06-16 PROCEDURE — 96160 PT-FOCUSED HLTH RISK ASSMT: CPT | Performed by: FAMILY MEDICINE

## 2020-06-16 RX ORDER — CITALOPRAM 20 MG/1
20 TABLET ORAL DAILY
Qty: 90 TABLET | Refills: 1 | Status: SHIPPED | OUTPATIENT
Start: 2020-06-16 | End: 2021-03-11

## 2020-06-16 RX ORDER — BACLOFEN 20 MG/1
20 TABLET ORAL 2 TIMES DAILY
Qty: 180 TABLET | Refills: 1 | Status: SHIPPED | OUTPATIENT
Start: 2020-06-16 | End: 2020-12-16 | Stop reason: SDUPTHER

## 2020-06-16 RX ORDER — DANTROLENE SODIUM 100 MG/1
100 CAPSULE ORAL 2 TIMES DAILY
Qty: 180 CAPSULE | Refills: 1 | Status: SHIPPED | OUTPATIENT
Start: 2020-06-16 | End: 2020-12-16 | Stop reason: SDUPTHER

## 2020-06-16 RX ORDER — ESOMEPRAZOLE MAGNESIUM 40 MG/1
40 CAPSULE, DELAYED RELEASE ORAL
Qty: 90 CAPSULE | Refills: 1 | Status: SHIPPED | OUTPATIENT
Start: 2020-06-16 | End: 2020-12-28

## 2020-06-16 RX ORDER — ATORVASTATIN CALCIUM 10 MG/1
10 TABLET, FILM COATED ORAL DAILY
Qty: 90 TABLET | Refills: 3 | Status: SHIPPED | OUTPATIENT
Start: 2020-06-16 | End: 2021-08-23

## 2020-06-16 RX ORDER — AMLODIPINE BESYLATE AND BENAZEPRIL HYDROCHLORIDE 10; 20 MG/1; MG/1
1 CAPSULE ORAL DAILY
Qty: 90 CAPSULE | Refills: 1 | Status: SHIPPED | OUTPATIENT
Start: 2020-06-16 | End: 2021-02-11

## 2020-06-16 NOTE — PROGRESS NOTES
Subsequent Medicare Wellness Visit   The ABC's of the Annual Wellness Visit    Chief Complaint   Patient presents with   • Medicare Wellness-subsequent       HPI:  Kiran Belcher, -1960, is a 59 y.o. male who presents for a Subsequent Medicare Wellness Visit.    Recent Hospitalizations:  Recently treated at the following:  Other: . For nephroectomy for Renal Cell Carcinoma    Current Medical Providers:  Patient Care Team:  Nadira Fernandez DO as PCP - General  Bam Alfaro MD (Urology)    Health Habits and Functional and Cognitive Screening and Depression Screening:  Functional & Cognitive Status 2020   Do you have difficulty preparing food and eating? No   Do you have difficulty bathing yourself, getting dressed or grooming yourself? No   Do you have difficulty using the toilet? No   Do you have difficulty moving around from place to place? No   Do you have trouble with steps or getting out of a bed or a chair? No   Current Diet Well Balanced Diet   Dental Exam Up to date   Eye Exam Up to date   Exercise (times per week) 3 times per week   Current Exercise Activities Include Aerobics   Do you need help using the phone?  No   Are you deaf or do you have serious difficulty hearing?  No   Do you need help with transportation? No   Do you need help shopping? No   Do you need help preparing meals?  No   Do you need help with housework?  No   Do you need help with laundry? No   Do you need help taking your medications? No   Do you need help managing money? No   Do you ever drive or ride in a car without wearing a seat belt? No   Have you felt unusual stress, anger or loneliness in the last month? No   Who do you live with? Spouse   If you need help, do you have trouble finding someone available to you? No   Have you been bothered in the last four weeks by sexual problems? No   Do you have difficulty concentrating, remembering or making decisions? No       Compared to one year ago, the patient feels  his physical health is the same and his mental health is the same.    Depression Screen:  PHQ-2/PHQ-9 Depression Screening 6/16/2020   Little interest or pleasure in doing things 0   Feeling down, depressed, or hopeless 0   Trouble falling or staying asleep, or sleeping too much -   Feeling tired or having little energy -   Poor appetite or overeating -   Feeling bad about yourself - or that you are a failure or have let yourself or your family down -   Trouble concentrating on things, such as reading the newspaper or watching television -   Moving or speaking so slowly that other people could have noticed. Or the opposite - being so fidgety or restless that you have been moving around a lot more than usual -   Thoughts that you would be better off dead, or of hurting yourself in some way -   Total Score 0   If you checked off any problems, how difficult have these problems made it for you to do your work, take care of things at home, or get along with other people? -         Past Medical/Family/Social History:  The following portions of the patient's history were reviewed and updated as appropriate: allergies, current medications, past family history, past medical history, past social history, past surgical history and problem list.    Allergies   Allergen Reactions   • Levofloxacin Unknown (See Comments)     tendinopathy         Current Outpatient Medications:   •  amLODIPine-benazepril (LOTREL) 10-20 MG per capsule, Take 1 capsule by mouth Daily., Disp: 90 capsule, Rfl: 1  •  atorvastatin (LIPITOR) 10 MG tablet, Take 1 tablet by mouth Daily., Disp: 90 tablet, Rfl: 3  •  baclofen (LIORESAL) 20 MG tablet, Take 1 tablet by mouth 2 (Two) Times a Day., Disp: 180 tablet, Rfl: 1  •  citalopram (CeleXA) 20 MG tablet, Take 1 tablet by mouth Daily., Disp: 90 tablet, Rfl: 1  •  dantrolene (DANTRIUM) 100 MG capsule, Take 1 capsule by mouth 2 (Two) Times a Day., Disp: 180 capsule, Rfl: 1  •  esomeprazole (nexIUM) 40 MG  "capsule, Take 1 capsule by mouth Every Morning Before Breakfast., Disp: 90 capsule, Rfl: 1  •  glucose blood (Accu-Chek Elke Plus) test strip, 1 each by Other route 2 (Two) Times a Day. Use as instructed, Disp: 200 each, Rfl: 3  •  glyburide (DIAbeta) 5 MG tablet, TAKE ONE TABLET BY MOUTH TWICE A DAY WITH MEALS, Disp: 180 tablet, Rfl: 0  •  lactobacillus acidophilus (RISAQUAD) capsule capsule, Take 1 capsule by mouth Daily., Disp: 90 capsule, Rfl: 1  •  metFORMIN (GLUCOPHAGE) 500 MG tablet, Take 1 tablet by mouth 3 (Three) Times a Day., Disp: 270 tablet, Rfl: 1  •  TOVIAZ 8 MG tablet sustained-release 24 hour tablet, , Disp: , Rfl:   •  triamcinolone (KENALOG) 0.1 % cream, Apply  topically to the appropriate area as directed 2 (Two) Times a Day., Disp: 30 g, Rfl: 0    Aspirin use counseling: Taking ASA appropriately as indicated    Current medication list contains no high risk medications.  No harmful drug interactions have been identified.     Family History   Problem Relation Age of Onset   • Hypertension Mother    • Heart disease Father    • Coronary artery disease Father    • Diabetes Other    • Hypertension Other        Social History     Tobacco Use   • Smoking status: Never Smoker   • Smokeless tobacco: Never Used   Substance Use Topics   • Alcohol use: No       Past Surgical History:   Procedure Laterality Date   • CERVICAL DISC SURGERY      around 5/2008, fall/injury tripped over a safety gate for their dogs, reported SCI \"semi quadraplegic\" since. [facility]. [facility] after.   • SPINE SURGERY         Patient Active Problem List   Diagnosis   • Benign essential hypertension   • Depression   • Gastroesophageal reflux disease without esophagitis   • Hyperlipidemia   • NISA on CPAP   • Diabetes mellitus, type II (CMS/HCC)   • Spasm   • Obesity   • Right otitis externa   • Hx of spinal cord injury   • Kidney disorder   • Lower extremity weakness   • Myalgia   • Moderate episode of recurrent major depressive " "disorder (CMS/[facility])   • Dysphagia   • Toenail avulsion   • H/O kidney removal   • Renal cell carcinoma of right kidney (CMS/[facility])       Review of Systems   Constitutional: Negative.  Negative for activity change, fatigue, fever and unexpected weight change.   HENT: Negative.  Negative for congestion, sneezing and sore throat.    Eyes: Negative.  Negative for visual disturbance.   Respiratory: Negative.  Negative for cough, chest tightness, shortness of breath and wheezing.    Cardiovascular: Negative.  Negative for chest pain, palpitations and leg swelling.   Gastrointestinal: Negative.  Negative for abdominal distention, abdominal pain, blood in stool, constipation, diarrhea and nausea.   Endocrine: Negative.  Negative for cold intolerance and heat intolerance.   Genitourinary: Negative.  Negative for dysuria, frequency and urgency.   Musculoskeletal: Negative.  Negative for arthralgias and myalgias.   Skin: Negative.  Negative for rash.   Allergic/Immunologic: Negative.    Neurological: Negative.  Negative for dizziness, syncope and numbness.   Hematological: Negative.  Negative for adenopathy.   Psychiatric/Behavioral: Negative.  Negative for suicidal ideas. The patient is not nervous/anxious.        Objective     Vitals:    06/16/20 1309   BP: 140/90   BP Location: Left arm   Patient Position: Sitting   Cuff Size: Adult   Pulse: 94   Resp: 18   Temp: 98 °F (36.7 °C)   TempSrc: Temporal   SpO2: 97%   Height: 175.3 cm (69\")       Patient's Body mass index is 38.4 kg/m². BMI is above normal parameters. Recommendations include: nutrition counseling.      Hearing Screening Comments: Normal hearing  Vision Screening Comments: Per ophth    The patient has no evidence of cognitve impairment.     Physical Exam   Constitutional: He is oriented to person, place, and time. He appears well-developed and well-nourished. No distress.   HENT:   Right Ear: External ear normal.   Left Ear: External ear normal.   Nose: Nose normal. "   Mouth/Throat: Oropharynx is clear and moist.   Eyes: Pupils are equal, round, and reactive to light. Conjunctivae and EOM are normal.   Neck: Normal range of motion. Neck supple. No thyromegaly present.   Cardiovascular: Normal rate, regular rhythm and normal heart sounds.   No murmur heard.  Pulmonary/Chest: Effort normal and breath sounds normal. No respiratory distress. He has no wheezes.   Abdominal: Soft. Bowel sounds are normal. He exhibits no distension and no mass. There is no tenderness. There is no rebound and no guarding. No hernia.   Musculoskeletal: Normal range of motion. He exhibits no edema or tenderness.   Lymphadenopathy:     He has no cervical adenopathy.   Neurological: He is alert and oriented to person, place, and time. He has normal reflexes.   Skin: Skin is warm and dry. No rash noted. He is not diaphoretic. No erythema. No pallor.   Psychiatric: He has a normal mood and affect. His behavior is normal. Judgment and thought content normal.   Nursing note and vitals reviewed.      Recent Lab Results:  Lab Results   Component Value Date     (H) 10/11/2019     Lab Results   Component Value Date    CHOL 154 04/12/2019    TRIG 264 (H) 10/11/2019    HDL 30 (L) 10/11/2019    VLDL 52.8 10/11/2019    LDLHDL 2.60 04/12/2019       Assessment/Plan   Age-appropriate Screening Schedule:  Refer to the list below for future screening recommendations based on patient's age, sex and/or medical conditions.      Health Maintenance   Topic Date Due   • DIABETIC EYE EXAM  01/04/2018   • URINE MICROALBUMIN  07/24/2018   • DIABETIC FOOT EXAM  01/29/2019   • HEMOGLOBIN A1C  04/11/2020   • INFLUENZA VACCINE  08/01/2020   • LIPID PANEL  10/11/2020   • COLONOSCOPY  07/01/2026   • TDAP/TD VACCINES (2 - Td) 07/01/2026   • PNEUMOCOCCAL VACCINE (19-64 MEDIUM RISK)  Completed   • ZOSTER VACCINE  Discontinued       Medicare Risks and Personalized Health Plan:  Cardiovascular  risk  Depression/Dysphoria  Obesity/Overweight       CMS-Preventive Services Quick Reference  Medicare Preventive Services Addressed:  Cardiovascular Disease Screening Tests (may do this order every 5 years in beneficiaries without signs or symptoms of cardiovascular disease)  Colorectal Cancer Screening, Colonoscopy  Diabetes Screening-Lab Order for either glucose quantitative blood (except reagent strip), glucose;post glucose dose(includes glucose), or glucose tolerance test-3 specimens(includes glucose)    Advance Care Planning:  ACP discussion was held with the patient during this visit. Patient does not have an advance directive, information provided.    Diagnoses and all orders for this visit:    1. Medicare annual wellness visit, subsequent (Primary)    2. Type 2 diabetes mellitus without complication, without long-term current use of insulin (CMS/[facility])  -     POC Glycosylated Hemoglobin (Hb A1C)  -     metFORMIN (GLUCOPHAGE) 500 MG tablet; Take 1 tablet by mouth 3 (Three) Times a Day.  Dispense: 270 tablet; Refill: 1  -     CBC & Differential  -     Comprehensive Metabolic Panel    3. Advance care planning  -     Ambulatory Referral to Advance Care Planning    4. Colon cancer screening  -     Cologuard - Stool, Per Rectum; Future    5. Essential hypertension  -     amLODIPine-benazepril (LOTREL) 10-20 MG per capsule; Take 1 capsule by mouth Daily.  Dispense: 90 capsule; Refill: 1    6. Mixed hyperlipidemia  -     atorvastatin (LIPITOR) 10 MG tablet; Take 1 tablet by mouth Daily.  Dispense: 90 tablet; Refill: 3  -     Lipid Panel    7. Current mild episode of major depressive disorder without prior episode (CMS/[facility])  -     citalopram (CeleXA) 20 MG tablet; Take 1 tablet by mouth Daily.  Dispense: 90 tablet; Refill: 1    Other orders  -     baclofen (LIORESAL) 20 MG tablet; Take 1 tablet by mouth 2 (Two) Times a Day.  Dispense: 180 tablet; Refill: 1  -     dantrolene (DANTRIUM) 100 MG capsule; Take 1 capsule by  mouth 2 (Two) Times a Day.  Dispense: 180 capsule; Refill: 1  -     esomeprazole (nexIUM) 40 MG capsule; Take 1 capsule by mouth Every Morning Before Breakfast.  Dispense: 90 capsule; Refill: 1  -     glucose blood (Accu-Chek Elke Plus) test strip; 1 each by Other route 2 (Two) Times a Day. Use as instructed  Dispense: 200 each; Refill: 3      Patient is here for health maintenance visit.  Discussed diet and adequate exercise.  Screening lab work discussed.  Immunizations reviewed.  Advice and education regarding nutrition, exercise, dental evaluations, routine eye examinations, reproductive health, cardiovascular risk reduction, sunscreen use, self skin examination, and seatbelt use was given today.  Annual wellness evaluations recommended.       An After Visit Summary and PPPS with all of these plans were given to the patient.      Follow Up:  Return in about 6 months (around 12/16/2020).

## 2020-06-17 LAB
ALBUMIN SERPL-MCNC: 4.3 G/DL (ref 3.5–5.2)
ALBUMIN/GLOB SERPL: 1.6 G/DL
ALP SERPL-CCNC: 100 U/L (ref 39–117)
ALT SERPL-CCNC: 28 U/L (ref 1–41)
AST SERPL-CCNC: 22 U/L (ref 1–40)
BASOPHILS # BLD AUTO: 0.03 10*3/MM3 (ref 0–0.2)
BASOPHILS NFR BLD AUTO: 0.3 % (ref 0–1.5)
BILIRUB SERPL-MCNC: 0.3 MG/DL (ref 0.2–1.2)
BUN SERPL-MCNC: 13 MG/DL (ref 6–20)
BUN/CREAT SERPL: 11.9 (ref 7–25)
CALCIUM SERPL-MCNC: 9.1 MG/DL (ref 8.6–10.5)
CHLORIDE SERPL-SCNC: 100 MMOL/L (ref 98–107)
CHOLEST SERPL-MCNC: 158 MG/DL (ref 0–200)
CO2 SERPL-SCNC: 22.6 MMOL/L (ref 22–29)
CREAT SERPL-MCNC: 1.09 MG/DL (ref 0.76–1.27)
EOSINOPHIL # BLD AUTO: 0.37 10*3/MM3 (ref 0–0.4)
EOSINOPHIL NFR BLD AUTO: 3.6 % (ref 0.3–6.2)
ERYTHROCYTE [DISTWIDTH] IN BLOOD BY AUTOMATED COUNT: 13.2 % (ref 12.3–15.4)
GLOBULIN SER CALC-MCNC: 2.7 GM/DL
GLUCOSE SERPL-MCNC: 258 MG/DL (ref 65–99)
HCT VFR BLD AUTO: 35.6 % (ref 37.5–51)
HDLC SERPL-MCNC: 28 MG/DL (ref 40–60)
HGB BLD-MCNC: 11.7 G/DL (ref 13–17.7)
IMM GRANULOCYTES # BLD AUTO: 0.05 10*3/MM3 (ref 0–0.05)
IMM GRANULOCYTES NFR BLD AUTO: 0.5 % (ref 0–0.5)
LDLC SERPL CALC-MCNC: 86 MG/DL (ref 0–100)
LYMPHOCYTES # BLD AUTO: 1.31 10*3/MM3 (ref 0.7–3.1)
LYMPHOCYTES NFR BLD AUTO: 12.8 % (ref 19.6–45.3)
MCH RBC QN AUTO: 30.2 PG (ref 26.6–33)
MCHC RBC AUTO-ENTMCNC: 32.9 G/DL (ref 31.5–35.7)
MCV RBC AUTO: 92 FL (ref 79–97)
MONOCYTES # BLD AUTO: 0.54 10*3/MM3 (ref 0.1–0.9)
MONOCYTES NFR BLD AUTO: 5.3 % (ref 5–12)
NEUTROPHILS # BLD AUTO: 7.97 10*3/MM3 (ref 1.7–7)
NEUTROPHILS NFR BLD AUTO: 77.5 % (ref 42.7–76)
NRBC BLD AUTO-RTO: 0 /100 WBC (ref 0–0.2)
PLATELET # BLD AUTO: 305 10*3/MM3 (ref 140–450)
POTASSIUM SERPL-SCNC: 4.2 MMOL/L (ref 3.5–5.2)
PROT SERPL-MCNC: 7 G/DL (ref 6–8.5)
RBC # BLD AUTO: 3.87 10*6/MM3 (ref 4.14–5.8)
SODIUM SERPL-SCNC: 135 MMOL/L (ref 136–145)
TRIGL SERPL-MCNC: 222 MG/DL (ref 0–150)
VLDLC SERPL CALC-MCNC: 44.4 MG/DL
WBC # BLD AUTO: 10.27 10*3/MM3 (ref 3.4–10.8)

## 2020-09-10 ENCOUNTER — OFFICE VISIT (OUTPATIENT)
Dept: ORTHOPEDIC SURGERY | Facility: CLINIC | Age: 60
End: 2020-09-10

## 2020-09-10 VITALS — OXYGEN SATURATION: 97 % | WEIGHT: 260.14 LBS | HEIGHT: 71 IN | HEART RATE: 70 BPM | BODY MASS INDEX: 36.42 KG/M2

## 2020-09-10 DIAGNOSIS — R60.0 EDEMA OF BOTH LOWER LEGS: ICD-10-CM

## 2020-09-10 DIAGNOSIS — M25.571 ACUTE RIGHT ANKLE PAIN: Primary | ICD-10-CM

## 2020-09-10 DIAGNOSIS — S82.831A OTHER CLOSED FRACTURE OF DISTAL END OF RIGHT FIBULA, INITIAL ENCOUNTER: ICD-10-CM

## 2020-09-10 PROCEDURE — 27786 TREATMENT OF ANKLE FRACTURE: CPT | Performed by: PHYSICIAN ASSISTANT

## 2020-09-10 PROCEDURE — 99214 OFFICE O/P EST MOD 30 MIN: CPT | Performed by: PHYSICIAN ASSISTANT

## 2020-09-10 NOTE — PROGRESS NOTES
"    [facility] [place] [facility] Note    Subjective     Chief Complaint   Patient presents with   • Right Ankle - Pain   DOI: 9/3/2020    HPI  Kiran Belcher is a 59 y.o. male.  Patient presents for evaluation of his right ankle.  States on the above-stated date he was using his walker and he turned his right ankle while attempting to ambulate.  He had significant pain so he presented to urgent care where he was noted to have questionable fracture distal fibula.  He was placed in a Aircast stirrup.    He is currently in an electric wheelchair.  He endorses a pain scale of 1-2/10.  Severity the pain mild.  Quality the pain dull, aching.  Is been taking Tylenol for pain control.    No reported fever, chills, night sweats or other constitutional symptoms.    Past Medical History:   Diagnosis Date   • Cancer (CMS/HCC)     hx spot on right kidney, for ~8 years, told cancer but no hx biopsy and no interventions and just watching it. Dr. vega used to watch this cyst, then started with  urology a year ago after he retired.   • Decreased mobility    • Depression    • Diabetes mellitus (CMS/HCC)    • Esophageal reflux    • Glaucoma    • History of methicillin resistant Staphylococcus aureus infection    • History of obstructive sleep apnea    • History of quadriplegia     of unknown etiology - C5-C7, incomplete    • History of spinal cord injury/quadriplegia 2008 9/14/2018   • Hyperlipidemia    • Hypertension    • Kidney disorder 9/14/2018   • Paraplegia (CMS/HCC)       Past Surgical History:   Procedure Laterality Date   • CERVICAL DISC SURGERY      around 5/2008, fall/injury tripped over a safety gate for their dogs, reported SCI \"semi quadraplegic\" since. [facility]. [facility] after.   • SPINE SURGERY        Family History   Problem Relation Age of Onset   • Hypertension Mother    • Heart disease Father    • Coronary artery disease Father    • Diabetes Other    • Hypertension Other      Social History     Socioeconomic " History   • Marital status:      Spouse name: Not on file   • Number of children: Not on file   • Years of education: Not on file   • Highest education level: Not on file   Tobacco Use   • Smoking status: Never Smoker   • Smokeless tobacco: Never Used   Substance and Sexual Activity   • Alcohol use: No   • Drug use: No   • Sexual activity: Defer   Social History Narrative    Mr. Belcher is a 57 year old white  male. They live in [facility] [facility] [place]. They do not have children but have dog children. 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      Current Outpatient Medications on File Prior to Visit   Medication Sig Dispense Refill   • amLODIPine-benazepril (LOTREL) 10-20 MG per capsule Take 1 capsule by mouth Daily. 90 capsule 1   • atorvastatin (LIPITOR) 10 MG tablet Take 1 tablet by mouth Daily. 90 tablet 3   • baclofen (LIORESAL) 20 MG tablet Take 1 tablet by mouth 2 (Two) Times a Day. 180 tablet 1   • citalopram (CeleXA) 20 MG tablet Take 1 tablet by mouth Daily. 90 tablet 1   • dantrolene (DANTRIUM) 100 MG capsule Take 1 capsule by mouth 2 (Two) Times a Day. 180 capsule 1   • esomeprazole (nexIUM) 40 MG capsule Take 1 capsule by mouth Every Morning Before Breakfast. 90 capsule 1   • glucose blood (Accu-Chek Elke Plus) test strip 1 each by Other route 2 (Two) Times a Day. Use as instructed 200 each 3   • glyburide (DIAbeta) 5 MG tablet TAKE ONE TABLET BY MOUTH TWICE A DAY WITH MEALS 180 tablet 0   • lactobacillus acidophilus (RISAQUAD) capsule capsule Take 1 capsule by mouth Daily. 90 capsule 1   • metFORMIN (GLUCOPHAGE) 500 MG tablet Take 1 tablet by mouth 3 (Three) Times a Day. 270 tablet 1   • TOVIAZ 8 MG tablet sustained-release 24 hour tablet      • triamcinolone (KENALOG) 0.1 % cream Apply  topically to the appropriate area as directed 2 (Two) Times a Day. 30 g 0  "    No current facility-administered medications on file prior to visit.       Allergies   Allergen Reactions   • Levofloxacin Unknown (See Comments)     tendinopathy        The following portions of the patient's history were reviewed and updated as appropriate: allergies, current medications, past family history, past medical history, past social history, past surgical history and problem list.    Review of Systems   Constitutional: Negative.    HENT: Negative.    Eyes: Negative.    Respiratory: Negative.    Cardiovascular: Negative.    Gastrointestinal: Negative.    Endocrine: Negative.    Genitourinary: Negative.    Musculoskeletal: Positive for arthralgias.   Skin: Negative.    Allergic/Immunologic: Negative.    Neurological: Negative.    Hematological: Negative.    Psychiatric/Behavioral: Negative.         Objective      Physical Exam  Pulse 70   Ht 180.3 cm (70.98\")   Wt 118 kg (260 lb 2.3 oz)   SpO2 97%   BMI 36.30 kg/m²     Body mass index is 36.3 kg/m².    GENERAL APPEARANCE: awake, alert & oriented x 3, in no acute distress and well developed, well nourished  PSYCH: normal mood and affect  LUNGS:  breathing nonlabored, no wheezing  EYES: sclera anicteric, pupils equal  CARDIOVASCULAR: palpable pulses. Capillary refill less than 2 seconds  INTEGUMENTARY: skin intact, no clubbing, cyanosis  NEUROLOGIC:  Normal Sensation decreased lower extremities.         Ortho Exam  Right ankle  Skin: Grossly intact without erythema, warmth positive soft tissue swelling lateral ankle with generalized 2+ bilateral lower extremity edema.  Tenderness: Lateral malleolus minimal.  Medial malleolus/medial ankle negative.  ATFL mild discomfort.  Deltoid ligament negative.  Base 5MT negative.  Prox Tib-Fib negative.  Motion: Dorsiflexion 5°, plantarflexion 30°.  Instability: Anterior drawer negative.  Proximal squeeze test negative.  Motor: Grossly intact TA/GS/EHL/P.  Sensory: Grossly intact to light touch DP/SP/S/S/T nerve " distributions. Decrease [place] throughout foot.      Imaging/Studies  Dr. Martinez and I reviewed plain film imaging performed on 4/9/2020.    EXAMINATION: XR ANKLE 3+ VW RIGHT- 09/04/2020     INDICATION: pain, swelling; S82.831A-Other fracture of upper and lower  end of right fibula, initial encounter for closed fracture     COMPARISON: NONE     FINDINGS: Patient history indicates rolling injury of right ankle, five  days ago.     There is a subtle oblique irregular lucency of the fibula on the oblique  ankle view, suspicious for underlying nondisplaced fracture. This is  associated with a tiny cortical flake of bone. There is lateral soft  tissue swelling. No medial or posterior malleolar fracture is  identified. Remaining bony structures appear anatomically aligned and  intact.     IMPRESSION:  Suspected nondisplaced fracture of the distal fibula.     D:  09/04/2020  E:  09/04/2020         This report was finalized on 9/4/2020 9:30 PM by Dr. Hayden Yi MD.    Assessment/Plan        ICD-10-CM ICD-9-CM   1. Acute right ankle pain  M25.571 719.47     338.19   2. Other closed fracture of distal end of right fibula, initial encounter  S82.831A 824.8   3. Edema of both lower legs  R60.0 782.3       No orders of the defined types were placed in this encounter.       -Right ankle pain due to a nondisplaced distal fibular fracture.  -Patient to continue with Aircast stirrup splint.  -Recommend over-the-counter pain medication as needed.  -May continue use of his walker while in his house and using wheelchair for distances.  -Discussed the importance of compression socks for his bilateral lower leg edema.  -Follow-up 3 weeks for repeat evaluation, sooner if issues arise or symptoms worsen/change.  Patient will require repeat imaging of the right ankle at that visit.  -Questions and concerns answered.    History, exam and imaging discussed with her agrees with the assessment and plan.    Medical Decision  Making  Management Options : over-the-counter medicine and close treatment of fracture or dislocation  Data/Risk: radiology tests       Cher Perea PA-C  09/14/20  09:59 EDT         EMR Dragon/Transcription disclaimer:  Much of this encounter note is an electronic transcription of spoken language to printed text. Electronic transcription of spoken language may permit erroneous, or at times, nonsensical words or phrases to be inadvertently transcribed. Although I have reviewed the note for such errors, some may still exist.

## 2020-10-01 ENCOUNTER — OFFICE VISIT (OUTPATIENT)
Dept: ORTHOPEDIC SURGERY | Facility: CLINIC | Age: 60
End: 2020-10-01

## 2020-10-01 VITALS — HEIGHT: 71 IN | HEART RATE: 93 BPM | BODY MASS INDEX: 36.42 KG/M2 | OXYGEN SATURATION: 99 % | WEIGHT: 260.14 LBS

## 2020-10-01 DIAGNOSIS — R60.0 EDEMA OF BOTH LOWER LEGS: ICD-10-CM

## 2020-10-01 DIAGNOSIS — S82.831D OTHER CLOSED FRACTURE OF DISTAL END OF RIGHT FIBULA WITH ROUTINE HEALING, SUBSEQUENT ENCOUNTER: Primary | ICD-10-CM

## 2020-10-01 PROCEDURE — 99024 POSTOP FOLLOW-UP VISIT: CPT | Performed by: PHYSICIAN ASSISTANT

## 2020-10-01 NOTE — PROGRESS NOTES
"    [facility] [place] [facility] Note        Subjective     CC: Follow-up (3 weeks follow up for Other closed fracture of distal end of right fibula DOI: 9/3/2020)      LEONARDO Belcher is a 59 y.o. male.  Patient returns today for follow-up evaluation of right distal fibula fracture.  He is been wearing a Aircast stirrup as directed.  He uses his walker when weightbearing and has a stirrup on.  He has no complaints or issues.  He denies any pain.  No reported numbness or tingling into the lower leg or foot.    Overall, patient's symptoms are improved.    ROS:    Constiutional:Pt denies fever, chills, nausea, or vomiting.  MSK:as above        Objective      Past Medical History  Past Medical History:   Diagnosis Date   • Cancer (CMS/HCC)     hx spot on right kidney, for ~8 years, told cancer but no hx biopsy and no interventions and just watching it. Dr. vega used to watch this cyst, then started with  urology a year ago after he retired.   • Decreased mobility    • Depression    • Diabetes mellitus (CMS/HCC)    • Esophageal reflux    • Glaucoma    • History of methicillin resistant Staphylococcus aureus infection    • History of obstructive sleep apnea    • History of quadriplegia     of unknown etiology - C5-C7, incomplete    • History of spinal cord injury/quadriplegia 2008 9/14/2018   • Hyperlipidemia    • Hypertension    • Kidney disorder 9/14/2018   • Paraplegia (CMS/HCC)          Physical Exam  Pulse 93   Ht 180.3 cm (70.98\")   Wt 118 kg (260 lb 2.3 oz)   SpO2 99%   BMI 36.30 kg/m²     Body mass index is 36.3 kg/m².    Patient is well nourished and well developed.        Ortho Exam  Right ankle  Skin: Grossly intact without erythema, warmth.  Previously noted soft tissue swelling is improved but he continues to have 2+ bilateral lower leg edema.  Tenderness: No palpable tenderness about the lower leg, ankle or foot on exam today.    Motion: Dorsiflexion 5°, plantarflexion 30°.  Instability: " Anterior drawer negative.  Proximal squeeze test negative.  Motor: Grossly intact TA/GS/EHL/P.  Sensory: Grossly intact to light touch DP/SP/S/S/T nerve distributions. Decrease [place]ielo throughout foot.      Imaging/Labs/EMG Reviewed:  Ordered right ankle plain films.  Imaging read by Dr. Martinez.    Indication: Right ankle fracture     Comparison: Todays xrays were compared to previous xrays from 9/4/2020     IMPRESSION:      Right ankle 3 views: Radiographs demonstrate nondisplaced lateral malleolar ankle fracture with alignment grossly unchanged compared to prior radiographs.  Ankle mortise remains symmetric.  Fracture line remains visible.      Assessment:  1. Other closed fracture of distal end of right fibula with routine healing, subsequent encounter    2. Edema of both lower legs        Plan:  1. Stable left distal right fibula fracture.  2. Continue with Aircast stirrup.  3. Continue use of walker to assist with ambulation.  4. Bilateral lower leg edema--patient needs to be wearing compression socks or stockings to help assist with edema control.  5. Recommend over-the-counter pain medication as needed.  6. Offered to send the patient to physical therapy but he politely declined.  7. Follow-up in 4 weeks for repeat evaluation to include pre-clinic imaging of the right ankle.  8. Questions and concerns answered.      Cher Perea PA-C  10/05/20  08:20 EDT      Dragon disclaimer:  Much of this encounter note is an electronic transcription/translation of spoken language to printed text. The electronic translation of spoken language may permit erroneous, or at times, nonsensical words or phrases to be inadvertently transcribed; Although I have reviewed the note for such errors, some may still exist.

## 2020-10-07 ENCOUNTER — FLU SHOT (OUTPATIENT)
Dept: FAMILY MEDICINE CLINIC | Facility: CLINIC | Age: 60
End: 2020-10-07

## 2020-10-07 DIAGNOSIS — Z23 NEED FOR INFLUENZA VACCINATION: ICD-10-CM

## 2020-10-07 PROCEDURE — 90686 IIV4 VACC NO PRSV 0.5 ML IM: CPT | Performed by: FAMILY MEDICINE

## 2020-10-07 PROCEDURE — 90471 IMMUNIZATION ADMIN: CPT | Performed by: FAMILY MEDICINE

## 2020-11-10 ENCOUNTER — OFFICE VISIT (OUTPATIENT)
Dept: ORTHOPEDIC SURGERY | Facility: CLINIC | Age: 60
End: 2020-11-10

## 2020-11-10 VITALS — HEART RATE: 98 BPM | HEIGHT: 71 IN | WEIGHT: 260 LBS | OXYGEN SATURATION: 98 % | BODY MASS INDEX: 36.4 KG/M2

## 2020-11-10 DIAGNOSIS — Z09 FRACTURE FOLLOW-UP: Primary | ICD-10-CM

## 2020-11-10 DIAGNOSIS — S82.831D OTHER CLOSED FRACTURE OF DISTAL END OF RIGHT FIBULA WITH ROUTINE HEALING, SUBSEQUENT ENCOUNTER: ICD-10-CM

## 2020-11-10 PROCEDURE — 99024 POSTOP FOLLOW-UP VISIT: CPT | Performed by: PHYSICIAN ASSISTANT

## 2020-11-10 NOTE — PROGRESS NOTES
"    [facility] [place] [facility] Note    Subjective     Chief Complaint   Patient presents with   • Right Ankle - Follow-up     6 week   DOI: 9/3/2020    HPI  Kiran Belcher is a 60 y.o. male.  Patient returns for follow-up right distal fibula fracture.  He is approximately 9-1/2 weeks out from date of injury.  He has been wearing the Aircast stirrup as directed.  He does use his walker and weightbears periodically but for the majority the time he is in a motorized wheelchair.    He does not complain of any pain.  No reported numbness or tingling into the lower leg or foot.    Patient denies any fever, chills, night sweats or other constitutional symptoms.    Past Medical History:   Diagnosis Date   • Cancer (CMS/HCC)     hx spot on right kidney, for ~8 years, told cancer but no hx biopsy and no interventions and just watching it. Dr. vega used to watch this cyst, then started with  urology a year ago after he retired.   • Decreased mobility    • Depression    • Diabetes mellitus (CMS/HCC)    • Esophageal reflux    • Glaucoma    • History of methicillin resistant Staphylococcus aureus infection    • History of obstructive sleep apnea    • History of quadriplegia     of unknown etiology - C5-C7, incomplete    • History of spinal cord injury/quadriplegia 2008 9/14/2018   • Hyperlipidemia    • Hypertension    • Kidney disorder 9/14/2018   • Paraplegia (CMS/HCC)       Past Surgical History:   Procedure Laterality Date   • CERVICAL DISC SURGERY      around 5/2008, fall/injury tripped over a safety gate for their dogs, reported SCI \"semi quadraplegic\" since. [facility]. [facility] after.   • SPINE SURGERY        Family History   Problem Relation Age of Onset   • Hypertension Mother    • Heart disease Father    • Coronary artery disease Father    • Diabetes Other    • Hypertension Other      Social History     Socioeconomic History   • Marital status:      Spouse name: Not on file   • Number of children: Not on " file   • Years of education: Not on file   • Highest education level: Not on file   Tobacco Use   • Smoking status: Never Smoker   • Smokeless tobacco: Never Used   Substance and Sexual Activity   • Alcohol use: No   • Drug use: No   • Sexual activity: Defer   Social History Narrative    Mr. Belcher is a 57 year old white  male. They live in [place] [facility]. They do not have children but have dog children. 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      Current Outpatient Medications on File Prior to Visit   Medication Sig Dispense Refill   • amLODIPine-benazepril (LOTREL) 10-20 MG per capsule Take 1 capsule by mouth Daily. 90 capsule 1   • atorvastatin (LIPITOR) 10 MG tablet Take 1 tablet by mouth Daily. 90 tablet 3   • baclofen (LIORESAL) 20 MG tablet Take 1 tablet by mouth 2 (Two) Times a Day. 180 tablet 1   • citalopram (CeleXA) 20 MG tablet Take 1 tablet by mouth Daily. 90 tablet 1   • dantrolene (DANTRIUM) 100 MG capsule Take 1 capsule by mouth 2 (Two) Times a Day. 180 capsule 1   • esomeprazole (nexIUM) 40 MG capsule Take 1 capsule by mouth Every Morning Before Breakfast. 90 capsule 1   • glucose blood (Accu-Chek Elke Plus) test strip 1 each by Other route 2 (Two) Times a Day. Use as instructed 200 each 3   • glyburide (DIAbeta) 5 MG tablet TAKE ONE TABLET BY MOUTH TWICE A DAY WITH MEALS 180 tablet 0   • lactobacillus acidophilus (RISAQUAD) capsule capsule Take 1 capsule by mouth Daily. 90 capsule 1   • metFORMIN (GLUCOPHAGE) 500 MG tablet Take 1 tablet by mouth 3 (Three) Times a Day. 270 tablet 1   • TOVIAZ 8 MG tablet sustained-release 24 hour tablet      • triamcinolone (KENALOG) 0.1 % cream Apply  topically to the appropriate area as directed 2 (Two) Times a Day. 30 g 0     No current facility-administered medications on file prior to visit.       Allergies  "  Allergen Reactions   • Levofloxacin Unknown (See Comments)     tendinopathy        The following portions of the patient's history were reviewed and updated as appropriate: allergies, current medications, past family history, past medical history, past social history, past surgical history and problem list.    Review of Systems   Constitutional: Negative.    HENT: Negative.    Eyes: Negative.    Respiratory: Negative.    Cardiovascular: Negative.    Gastrointestinal: Negative.    Endocrine: Negative.    Genitourinary: Negative.    Musculoskeletal: Negative.    Skin: Negative.    Allergic/Immunologic: Negative.    Neurological: Negative.    Hematological: Negative.    Psychiatric/Behavioral: Negative.         Objective      Physical Exam  Pulse 98   Ht 180.3 cm (70.98\")   Wt 118 kg (260 lb)   SpO2 98%   BMI 36.28 kg/m²     Body mass index is 36.28 kg/m².    GENERAL APPEARANCE: awake, alert & oriented x 3, in no acute distress and well developed, well nourished  PSYCH: normal mood and affect  LUNGS:  breathing nonlabored, no wheezing  EYES: sclera anicteric, pupils equal  CARDIOVASCULAR: palpable pulses. Capillary refill less than 2 seconds  INTEGUMENTARY: skin intact, no clubbing, cyanosis  NEUROLOGIC:  Normal Sensation         Ortho Exam  Right ankle  Skin: Grossly intact without erythema, warmth.    Patient continues to have edema noted throughout both lower extremities.  Tenderness:  No palpable tenderness throughout ankle or foot on exam today.   Motion: Dorsiflexion 5°, plantarflexion 30°.  Instability: Anterior drawer negative.  Proximal squeeze test negative.  Motor: Grossly intact TA/GS/EHL/P.  Sensory: Grossly intact to light touch DP/SP/S/S/T nerves. Decrease [place] Cielo throughout foot.      Imaging/Studies  Ordered right ankle plain films.  Imaging read by Dr. Martinez.    Imaging Results (Last 7 Days)     Procedure Component Value Units Date/Time    XR Ankle 3+ View Right [[id number]] Resulted: " 11/10/20 1612     Updated: 11/10/20 1613    Narrative:      Indication: Right ankle fracture    Comparison: Todays xrays were compared to previous xrays from 10/1/2020      Impression:           Right ankle 3 views: Radiographs demonstrate interval consolidation of the   distal fibular fracture with bridging callus formation identified.  Ankle   mortise remains symmetric.  No significant change in alignment compared to   prior radiographs          Assessment/Plan        ICD-10-CM ICD-9-CM   1. Fracture follow-up  Z09 V67.4   2. Other closed fracture of distal end of right fibula with routine healing, subsequent encounter  S82.831D V54.16       Orders Placed This Encounter   Procedures   • XR Ankle 3+ View Right        -Healing right distal fibular fracture.  -Patient will continue use of Aircast stirrup for the next 4 weeks and then he can gradually wean out of it.  -Continue use of walker when ambulating.  -Offered to place referral for formal PT to help assist with lower leg edema as well as strengthening/conditioning to the lower extremities.  He would like to talk it over with his PCP first, and if she agrees that he would benefit, then he will have her placed the referral.   -Recommend over-the-counter pain medications as needed.  -Recommend compression socks for the lower leg edema.  -Follow-up in orthopedic clinic as needed.  -Questions and concerns answered.      Medical Decision Making  Management Options : over-the-counter medicine  Data/Risk: radiology tests       Cher Perea PA-C  11/12/20  12:36 EST         EMR Dragon/Transcription disclaimer:  Much of this encounter note is an electronic transcription of spoken language to printed text. Electronic transcription of spoken language may permit erroneous, or at times, nonsensical words or phrases to be inadvertently transcribed. Although I have reviewed the note for such errors, some may still exist.

## 2020-12-08 ENCOUNTER — TELEPHONE (OUTPATIENT)
Dept: FAMILY MEDICINE CLINIC | Facility: CLINIC | Age: 60
End: 2020-12-08

## 2020-12-08 DIAGNOSIS — R53.81 PHYSICAL DECONDITIONING: Primary | ICD-10-CM

## 2020-12-08 DIAGNOSIS — R29.898 WEAKNESS OF BOTH LOWER EXTREMITIES: ICD-10-CM

## 2020-12-08 NOTE — TELEPHONE ENCOUNTER
Patient states that he needs a referral to Physical Therapy for more strengthening for his legs.  He can be reached at [phone]

## 2020-12-16 ENCOUNTER — OFFICE VISIT (OUTPATIENT)
Dept: FAMILY MEDICINE CLINIC | Facility: CLINIC | Age: 60
End: 2020-12-16

## 2020-12-16 VITALS
HEART RATE: 105 BPM | TEMPERATURE: 97.3 F | SYSTOLIC BLOOD PRESSURE: 148 MMHG | HEIGHT: 71 IN | BODY MASS INDEX: 36.28 KG/M2 | DIASTOLIC BLOOD PRESSURE: 82 MMHG | RESPIRATION RATE: 18 BRPM | OXYGEN SATURATION: 99 %

## 2020-12-16 DIAGNOSIS — E78.2 MIXED HYPERLIPIDEMIA: ICD-10-CM

## 2020-12-16 DIAGNOSIS — Z12.5 PROSTATE CANCER SCREENING: ICD-10-CM

## 2020-12-16 DIAGNOSIS — E11.9 DIABETES MELLITUS WITHOUT COMPLICATION (HCC): ICD-10-CM

## 2020-12-16 DIAGNOSIS — I10 ESSENTIAL HYPERTENSION: Primary | ICD-10-CM

## 2020-12-16 DIAGNOSIS — Z74.09 DECLINING MOBILITY: ICD-10-CM

## 2020-12-16 DIAGNOSIS — S14.115A SPINAL CORD INJURY AT C5-C7 LEVEL WITH COMPLETE SPINAL CORD LESION (HCC): ICD-10-CM

## 2020-12-16 PROCEDURE — 99214 OFFICE O/P EST MOD 30 MIN: CPT | Performed by: FAMILY MEDICINE

## 2020-12-16 PROCEDURE — 36415 COLL VENOUS BLD VENIPUNCTURE: CPT | Performed by: FAMILY MEDICINE

## 2020-12-16 RX ORDER — BACLOFEN 20 MG/1
20 TABLET ORAL 3 TIMES DAILY
Qty: 270 TABLET | Refills: 1 | Status: SHIPPED | OUTPATIENT
Start: 2020-12-16 | End: 2021-07-06

## 2020-12-16 RX ORDER — DANTROLENE SODIUM 100 MG/1
100 CAPSULE ORAL 2 TIMES DAILY
Qty: 180 CAPSULE | Refills: 1 | Status: SHIPPED | OUTPATIENT
Start: 2020-12-16 | End: 2021-06-18 | Stop reason: SDUPTHER

## 2020-12-16 NOTE — PROGRESS NOTES
Subjective   Kiran Belcher is a 60 y.o. male.   Overall stable  Needs a replacement for wheelchair control arm which is broken. Pt has a history of spinal cord injury/quadraplegia. Has increased Baclofen to tid due to increased spasm due to inactivity.  Had a fracture of right foot in 9/20 and placed on an air cast. Will be starting therapy.  Hypertension  This is a chronic problem. The current episode started more than 1 year ago. The problem is unchanged. The problem is controlled. Pertinent negatives include no anxiety, blurred vision, chest pain, malaise/fatigue, neck pain, palpitations, peripheral edema, PND or shortness of breath. There are no associated agents to hypertension. Risk factors for coronary artery disease include diabetes mellitus, dyslipidemia, family history, obesity, male gender and sedentary lifestyle. Past treatments include ACE inhibitors and calcium channel blockers. Current antihypertension treatment includes ACE inhibitors and calcium channel blockers. The current treatment provides significant improvement. There are no compliance problems.  Hypertensive end-organ damage includes kidney disease. Identifiable causes of hypertension include chronic renal disease.   Diabetes  He presents for his follow-up (Taking Metformin and Glyburide) diabetic visit. He has type 2 diabetes mellitus. His disease course has been stable. There are no hypoglycemic associated symptoms. Pertinent negatives for hypoglycemia include no dizziness, mood changes or nervousness/anxiousness. Pertinent negatives for diabetes include no blurred vision, no chest pain, no fatigue, no foot paresthesias, no polydipsia, no polyphagia, no polyuria, no visual change, no weakness and no weight loss. There are no hypoglycemic complications. Symptoms are stable. There are no diabetic complications. Risk factors for coronary artery disease include diabetes mellitus, dyslipidemia, obesity, male sex, hypertension and family  history. Current diabetic treatment includes oral agent (dual therapy). He is compliant with treatment all of the time. His weight is stable. He is following a generally healthy diet. When asked about meal planning, he reported none. He has not had a previous visit with a dietitian. He participates in exercise intermittently. There is no change in his home blood glucose trend. An ACE inhibitor/angiotensin II receptor blocker is being taken. He does not see a podiatrist.Eye exam is not current.   Hyperlipidemia  This is a chronic problem. The current episode started more than 1 year ago. The problem is controlled. Recent lipid tests were reviewed and are normal. Exacerbating diseases include chronic renal disease, diabetes and obesity. There are no known factors aggravating his hyperlipidemia. Pertinent negatives include no chest pain, focal weakness, leg pain, myalgias or shortness of breath. Current antihyperlipidemic treatment includes statins. The current treatment provides significant improvement of lipids. There are no compliance problems.  Risk factors for coronary artery disease include diabetes mellitus, dyslipidemia, family history, obesity, male sex and hypertension.        The following portions of the patient's history were reviewed and updated as appropriate: allergies, current medications, past family history, past medical history, past social history, past surgical history and problem list.  Vitals:    12/16/20 1406   BP: 148/82   Pulse: 105   Resp: 18   Temp: 97.3 °F (36.3 °C)   SpO2: 99%     Body mass index is 36.28 kg/m².  Review of Systems   Constitutional: Negative.  Negative for activity change, fatigue, fever, malaise/fatigue, unexpected weight gain and unexpected weight loss.   HENT: Negative.  Negative for congestion, sneezing and sore throat.    Eyes: Negative.  Negative for blurred vision, double vision and visual disturbance.   Respiratory: Negative.  Negative for cough, chest tightness,  shortness of breath and wheezing.    Cardiovascular: Negative.  Negative for chest pain, palpitations, leg swelling and PND.   Gastrointestinal: Negative.  Negative for abdominal distention, abdominal pain, blood in stool, constipation, diarrhea and nausea.   Endocrine: Negative.  Negative for cold intolerance, heat intolerance, polydipsia, polyphagia and polyuria.   Genitourinary: Negative.  Negative for dysuria, frequency, urgency and urinary incontinence.   Musculoskeletal: Negative.  Negative for arthralgias, myalgias and neck pain.   Skin: Negative.  Negative for rash.   Allergic/Immunologic: Negative.    Neurological: Negative.  Negative for dizziness, focal weakness, syncope, weakness, numbness and memory problem.   Hematological: Negative.  Negative for adenopathy.   Psychiatric/Behavioral: Negative.  Negative for suicidal ideas and depressed mood. The patient is not nervous/anxious.    All other systems reviewed and are negative.      Objective   Physical Exam  Vitals signs and nursing note reviewed.   Constitutional:       General: He is not in acute distress.     Appearance: He is well-developed. He is not diaphoretic.   HENT:      Right Ear: External ear normal.      Left Ear: External ear normal.      Nose: Nose normal.   Eyes:      Conjunctiva/sclera: Conjunctivae normal.      Pupils: Pupils are equal, round, and reactive to light.   Neck:      Musculoskeletal: Normal range of motion and neck supple.      Thyroid: No thyromegaly.   Cardiovascular:      Rate and Rhythm: Normal rate and regular rhythm.      Heart sounds: Normal heart sounds. No murmur.   Pulmonary:      Effort: Pulmonary effort is normal. No respiratory distress.      Breath sounds: Normal breath sounds. No wheezing.   Abdominal:      General: Bowel sounds are normal. There is no distension.      Palpations: Abdomen is soft. There is no mass.      Tenderness: There is no abdominal tenderness. There is no guarding or rebound.      Hernia:  No hernia is present.   Musculoskeletal: Normal range of motion.         General: No tenderness.   Lymphadenopathy:      Cervical: No cervical adenopathy.   Skin:     General: Skin is warm and dry.      Coloration: Skin is not pale.      Findings: No erythema or rash.   Neurological:      Mental Status: He is alert and oriented to person, place, and time.      Deep Tendon Reflexes: Reflexes are normal and symmetric.   Psychiatric:         Behavior: Behavior normal.         Thought Content: Thought content normal.         Judgment: Judgment normal.             Assessment/Plan   Diagnoses and all orders for this visit:    1. Essential hypertension (Primary)  -     CBC & Differential  -     Comprehensive Metabolic Panel    2. Mixed hyperlipidemia  -     Lipid Panel  -     TSH    3. Diabetes mellitus without complication (CMS/HCC)  -     Hemoglobin A1c    4. Prostate cancer screening  -     PSA Screen    5. Declining mobility    6. Spinal cord injury at C5-C7 level with complete spinal cord lesion (CMS/HCC)  -     baclofen (LIORESAL) 20 MG tablet; Take 1 tablet by mouth 3 (Three) Times a Day.  Dispense: 270 tablet; Refill: 1  -     dantrolene (DANTRIUM) 100 MG capsule; Take 1 capsule by mouth 2 (Two) Times a Day.  Dispense: 180 capsule; Refill: 1      Today I have spent a total of 25 minutes face to face with Kiran Belcher.  During that time, a total of 15 minutes was spent counseling on hypertension, hyperlipidemia, diabetes.  Discussed medications and side effects, compliance and follow-up.

## 2020-12-17 LAB
ALBUMIN SERPL-MCNC: 4.1 G/DL (ref 3.5–5.2)
ALBUMIN/GLOB SERPL: 1.3 G/DL
ALP SERPL-CCNC: 104 U/L (ref 39–117)
ALT SERPL-CCNC: 28 U/L (ref 1–41)
AST SERPL-CCNC: 21 U/L (ref 1–40)
BASOPHILS # BLD AUTO: 0.02 10*3/MM3 (ref 0–0.2)
BASOPHILS NFR BLD AUTO: 0.2 % (ref 0–1.5)
BILIRUB SERPL-MCNC: 0.4 MG/DL (ref 0–1.2)
BUN SERPL-MCNC: 16 MG/DL (ref 6–20)
BUN/CREAT SERPL: 17.8 (ref 7–25)
CALCIUM SERPL-MCNC: 9.4 MG/DL (ref 8.6–10.5)
CHLORIDE SERPL-SCNC: 100 MMOL/L (ref 98–107)
CHOLEST SERPL-MCNC: 154 MG/DL (ref 0–200)
CO2 SERPL-SCNC: 24 MMOL/L (ref 22–29)
CREAT SERPL-MCNC: 0.9 MG/DL (ref 0.76–1.27)
EOSINOPHIL # BLD AUTO: 0.31 10*3/MM3 (ref 0–0.4)
EOSINOPHIL NFR BLD AUTO: 3.1 % (ref 0.3–6.2)
ERYTHROCYTE [DISTWIDTH] IN BLOOD BY AUTOMATED COUNT: 13.4 % (ref 12.3–15.4)
GLOBULIN SER CALC-MCNC: 3.1 GM/DL
GLUCOSE SERPL-MCNC: 114 MG/DL (ref 65–99)
HBA1C MFR BLD: 6.7 % (ref 4.8–5.6)
HCT VFR BLD AUTO: 37 % (ref 37.5–51)
HDLC SERPL-MCNC: 32 MG/DL (ref 40–60)
HGB BLD-MCNC: 12.5 G/DL (ref 13–17.7)
IMM GRANULOCYTES # BLD AUTO: 0.05 10*3/MM3 (ref 0–0.05)
IMM GRANULOCYTES NFR BLD AUTO: 0.5 % (ref 0–0.5)
LDLC SERPL CALC-MCNC: 87 MG/DL (ref 0–100)
LYMPHOCYTES # BLD AUTO: 1.36 10*3/MM3 (ref 0.7–3.1)
LYMPHOCYTES NFR BLD AUTO: 13.8 % (ref 19.6–45.3)
MCH RBC QN AUTO: 29.1 PG (ref 26.6–33)
MCHC RBC AUTO-ENTMCNC: 33.8 G/DL (ref 31.5–35.7)
MCV RBC AUTO: 86 FL (ref 79–97)
MONOCYTES # BLD AUTO: 0.65 10*3/MM3 (ref 0.1–0.9)
MONOCYTES NFR BLD AUTO: 6.6 % (ref 5–12)
NEUTROPHILS # BLD AUTO: 7.46 10*3/MM3 (ref 1.7–7)
NEUTROPHILS NFR BLD AUTO: 75.8 % (ref 42.7–76)
NRBC BLD AUTO-RTO: 0 /100 WBC (ref 0–0.2)
PLATELET # BLD AUTO: 311 10*3/MM3 (ref 140–450)
POTASSIUM SERPL-SCNC: 5 MMOL/L (ref 3.5–5.2)
PROT SERPL-MCNC: 7.2 G/DL (ref 6–8.5)
PSA SERPL-MCNC: 0.78 NG/ML (ref 0–4)
RBC # BLD AUTO: 4.3 10*6/MM3 (ref 4.14–5.8)
SODIUM SERPL-SCNC: 137 MMOL/L (ref 136–145)
TRIGL SERPL-MCNC: 207 MG/DL (ref 0–150)
TSH SERPL DL<=0.005 MIU/L-ACNC: 1.37 UIU/ML (ref 0.27–4.2)
VLDLC SERPL CALC-MCNC: 35 MG/DL (ref 5–40)
WBC # BLD AUTO: 9.85 10*3/MM3 (ref 3.4–10.8)

## 2020-12-22 ENCOUNTER — TREATMENT (OUTPATIENT)
Dept: PHYSICAL THERAPY | Facility: CLINIC | Age: 60
End: 2020-12-22

## 2020-12-22 DIAGNOSIS — Z87.828 HISTORY OF SPINAL CORD INJURY: ICD-10-CM

## 2020-12-22 DIAGNOSIS — R26.9 NEUROLOGIC GAIT DYSFUNCTION: Primary | ICD-10-CM

## 2020-12-22 DIAGNOSIS — S82.891A CLOSED FRACTURE OF RIGHT ANKLE, INITIAL ENCOUNTER: ICD-10-CM

## 2020-12-22 PROCEDURE — 97162 PT EVAL MOD COMPLEX 30 MIN: CPT | Performed by: PHYSICAL THERAPIST

## 2020-12-22 NOTE — PROGRESS NOTES
Physical Therapy Initial Evaluation and Plan of Care      Patient: Kiran Belcher   : 1960  Diagnosis/ICD-10 Code:  Neurologic gait dysfunction [R26.9]  Referring practitioner: Nadira Fernandez DO    Subjective Evaluation    History of Present Illness  Mechanism of injury: Patient has C6-7 spinal cord injury 12 years ago.     Broke right ankle in September walking into storage room with walker and fell in the floor. Using an aircast, they didn't want to do a hard boot because of difficulty walking.     Uses power chair outside of home but tries to use walker inside home. Arm has broken on powerchair so has difficulty standing up.  [employer] has ordered a new arm.     Feels like his hip flexors have gotten tight from sitting and legs have gotten weak. Has walked from bed to kitchen (about 40 feet).     Spasms have gotten worse in leg.       Patient Occupation: Disability  Pain  Current pain ratin  At best pain ratin  At worst pain ratin  Aggravating factors: squatting, sleeping, ambulation, standing, stairs, lifting and movement    Patient Goals  Patient goals for therapy: decreased edema, improved balance, increased motion, return to sport/leisure activities, independence with ADLs/IADLs and increased strength             Objective          Tenderness     Right Ankle/Foot   No tenderness.     Active Range of Motion   Left Ankle/Foot   Dorsiflexion (kf): 10 degrees   Plantar flexion: 49 degrees   Inversion: 9 degrees   Eversion: 13 degrees     Right Ankle/Foot   Plantar flexion: 43 degrees   Inversion: 23 degrees   Eversion: 23 degrees     Additional Active Range of Motion Details  R DF (kf): 12 deg from neutral.     Passive Range of Motion     Right Ankle/Foot    Dorsiflexion (kf): 10 degrees     Joint Play     Right Ankle/Foot  Hypomobile in the talocrural joint, subtalar joint and midfoot.     Strength/Myotome Testing     Left Hip   Planes of Motion   Flexion: 3-  Abduction: 3+  External  rotation: 3+  Internal rotation: 3+    Right Hip   Planes of Motion   Flexion: 3-  Abduction: 3+  External rotation: 3+  Internal rotation: 3+    Left Knee   Flexion: 4  Extension: 4    Right Knee   Flexion: 4+  Extension: 4    Left Ankle/Foot   Dorsiflexion: 4  Plantar flexion: 4+  Inversion: 4  Eversion: 4    Right Ankle/Foot   Dorsiflexion: 4  Plantar flexion: 4+  Inversion: 4-  Eversion: 4+    Ambulation   Weight-Bearing Status   Weight-Bearing Status (Right): weight-bearing as tolerated    Assistive device used: two-wheeled walker    Observational Gait   Gait: antalgic and crouched   Decreased walking speed and stride length.   Right foot contact pattern: foot flat    Quality of Movement During Gait   Trunk  Forward lean.     Functional Assessment     Comments  Sit<>stand (from power chair): req's bilateral UE assist, mod assist x 1, momentum rocking           Assessment & Plan     Assessment  Impairments: abnormal coordination, abnormal gait, abnormal muscle firing, abnormal muscle tone, abnormal or restricted ROM, activity intolerance, impaired balance, impaired physical strength, lacks appropriate home exercise program, pain with function, safety issue and weight-bearing intolerance  Assessment details: Patient is a 60 year old male presenting with right ankle fracture (sustained in September), which has resulted in difficulty walking and increased fall risk. She was only able to use an aircast due to difficulty standing and walking in a boot. Patient has a C6-7 complete spinal cord injury (12 years). He uses a power chair out of home and a rolling walker inside home. Currently the right arm of power chair is broken (has been addressed) and standing from chair has gotten difficult. Patient also demonstrates decreased hip extension and gross LE weakness. Patient is appropriate for physical therapy to address above issues.     Problem list:  1. Right ankle fracture   2. Hip flexor tightness  3. LE weakness  4.  Gait instability   Barriers to therapy: spinal cord injury   Prognosis: good  Prognosis details: Short Term Goals (3 weeks):  1. Patient will be independent with home exercise program.  2. Patient will demonstrate improved hip extension by 50%.   3. Patient will be able to stand from power chair independently and safely.     Long Term Goals (12 weeks):  1. Patient will be able to walk 150 feet using LRAD independently.   2. Patient will demonstrate right ankle strength and mobility of WFL compared to left side.   3. Patient will be able to return to full work/home duty using LRAD at previous level of independence.     Functional Limitations: lifting, sleeping, walking, pulling, pushing, uncomfortable because of pain, moving in bed and standing  Plan  Therapy options: will be seen for skilled physical therapy services  Planned modality interventions: ultrasound, thermotherapy (hydrocollator packs), TENS, high voltage pulsed current (spasm management), high voltage pulsed current (pain management) and cryotherapy  Planned therapy interventions: therapeutic activities, stretching, strengthening, spinal/joint mobilization, soft tissue mobilization, postural training, neuromuscular re-education, manual therapy, orthotic fitting/training, motor coordination training, ADL retraining, abdominal trunk stabilization, balance/weight-bearing training, body mechanics training, fine motor coordination training, flexibility, functional ROM exercises, gait training, home exercise program, IADL retraining, joint mobilization, transfer training and wheelchair management/propulsion training  Frequency: 2-3x/week.  Duration in visits: 16  Treatment plan discussed with: patient          Timed Treatment:      mins   Total Treatment:     45    mins    PT SIGNATURE: Apurva Delgado, WILBERT   DATE TREATMENT INITIATED: 12/23/2020    Medicare Initial Certification  Certification Period: 3/23/2021  I certify that the therapy services are furnished  while this patient is under my care.  The services outlined above are required by this patient, and will be reviewed every 90 days.     PHYSICIAN: Nadira Fernandez, DO      DATE:     Please sign and return via fax to [phone].. Thank you, [facility].

## 2020-12-28 RX ORDER — ESOMEPRAZOLE MAGNESIUM 40 MG/1
CAPSULE, DELAYED RELEASE ORAL
Qty: 90 CAPSULE | Refills: 0 | Status: SHIPPED | OUTPATIENT
Start: 2020-12-28 | End: 2021-04-02 | Stop reason: SDUPTHER

## 2020-12-31 ENCOUNTER — TREATMENT (OUTPATIENT)
Dept: PHYSICAL THERAPY | Facility: CLINIC | Age: 60
End: 2020-12-31

## 2020-12-31 DIAGNOSIS — R26.9 NEUROLOGIC GAIT DYSFUNCTION: Primary | ICD-10-CM

## 2020-12-31 DIAGNOSIS — S82.891A CLOSED FRACTURE OF RIGHT ANKLE, INITIAL ENCOUNTER: ICD-10-CM

## 2020-12-31 DIAGNOSIS — Z87.828 HISTORY OF SPINAL CORD INJURY: ICD-10-CM

## 2020-12-31 PROCEDURE — 97530 THERAPEUTIC ACTIVITIES: CPT | Performed by: PHYSICAL THERAPIST

## 2020-12-31 PROCEDURE — 97112 NEUROMUSCULAR REEDUCATION: CPT | Performed by: PHYSICAL THERAPIST

## 2020-12-31 PROCEDURE — 97110 THERAPEUTIC EXERCISES: CPT | Performed by: PHYSICAL THERAPIST

## 2020-12-31 PROCEDURE — 97116 GAIT TRAINING THERAPY: CPT | Performed by: PHYSICAL THERAPIST

## 2021-01-06 ENCOUNTER — TREATMENT (OUTPATIENT)
Dept: PHYSICAL THERAPY | Facility: CLINIC | Age: 61
End: 2021-01-06

## 2021-01-06 DIAGNOSIS — Z87.828 HISTORY OF SPINAL CORD INJURY: ICD-10-CM

## 2021-01-06 DIAGNOSIS — R26.9 NEUROLOGIC GAIT DYSFUNCTION: Primary | ICD-10-CM

## 2021-01-06 DIAGNOSIS — S82.891A CLOSED FRACTURE OF RIGHT ANKLE, INITIAL ENCOUNTER: ICD-10-CM

## 2021-01-06 PROCEDURE — 97116 GAIT TRAINING THERAPY: CPT | Performed by: PHYSICAL THERAPIST

## 2021-01-06 PROCEDURE — 97112 NEUROMUSCULAR REEDUCATION: CPT | Performed by: PHYSICAL THERAPIST

## 2021-01-06 PROCEDURE — 97530 THERAPEUTIC ACTIVITIES: CPT | Performed by: PHYSICAL THERAPIST

## 2021-01-06 PROCEDURE — 97110 THERAPEUTIC EXERCISES: CPT | Performed by: PHYSICAL THERAPIST

## 2021-01-06 NOTE — PROGRESS NOTES
Visit #: 3    Subjective   Kiran Belcher reports: No change in sx     Objective   Gait 76 feet using rolling walker   56 feet x 2     See Exercise, Manual, and Modality Logs for complete treatment.       Assessment/Plan  Max walking distance improved, will continue to progress as tolerated. Sit <> stand difficult, will need accessory strengthening of quad, hamstring, and glute.   Progress per Plan of Care           Manual Therapy:         mins  33930;  Therapeutic Exercise:    15     mins  94813;     Neuromuscular Isabel:    13    mins  82538;    Therapeutic Activity:     10     mins  25669;     Gait Trainin     mins  59693;     Ultrasound:          mins  19706;   Iontophoresis          mins  73278   Electrical Stimulation:        mins  13574 ( );  Dry Needling          mins self-pay  Fluidotherapy          mins [id number]    Timed Treatment:   63   mins   Total Treatment:     63   mins    Apurva Delgado, PT  Physical Therapist

## 2021-01-08 ENCOUNTER — TELEPHONE (OUTPATIENT)
Dept: ORTHOPEDICS | Facility: OTHER | Age: 61
End: 2021-01-08

## 2021-01-27 DIAGNOSIS — E11.9 TYPE 2 DIABETES MELLITUS WITHOUT COMPLICATION, WITHOUT LONG-TERM CURRENT USE OF INSULIN (HCC): ICD-10-CM

## 2021-01-29 RX ORDER — GLYBURIDE 5 MG/1
TABLET ORAL
Qty: 180 TABLET | Refills: 0 | Status: SHIPPED | OUTPATIENT
Start: 2021-01-29 | End: 2021-08-02

## 2021-02-03 ENCOUNTER — TREATMENT (OUTPATIENT)
Dept: PHYSICAL THERAPY | Facility: CLINIC | Age: 61
End: 2021-02-03

## 2021-02-03 DIAGNOSIS — S82.891A CLOSED FRACTURE OF RIGHT ANKLE, INITIAL ENCOUNTER: ICD-10-CM

## 2021-02-03 DIAGNOSIS — Z87.828 HISTORY OF SPINAL CORD INJURY: ICD-10-CM

## 2021-02-03 DIAGNOSIS — R26.9 NEUROLOGIC GAIT DYSFUNCTION: Primary | ICD-10-CM

## 2021-02-03 PROCEDURE — 97116 GAIT TRAINING THERAPY: CPT | Performed by: PHYSICAL THERAPIST

## 2021-02-03 PROCEDURE — 97110 THERAPEUTIC EXERCISES: CPT | Performed by: PHYSICAL THERAPIST

## 2021-02-03 PROCEDURE — 97530 THERAPEUTIC ACTIVITIES: CPT | Performed by: PHYSICAL THERAPIST

## 2021-02-03 NOTE — PROGRESS NOTES
Re-Assessment / Re-Certification        Patient: Kiran Belcher   : 1960  Diagnosis/ICD-10 Code:  Neurologic gait dysfunction [R26.9]  Referring practitioner: Nadira Fernandez DO  Date of Initial Visit: 2/3/2021  Today's Date: 2021  Patient seen for 4 sessions      Subjective:   Kiran Belcher reports:   Mechanism of injury: Patient has C6-7 spinal cord injury 12 years ago. Closed right distal fibula fracture sustained on 2020 walking into storage room with walker and fell in the floor. Has been using an aircast, they didn't want to do a hard boot because of difficulty walking. His physician discharged him in regard to his foot.     Feels like his hip flexors have gotten tight from sitting and legs have gotten weak. Has walked from bed to kitchen (about 40 feet). Spasms have gotten worse in leg and has increased Baclofen to address this.     Currently uses power chair most of the time but was previously using rolling walker inside of home.     Patient had to pause therapy due to UTI and then quarantined from Covid-19 exposure.    PMH is extensive and includes diabetes, kidney disease, hypertension, hyperlipidemia.     Clinical Progress: unchanged  Home Program Compliance: Yes, somewhat.   Treatment has included: therapeutic exercise, neuromuscular re-education, therapeutic activity and gait training      Objective          Active Range of Motion     Right Ankle/Foot   Plantar flexion: 35 degrees   Inversion: 9 degrees   Eversion: 13 degrees     Additional Active Range of Motion Details  R ankle DF 10 deg from neutral     Joint Play     Right Ankle/Foot  Hypomobile in the talocrural joint, subtalar joint, midfoot and forefoot.     Strength/Myotome Testing     Right Ankle/Foot   Dorsiflexion: 4-  Plantar flexion: 4-  Inversion: 4  Eversion: 4    Ambulation     Comments   Gait: able to walk 98 feet using rolling walker and CGA.     Functional Assessment     Comments    Sit<>stand  Cannot stand  from power chair without mod assist x 1.   5x sit<>stand: 35 seconds using UE on rolling walker from 22 inch surface (patient stand using 50/50 arms/legs)        Assessment/Plan   Patient is a 60 year old man with quadriplegia for 12 years and recently sustained right ankle fracture which has severely decreased ankle mobility, ability to stand from sitting, gait, and independence in home. Patient has returned to therapy following sickness and quarantine period. He is still appropriate for physical therapy to increased general strength, mobility, and safe ambulation.     Problem list:  1. Right ankle DF limitation  2. Hip flexor tightness  3. LE weakness  4. Gait instability   5. Difficulty standing from seated     Progress toward previous goals: Not Met    Previous Goals   Short Term Goals (3 weeks):  1. Patient will be independent with home exercise program.  2. Patient will demonstrate improved hip extension by 50%.   3. Patient will be able to stand from power chair independently and safely.     Long Term Goals (12 weeks):  1. Patient will be able to walk 150 feet using LRAD independently.   2. Patient will demonstrate right ankle strength and mobility of WFL compared to left side.   3. Patient will be able to return to full work/home duty using LRAD at previous level of independence.       Recommendations: Continue as planned  Timeframe: 3 months  Prognosis to achieve goals: fair    PT Signature: Apurva Delgado PT      Based upon review of the patient's progress and continued therapy plan, it is my medical opinion that Kiran Belcher should continue physical therapy treatment at [facility]  [address] [place] [address], [address] [place] [id number] [phone].    Signature: __________________________________  Nadira Fernandez,     Manual Therapy:         mins  34763;  Therapeutic Exercise:    15     mins  82816;     Neuromuscular Isabel:        mins  14819;    Therapeutic  Activity:     10     mins  06963;     Gait Training:      15     mins  88931;     Ultrasound:          mins  01368;    Electrical Stimulation:         mins  24049 ( );  Dry Needling          mins self-pay    Timed Treatment:   40   mins   Total Treatment:     63   mins

## 2021-02-11 DIAGNOSIS — I10 ESSENTIAL HYPERTENSION: ICD-10-CM

## 2021-02-11 RX ORDER — AMLODIPINE BESYLATE AND BENAZEPRIL HYDROCHLORIDE 10; 20 MG/1; MG/1
CAPSULE ORAL
Qty: 90 CAPSULE | Refills: 0 | Status: SHIPPED | OUTPATIENT
Start: 2021-02-11 | End: 2021-05-14

## 2021-02-17 ENCOUNTER — TELEPHONE (OUTPATIENT)
Dept: ORTHOPEDICS | Facility: OTHER | Age: 61
End: 2021-02-17

## 2021-02-24 ENCOUNTER — TREATMENT (OUTPATIENT)
Dept: PHYSICAL THERAPY | Facility: CLINIC | Age: 61
End: 2021-02-24

## 2021-02-24 DIAGNOSIS — R26.9 NEUROLOGIC GAIT DYSFUNCTION: Primary | ICD-10-CM

## 2021-02-24 DIAGNOSIS — Z87.828 HISTORY OF SPINAL CORD INJURY: ICD-10-CM

## 2021-02-24 DIAGNOSIS — S82.891A CLOSED FRACTURE OF RIGHT ANKLE, INITIAL ENCOUNTER: ICD-10-CM

## 2021-02-24 PROCEDURE — 97116 GAIT TRAINING THERAPY: CPT | Performed by: PHYSICAL THERAPIST

## 2021-02-24 PROCEDURE — 97530 THERAPEUTIC ACTIVITIES: CPT | Performed by: PHYSICAL THERAPIST

## 2021-02-24 PROCEDURE — 97110 THERAPEUTIC EXERCISES: CPT | Performed by: PHYSICAL THERAPIST

## 2021-03-01 ENCOUNTER — OFFICE VISIT (OUTPATIENT)
Dept: FAMILY MEDICINE CLINIC | Facility: CLINIC | Age: 61
End: 2021-03-01

## 2021-03-01 VITALS
RESPIRATION RATE: 20 BRPM | HEART RATE: 87 BPM | DIASTOLIC BLOOD PRESSURE: 84 MMHG | OXYGEN SATURATION: 97 % | TEMPERATURE: 97.7 F | SYSTOLIC BLOOD PRESSURE: 159 MMHG

## 2021-03-01 DIAGNOSIS — L23.9 ALLERGIC CONTACT DERMATITIS, UNSPECIFIED TRIGGER: Primary | ICD-10-CM

## 2021-03-01 PROCEDURE — 99213 OFFICE O/P EST LOW 20 MIN: CPT | Performed by: NURSE PRACTITIONER

## 2021-03-02 NOTE — PROGRESS NOTES
Follow Up Office Note     Patient Name: Kiran Belcher  : 1960   MRN: [id number]     Chief Complaint:    Chief Complaint   Patient presents with   • Lower Extremity Issue     started last wednesday       History of Present Illness: Kiran Belcher is a 60 y.o. male patient of Dr. Fernandez who presents today with c/o redness right lower leg x several days. He denies injury/trauma. He states that the area is not painful.  Mr. Belcher is quadriplegic.     Rash  This is a new problem. The current episode started in the past 7 days. The problem is unchanged. The affected locations include the right lower leg. The rash is characterized by redness and itchiness. He was exposed to nothing. Pertinent negatives include no fatigue, fever or shortness of breath. Past treatments include nothing. The treatment provided no relief.        Subjective      Review of Systems:   Review of Systems   Constitutional: Negative for activity change, appetite change, chills, diaphoresis, fatigue and fever.   Respiratory: Negative for chest tightness and shortness of breath.    Cardiovascular: Negative for chest pain and palpitations.   Gastrointestinal: Negative.    Skin: Positive for rash.        Past Medical History:   Past Medical History:   Diagnosis Date   • Cancer (CMS/HCC)     hx spot on right kidney, for ~8 years, told cancer but no hx biopsy and no interventions and just watching it. Dr. vega used to watch this cyst, then started with  urology a year ago after he retired.   • Decreased mobility    • Depression    • Diabetes mellitus (CMS/HCC)    • Esophageal reflux    • Glaucoma    • History of methicillin resistant Staphylococcus aureus infection    • History of obstructive sleep apnea    • History of quadriplegia     of unknown etiology - C5-C7, incomplete    • History of spinal cord injury/quadriplegia 2018   • Hyperlipidemia    • Hypertension    • Kidney disorder 2018   • Paraplegia (CMS/HCC)           Medications:     Current Outpatient Medications:   •  amLODIPine-benazepril (LOTREL) 10-20 MG per capsule, TAKE ONE CAPSULE BY MOUTH DAILY, Disp: 90 capsule, Rfl: 0  •  atorvastatin (LIPITOR) 10 MG tablet, Take 1 tablet by mouth Daily., Disp: 90 tablet, Rfl: 3  •  baclofen (LIORESAL) 20 MG tablet, Take 1 tablet by mouth 3 (Three) Times a Day., Disp: 270 tablet, Rfl: 1  •  citalopram (CeleXA) 20 MG tablet, Take 1 tablet by mouth Daily., Disp: 90 tablet, Rfl: 1  •  dantrolene (DANTRIUM) 100 MG capsule, Take 1 capsule by mouth 2 (Two) Times a Day., Disp: 180 capsule, Rfl: 1  •  esomeprazole (nexIUM) 40 MG capsule, TAKE ONE CAPSULE BY MOUTH EVERY MORNING BEFORE BREAKFAST, Disp: 90 capsule, Rfl: 0  •  glucose blood (Accu-Chek Elke Plus) test strip, 1 each by Other route 2 (Two) Times a Day. Use as instructed, Disp: 200 each, Rfl: 3  •  glyburide (DIAbeta) 5 MG tablet, TAKE ONE TABLET BY MOUTH TWICE A DAY WITH MEALS, Disp: 180 tablet, Rfl: 0  •  lactobacillus acidophilus (RISAQUAD) capsule capsule, Take 1 capsule by mouth Daily., Disp: 90 capsule, Rfl: 1  •  metFORMIN (GLUCOPHAGE) 500 MG tablet, Take 1 tablet by mouth 3 (Three) Times a Day., Disp: 270 tablet, Rfl: 1  •  TOVIAZ 8 MG tablet sustained-release 24 hour tablet, , Disp: , Rfl:   •  fluocinonide (LIDEX) 0.05 % cream, Apply  topically to the appropriate area as directed Every 12 (Twelve) Hours for 14 days. Apply to affected areas in thin layer BID, Disp: 30 g, Rfl: 0    Allergies:   Allergies   Allergen Reactions   • Levofloxacin Unknown (See Comments)     tendinopathy         Objective     Physical Exam:  Vital Signs:   Vitals:    03/01/21 1343   BP: 159/84   BP Location: Left arm   Patient Position: Sitting   Cuff Size: Adult   Pulse: 87   Resp: 20   Temp: 97.7 °F (36.5 °C)   SpO2: 97%   PainSc: 0-No pain     There is no height or weight on file to calculate BMI.     Physical Exam  Vitals signs and nursing note reviewed.   Constitutional:       General:  He is not in acute distress.     Appearance: Normal appearance. He is well-developed. He is not ill-appearing, toxic-appearing or diaphoretic.   HENT:      Head: Normocephalic and atraumatic.   Cardiovascular:      Rate and Rhythm: Normal rate and regular rhythm.      Heart sounds: Normal heart sounds, S1 normal and S2 normal. No murmur.   Pulmonary:      Effort: Pulmonary effort is normal. No tachypnea or respiratory distress.      Breath sounds: Normal breath sounds. No stridor. No decreased breath sounds or wheezing.   Musculoskeletal:      Right lower le+ Edema present.      Left lower le+ Edema present.      Comments: Patient has mild chronic non-pitting edema bilateral LE.   Skin:     General: Skin is warm and dry.      Findings: Rash present. Rash is urticarial.          Neurological:      General: No focal deficit present.      Mental Status: He is alert and oriented to person, place, and time.   Psychiatric:         Mood and Affect: Mood normal.         Behavior: Behavior normal. Behavior is cooperative.         Thought Content: Thought content normal.         Judgment: Judgment normal.         Assessment / Plan      Assessment/Plan:   Diagnoses and all orders for this visit:    1. Allergic contact dermatitis, unspecified trigger (Primary)  -     fluocinonide (LIDEX) 0.05 % cream; Apply  topically to the appropriate area as directed Every 12 (Twelve) Hours for 14 days. Apply to affected areas in thin layer BID  Dispense: 30 g; Refill: 0    *I consulted with Dr. Fernandez regarding patient rash and patient was examined by her. Dr. Fernandez recommended a trial of Lidex cream to affected area.      Follow Up:   PRN and at next scheduled appointment(s) with PCP Dr. Fernandez    Discussed the nature of the medical condition(s) risks, complications, implications, management, safe and proper use of medications. Encouraged medication compliance, and keeping scheduled follow up appointments with me and any other  providers.      RTC if symptoms fail to improve, to ER if symptoms worsen.      FARNAZ Jordan  [facility] [place]       Please note that portions of this note may have been completed with a voice recognition program. Efforts were made to edit the dictations, but occasionally words are mistranscribed.

## 2021-03-03 ENCOUNTER — TELEPHONE (OUTPATIENT)
Dept: FAMILY MEDICINE CLINIC | Facility: CLINIC | Age: 61
End: 2021-03-03

## 2021-03-03 ENCOUNTER — TREATMENT (OUTPATIENT)
Dept: PHYSICAL THERAPY | Facility: CLINIC | Age: 61
End: 2021-03-03

## 2021-03-03 DIAGNOSIS — S82.891A CLOSED FRACTURE OF RIGHT ANKLE, INITIAL ENCOUNTER: ICD-10-CM

## 2021-03-03 DIAGNOSIS — R26.9 NEUROLOGIC GAIT DYSFUNCTION: Primary | ICD-10-CM

## 2021-03-03 DIAGNOSIS — Z87.828 HISTORY OF SPINAL CORD INJURY: ICD-10-CM

## 2021-03-03 PROCEDURE — 97116 GAIT TRAINING THERAPY: CPT | Performed by: PHYSICAL THERAPIST

## 2021-03-03 PROCEDURE — 97110 THERAPEUTIC EXERCISES: CPT | Performed by: PHYSICAL THERAPIST

## 2021-03-03 PROCEDURE — 97530 THERAPEUTIC ACTIVITIES: CPT | Performed by: PHYSICAL THERAPIST

## 2021-03-03 NOTE — PATIENT INSTRUCTIONS
Contact Dermatitis  Dermatitis is redness, soreness, and swelling (inflammation) of the skin. Contact dermatitis is a reaction to certain substances that touch the skin. Many different substances can cause contact dermatitis. There are two types of contact dermatitis:  · Irritant contact dermatitis. This type is caused by something that irritates your skin, such as having dry hands from washing them too often with soap. This type does not require previous exposure to the substance for a reaction to occur. This is the most common type.  · Allergic contact dermatitis. This type is caused by a substance that you are allergic to, such as poison ivy. This type occurs when you have been exposed to the substance (allergen) and develop a sensitivity to it. Dermatitis may develop soon after your first exposure to the allergen, or it may not develop until the next time you are exposed and every time thereafter.  What are the causes?  Irritant contact dermatitis is most commonly caused by exposure to:  · Makeup.  · Soaps.  · Detergents.  · Bleaches.  · Acids.  · Metal salts, such as nickel.  Allergic contact dermatitis is most commonly caused by exposure to:  · Poisonous plants.  · Chemicals.  · Jewelry.  · Latex.  · Medicines.  · Preservatives in products, such as clothing.  What increases the risk?  You are more likely to develop this condition if you have:  · A job that exposes you to irritants or allergens.  · Certain medical conditions, such as asthma or eczema.  What are the signs or symptoms?  Symptoms of this condition may occur on your body anywhere the irritant has touched you or is touched by you.  · Symptoms include:  ? Dryness or flaking.  ? Redness.  ? Cracks.  ? Itching.  ? Pain or a burning feeling.  ? Blisters.  ? Drainage of small amounts of blood or clear fluid from skin cracks.  With allergic contact dermatitis, there may also be swelling in areas such as the eyelids, mouth, or genitals.  How is this  diagnosed?  This condition is diagnosed with a medical history and physical exam.  · A patch skin test may be performed to help determine the cause.  · If the condition is related to your job, you may need to see an occupational medicine specialist.  How is this treated?  This condition is treated by checking for the cause of the reaction and protecting your skin from further contact. Treatment may also include:  · Steroid creams or ointments. Oral steroid medicines may be needed in more severe cases.  · Antibiotic medicines or antibacterial ointments, if a skin infection is present.  · Antihistamine lotion or an antihistamine taken by mouth to ease itching.  · A bandage (dressing).  Follow these instructions at home:  Skin care  · Moisturize your skin as needed.  · Apply cool compresses to the affected areas.  · Try applying baking soda paste to your skin. Stir water into baking soda until it reaches a paste-like consistency.  · Do not scratch your skin, and avoid friction to the affected area.  · Avoid the use of soaps, perfumes, and dyes.  Medicines  · Take or apply over-the-counter and prescription medicines only as told by your health care provider.  · If you were prescribed an antibiotic medicine, take or apply the antibiotic as told by your health care provider. Do not stop using the antibiotic even if your condition improves.  Bathing  · Try taking a bath with:  ? Epsom salts. Follow the instructions on the packaging. You can get these at your local pharmacy or grocery store.  ? Baking soda. Pour a small amount into the bath as directed by your health care provider.  ? Colloidal oatmeal. Follow the instructions on the packaging. You can get this at your local pharmacy or grocery store.  · Bathe less frequently, such as every other day.  · Bathe in lukewarm water. Avoid using hot water.  Bandage care  · If you were given a bandage (dressing), change it as told by your health care provider.  · Wash your hands  with soap and water before and after you change your dressing. If soap and water are not available, use hand .  General instructions  · Avoid the substance that caused your reaction. If you do not know what caused it, keep a journal to try to track what caused it. Write down:  ? What you eat.  ? What cosmetic products you use.  ? What you drink.  ? What you wear in the affected area. This includes jewelry.  · Check the affected areas every day for signs of infection. Check for:  ? More redness, swelling, or pain.  ? More fluid or blood.  ? Warmth.  ? Pus or a bad smell.  · Keep all follow-up visits as told by your health care provider. This is important.  Contact a health care provider if:  · Your condition does not improve with treatment.  · Your condition gets worse.  · You have signs of infection such as swelling, tenderness, redness, soreness, or warmth in the affected area.  · You have a fever.  · You have new symptoms.  Get help right away if:  · You have a severe headache, neck pain, or neck stiffness.  · You vomit.  · You feel very sleepy.  · You notice red streaks coming from the affected area.  · Your bone or joint underneath the affected area becomes painful after the skin has healed.  · The affected area turns darker.  · You have difficulty breathing.  Summary  · Dermatitis is redness, soreness, and swelling (inflammation) of the skin. Contact dermatitis is a reaction to certain substances that touch the skin.  · Symptoms of this condition may occur on your body anywhere the irritant has touched you or is touched by you.  · This condition is treated by figuring out what caused the reaction and protecting your skin from further contact. Treatment may also include medicines and skin care.  · Avoid the substance that caused your reaction. If you do not know what caused it, keep a journal to try to track what caused it.  · Contact a health care provider if your condition gets worse or you have signs  of infection such as swelling, tenderness, redness, soreness, or warmth in the affected area.  This information is not intended to replace advice given to you by your health care provider. Make sure you discuss any questions you have with your health care provider.  Document Revised: 04/08/2020 Document Reviewed: 07/03/2019  Elsevier Patient Education © 2020 "[employer] Inc.  Fluocinonide skin cream, gel, ointment, or topical solution  What is this medicine?  FLUOCINONIDE (floo oh SIN oh lone) is a corticosteroid. It is used on the skin to reduce swelling, redness, itching, and allergic reactions.  This medicine may be used for other purposes; ask your health care provider or pharmacist if you have questions.  COMMON BRAND NAME(S): Fluovix, Fluovix Plus, Lidex, Lidex -E, Vanos  What should I tell my health care provider before I take this medicine?  They need to know if you have any of these conditions:  · any active infection  · diabetes  · large areas of burned or damaged skin  · skin wasting or thinning  · an unusual or allergic reaction to fluocinonide, corticosteroids, sulfites, other medicines, foods, dyes, or preservatives  · pregnant or trying to get pregnant  · breast-feeding  How should I use this medicine?  This medicine is for external use only. Do not take by mouth. Follow the directions on the prescription label. You should wear a glove when applying the medicine. Apply a thin film of medicine to the affected area as directed, and rub in gently. Do not use on healthy skin or over large areas of skin. Do not get this medicine in your eyes. If you do, rinse out with plenty of cool tap water. It is important not to use more medicine than prescribed. Do not use for more than 14 days.  Talk to your pediatrician regarding the use of this medicine in children. Special care may be needed. If applying this medicine to the diaper area of a child, do not cover with tight-fitting diapers or plastic pants. This may  increase the amount of medicine that passes through the skin and increase the risk of serious side effects.  Elderly patients are more likely to have damaged skin through aging, and this may increase side effects. This medicine should only be used for brief periods and infrequently in older patients.  Overdosage: If you think you have taken too much of this medicine contact a poison control center or emergency room at once.  NOTE: This medicine is only for you. Do not share this medicine with others.  What if I miss a dose?  If you miss a dose, use it as soon as you can. If it is almost time for your next dose, use only that dose. Do not use double or extra doses.  What may interact with this medicine?  Interactions are not expected. Do not use any other skin products without telling your doctor or health care professional.  This list may not describe all possible interactions. Give your health care provider a list of all the medicines, herbs, non-prescription drugs, or dietary supplements you use. Also tell them if you smoke, drink alcohol, or use illegal drugs. Some items may interact with your medicine.  What should I watch for while using this medicine?  Tell your doctor or health care professional if your symptoms do not start to get better within one week. Tell your doctor or health care professional if you are exposed to anyone with measles or chickenpox, or if you develop sores or blisters that do not heal properly.  What side effects may I notice from receiving this medicine?  Side effects that you should report to your doctor or health care professional as soon as possible:  · burning or itching of the skin  · dark red spots on the skin  · infection  · painful, red, pus filled blisters in hair follicles  · thinning of the skin, sunburn more likely especially on the face  Side effects that usually do not require medical attention (report to your doctor or health care professional if they continue or are  bothersome):  · dry skin, irritation  · unusual increased growth of hair on the face or body  This list may not describe all possible side effects. Call your doctor for medical advice about side effects. You may report side effects to FDA at 0-702-ZLC-3684.  Where should I keep my medicine?  Keep out of the reach of children.  Store at room temperature between 15 and 30 degrees C (59 and 86 degrees F). Avoid excessive heat above 30 degrees C (86 degrees F). Do not freeze. Throw away any unused medicine after the expiration date.  NOTE: This sheet is a summary. It may not cover all possible information. If you have questions about this medicine, talk to your doctor, pharmacist, or health care provider.  © 2021 Elsevier/Gold Standard (2009-04-22 16:59:47)

## 2021-03-03 NOTE — TELEPHONE ENCOUNTER
PT CALLED TO REQUEST A NEW CUSHION FOR HIS WHEEL CHAIR FOR POSITIONING.  FAX:[phone](CHADWICK REILLY)    PLEASE ADVISE.  CALL BACK:[phone]

## 2021-03-03 NOTE — PROGRESS NOTES
Visit #: 6    Subjective   Kiran Belcher reports: Hit his right foot on furniture and had a little pain but better now and not painful when walking.     Objective   66 ft x2 cues to not shuffle feet.     See Exercise, Manual, and Modality Logs for complete treatment.       Assessment/Plan  Patient requires extended rest breaks. Alternating SL balance during standing hip exercises without knee buckling, legs fatigue quickly. Active hip flexion limiting factor.   Progress per Plan of Care           Manual Therapy:         mins  12022;  Therapeutic Exercise:    20     mins  45571;     Neuromuscular Isabel:        mins  30275;    Therapeutic Activity:     10     mins  33932;     Gait Training:      15     mins  62243;     Ultrasound:          mins  38814;   Iontophoresis          mins  25918   Electrical Stimulation:         mins  01068 ( );  Dry Needling          mins self-pay  Fluidotherapy          mins 40018    Timed Treatment:   45   mins   Total Treatment:     45   mins    Apurva Delgado PT  Physical Therapist

## 2021-03-05 ENCOUNTER — TREATMENT (OUTPATIENT)
Dept: PHYSICAL THERAPY | Facility: CLINIC | Age: 61
End: 2021-03-05

## 2021-03-05 DIAGNOSIS — Z87.828 HISTORY OF SPINAL CORD INJURY: ICD-10-CM

## 2021-03-05 DIAGNOSIS — S82.891A CLOSED FRACTURE OF RIGHT ANKLE, INITIAL ENCOUNTER: ICD-10-CM

## 2021-03-05 DIAGNOSIS — R26.9 NEUROLOGIC GAIT DYSFUNCTION: Primary | ICD-10-CM

## 2021-03-05 PROCEDURE — 97110 THERAPEUTIC EXERCISES: CPT | Performed by: PHYSICAL THERAPIST

## 2021-03-05 PROCEDURE — 97116 GAIT TRAINING THERAPY: CPT | Performed by: PHYSICAL THERAPIST

## 2021-03-05 PROCEDURE — 97530 THERAPEUTIC ACTIVITIES: CPT | Performed by: PHYSICAL THERAPIST

## 2021-03-05 NOTE — PROGRESS NOTES
Visit #: 7    Subjective   Kiran Belcher reports: No soreness after last session     Objective   Gait:  84 ft x 2     Torso more erect in standing     Per patient: using legs>arms for sit<>stand from higher surface     See Exercise, Manual, and Modality Logs for complete treatment.       Assessment/Plan  Patient progressing with functional sit<>stand. Progress expected to be slow. Will continue progressing gait to reach home distances   Progress per Plan of Care           Manual Therapy:         mins  26674;  Therapeutic Exercise:    23     mins  31941;     Neuromuscular Isabel:        mins  87715;    Therapeutic Activity:     10     mins  61008;     Gait Training:      15     mins  10669;     Ultrasound:          mins  86087;   Iontophoresis          mins  44902   Electrical Stimulation:         mins  97279 ( );  Dry Needling         mins self-pay  Fluidotherapy          mins 51182    Timed Treatment:   48   mins   Total Treatment:     48   mins    Apurva Delgado PT  Physical Therapist

## 2021-03-08 ENCOUNTER — OFFICE VISIT (OUTPATIENT)
Dept: SLEEP MEDICINE | Facility: HOSPITAL | Age: 61
End: 2021-03-08

## 2021-03-08 VITALS
HEART RATE: 86 BPM | SYSTOLIC BLOOD PRESSURE: 157 MMHG | HEIGHT: 71 IN | RESPIRATION RATE: 20 BRPM | TEMPERATURE: 97.5 F | DIASTOLIC BLOOD PRESSURE: 78 MMHG | BODY MASS INDEX: 36.28 KG/M2 | OXYGEN SATURATION: 98 %

## 2021-03-08 DIAGNOSIS — G47.33 OSA (OBSTRUCTIVE SLEEP APNEA): Primary | ICD-10-CM

## 2021-03-08 PROCEDURE — 99212 OFFICE O/P EST SF 10 MIN: CPT | Performed by: NURSE PRACTITIONER

## 2021-03-08 NOTE — PROGRESS NOTES
Chief Complaint:   Chief Complaint   Patient presents with   • Sleep Apnea     FU       HPI:    Kiran Belcher is a 60 y.o. male here for follow-up of sleep apnea.  Patient was last seen 11/22/2019.  Patient states he continues to do well with CPAP therapy.  Patient is sleeping 8+ hours nightly and does feel refreshed upon awakening.  Patient will go to sleep within 1 hour and does not get up during the night.  Patient has an [employer] score of 3/24.  Patient has no concerns or complaints regarding CPAP use and wishes to continue therapy.      Past Medical History:   Diagnosis Date   • Cancer (CMS/HCC)     hx spot on right kidney, for ~8 years, told cancer but no hx biopsy and no interventions and just watching it. Dr. vega used to watch this cyst, then started with  urology a year ago after he retired.   • Decreased mobility    • Depression    • Diabetes mellitus (CMS/HCC)    • Esophageal reflux    • Glaucoma    • History of methicillin resistant Staphylococcus aureus infection    • History of obstructive sleep apnea    • History of quadriplegia     of unknown etiology - C5-C7, incomplete    • History of spinal cord injury/quadriplegia 2008 9/14/2018   • Hyperlipidemia    • Hypertension    • Kidney disorder 9/14/2018   • Paraplegia (CMS/HCC)        Current medications are:   Current Outpatient Medications:   •  amLODIPine-benazepril (LOTREL) 10-20 MG per capsule, TAKE ONE CAPSULE BY MOUTH DAILY, Disp: 90 capsule, Rfl: 0  •  atorvastatin (LIPITOR) 10 MG tablet, Take 1 tablet by mouth Daily., Disp: 90 tablet, Rfl: 3  •  baclofen (LIORESAL) 20 MG tablet, Take 1 tablet by mouth 3 (Three) Times a Day., Disp: 270 tablet, Rfl: 1  •  citalopram (CeleXA) 20 MG tablet, Take 1 tablet by mouth Daily., Disp: 90 tablet, Rfl: 1  •  dantrolene (DANTRIUM) 100 MG capsule, Take 1 capsule by mouth 2 (Two) Times a Day., Disp: 180 capsule, Rfl: 1  •  esomeprazole (nexIUM) 40 MG capsule, TAKE ONE CAPSULE BY MOUTH EVERY MORNING  BEFORE BREAKFAST, Disp: 90 capsule, Rfl: 0  •  fluocinonide (LIDEX) 0.05 % cream, Apply  topically to the appropriate area as directed Every 12 (Twelve) Hours for 14 days. Apply to affected areas in thin layer BID, Disp: 30 g, Rfl: 0  •  glucose blood (Accu-Chek Elke Plus) test strip, 1 each by Other route 2 (Two) Times a Day. Use as instructed, Disp: 200 each, Rfl: 3  •  glyburide (DIAbeta) 5 MG tablet, TAKE ONE TABLET BY MOUTH TWICE A DAY WITH MEALS, Disp: 180 tablet, Rfl: 0  •  lactobacillus acidophilus (RISAQUAD) capsule capsule, Take 1 capsule by mouth Daily., Disp: 90 capsule, Rfl: 1  •  metFORMIN (GLUCOPHAGE) 500 MG tablet, Take 1 tablet by mouth 3 (Three) Times a Day., Disp: 270 tablet, Rfl: 1  •  TOVIAZ 8 MG tablet sustained-release 24 hour tablet, , Disp: , Rfl: .      The patient's relevant past medical, surgical, family and social history were reviewed and updated in Epic as appropriate.       Review of Systems   Eyes: Positive for visual disturbance.   Respiratory: Positive for apnea.    Endocrine: Positive for polydipsia and polyuria.   Musculoskeletal: Positive for arthralgias, gait problem, joint swelling and myalgias.   Psychiatric/Behavioral: Positive for dysphoric mood and sleep disturbance. The patient is nervous/anxious.          Objective:    Physical Exam  Vitals reviewed.   Constitutional:       Appearance: Normal appearance. He is obese.   HENT:      Head: Normocephalic and atraumatic.      Mouth/Throat:      Mouth: Mucous membranes are moist.      Pharynx: Oropharynx is clear.      Comments: Mallampati 4 anatomy  Eyes:      Conjunctiva/sclera: Conjunctivae normal.      Pupils: Pupils are equal, round, and reactive to light.   Cardiovascular:      Rate and Rhythm: Normal rate and regular rhythm.      Heart sounds: Normal heart sounds.   Pulmonary:      Effort: Pulmonary effort is normal.      Breath sounds: Normal breath sounds.   Neurological:      Mental Status: He is alert and oriented  to person, place, and time.   Psychiatric:         Mood and Affect: Mood normal.         Behavior: Behavior normal.         Thought Content: Thought content normal.         Judgment: Judgment normal.       30/30 days of use  Greater than 4-hour use 100%  AHI 4.3  Setting 12 cm H2O    ASSESSMENT/PLAN    Diagnoses and all orders for this visit:    1. NISA (obstructive sleep apnea) (Primary)  -     CPAP Therapy            1. Counseled patient regarding multimodal approach with healthy nutrition, healthy sleep, regular physical activity, social activities, counseling, and medications. Encouraged to practice lateral sleep position. Avoid alcohol and sedatives close to bedtime.  2.   Refill supplies x1 year.  Return to clinic 1 year or sooner symptoms warrant.  I have reviewed the results of my evaluation and impression and discussed my recommendations in detail with the patient.      Signed by  Janna Kramer, FARNAZ    March 8, 2021      CC: Nadira Fernandez DO          No ref. provider found

## 2021-03-10 ENCOUNTER — TREATMENT (OUTPATIENT)
Dept: PHYSICAL THERAPY | Facility: CLINIC | Age: 61
End: 2021-03-10

## 2021-03-10 DIAGNOSIS — R26.9 NEUROLOGIC GAIT DYSFUNCTION: Primary | ICD-10-CM

## 2021-03-10 DIAGNOSIS — Z87.828 HISTORY OF SPINAL CORD INJURY: ICD-10-CM

## 2021-03-10 DIAGNOSIS — S82.891A CLOSED FRACTURE OF RIGHT ANKLE, INITIAL ENCOUNTER: ICD-10-CM

## 2021-03-10 PROCEDURE — 97530 THERAPEUTIC ACTIVITIES: CPT | Performed by: PHYSICAL THERAPIST

## 2021-03-10 PROCEDURE — 97116 GAIT TRAINING THERAPY: CPT | Performed by: PHYSICAL THERAPIST

## 2021-03-10 PROCEDURE — 97110 THERAPEUTIC EXERCISES: CPT | Performed by: PHYSICAL THERAPIST

## 2021-03-10 NOTE — PROGRESS NOTES
Visit #: 8    Subjective   Kiran Belcher reports: Able to walk from living room to dining room now.     Objective   Gait:   84 ft x2 CGA, rolling walker: speed improved.       See Exercise, Manual, and Modality Logs for complete treatment.       Assessment/Plan  Will continue working on gait endurance, speed has improved this week. Patient still requires significant UE use for sit<>stand.   Progress per Plan of Care           Manual Therapy:         mins  89017;  Therapeutic Exercise:    23     mins  20824;     Neuromuscular Isabel:        mins  25184;    Therapeutic Activity:     10     mins  29408;     Gait Training:      15     mins  97721;     Ultrasound:          mins  47179;   Iontophoresis          mins  74260   Electrical Stimulation:         mins  01632 ( );  Dry Needling         mins self-pay  Fluidotherapy          mins 59403    Timed Treatment:   48   mins   Total Treatment:     48   mins    Apurva Delgado, PT  Physical Therapist

## 2021-03-11 DIAGNOSIS — F32.0 CURRENT MILD EPISODE OF MAJOR DEPRESSIVE DISORDER WITHOUT PRIOR EPISODE (HCC): ICD-10-CM

## 2021-03-11 DIAGNOSIS — E11.9 TYPE 2 DIABETES MELLITUS WITHOUT COMPLICATION, WITHOUT LONG-TERM CURRENT USE OF INSULIN (HCC): ICD-10-CM

## 2021-03-11 RX ORDER — CITALOPRAM 20 MG/1
TABLET ORAL
Qty: 90 TABLET | Refills: 0 | Status: SHIPPED | OUTPATIENT
Start: 2021-03-11 | End: 2021-06-11

## 2021-03-17 ENCOUNTER — TREATMENT (OUTPATIENT)
Dept: PHYSICAL THERAPY | Facility: CLINIC | Age: 61
End: 2021-03-17

## 2021-03-17 DIAGNOSIS — R26.9 NEUROLOGIC GAIT DYSFUNCTION: Primary | ICD-10-CM

## 2021-03-17 PROCEDURE — 97530 THERAPEUTIC ACTIVITIES: CPT | Performed by: PHYSICAL THERAPIST

## 2021-03-17 PROCEDURE — 97112 NEUROMUSCULAR REEDUCATION: CPT | Performed by: PHYSICAL THERAPIST

## 2021-03-17 PROCEDURE — 97116 GAIT TRAINING THERAPY: CPT | Performed by: PHYSICAL THERAPIST

## 2021-03-17 NOTE — PROGRESS NOTES
Physical Therapy Daily Progress Note        Patient: Kiran Belcher   : 1960  Diagnosis/ICD-10 Code:  Neurologic gait dysfunction [R26.9]  Referring practitioner: Nadira Fernandez DO  Date of Initial Visit: Type: THERAPY  Noted: 2020  Today's Date: 3/17/2021  Patient seen for 9 sessions             Subjective   Kiran Belcher reports: Feels that he has improved but not back to where he was before the break in the ankle.     Objective   Mod A with leg exercises in supine.  See Exercise, Manual, and Modality Logs for complete treatment.       Assessment/Plan  Still needs cue for safety during sit <>stand transition. Pt was challenged with bed activities to strengthen legs and seated core stabilization exercises. Improved transfers in bed and chair post treatment showing good carryover from exercise.   Progress per Plan of Care and Progress strengthening /stabilization /functional activity           Timed:  Manual Therapy:         mins  41608;  Therapeutic Exercise:         mins  43137;     Neuromuscular Isabel:    30    mins  26719;    Therapeutic Activity:     15     mins  15115;     Gait Training:      15     mins  91631;     Ultrasound:          mins  36951;    Electrical Stimulation:         mins  62090;  Iontophoresis          mins  30814    Untimed:  Electrical Stimulation:         mins  04451 ( );  Mechanical Traction:         mins  11503;   Fluidotherapy          mins  61076    Timed Treatment:   60   mins   Total Treatment:     60   mins        Shayna Beltran PTA  Physical Therapist Assistant

## 2021-03-24 ENCOUNTER — TELEPHONE (OUTPATIENT)
Dept: PHYSICAL THERAPY | Facility: CLINIC | Age: 61
End: 2021-03-24

## 2021-03-31 ENCOUNTER — TREATMENT (OUTPATIENT)
Dept: PHYSICAL THERAPY | Facility: CLINIC | Age: 61
End: 2021-03-31

## 2021-03-31 DIAGNOSIS — Z87.828 HISTORY OF SPINAL CORD INJURY: ICD-10-CM

## 2021-03-31 DIAGNOSIS — R26.9 NEUROLOGIC GAIT DYSFUNCTION: Primary | ICD-10-CM

## 2021-03-31 DIAGNOSIS — S82.891A CLOSED FRACTURE OF RIGHT ANKLE, INITIAL ENCOUNTER: ICD-10-CM

## 2021-03-31 PROCEDURE — 97530 THERAPEUTIC ACTIVITIES: CPT | Performed by: PHYSICAL THERAPIST

## 2021-03-31 PROCEDURE — 97112 NEUROMUSCULAR REEDUCATION: CPT | Performed by: PHYSICAL THERAPIST

## 2021-03-31 PROCEDURE — 97116 GAIT TRAINING THERAPY: CPT | Performed by: PHYSICAL THERAPIST

## 2021-04-03 RX ORDER — ESOMEPRAZOLE MAGNESIUM 40 MG/1
40 CAPSULE, DELAYED RELEASE ORAL
Qty: 90 CAPSULE | Refills: 1 | Status: SHIPPED | OUTPATIENT
Start: 2021-04-03 | End: 2021-09-27

## 2021-04-12 ENCOUNTER — TELEPHONE (OUTPATIENT)
Dept: PHYSICAL THERAPY | Facility: CLINIC | Age: 61
End: 2021-04-12

## 2021-04-20 ENCOUNTER — TREATMENT (OUTPATIENT)
Dept: PHYSICAL THERAPY | Facility: CLINIC | Age: 61
End: 2021-04-20

## 2021-04-20 DIAGNOSIS — Z87.828 HISTORY OF SPINAL CORD INJURY: ICD-10-CM

## 2021-04-20 DIAGNOSIS — R26.9 NEUROLOGIC GAIT DYSFUNCTION: Primary | ICD-10-CM

## 2021-04-20 DIAGNOSIS — S82.891A CLOSED FRACTURE OF RIGHT ANKLE, INITIAL ENCOUNTER: ICD-10-CM

## 2021-04-20 PROCEDURE — 97112 NEUROMUSCULAR REEDUCATION: CPT | Performed by: PHYSICAL THERAPIST

## 2021-04-20 PROCEDURE — 97530 THERAPEUTIC ACTIVITIES: CPT | Performed by: PHYSICAL THERAPIST

## 2021-04-20 PROCEDURE — 97116 GAIT TRAINING THERAPY: CPT | Performed by: PHYSICAL THERAPIST

## 2021-04-20 NOTE — PROGRESS NOTES
Re-Assessment / Re-Certification        Patient: Kiran Belcher   : 1960  Diagnosis/ICD-10 Code:  Neurologic gait dysfunction [R26.9]  Referring practitioner: Nadira Fernandez DO  Date of Initial Visit: 2021  Today's Date: 2021  Patient seen for 11 sessions      Subjective:   Kiran Belcher reports: Can now walk through most of house using rolling walker. Feels like he is almost back to where he was before his foot fracture but not all the way. Moving on bed is still more difficult and he was using a Rolator before. Does do seated exercises at home.     Previous function: able to ambulate short distances around house using Rolator     Current: using rolling walker for short distances in house     Clinical Progress: improved  Home Program Compliance: Yes  Treatment has included: therapeutic exercise, neuromuscular re-education, manual therapy, therapeutic activity and gait training      Objective          Active Range of Motion   Left Ankle/Foot   Dorsiflexion (kf): 0 degrees     Right Ankle/Foot   Dorsiflexion (kf): 0 degrees     Passive Range of Motion   Left Ankle/Foot    Dorsiflexion (kf): 2 degrees     Right Ankle/Foot    Dorsiflexion (kf): 0 degrees      Additional Passive Range of Motion Details  Increased tone bilaterally today     Ambulation   Weight-Bearing Status   Weight-Bearing Status (Left): weight-bearing as tolerated   Weight-Bearing Status (Right): weight-bearing as tolerated    Distance in feet: 116  Assistive device used: front-wheeled walker    Additional Weight-Bearing Status Details  Uses walker in home for short distances and power chair mainly.     Ambulation: Level Surfaces   Ambulation with assistive device: independent  Ambulation without assistive device: unable    Ambulation: Stairs   Ascend stairs: unable  Descend stairs: unable  Curbs: unable    Observational Gait   Gait: asymmetric   Decreased walking speed and stride length.   Left foot contact pattern: foot  flat  Right foot contact pattern: foot flat    Quality of Movement During Gait   Trunk  Forward lean.     Hip    Hip (Left): Positive circumducted.   Hip (Right): Positive circumducted.     Functional Assessment     Comments  5x sit to stand: 36 seconds   Uses bilateral UE on power chair (improved from using walker), and momentum.   Improved core strength observed       Sit <> supine: independent    Rolling: SBA, req'd verbal cues to bend knees and push with legs to help facilitate roll. Difficulty shifting laterally shifting hips.       Assessment/Plan   Patient has made good progress but has not met goals returning to previous function. Therapy had to be delayed several times due to illness or weather. Patient is compliant but unsafe to practice using Rolator without skilled therapy at this time. He has not yet reached home ambulation distance (150 feet). Patient is appropriate to continue therapy to return to prior function safely.     Progress toward previous goals: Partially Met    Previous Goals   Short Term Goals (3 weeks):  1. Patient will be independent with home exercise program.  MET   2. Patient will demonstrate improved hip extension by 50%.  MET   3. Patient will be able to stand from power chair independently and safely. MET     Long Term Goals (12 weeks):  1. Patient will be able to walk 150 feet using LRAD independently. Not yet met, will cont   2. Patient will demonstrate right ankle strength and mobility of WFL compared to left side. Not yet Met, will cont   3. Patient will be able to return to full work/home duty using LRAD at previous level of independence. Not yet met, will cont       Recommendations: Continue as planned  Timeframe: 1 month  Prognosis to achieve goals: good    PT Signature: Apurva Delgado, PT      Based upon review of the patient's progress and continued therapy plan, it is my medical opinion that Kiran Belcher should continue physical therapy treatment at [facility] LARRY RUSSELL  CT  [facility]  [address] [place] [id number] [phone].    Signature: __________________________________  Nadira Fernandez DO    Manual Therapy:         mins  64488;  Therapeutic Exercise:         mins  86260;     Neuromuscular Isabel:    30    mins  69391;    Therapeutic Activity:     15     mins  28610;     Gait Training:      15     mins  82605;     Ultrasound:          mins  68346;    Electrical Stimulation:         mins  70878 ( );  Dry Needling          mins self-pay    Timed Treatment:   60   mins   Total Treatment:     60   mins

## 2021-04-28 ENCOUNTER — TELEPHONE (OUTPATIENT)
Dept: PHYSICAL THERAPY | Facility: CLINIC | Age: 61
End: 2021-04-28

## 2021-05-04 ENCOUNTER — TELEPHONE (OUTPATIENT)
Dept: PHYSICAL THERAPY | Facility: CLINIC | Age: 61
End: 2021-05-04

## 2021-05-14 DIAGNOSIS — I10 ESSENTIAL HYPERTENSION: ICD-10-CM

## 2021-05-14 RX ORDER — AMLODIPINE BESYLATE AND BENAZEPRIL HYDROCHLORIDE 10; 20 MG/1; MG/1
CAPSULE ORAL
Qty: 90 CAPSULE | Refills: 0 | Status: SHIPPED | OUTPATIENT
Start: 2021-05-14 | End: 2021-08-16

## 2021-05-18 ENCOUNTER — TELEPHONE (OUTPATIENT)
Dept: PHYSICAL THERAPY | Facility: CLINIC | Age: 61
End: 2021-05-18

## 2021-06-11 DIAGNOSIS — E11.9 TYPE 2 DIABETES MELLITUS WITHOUT COMPLICATION, WITHOUT LONG-TERM CURRENT USE OF INSULIN (HCC): ICD-10-CM

## 2021-06-11 DIAGNOSIS — F32.0 CURRENT MILD EPISODE OF MAJOR DEPRESSIVE DISORDER WITHOUT PRIOR EPISODE (HCC): ICD-10-CM

## 2021-06-11 RX ORDER — CITALOPRAM 20 MG/1
TABLET ORAL
Qty: 90 TABLET | Refills: 0 | Status: SHIPPED | OUTPATIENT
Start: 2021-06-11 | End: 2021-09-13

## 2021-06-18 ENCOUNTER — OFFICE VISIT (OUTPATIENT)
Dept: FAMILY MEDICINE CLINIC | Facility: CLINIC | Age: 61
End: 2021-06-18

## 2021-06-18 VITALS
WEIGHT: 270 LBS | HEART RATE: 80 BPM | RESPIRATION RATE: 18 BRPM | SYSTOLIC BLOOD PRESSURE: 140 MMHG | DIASTOLIC BLOOD PRESSURE: 90 MMHG | HEIGHT: 70 IN | OXYGEN SATURATION: 99 % | BODY MASS INDEX: 38.65 KG/M2 | TEMPERATURE: 97.7 F

## 2021-06-18 DIAGNOSIS — E11.9 TYPE 2 DIABETES MELLITUS WITHOUT COMPLICATION, WITHOUT LONG-TERM CURRENT USE OF INSULIN (HCC): ICD-10-CM

## 2021-06-18 DIAGNOSIS — S14.115A SPINAL CORD INJURY AT C5-C7 LEVEL WITH COMPLETE SPINAL CORD LESION (HCC): ICD-10-CM

## 2021-06-18 DIAGNOSIS — Z87.828 HX OF SPINAL CORD INJURY: Chronic | ICD-10-CM

## 2021-06-18 DIAGNOSIS — R26.89 DECREASED MOBILITY: ICD-10-CM

## 2021-06-18 DIAGNOSIS — I10 ESSENTIAL HYPERTENSION: ICD-10-CM

## 2021-06-18 DIAGNOSIS — Z00.00 MEDICARE ANNUAL WELLNESS VISIT, SUBSEQUENT: Primary | ICD-10-CM

## 2021-06-18 LAB
EXPIRATION DATE: NORMAL
HBA1C MFR BLD: 6.6 %
Lab: NORMAL

## 2021-06-18 PROCEDURE — 83036 HEMOGLOBIN GLYCOSYLATED A1C: CPT | Performed by: FAMILY MEDICINE

## 2021-06-18 PROCEDURE — G0439 PPPS, SUBSEQ VISIT: HCPCS | Performed by: FAMILY MEDICINE

## 2021-06-18 PROCEDURE — 1159F MED LIST DOCD IN RCRD: CPT | Performed by: FAMILY MEDICINE

## 2021-06-18 PROCEDURE — 96160 PT-FOCUSED HLTH RISK ASSMT: CPT | Performed by: FAMILY MEDICINE

## 2021-06-18 PROCEDURE — 1170F FXNL STATUS ASSESSED: CPT | Performed by: FAMILY MEDICINE

## 2021-06-18 PROCEDURE — 3044F HG A1C LEVEL LT 7.0%: CPT | Performed by: FAMILY MEDICINE

## 2021-06-18 RX ORDER — DANTROLENE SODIUM 100 MG/1
100 CAPSULE ORAL 2 TIMES DAILY
Qty: 180 CAPSULE | Refills: 1 | Status: SHIPPED | OUTPATIENT
Start: 2021-06-18 | End: 2021-12-27

## 2021-06-18 RX ORDER — FEXOFENADINE HCL 180 MG/1
180 TABLET ORAL DAILY
COMMUNITY

## 2021-06-18 NOTE — PATIENT INSTRUCTIONS
Advance Care Planning and Advance Directives     You make decisions on a daily basis - decisions about where you want to live, your career, your home, your life. Perhaps one of the most important decisions you face is your choice for future medical care. Take time to talk with your family and your healthcare team and start planning today.  Advance Care Planning is a process that can help you:  · Understand possible future healthcare decisions in light of your own experiences  · Reflect on those decision in light of your goals and values  · Discuss your decisions with those closest to you and the healthcare professionals that care for you  · Make a plan by creating a document that reflects your wishes    Surrogate Decision Maker  In the event of a medical emergency, which has left you unable to communicate or to make your own decisions, you would need someone to make decisions for you.  It is important to discuss your preferences for medical treatment with this person while you are in good health.     Qualities of a surrogate decision maker:  • Willing to take on this role and responsibility  • Knows what you want for future medical care  • Willing to follow your wishes even if they don't agree with them  • Able to make difficult medical decisions under stressful circumstances    Advance Directives  These are legal documents you can create that will guide your healthcare team and decision maker(s) when needed. These documents can be stored in the electronic medical record.    · Living Will - a legal document to guide your care if you have a terminal condition or a serious illness and are unable to communicate. States vary by statute in document names/types, but most forms may include one or more of the following:        -  Directions regarding life-prolonging treatments        -  Directions regarding artificially provided nutrition/hydration        -  Choosing a healthcare decision maker        -  Direction  regarding organ/tissue donation    · Durable Power of  for Healthcare - this document names an -in-fact to make medical decisions for you, but it may also allow this person to make personal and financial decisions for you. Please seek the advice of an  if you need this type of document.    **Advance Directives are not required and no one may discriminate against you if you do not sign one.    Medical Orders  Many states allow specific forms/orders signed by your physician to record your wishes for medical treatment in your current state of health. This form, signed in personal communication with your physician, addresses resuscitation and other medical interventions that you may or may not want.      For more information or to schedule a time with a [facility] Advance Care Planning Facilitator contact: [email or web address] or call [phone] and someone will contact you directly.

## 2021-06-18 NOTE — PROGRESS NOTES
The ABCs of the Annual Wellness Visit  Subsequent Medicare Wellness Visit    Chief Complaint   Patient presents with   • Medicare Wellness-subsequent       Subjective   History of Present Illness:  Kiran Belcher is a 60 y.o. male who presents for a Subsequent Medicare Wellness Visit.    HEALTH RISK ASSESSMENT    Recent Hospitalizations:  No hospitalization(s) within the last year.    Current Medical Providers:  Patient Care Team:  Nadira Fernandez DO as PCP - General  Bam Alfaro MD (Urology)    Smoking Status:  Social History     Tobacco Use   Smoking Status Never Smoker   Smokeless Tobacco Never Used       Alcohol Consumption:  Social History     Substance and Sexual Activity   Alcohol Use No       Depression Screen:   PHQ-2/PHQ-9 Depression Screening 6/18/2021   Little interest or pleasure in doing things 0   Feeling down, depressed, or hopeless 0   Trouble falling or staying asleep, or sleeping too much 0   Feeling tired or having little energy 0   Poor appetite or overeating 0   Feeling bad about yourself - or that you are a failure or have let yourself or your family down 0   Trouble concentrating on things, such as reading the newspaper or watching television 0   Moving or speaking so slowly that other people could have noticed. Or the opposite - being so fidgety or restless that you have been moving around a lot more than usual 0   Thoughts that you would be better off dead, or of hurting yourself in some way 0   Total Score 0   If you checked off any problems, how difficult have these problems made it for you to do your work, take care of things at home, or get along with other people? Not difficult at all       Fall Risk Screen:  STEADI Fall Risk Assessment was completed, and patient is at HIGH risk for falls. Assessment completed on:6/18/2021    Health Habits and Functional and Cognitive Screening:  Functional & Cognitive Status 6/18/2021   Do you have difficulty preparing food and eating? No   Do  you have difficulty bathing yourself, getting dressed or grooming yourself? No   Do you have difficulty using the toilet? No   Do you have difficulty moving around from place to place? No   Do you have trouble with steps or getting out of a bed or a chair? No   Current Diet Well Balanced Diet   Dental Exam Up to date   Eye Exam Up to date   Exercise (times per week) 3 times per week   Current Exercises Include Walking   Current Exercise Activities Include -   Do you need help using the phone?  No   Are you deaf or do you have serious difficulty hearing?  No   Do you need help with transportation? No   Do you need help shopping? No   Do you need help preparing meals?  No   Do you need help with housework?  No   Do you need help with laundry? No   Do you need help taking your medications? No   Do you need help managing money? No   Do you ever drive or ride in a car without wearing a seat belt? No   Have you felt unusual stress, anger or loneliness in the last month? No   Who do you live with? Spouse   If you need help, do you have trouble finding someone available to you? No   Have you been bothered in the last four weeks by sexual problems? No   Do you have difficulty concentrating, remembering or making decisions? No         Does the patient have evidence of cognitive impairment? No    Asprin use counseling:Does not need ASA (and currently is not on it)    Age-appropriate Screening Schedule:  Refer to the list below for future screening recommendations based on patient's age, sex and/or medical conditions. Orders for these recommended tests are listed in the plan section. The patient has been provided with a written plan.    Health Maintenance   Topic Date Due   • DIABETIC EYE EXAM  01/04/2018   • URINE MICROALBUMIN  07/24/2018   • DIABETIC FOOT EXAM  01/29/2019   • INFLUENZA VACCINE  08/01/2021   • LIPID PANEL  12/16/2021   • HEMOGLOBIN A1C  12/18/2021   • TDAP/TD VACCINES (2 - Td or Tdap) 07/01/2026   • ZOSTER  VACCINE  Discontinued          The following portions of the patient's history were reviewed and updated as appropriate: allergies, current medications, past family history, past medical history, past social history, past surgical history and problem list.    Outpatient Medications Prior to Visit   Medication Sig Dispense Refill   • amLODIPine-benazepril (LOTREL) 10-20 MG per capsule TAKE ONE CAPSULE BY MOUTH DAILY 90 capsule 0   • atorvastatin (LIPITOR) 10 MG tablet Take 1 tablet by mouth Daily. 90 tablet 3   • baclofen (LIORESAL) 20 MG tablet Take 1 tablet by mouth 3 (Three) Times a Day. 270 tablet 1   • citalopram (CeleXA) 20 MG tablet TAKE ONE TABLET BY MOUTH DAILY 90 tablet 0   • esomeprazole (nexIUM) 40 MG capsule Take 1 capsule by mouth Every Morning Before Breakfast. 90 capsule 1   • fexofenadine (ALLEGRA) 180 MG tablet Take 180 mg by mouth Daily.     • glucose blood (Accu-Chek Elke Plus) test strip 1 each by Other route 2 (Two) Times a Day. Use as instructed 200 each 3   • glyburide (DIAbeta) 5 MG tablet TAKE ONE TABLET BY MOUTH TWICE A DAY WITH MEALS 180 tablet 0   • lactobacillus acidophilus (RISAQUAD) capsule capsule Take 1 capsule by mouth Daily. 90 capsule 1   • metFORMIN (GLUCOPHAGE) 500 MG tablet TAKE ONE TABLET BY MOUTH THREE TIMES A  tablet 0   • TOVIAZ 8 MG tablet sustained-release 24 hour tablet      • dantrolene (DANTRIUM) 100 MG capsule Take 1 capsule by mouth 2 (Two) Times a Day. 180 capsule 1     No facility-administered medications prior to visit.       Patient Active Problem List   Diagnosis   • Benign essential hypertension   • Depression   • Gastroesophageal reflux disease without esophagitis   • Hyperlipidemia   • NISA on CPAP   • Diabetes mellitus, type II (CMS/HCC)   • Spasm   • Obesity   • Right otitis externa   • Hx of spinal cord injury   • Kidney disorder   • Lower extremity weakness   • Myalgia   • Moderate episode of recurrent major depressive disorder (CMS/HCC)   •  "Dysphagia   • Toenail avulsion   • H/O kidney removal   • Renal cell carcinoma of right kidney (CMS/HCC)       Advanced Care Planning:  ACP discussion was held with the patient during this visit. Patient does not have an advance directive, information provided.    Review of Systems   Constitutional: Negative.  Negative for activity change, fatigue, fever and unexpected weight change.   HENT: Negative.  Negative for congestion, sneezing and sore throat.    Eyes: Negative.  Negative for visual disturbance.   Respiratory: Negative.  Negative for cough, chest tightness, shortness of breath and wheezing.    Cardiovascular: Negative.  Negative for chest pain, palpitations and leg swelling.   Gastrointestinal: Negative.  Negative for abdominal distention, abdominal pain, blood in stool, constipation, diarrhea and nausea.   Endocrine: Negative.  Negative for cold intolerance and heat intolerance.   Genitourinary: Negative.  Negative for dysuria, frequency and urgency.   Musculoskeletal: Negative.  Negative for arthralgias and myalgias.   Skin: Negative.  Negative for rash.   Allergic/Immunologic: Negative.    Neurological: Negative.  Negative for dizziness, syncope and numbness.   Hematological: Negative.  Negative for adenopathy.   Psychiatric/Behavioral: Negative.  Negative for suicidal ideas. The patient is not nervous/anxious.        Compared to one year ago, the patient feels his physical health is the same.  Compared to one year ago, the patient feels his mental health is the same.    Reviewed chart for potential of high risk medication in the elderly: yes  Reviewed chart for potential of harmful drug interactions in the elderly:yes    Objective         Vitals:    06/18/21 1355   BP: 140/90   BP Location: Right arm   Patient Position: Sitting   Cuff Size: Large Adult   Pulse: 80   Resp: 18   Temp: 97.7 °F (36.5 °C)   TempSrc: Temporal   SpO2: 99%   Weight: 122 kg (270 lb)   Height: 177.8 cm (70\")   PainSc: 0-No pain "       Body mass index is 38.74 kg/m².  Discussed the patient's BMI with him. The BMI is above average; BMI management plan is completed.    Physical Exam  Vitals and nursing note reviewed.   Constitutional:       General: He is not in acute distress.     Appearance: He is well-developed. He is not diaphoretic.   HENT:      Right Ear: External ear normal.      Left Ear: External ear normal.      Nose: Nose normal.   Eyes:      Conjunctiva/sclera: Conjunctivae normal.      Pupils: Pupils are equal, round, and reactive to light.   Neck:      Thyroid: No thyromegaly.   Cardiovascular:      Rate and Rhythm: Normal rate and regular rhythm.      Heart sounds: Normal heart sounds. No murmur heard.     Pulmonary:      Effort: Pulmonary effort is normal. No respiratory distress.      Breath sounds: Normal breath sounds. No wheezing.   Abdominal:      General: Bowel sounds are normal. There is no distension.      Palpations: Abdomen is soft. There is no mass.      Tenderness: There is no abdominal tenderness. There is no guarding or rebound.      Hernia: No hernia is present.   Musculoskeletal:         General: No tenderness. Normal range of motion.      Cervical back: Normal range of motion and neck supple.   Lymphadenopathy:      Cervical: No cervical adenopathy.   Skin:     General: Skin is warm and dry.      Coloration: Skin is not pale.      Findings: No erythema or rash.   Neurological:      Mental Status: He is alert and oriented to person, place, and time.      Deep Tendon Reflexes: Reflexes are normal and symmetric.   Psychiatric:         Behavior: Behavior normal.         Thought Content: Thought content normal.         Judgment: Judgment normal.         Lab Results   Component Value Date    HGBA1C 6.6 06/18/2021        Assessment/Plan   Medicare Risks and Personalized Health Plan  CMS Preventative Services Quick Reference  Cardiovascular risk  Diabetic Lab Screening     The above risks/problems have been discussed  with the patient.  Pertinent information has been shared with the patient in the After Visit Summary.  Follow up plans and orders are seen below in the Assessment/Plan Section.    Diagnoses and all orders for this visit:    1. Medicare annual wellness visit, subsequent (Primary)    2. Type 2 diabetes mellitus without complication, without long-term current use of insulin (CMS/[facility])  -     POC Glycosylated Hemoglobin (Hb A1C)    3. Essential hypertension  -     CBC & Differential  -     Comprehensive Metabolic Panel  -     Lipid Panel  -     TSH    4. Spinal cord injury at C5-C7 level with complete spinal cord lesion (CMS/[facility])  -     dantrolene (DANTRIUM) 100 MG capsule; Take 1 capsule by mouth 2 (Two) Times a Day.  Dispense: 180 capsule; Refill: 1    5. Hx of spinal cord injury    6. Decreased mobility    Patient is here for health maintenance visit.  Discussed diet and adequate exercise.  Screening lab work discussed.  Immunizations reviewed.  Advice and education regarding nutrition, exercise, dental evaluations, routine eye examinations, reproductive health, cardiovascular risk reduction, sunscreen use, self skin examination, and seatbelt use was given today.  Annual wellness evaluations recommended.    Follow Up:  Return in about 4 months (around 10/18/2021).     An After Visit Summary and PPPS were given to the patient.

## 2021-06-20 LAB
ALBUMIN SERPL-MCNC: 4.1 G/DL (ref 3.8–4.9)
ALBUMIN/GLOB SERPL: 1.2 {RATIO} (ref 1.2–2.2)
ALP SERPL-CCNC: 105 IU/L (ref 48–121)
ALT SERPL-CCNC: 23 IU/L (ref 0–44)
AST SERPL-CCNC: 20 IU/L (ref 0–40)
BASOPHILS # BLD AUTO: 0 X10E3/UL (ref 0–0.2)
BASOPHILS NFR BLD AUTO: 0 %
BILIRUB SERPL-MCNC: 0.4 MG/DL (ref 0–1.2)
BUN SERPL-MCNC: 12 MG/DL (ref 6–24)
BUN/CREAT SERPL: 11 (ref 9–20)
CALCIUM SERPL-MCNC: 9.3 MG/DL (ref 8.7–10.2)
CHLORIDE SERPL-SCNC: 104 MMOL/L (ref 96–106)
CHOLEST SERPL-MCNC: 165 MG/DL (ref 100–199)
CO2 SERPL-SCNC: 22 MMOL/L (ref 20–29)
CREAT SERPL-MCNC: 1.08 MG/DL (ref 0.76–1.27)
EOSINOPHIL # BLD AUTO: 0.3 X10E3/UL (ref 0–0.4)
EOSINOPHIL NFR BLD AUTO: 3 %
ERYTHROCYTE [DISTWIDTH] IN BLOOD BY AUTOMATED COUNT: 14.3 % (ref 11.6–15.4)
GLOBULIN SER CALC-MCNC: 3.3 G/DL (ref 1.5–4.5)
GLUCOSE SERPL-MCNC: 115 MG/DL (ref 65–99)
HCT VFR BLD AUTO: 36.1 % (ref 37.5–51)
HDLC SERPL-MCNC: 29 MG/DL
HGB BLD-MCNC: 11.5 G/DL (ref 13–17.7)
IMM GRANULOCYTES # BLD AUTO: 0 X10E3/UL (ref 0–0.1)
IMM GRANULOCYTES NFR BLD AUTO: 0 %
LDLC SERPL CALC-MCNC: 100 MG/DL (ref 0–99)
LYMPHOCYTES # BLD AUTO: 1.5 X10E3/UL (ref 0.7–3.1)
LYMPHOCYTES NFR BLD AUTO: 15 %
MCH RBC QN AUTO: 28.1 PG (ref 26.6–33)
MCHC RBC AUTO-ENTMCNC: 31.9 G/DL (ref 31.5–35.7)
MCV RBC AUTO: 88 FL (ref 79–97)
MONOCYTES # BLD AUTO: 0.6 X10E3/UL (ref 0.1–0.9)
MONOCYTES NFR BLD AUTO: 6 %
NEUTROPHILS # BLD AUTO: 7.6 X10E3/UL (ref 1.4–7)
NEUTROPHILS NFR BLD AUTO: 76 %
PLATELET # BLD AUTO: 372 X10E3/UL (ref 150–450)
POTASSIUM SERPL-SCNC: 4.4 MMOL/L (ref 3.5–5.2)
PROT SERPL-MCNC: 7.4 G/DL (ref 6–8.5)
RBC # BLD AUTO: 4.09 X10E6/UL (ref 4.14–5.8)
SODIUM SERPL-SCNC: 144 MMOL/L (ref 134–144)
TRIGL SERPL-MCNC: 209 MG/DL (ref 0–149)
TSH SERPL DL<=0.005 MIU/L-ACNC: 2.25 UIU/ML (ref 0.45–4.5)
VLDLC SERPL CALC-MCNC: 36 MG/DL (ref 5–40)
WBC # BLD AUTO: 10 X10E3/UL (ref 3.4–10.8)

## 2021-07-05 DIAGNOSIS — S14.115A SPINAL CORD INJURY AT C5-C7 LEVEL WITH COMPLETE SPINAL CORD LESION (HCC): ICD-10-CM

## 2021-07-06 RX ORDER — BACLOFEN 20 MG/1
TABLET ORAL
Qty: 270 TABLET | Refills: 0 | Status: SHIPPED | OUTPATIENT
Start: 2021-07-06 | End: 2021-09-27

## 2021-07-06 NOTE — TELEPHONE ENCOUNTER
PATIENT CALLED TO CHECK ON THE STATUS OF HIS MEDICATION REFILL REQUEST. PATIENT IS COMPLETELY OUT OF THIS MEDICATION AND NEEDS IT ASAP. PLEASE ADVISE AND CALL PATIENT   [phone]    baclofen (LIORESAL) 20 MG tablet    [facility] [address] [place] [address] - [phone]  - [phone]   [phone]

## 2021-08-02 DIAGNOSIS — E11.9 TYPE 2 DIABETES MELLITUS WITHOUT COMPLICATION, WITHOUT LONG-TERM CURRENT USE OF INSULIN (HCC): ICD-10-CM

## 2021-08-02 RX ORDER — GLYBURIDE 5 MG/1
TABLET ORAL
Qty: 180 TABLET | Refills: 0 | Status: SHIPPED | OUTPATIENT
Start: 2021-08-02 | End: 2022-01-07

## 2021-08-16 DIAGNOSIS — I10 ESSENTIAL HYPERTENSION: ICD-10-CM

## 2021-08-16 RX ORDER — AMLODIPINE BESYLATE AND BENAZEPRIL HYDROCHLORIDE 10; 20 MG/1; MG/1
CAPSULE ORAL
Qty: 90 CAPSULE | Refills: 0 | Status: SHIPPED | OUTPATIENT
Start: 2021-08-16 | End: 2021-11-15

## 2021-08-23 DIAGNOSIS — E78.2 MIXED HYPERLIPIDEMIA: ICD-10-CM

## 2021-08-23 RX ORDER — ATORVASTATIN CALCIUM 10 MG/1
TABLET, FILM COATED ORAL
Qty: 90 TABLET | Refills: 0 | Status: SHIPPED | OUTPATIENT
Start: 2021-08-23 | End: 2021-11-22

## 2021-09-02 ENCOUNTER — TELEPHONE (OUTPATIENT)
Dept: FAMILY MEDICINE CLINIC | Facility: CLINIC | Age: 61
End: 2021-09-02

## 2021-09-13 DIAGNOSIS — F32.0 CURRENT MILD EPISODE OF MAJOR DEPRESSIVE DISORDER WITHOUT PRIOR EPISODE (HCC): ICD-10-CM

## 2021-09-13 DIAGNOSIS — E11.9 TYPE 2 DIABETES MELLITUS WITHOUT COMPLICATION, WITHOUT LONG-TERM CURRENT USE OF INSULIN (HCC): ICD-10-CM

## 2021-09-13 RX ORDER — CITALOPRAM 20 MG/1
TABLET ORAL
Qty: 90 TABLET | Refills: 0 | Status: SHIPPED | OUTPATIENT
Start: 2021-09-13 | End: 2021-12-21

## 2021-09-27 DIAGNOSIS — S14.115A SPINAL CORD INJURY AT C5-C7 LEVEL WITH COMPLETE SPINAL CORD LESION (HCC): ICD-10-CM

## 2021-09-27 RX ORDER — ESOMEPRAZOLE MAGNESIUM 40 MG/1
CAPSULE, DELAYED RELEASE ORAL
Qty: 90 CAPSULE | Refills: 1 | Status: SHIPPED | OUTPATIENT
Start: 2021-09-27 | End: 2022-03-28 | Stop reason: SDUPTHER

## 2021-09-27 RX ORDER — BACLOFEN 20 MG/1
TABLET ORAL
Qty: 270 TABLET | Refills: 0 | Status: SHIPPED | OUTPATIENT
Start: 2021-09-27 | End: 2021-12-27

## 2021-10-11 ENCOUNTER — TELEPHONE (OUTPATIENT)
Dept: FAMILY MEDICINE CLINIC | Facility: CLINIC | Age: 61
End: 2021-10-11

## 2021-10-20 ENCOUNTER — OFFICE VISIT (OUTPATIENT)
Dept: FAMILY MEDICINE CLINIC | Facility: CLINIC | Age: 61
End: 2021-10-20

## 2021-10-20 VITALS
HEART RATE: 87 BPM | RESPIRATION RATE: 20 BRPM | TEMPERATURE: 97.6 F | SYSTOLIC BLOOD PRESSURE: 122 MMHG | BODY MASS INDEX: 38.65 KG/M2 | WEIGHT: 270 LBS | DIASTOLIC BLOOD PRESSURE: 70 MMHG | HEIGHT: 70 IN | OXYGEN SATURATION: 97 %

## 2021-10-20 DIAGNOSIS — I10 ESSENTIAL HYPERTENSION: Chronic | ICD-10-CM

## 2021-10-20 DIAGNOSIS — E78.2 MIXED HYPERLIPIDEMIA: Chronic | ICD-10-CM

## 2021-10-20 DIAGNOSIS — Z23 NEED FOR VACCINATION: ICD-10-CM

## 2021-10-20 DIAGNOSIS — E11.9 TYPE 2 DIABETES MELLITUS WITHOUT COMPLICATION, WITHOUT LONG-TERM CURRENT USE OF INSULIN (HCC): Primary | Chronic | ICD-10-CM

## 2021-10-20 PROCEDURE — 3044F HG A1C LEVEL LT 7.0%: CPT | Performed by: FAMILY MEDICINE

## 2021-10-20 PROCEDURE — 90686 IIV4 VACC NO PRSV 0.5 ML IM: CPT | Performed by: FAMILY MEDICINE

## 2021-10-20 PROCEDURE — G0008 ADMIN INFLUENZA VIRUS VAC: HCPCS | Performed by: FAMILY MEDICINE

## 2021-10-20 PROCEDURE — 83036 HEMOGLOBIN GLYCOSYLATED A1C: CPT | Performed by: FAMILY MEDICINE

## 2021-10-20 PROCEDURE — 99213 OFFICE O/P EST LOW 20 MIN: CPT | Performed by: FAMILY MEDICINE

## 2021-10-20 NOTE — PROGRESS NOTES
Chief Complaint  Hypertension and Diabetes    Subjective          Kiran Belcher presents to [facility] for       Hypertension  This is a chronic problem. The current episode started more than 1 year ago. The problem is unchanged. The problem is controlled. Pertinent negatives include no anxiety, blurred vision, chest pain, malaise/fatigue, neck pain, palpitations, peripheral edema, PND or shortness of breath. There are no associated agents to hypertension. Risk factors for coronary artery disease include diabetes mellitus, dyslipidemia, family history, obesity, male gender and sedentary lifestyle. Past treatments include ACE inhibitors and calcium channel blockers. Current antihypertension treatment includes ACE inhibitors and calcium channel blockers. The current treatment provides significant improvement. There are no compliance problems.  Hypertensive end-organ damage includes kidney disease. Identifiable causes of hypertension include chronic renal disease.   Diabetes  He presents for his follow-up (Taking Metformin and Glyburide) diabetic visit. He has type 2 diabetes mellitus. His disease course has been stable. There are no hypoglycemic associated symptoms. Pertinent negatives for hypoglycemia include no dizziness, mood changes or nervousness/anxiousness. Pertinent negatives for diabetes include no blurred vision, no chest pain, no fatigue, no foot paresthesias, no polydipsia, no polyphagia, no polyuria, no visual change, no weakness and no weight loss. There are no hypoglycemic complications. Symptoms are stable. There are no diabetic complications. Risk factors for coronary artery disease include diabetes mellitus, dyslipidemia, obesity, male sex, hypertension and family history. Current diabetic treatment includes oral agent (dual therapy). He is compliant with treatment all of the time. His weight is stable. He is following a generally healthy diet. When asked about  "meal planning, he reported none. He has not had a previous visit with a dietitian. He participates in exercise intermittently. There is no change in his home blood glucose trend. An ACE inhibitor/angiotensin II receptor blocker is being taken. He does not see a podiatrist.Eye exam is not current.   Hyperlipidemia  This is a chronic problem. The current episode started more than 1 year ago. The problem is controlled. Recent lipid tests were reviewed and are normal. Exacerbating diseases include chronic renal disease, diabetes and obesity. There are no known factors aggravating his hyperlipidemia. Pertinent negatives include no chest pain, focal weakness, leg pain, myalgias or shortness of breath. Current antihyperlipidemic treatment includes statins. The current treatment provides significant improvement of lipids. There are no compliance problems.  Risk factors for coronary artery disease include diabetes mellitus, dyslipidemia, family history, obesity, male sex and hypertension.       Objective   Vital Signs:   /70   Pulse 87   Temp 97.6 °F (36.4 °C) (Infrared)   Resp 20   Ht 177.8 cm (70\")   Wt 122 kg (270 lb)   SpO2 97%   BMI 38.74 kg/m²       Review of Systems   Constitutional: Negative for fatigue, fever, malaise/fatigue, unexpected weight change and weight loss.   Eyes: Negative for blurred vision.   Respiratory: Negative for shortness of breath.    Cardiovascular: Negative for chest pain, palpitations and PND.   Endocrine: Negative for polydipsia, polyphagia and polyuria.   Musculoskeletal: Negative for myalgias and neck pain.   Neurological: Negative for dizziness, focal weakness and weakness.   Psychiatric/Behavioral: The patient is not nervous/anxious.      Physical Exam  Vitals and nursing note reviewed.   Constitutional:       General: He is not in acute distress.     Appearance: He is well-developed. He is not diaphoretic.   HENT:      Right Ear: External ear normal.      Left Ear: External " ear normal.      Nose: Nose normal.   Eyes:      Conjunctiva/sclera: Conjunctivae normal.      Pupils: Pupils are equal, round, and reactive to light.   Neck:      Thyroid: No thyromegaly.   Cardiovascular:      Rate and Rhythm: Normal rate and regular rhythm.      Heart sounds: Normal heart sounds. No murmur heard.      Pulmonary:      Effort: Pulmonary effort is normal. No respiratory distress.      Breath sounds: Normal breath sounds. No wheezing.   Abdominal:      General: Bowel sounds are normal. There is no distension.      Palpations: Abdomen is soft. There is no mass.      Tenderness: There is no abdominal tenderness. There is no guarding or rebound.      Hernia: No hernia is present.   Musculoskeletal:         General: No tenderness. Normal range of motion.      Cervical back: Normal range of motion and neck supple.   Lymphadenopathy:      Cervical: No cervical adenopathy.   Skin:     General: Skin is warm and dry.      Coloration: Skin is not pale.      Findings: No erythema or rash.   Neurological:      Mental Status: He is alert and oriented to person, place, and time.      Deep Tendon Reflexes: Reflexes are normal and symmetric.   Psychiatric:         Behavior: Behavior normal.         Thought Content: Thought content normal.         Judgment: Judgment normal.        Result Review :   The following data was reviewed by: Nadira Fernandez DO on 10/20/2021:  Most Recent A1C    HGBA1C Most Recent 10/21/21   Hemoglobin A1C 7.1           A1C Last 3 Results    HGBA1C Last 3 Results 12/16/20 6/18/21 10/21/21   Hemoglobin A1C 6.70 (A) 6.6 7.1   (A) Abnormal value       Comments are available for some flowsheets but are not being displayed.         HBAIC 7.1            Assessment and Plan    Problem List Items Addressed This Visit        Cardiac and Vasculature    Hyperlipidemia       Endocrine and Metabolic    Diabetes mellitus, type II (HCC) - Primary (Chronic)    Relevant Orders    POC Glycosylated Hemoglobin  (Hb A1C) (Completed)      Other Visit Diagnoses     Essential hypertension  (Chronic)       Need for vaccination        Relevant Orders    FluLaval/Fluarix/Fluzone >6 Months ([id number]) (Completed)      Continue current meds    Follow Up   Return in about 3 months (around 1/20/2022).  Patient was given instructions and counseling regarding his condition or for health maintenance advice. Please see specific information pulled into the AVS if appropriate.

## 2021-10-21 LAB
EXPIRATION DATE: NORMAL
HBA1C MFR BLD: 7.1 %
Lab: NORMAL

## 2021-10-22 ENCOUNTER — TELEPHONE (OUTPATIENT)
Dept: FAMILY MEDICINE CLINIC | Facility: CLINIC | Age: 61
End: 2021-10-22

## 2021-10-22 NOTE — TELEPHONE ENCOUNTER
Caller: CAROLINE (PATIENT AIDE)    Relationship:     Best call back number: [phone]    What is the best time to reach you: ANYTIME (LEAVE MESSAGE)     Who are you requesting to speak with (clinical staff, provider,  specific staff member): TUAN SARABIA      What was the call regarding: CAROLINE CALLED FROM PATIENT AIDE  ABOUT A POWER WHEEL CHAIR ORDER THAT WAS FAXED OVER ON 0/5/2021. SHE IS WILL BE FAXING IT OVER AGAIN ON 10/22/202    Do you require a callback: YES

## 2021-10-25 ENCOUNTER — OFFICE VISIT (OUTPATIENT)
Dept: FAMILY MEDICINE CLINIC | Facility: CLINIC | Age: 61
End: 2021-10-25

## 2021-10-25 VITALS
HEIGHT: 70 IN | OXYGEN SATURATION: 97 % | TEMPERATURE: 97.6 F | SYSTOLIC BLOOD PRESSURE: 142 MMHG | WEIGHT: 270 LBS | HEART RATE: 93 BPM | RESPIRATION RATE: 18 BRPM | DIASTOLIC BLOOD PRESSURE: 80 MMHG | BODY MASS INDEX: 38.65 KG/M2

## 2021-10-25 DIAGNOSIS — K59.00 CONSTIPATION, UNSPECIFIED CONSTIPATION TYPE: Primary | ICD-10-CM

## 2021-10-25 DIAGNOSIS — Z12.11 COLON CANCER SCREENING: ICD-10-CM

## 2021-10-25 PROCEDURE — 99213 OFFICE O/P EST LOW 20 MIN: CPT | Performed by: FAMILY MEDICINE

## 2021-10-25 NOTE — PROGRESS NOTES
"Chief Complaint  Constipation    Subjective          Kiran Belcher presents to [facility] for   Has had trouble having a bowel movement for 1 week.   Takes probiotic, stool softener. Took exlax and dulcolax  Pt usually has regular bowel movements.  Has never had a colonoscopy.  Constipation  This is a new problem. The current episode started in the past 7 days. The problem is unchanged. The patient is not on a high fiber diet. He does not exercise regularly. There has not been adequate water intake. Associated symptoms include bloating. Pertinent negatives include no abdominal pain, fever, nausea or vomiting. Risk factors include immobility and obesity. The treatment provided no relief. His past medical history is significant for abdominal surgery.       Objective   Vital Signs:   /80   Pulse 93   Temp 97.6 °F (36.4 °C) (Infrared)   Resp 18   Ht 177.8 cm (70\")   Wt 122 kg (270 lb)   SpO2 97%   BMI 38.74 kg/m²       Review of Systems   Constitutional: Negative for fatigue, fever and unexpected weight change.   Respiratory: Negative for cough, chest tightness and wheezing.    Cardiovascular: Negative for chest pain, palpitations and leg swelling.   Gastrointestinal: Positive for abdominal distention, bloating and constipation. Negative for abdominal pain, nausea and vomiting.     Physical Exam  Vitals and nursing note reviewed.   Constitutional:       General: He is not in acute distress.     Appearance: He is well-developed. He is not diaphoretic.   HENT:      Right Ear: External ear normal.      Left Ear: External ear normal.      Nose: Nose normal.   Eyes:      Conjunctiva/sclera: Conjunctivae normal.      Pupils: Pupils are equal, round, and reactive to light.   Neck:      Thyroid: No thyromegaly.   Cardiovascular:      Rate and Rhythm: Normal rate and regular rhythm.      Heart sounds: Normal heart sounds. No murmur heard.      Pulmonary:      Effort: Pulmonary " effort is normal. No respiratory distress.      Breath sounds: Normal breath sounds. No wheezing.   Abdominal:      General: Bowel sounds are normal. There is distension.      Palpations: Abdomen is soft. There is no mass.      Tenderness: There is no abdominal tenderness. There is no guarding or rebound.      Hernia: No hernia is present.   Musculoskeletal:         General: No tenderness. Normal range of motion.      Cervical back: Normal range of motion and neck supple.   Lymphadenopathy:      Cervical: No cervical adenopathy.   Skin:     General: Skin is warm and dry.      Coloration: Skin is not pale.      Findings: No erythema or rash.   Neurological:      Mental Status: He is alert and oriented to person, place, and time.      Deep Tendon Reflexes: Reflexes are normal and symmetric.   Psychiatric:         Behavior: Behavior normal.         Thought Content: Thought content normal.         Judgment: Judgment normal.        Result Review :                 Assessment and Plan    Problem List Items Addressed This Visit     None      Visit Diagnoses     Constipation, unspecified constipation type    -  Primary    Colon cancer screening        Relevant Orders    Ambulatory Referral For Screening Colonoscopy      Rec Magnesium and Mirilax  RTC or ER if worsening      Follow Up   No follow-ups on file.  Patient was given instructions and counseling regarding his condition or for health maintenance advice. Please see specific information pulled into the AVS if appropriate.

## 2021-11-08 ENCOUNTER — IMMUNIZATION (OUTPATIENT)
Dept: FAMILY MEDICINE CLINIC | Facility: CLINIC | Age: 61
End: 2021-11-08

## 2021-11-08 DIAGNOSIS — Z23 NEED FOR COVID-19 VACCINE: Primary | ICD-10-CM

## 2021-11-08 PROCEDURE — 91300 COVID-19 (PFIZER): CPT | Performed by: NURSE PRACTITIONER

## 2021-11-08 PROCEDURE — 0003A COVID-19 (PFIZER): CPT | Performed by: NURSE PRACTITIONER

## 2021-11-15 DIAGNOSIS — I10 ESSENTIAL HYPERTENSION: ICD-10-CM

## 2021-11-15 RX ORDER — AMLODIPINE BESYLATE AND BENAZEPRIL HYDROCHLORIDE 10; 20 MG/1; MG/1
CAPSULE ORAL
Qty: 90 CAPSULE | Refills: 0 | Status: SHIPPED | OUTPATIENT
Start: 2021-11-15 | End: 2022-02-18 | Stop reason: SDUPTHER

## 2021-11-22 DIAGNOSIS — E78.2 MIXED HYPERLIPIDEMIA: ICD-10-CM

## 2021-11-22 RX ORDER — ATORVASTATIN CALCIUM 10 MG/1
TABLET, FILM COATED ORAL
Qty: 90 TABLET | Refills: 0 | Status: SHIPPED | OUTPATIENT
Start: 2021-11-22 | End: 2022-03-08 | Stop reason: SDUPTHER

## 2021-11-23 ENCOUNTER — TELEPHONE (OUTPATIENT)
Dept: FAMILY MEDICINE CLINIC | Facility: CLINIC | Age: 61
End: 2021-11-23

## 2021-11-23 NOTE — TELEPHONE ENCOUNTER
PHONE CALL FROM PATIENT AIDS.  THEY NEED ADDITIONAL DIAGNOSIS CODES ON HIS FORMS FOR HIS POWER WHEELCHAIR.     PLEASE CALL CAROLINE @ [phone]

## 2021-12-21 DIAGNOSIS — F32.0 CURRENT MILD EPISODE OF MAJOR DEPRESSIVE DISORDER WITHOUT PRIOR EPISODE (HCC): ICD-10-CM

## 2021-12-21 DIAGNOSIS — E11.9 TYPE 2 DIABETES MELLITUS WITHOUT COMPLICATION, WITHOUT LONG-TERM CURRENT USE OF INSULIN (HCC): ICD-10-CM

## 2021-12-21 RX ORDER — CITALOPRAM 20 MG/1
TABLET ORAL
Qty: 90 TABLET | Refills: 0 | Status: SHIPPED | OUTPATIENT
Start: 2021-12-21 | End: 2022-03-28 | Stop reason: SDUPTHER

## 2021-12-27 DIAGNOSIS — S14.115A SPINAL CORD INJURY AT C5-C7 LEVEL WITH COMPLETE SPINAL CORD LESION (HCC): ICD-10-CM

## 2021-12-27 RX ORDER — BACLOFEN 20 MG/1
TABLET ORAL
Qty: 270 TABLET | Refills: 0 | Status: SHIPPED | OUTPATIENT
Start: 2021-12-27 | End: 2022-02-18

## 2021-12-27 RX ORDER — DANTROLENE SODIUM 100 MG/1
CAPSULE ORAL
Qty: 180 CAPSULE | Refills: 1 | Status: SHIPPED | OUTPATIENT
Start: 2021-12-27 | End: 2022-06-30 | Stop reason: SDUPTHER

## 2022-01-06 ENCOUNTER — TELEPHONE (OUTPATIENT)
Dept: FAMILY MEDICINE CLINIC | Facility: CLINIC | Age: 62
End: 2022-01-06

## 2022-01-06 NOTE — TELEPHONE ENCOUNTER
Patient is having really high bp readings at home he does take amlodipine 10-20 mg cap but his bp is still running 175/102 194/102 and 194/99

## 2022-01-06 NOTE — TELEPHONE ENCOUNTER
Spoke to Dr Cameron regarding BP and he stated pt would need to be seen asap preferrably tomorrow. I informed pt of this and informed pt that he needs to monitor any severe headache, chest pain, sob, or vision changes he will need to go to the ER before. Pt voiced understanding.     I am sending this to you because I have no clue about tomorrows scheduling. If we open in the morning maybe we can just contact him in the AM to try to get him on a schedule?

## 2022-01-07 ENCOUNTER — OFFICE VISIT (OUTPATIENT)
Dept: FAMILY MEDICINE CLINIC | Facility: CLINIC | Age: 62
End: 2022-01-07

## 2022-01-07 ENCOUNTER — TELEPHONE (OUTPATIENT)
Dept: FAMILY MEDICINE CLINIC | Facility: CLINIC | Age: 62
End: 2022-01-07

## 2022-01-07 ENCOUNTER — LAB (OUTPATIENT)
Dept: LAB | Facility: HOSPITAL | Age: 62
End: 2022-01-07

## 2022-01-07 VITALS
BODY MASS INDEX: 38.37 KG/M2 | HEIGHT: 70 IN | OXYGEN SATURATION: 98 % | HEART RATE: 104 BPM | SYSTOLIC BLOOD PRESSURE: 176 MMHG | DIASTOLIC BLOOD PRESSURE: 98 MMHG | RESPIRATION RATE: 18 BRPM | TEMPERATURE: 99.1 F | WEIGHT: 268 LBS

## 2022-01-07 DIAGNOSIS — I10 BENIGN ESSENTIAL HYPERTENSION: ICD-10-CM

## 2022-01-07 DIAGNOSIS — Z99.89 OSA ON CPAP: Chronic | ICD-10-CM

## 2022-01-07 DIAGNOSIS — Z90.5 H/O KIDNEY REMOVAL: ICD-10-CM

## 2022-01-07 DIAGNOSIS — I10 BENIGN ESSENTIAL HYPERTENSION: Primary | ICD-10-CM

## 2022-01-07 DIAGNOSIS — G47.33 OSA ON CPAP: Chronic | ICD-10-CM

## 2022-01-07 DIAGNOSIS — E11.9 TYPE 2 DIABETES MELLITUS WITHOUT COMPLICATION, UNSPECIFIED WHETHER LONG TERM INSULIN USE: Chronic | ICD-10-CM

## 2022-01-07 PROCEDURE — 99214 OFFICE O/P EST MOD 30 MIN: CPT | Performed by: STUDENT IN AN ORGANIZED HEALTH CARE EDUCATION/TRAINING PROGRAM

## 2022-01-07 PROCEDURE — 36415 COLL VENOUS BLD VENIPUNCTURE: CPT

## 2022-01-07 PROCEDURE — 80053 COMPREHEN METABOLIC PANEL: CPT | Performed by: STUDENT IN AN ORGANIZED HEALTH CARE EDUCATION/TRAINING PROGRAM

## 2022-01-07 RX ORDER — KETOCONAZOLE 20 MG/G
CREAM TOPICAL
COMMUNITY
Start: 2021-12-06 | End: 2022-01-07

## 2022-01-07 RX ORDER — GLYBURIDE 5 MG/1
5 TABLET ORAL
Qty: 90 TABLET | Refills: 3 | Status: SHIPPED | OUTPATIENT
Start: 2022-01-07 | End: 2023-02-27 | Stop reason: SDUPTHER

## 2022-01-07 RX ORDER — SULFAMETHOXAZOLE AND TRIMETHOPRIM 800; 160 MG/1; MG/1
TABLET ORAL
COMMUNITY
End: 2022-01-07

## 2022-01-07 RX ORDER — KETOCONAZOLE 20 MG/G
CREAM TOPICAL
COMMUNITY
Start: 2021-12-06 | End: 2022-02-18

## 2022-01-07 RX ORDER — CARVEDILOL 6.25 MG/1
6.25 TABLET ORAL 2 TIMES DAILY WITH MEALS
Qty: 60 TABLET | Refills: 1 | Status: SHIPPED | OUTPATIENT
Start: 2022-01-07 | End: 2022-01-12

## 2022-01-07 RX ORDER — INDAPAMIDE 1.25 MG/1
1.25 TABLET, FILM COATED ORAL EVERY MORNING
Qty: 30 TABLET | Refills: 1 | Status: SHIPPED | OUTPATIENT
Start: 2022-01-07 | End: 2022-01-07

## 2022-01-07 NOTE — PATIENT INSTRUCTIONS
Reach out to sleep medicine doctor to reassess CPAP machine.     Start indapamide.    If worsening headache, shortness or breath or chest pain or weakness or numbness seek care.     Call in two weeks with blood pressure log.

## 2022-01-07 NOTE — TELEPHONE ENCOUNTER
I called patient let him know I sent in carvedilol instead of indapamide given his solitary left kidney.    Chay Cameron MD  Family Medicine - [facility]

## 2022-01-07 NOTE — PROGRESS NOTES
Established Patient Office Visit      Subjective      Chief Complaint:  Hypertension (hypertension since 1/5/22, headache)      History of Present Illness: Kiran Belcher is a 61 y.o. male who presents for elevated home blood pressure readings over the past week. Max blood pressure has been about 190 systolic. About 100 systolic. In the past week he has had a mild headache. He denies any change in vision new weakness or numbness. He denies chest pain or shortness of breath. He denies change in urination. Patient is taking medication as prescribed. Patient has very mild current headache.    Patient's history includes:  Patient with right renal cancer in 2020 with right nephrectomy.   In 2007 patient had cervical spine injury in 2007 with lower ext weakness.     Past Medical History:   Diagnosis Date   • Cancer (HCC)     hx spot on right kidney, for ~8 years, told cancer but no hx biopsy and no interventions and just watching it. Dr. vega used to watch this cyst, then started with  urology a year ago after he retired.   • Decreased mobility    • Depression    • Diabetes mellitus ([facility])    • Esophageal reflux    • Glaucoma    • History of methicillin resistant Staphylococcus aureus infection    • History of obstructive sleep apnea    • History of quadriplegia     of unknown etiology - C5-C7, incomplete    • History of spinal cord injury/quadriplegia 2008 09/14/2018   • Hyperlipidemia    • Hypertension    • Kidney disorder 9/14/2018   • Paraplegia (HCC)        Patient Active Problem List   Diagnosis   • Benign essential hypertension   • Depression   • Gastroesophageal reflux disease without esophagitis   • Hyperlipidemia   • NISA on CPAP   • Diabetes mellitus, type II ([facility])   • Spasm   • Obesity   • Right otitis externa   • Hx of spinal cord injury   • Kidney disorder   • Lower extremity weakness   • Myalgia   • Moderate episode of recurrent major depressive disorder ([facility])   • Dysphagia   • Toenail avulsion   •  "H/O kidney removal   • Renal cell carcinoma of right kidney (HCC)         Current Outpatient Medications:   •  amLODIPine-benazepril (LOTREL) 10-20 MG per capsule, TAKE ONE CAPSULE BY MOUTH DAILY, Disp: 90 capsule, Rfl: 0  •  atorvastatin (LIPITOR) 10 MG tablet, TAKE ONE TABLET BY MOUTH DAILY, Disp: 90 tablet, Rfl: 0  •  baclofen (LIORESAL) 20 MG tablet, TAKE ONE TABLET BY MOUTH THREE TIMES A DAY, Disp: 270 tablet, Rfl: 0  •  citalopram (CeleXA) 20 MG tablet, TAKE ONE TABLET BY MOUTH DAILY, Disp: 90 tablet, Rfl: 0  •  dantrolene (DANTRIUM) 100 MG capsule, TAKE ONE CAPSULE BY MOUTH TWICE A DAY, Disp: 180 capsule, Rfl: 1  •  esomeprazole (nexIUM) 40 MG capsule, TAKE ONE CAPSULE BY MOUTH EVERY MORNING BEFORE BREAKFAST, Disp: 90 capsule, Rfl: 1  •  fexofenadine (ALLEGRA) 180 MG tablet, Take 180 mg by mouth Daily., Disp: , Rfl:   •  fluocinonide (LIDEX) 0.05 % cream, fluocinonide 0.05 % topical cream, Disp: , Rfl:   •  lactobacillus acidophilus (RISAQUAD) capsule capsule, Take 1 capsule by mouth Daily., Disp: 90 capsule, Rfl: 1  •  metFORMIN (GLUCOPHAGE) 500 MG tablet, TAKE ONE TABLET BY MOUTH THREE TIMES A DAY, Disp: 270 tablet, Rfl: 0  •  TOVIAZ 8 MG tablet sustained-release 24 hour tablet, , Disp: , Rfl:   •  carvedilol (COREG) 6.25 MG tablet, Take 1 tablet by mouth 2 (Two) Times a Day With Meals., Disp: 60 tablet, Rfl: 1  •  glucose blood (Accu-Chek Elke Plus) test strip, 1 each by Other route 2 (Two) Times a Day. Use as instructed, Disp: 200 each, Rfl: 3  •  glyburide (DIAbeta) 5 MG tablet, Take 1 tablet by mouth Daily With Breakfast., Disp: 90 tablet, Rfl: 3  •  ketoconazole (NIZORAL) 2 % cream, ketoconazole 2 % topical cream  APPLY TO THE AFFECTED AREA(S) BY TOPICAL ROUTE ONCE DAILY, Disp: , Rfl:       Objective     Physical Exam:   Vital Signs:   /98 (BP Location: Left arm, Patient Position: Sitting, Cuff Size: Adult)   Pulse 104   Temp 99.1 °F (37.3 °C) (Temporal)   Resp 18   Ht 177.8 cm (70\")   Wt " 122 kg (268 lb)   SpO2 98%   BMI 38.45 kg/m²      Physical Exam  Constitutional:       General: He is not in acute distress. Patient is in electronic wheelchair     Appearance: He is not ill-appearing.   Cardiovascular:      Rate and Rhythm: Normal rate and regular rhythm. Trace edema to the lower extremities bilaterally.  Pulmonary:      Effort: Pulmonary effort is normal.      Breath sounds: Normal breath sounds.   Neurological:      Mental Status: He is alert. Cranial nerves II through XII are intact. Bilateral weakness to lower extremities. Sensation intact to bilateral upper extremities. Strength preserved to upper extremities.  Psychiatric:         Thought Content: Thought content normal.          Assessment / Plan      Assessment/Plan:   Diagnoses and all orders for this visit:    1. Benign essential hypertension        -     Chronic, not controlled, worsening but unsure trigger  -     Comprehensive Metabolic Panel        -     Start carvedilol (COREG) 6.25 MG tablet; Take 1 tablet by mouth 2 (Two) Times a Day With Meals.  Dispense: 60 tablet; Refill: 1         -    Continue benazepril 20 mg. amlodipine 10 mg    2. H/O kidney removal         -     Status post nephrectomy to the right side in 2020 secondary renal cell carcinoma. We will be cautious with diuretics in the future for blood pressure control    3. Type 2 diabetes mellitus without complication, unspecified whether long term insulin use (HCC)         -     Continue metformin 500 100 mg 3 times daily as previously prescribed.         -     Continue glyburide 5 mg daily, recommend he take with food          -     Room to adjust if next A1c is not controlled last A1c 7.1    4. NISA on CPAP         -No recent titration recommended follow-up sleep medicine given increased blood pressures    Other orders  -     glyburide (DIAbeta) 5 MG tablet; Take 1 tablet by mouth Daily With Breakfast.  Dispense: 90 tablet; Refill: 3  -     Discontinue: indapamide  (LOZOL) 1.25 MG tablet; Take 1 tablet by mouth Every Morning.  Dispense: 30 tablet; Refill: 1      Follow Up:   Return for Keep follow-up.      Chay Cameron MD  Family Medicine - [facility]

## 2022-01-08 LAB
ALBUMIN SERPL-MCNC: 4.3 G/DL (ref 3.5–5.2)
ALBUMIN/GLOB SERPL: 1.3 G/DL
ALP SERPL-CCNC: 114 U/L (ref 39–117)
ALT SERPL W P-5'-P-CCNC: 33 U/L (ref 1–41)
ANION GAP SERPL CALCULATED.3IONS-SCNC: 11.4 MMOL/L (ref 5–15)
AST SERPL-CCNC: 26 U/L (ref 1–40)
BILIRUB SERPL-MCNC: 0.2 MG/DL (ref 0–1.2)
BUN SERPL-MCNC: 13 MG/DL (ref 6–20)
BUN/CREAT SERPL: 14.4 (ref 7–25)
CALCIUM SPEC-SCNC: 9.7 MG/DL (ref 8.6–10.5)
CHLORIDE SERPL-SCNC: 99 MMOL/L (ref 98–107)
CO2 SERPL-SCNC: 26.6 MMOL/L (ref 22–29)
CREAT SERPL-MCNC: 0.9 MG/DL (ref 0.76–1.27)
GFR SERPL CREATININE-BSD FRML MDRD: 86 ML/MIN/1.73
GLOBULIN UR ELPH-MCNC: 3.2 GM/DL
GLUCOSE SERPL-MCNC: 176 MG/DL (ref 65–99)
POTASSIUM SERPL-SCNC: 4.1 MMOL/L (ref 3.5–5.2)
PROT SERPL-MCNC: 7.5 G/DL (ref 6–8.5)
SODIUM SERPL-SCNC: 137 MMOL/L (ref 136–145)

## 2022-01-12 ENCOUNTER — TELEPHONE (OUTPATIENT)
Dept: FAMILY MEDICINE CLINIC | Facility: CLINIC | Age: 62
End: 2022-01-12

## 2022-01-12 DIAGNOSIS — I10 BENIGN ESSENTIAL HYPERTENSION: ICD-10-CM

## 2022-01-12 RX ORDER — CARVEDILOL 6.25 MG/1
12.5 TABLET ORAL 2 TIMES DAILY WITH MEALS
Qty: 60 TABLET | Refills: 1 | Status: SHIPPED | OUTPATIENT
Start: 2022-01-12 | End: 2022-01-25

## 2022-01-12 NOTE — TELEPHONE ENCOUNTER
Caller: Kiran Belcher    Relationship: Self    Best call back number: [phone]    What is the best time to reach you: NA    Who are you requesting to speak with (clinical staff, provider,  specific staff member): NA    Do you know the name of the person who called: NA    What was the call regarding:     PATIENT STATED HE IS STILL GETTING HIGH READINGS    BLOOD PRESSURE NUMBERS:    1/8:  1PM 180/100, 930PM 180/95, 1130PM 175/95    1/9:  930AM 172/101, 430PM 157/97, 12AM 139/82    1/10:  845AM 165/82, 115PM 162/98,  9PM 151/90    1/11 2AM 183/101, 11AM 168/86, 1PM 178/100, 230PM 152/90, 530PM 163/95, 2AM 174/104    1/12: 930AM 160/82        Do you require a callback: NA

## 2022-01-25 DIAGNOSIS — I10 BENIGN ESSENTIAL HYPERTENSION: ICD-10-CM

## 2022-01-25 RX ORDER — CARVEDILOL 12.5 MG/1
12.5 TABLET ORAL 2 TIMES DAILY WITH MEALS
Qty: 60 TABLET | Refills: 1 | Status: SHIPPED | OUTPATIENT
Start: 2022-01-25 | End: 2022-03-28 | Stop reason: SDUPTHER

## 2022-01-25 RX ORDER — CARVEDILOL 6.25 MG/1
12.5 TABLET ORAL 2 TIMES DAILY WITH MEALS
Qty: 60 TABLET | Refills: 1 | Status: CANCELLED | OUTPATIENT
Start: 2022-01-25

## 2022-01-25 NOTE — TELEPHONE ENCOUNTER
I called patient and gave him instruction to take one 12.5 in the morning now and one 12.5 in the evening.

## 2022-01-25 NOTE — TELEPHONE ENCOUNTER
Caller: Kiran Belcher    Relationship: Self    Best call back number: [phone]     Requested Prescriptions:   Requested Prescriptions     Pending Prescriptions Disp Refills   • carvedilol (COREG) 6.25 MG tablet 60 tablet 1     Sig: Take 2 tablets by mouth 2 (Two) Times a Day With Meals.        Pharmacy where request should be sent: [id number] [phone] [facility] [phone] [facility]     Additional details provided by patient: PATIENT CALLED IN STATED WHERE THE BLOOD PRESSURE MEDICATION WAS INCREASED HE HAS RAN OUT OF THE MEDICATION AND IS REQUESTING A REFILL    Does the patient have less than a 3 day supply:  [x] Yes  [] No    Dede Mitchell Rep   01/25/22 09:56 EST

## 2022-01-26 ENCOUNTER — TELEPHONE (OUTPATIENT)
Dept: FAMILY MEDICINE CLINIC | Facility: CLINIC | Age: 62
End: 2022-01-26

## 2022-01-26 NOTE — TELEPHONE ENCOUNTER
----- Message from Chay Cameron MD sent at 1/25/2022  8:49 AM EST -----  Regarding: Wheelchair  Please call patient aids at [phone] in regards to the patient's wheelchair evaluation.  Deana Seay is the representative with patient aids    I will have to complete this evaluation on 2/4.  I did not address the patient's mobility in any form in my appointment on 1/7 and will be unable to do an addendum to qualify him for his mobility evaluation.    I will complete this paperwork after appointment on 2/4.    Chay Cameron MD  Family Medicine - [facility]

## 2022-02-04 ENCOUNTER — OFFICE VISIT (OUTPATIENT)
Dept: FAMILY MEDICINE CLINIC | Facility: CLINIC | Age: 62
End: 2022-02-04

## 2022-02-04 VITALS
HEIGHT: 70 IN | TEMPERATURE: 98.6 F | SYSTOLIC BLOOD PRESSURE: 110 MMHG | OXYGEN SATURATION: 98 % | RESPIRATION RATE: 16 BRPM | HEART RATE: 84 BPM | BODY MASS INDEX: 38.45 KG/M2 | DIASTOLIC BLOOD PRESSURE: 64 MMHG

## 2022-02-04 DIAGNOSIS — J04.0 LARYNGITIS: ICD-10-CM

## 2022-02-04 DIAGNOSIS — I10 BENIGN ESSENTIAL HYPERTENSION: Chronic | ICD-10-CM

## 2022-02-04 DIAGNOSIS — Z87.828 HX OF SPINAL CORD INJURY: Chronic | ICD-10-CM

## 2022-02-04 DIAGNOSIS — N31.9 NEUROGENIC BLADDER: ICD-10-CM

## 2022-02-04 DIAGNOSIS — R26.89 INABILITY TO BEAR WEIGHT: ICD-10-CM

## 2022-02-04 DIAGNOSIS — I87.8 VENOUS STASIS: ICD-10-CM

## 2022-02-04 DIAGNOSIS — E11.9 TYPE 2 DIABETES MELLITUS WITHOUT COMPLICATION, WITHOUT LONG-TERM CURRENT USE OF INSULIN: Primary | ICD-10-CM

## 2022-02-04 DIAGNOSIS — S14.115S: ICD-10-CM

## 2022-02-04 DIAGNOSIS — R25.2 SPASM: ICD-10-CM

## 2022-02-04 DIAGNOSIS — G82.20 PARAPLEGIA: ICD-10-CM

## 2022-02-04 DIAGNOSIS — C64.1 RENAL CELL CARCINOMA OF RIGHT KIDNEY: ICD-10-CM

## 2022-02-04 PROBLEM — M62.40 MUSCLE CONTRACTURE: Status: ACTIVE | Noted: 2022-02-04

## 2022-02-04 PROBLEM — S14.115A: Status: ACTIVE | Noted: 2022-02-04

## 2022-02-04 LAB
EXPIRATION DATE: NORMAL
HBA1C MFR BLD: 7.1 %
Lab: NORMAL

## 2022-02-04 PROCEDURE — 3051F HG A1C>EQUAL 7.0%<8.0%: CPT | Performed by: STUDENT IN AN ORGANIZED HEALTH CARE EDUCATION/TRAINING PROGRAM

## 2022-02-04 PROCEDURE — 83036 HEMOGLOBIN GLYCOSYLATED A1C: CPT | Performed by: STUDENT IN AN ORGANIZED HEALTH CARE EDUCATION/TRAINING PROGRAM

## 2022-02-04 PROCEDURE — 99214 OFFICE O/P EST MOD 30 MIN: CPT | Performed by: STUDENT IN AN ORGANIZED HEALTH CARE EDUCATION/TRAINING PROGRAM

## 2022-02-04 NOTE — PATIENT INSTRUCTIONS
Bring cuff to office to check accuracy. Continue to check a couple times per week,    Return for urine test.     Get compression stockings.     Diabetes Mellitus and Nutrition, Adult  When you have diabetes, or diabetes mellitus, it is very important to have healthy eating habits because your blood sugar (glucose) levels are greatly affected by what you eat and drink. Eating healthy foods in the right amounts, at about the same times every day, can help you:  · Control your blood glucose.  · Lower your risk of heart disease.  · Improve your blood pressure.  · Reach or maintain a healthy weight.  What can affect my meal plan?  Every person with diabetes is different, and each person has different needs for a meal plan. Your health care provider may recommend that you work with a dietitian to make a meal plan that is best for you. Your meal plan may vary depending on factors such as:  · The calories you need.  · The medicines you take.  · Your weight.  · Your blood glucose, blood pressure, and cholesterol levels.  · Your activity level.  · Other health conditions you have, such as heart or kidney disease.  How do carbohydrates affect me?  Carbohydrates, also called carbs, affect your blood glucose level more than any other type of food. Eating carbs naturally raises the amount of glucose in your blood. Carb counting is a method for keeping track of how many carbs you eat. Counting carbs is important to keep your blood glucose at a healthy level, especially if you use insulin or take certain oral diabetes medicines.  It is important to know how many carbs you can safely have in each meal. This is different for every person. Your dietitian can help you calculate how many carbs you should have at each meal and for each snack.  How does alcohol affect me?  Alcohol can cause a sudden decrease in blood glucose (hypoglycemia), especially if you use insulin or take certain oral diabetes medicines. Hypoglycemia can be a  "life-threatening condition. Symptoms of hypoglycemia, such as sleepiness, dizziness, and confusion, are similar to symptoms of having too much alcohol.  · Do not drink alcohol if:  ? Your health care provider tells you not to drink.  ? You are pregnant, may be pregnant, or are planning to become pregnant.  · If you drink alcohol:  ? Do not drink on an empty stomach.  ? Limit how much you use to:  § 0-1 drink a day for women.  § 0-2 drinks a day for men.  ? Be aware of how much alcohol is in your drink. In the [place] one drink equals one 12 oz bottle of beer (355 mL), one 5 oz glass of wine (148 mL), or one 1½ oz glass of hard liquor (44 mL).  ? Keep yourself hydrated with water, diet soda, or unsweetened iced tea.  § Keep in mind that regular soda, juice, and other mixers may contain a lot of sugar and must be counted as carbs.  What are tips for following this plan?    Reading food labels  · Start by checking the serving size on the \"Nutrition Facts\" label of packaged foods and drinks. The amount of calories, carbs, fats, and other nutrients listed on the label is based on one serving of the item. Many items contain more than one serving per package.  · Check the total grams (g) of carbs in one serving. You can calculate the number of servings of carbs in one serving by dividing the total carbs by 15. For example, if a food has 30 g of total carbs per serving, it would be equal to 2 servings of carbs.  · Check the number of grams (g) of saturated fats and trans fats in one serving. Choose foods that have a low amount or none of these fats.  · Check the number of milligrams (mg) of salt (sodium) in one serving. Most people should limit total sodium intake to less than 2,300 mg per day.  · Always check the nutrition information of foods labeled as \"low-fat\" or \"nonfat.\" These foods may be higher in added sugar or refined carbs and should be avoided.  · Talk to your dietitian to identify your daily goals for nutrients " listed on the label.  Shopping  · Avoid buying canned, pre-made, or processed foods. These foods tend to be high in fat, sodium, and added sugar.  · Shop around the outside edge of the grocery store. This is where you will most often find fresh fruits and vegetables, bulk grains, fresh meats, and fresh dairy.  Cooking  · Use low-heat cooking methods, such as baking, instead of high-heat cooking methods like deep frying.  · Cook using healthy oils, such as olive, canola, or sunflower oil.  · Avoid cooking with butter, cream, or high-fat meats.  Meal planning  · Eat meals and snacks regularly, preferably at the same times every day. Avoid going long periods of time without eating.  · Eat foods that are high in fiber, such as fresh fruits, vegetables, beans, and whole grains. Talk with your dietitian about how many servings of carbs you can eat at each meal.  · Eat 4-6 oz (112-168 g) of lean protein each day, such as lean meat, chicken, fish, eggs, or tofu. One ounce (oz) of lean protein is equal to:  ? 1 oz (28 g) of meat, chicken, or fish.  ? 1 egg.  ? ¼ cup (62 g) of tofu.  · Eat some foods each day that contain healthy fats, such as avocado, nuts, seeds, and fish.  What foods should I eat?  Fruits  Berries. Apples. Oranges. Peaches. Apricots. Plums. Grapes. [place]. Papaya. Pomegranate. Kiwi. Cherries.  Vegetables  Lettuce. Spinach. Leafy greens, including kale, chard, moira greens, and mustard greens. Beets. Cauliflower. Cabbage. Broccoli. Carrots. Green beans. Tomatoes. Peppers. Onions. Cucumbers. [place] sprouts.  Grains  Whole grains, such as whole-wheat or whole-grain bread, crackers, tortillas, cereal, and pasta. Unsweetened oatmeal. Quinoa. Brown or wild rice.  Meats and other proteins  Seafood. Poultry without skin. Lean cuts of poultry and beef. Tofu. Nuts. Seeds.  Dairy  Low-fat or fat-free dairy products such as milk, yogurt, and cheese.  The items listed above may not be a complete list of foods and  beverages you can eat. Contact a dietitian for more information.  What foods should I avoid?  Fruits  Fruits canned with syrup.  Vegetables  Canned vegetables. Frozen vegetables with butter or cream sauce.  Grains  Refined white flour and flour products such as bread, pasta, snack foods, and cereals. Avoid all processed foods.  Meats and other proteins  Fatty cuts of meat. Poultry with skin. Breaded or fried meats. Processed meat. Avoid saturated fats.  Dairy  Full-fat yogurt, cheese, or milk.  Beverages  Sweetened drinks, such as soda or iced tea.  The items listed above may not be a complete list of foods and beverages you should avoid. Contact a dietitian for more information.  Questions to ask a health care provider  · Do I need to meet with a diabetes educator?  · Do I need to meet with a dietitian?  · What number can I call if I have questions?  · When are the best times to check my blood glucose?  Where to find more information:  · American Diabetes Association: diabetes.org  · Academy of Nutrition and Dietetics: www.eatright.org  · National [place] of Diabetes and Digestive and Kidney Diseases: www.niddk.nih.gov  · Association of Diabetes Care and Education Specialists: www.diabeteseducator.org  Summary  · It is important to have healthy eating habits because your blood sugar (glucose) levels are greatly affected by what you eat and drink.  · A healthy meal plan will help you control your blood glucose and maintain a healthy lifestyle.  · Your health care provider may recommend that you work with a dietitian to make a meal plan that is best for you.  · Keep in mind that carbohydrates (carbs) and alcohol have immediate effects on your blood glucose levels. It is important to count carbs and to use alcohol carefully.  This information is not intended to replace advice given to you by your health care provider. Make sure you discuss any questions you have with your health care provider.  Document Revised:  11/24/2020 Document Reviewed: 11/24/2020  Elsevier Patient Education © 2021 Elsevier Inc.

## 2022-02-04 NOTE — PROGRESS NOTES
Established Patient Office Visit      Subjective      Chief Complaint:  Follow-up (follow up today on blood pressures, patient has lost his voice today)      History of Present Illness: Kiran Belcher is a 61 y.o. male who presents for mobility exam and follow-up of hypertension    Patient is here for mobility exam.  I saw Kusum Belcher today discussed his mobility issue and limitation Mr. Belcher has the following diagnoses that limits his mobility and affects his activity of daily living:    Spinal cord injury at C5-C7.  I CD:10 S14.115S: Patient has difficulty with dressing, grooming, feeding, toileting which should be assisted five power wheelchair  History of spinal injury ICD: 10 Z87.828   Patient has difficulty with dressing, grooming, feeding, toileting which should be assisted five power wheelchair  Decreased mobility: R26.89   Patient has difficulty with dressing, grooming, feeding, toileting which should be assisted five power wheelchair  Paraplegia G 82.20   Patient has difficulty with dressing, grooming, feeding, toileting which should be assisted five power wheelchair  Muscle contracture M62.640   Patient has difficulty with dressing, grooming, feeding, toileting which should be assisted five power wheelchair    We have considered and rule out that a manual wheelchair cane or walker will not meet the patient's medical needs.  Patient will need a power wheelchair.  A power wheelchair as needed for safe and functional mobility within the home allowing the patient to be able to make it to the bathroom in a timely manner, prevent falls, improve mobility and improve quality of life.    The patient is willing capable of using the equipment prescribed.    I have read and agree with the therapist evaluation      Patient has pressures recorded at home via upper arm cuff recorded as between 1 30-1 80 systolic.  Has about 10 mmHg difference between arms and systolic pressure. he denies any shortness of  breath chest pain or headache.  No symptoms claudication of the arms    Past Medical History:   Diagnosis Date   • Cancer (HCC)     hx spot on right kidney, for ~8 years, told cancer but no hx biopsy and no interventions and just watching it. Dr. vega used to watch this cyst, then started with  urology a year ago after he retired.   • Decreased mobility    • Depression    • Diabetes mellitus (HCC)    • Esophageal reflux    • Glaucoma    • History of methicillin resistant Staphylococcus aureus infection    • History of obstructive sleep apnea    • History of quadriplegia     of unknown etiology - C5-C7, incomplete    • History of spinal cord injury/quadriplegia 2008 09/14/2018   • Hyperlipidemia    • Hypertension    • Kidney disorder 9/14/2018   • Paraplegia (HCC)        Patient Active Problem List   Diagnosis   • Benign essential hypertension   • Depression   • Gastroesophageal reflux disease without esophagitis   • Hyperlipidemia   • NISA on CPAP   • Diabetes mellitus, type II (HCC)   • Spasm   • Obesity   • Right otitis externa   • Hx of spinal cord injury   • Kidney disorder   • Lower extremity weakness   • Myalgia   • Moderate episode of recurrent major depressive disorder ([facility])   • Dysphagia   • Toenail avulsion   • H/O kidney removal   • Renal cell carcinoma of right kidney (HCC)   • Paraplegia (HCC)   • Muscle contracture   • C5-C7 level spinal injury with complete lesion of spinal cord, without evidence of spinal bone injury (HCC)   • Inability to bear weight   • Neurogenic bladder   • Venous stasis         Current Outpatient Medications:   •  amLODIPine-benazepril (LOTREL) 10-20 MG per capsule, TAKE ONE CAPSULE BY MOUTH DAILY, Disp: 90 capsule, Rfl: 0  •  atorvastatin (LIPITOR) 10 MG tablet, TAKE ONE TABLET BY MOUTH DAILY, Disp: 90 tablet, Rfl: 0  •  baclofen (LIORESAL) 20 MG tablet, TAKE ONE TABLET BY MOUTH THREE TIMES A DAY, Disp: 270 tablet, Rfl: 0  •  carvedilol (Coreg) 12.5 MG tablet, Take 1 tablet  "by mouth 2 (Two) Times a Day With Meals., Disp: 60 tablet, Rfl: 1  •  citalopram (CeleXA) 20 MG tablet, TAKE ONE TABLET BY MOUTH DAILY, Disp: 90 tablet, Rfl: 0  •  dantrolene (DANTRIUM) 100 MG capsule, TAKE ONE CAPSULE BY MOUTH TWICE A DAY, Disp: 180 capsule, Rfl: 1  •  esomeprazole (nexIUM) 40 MG capsule, TAKE ONE CAPSULE BY MOUTH EVERY MORNING BEFORE BREAKFAST, Disp: 90 capsule, Rfl: 1  •  fexofenadine (ALLEGRA) 180 MG tablet, Take 180 mg by mouth Daily., Disp: , Rfl:   •  fluocinonide (LIDEX) 0.05 % cream, fluocinonide 0.05 % topical cream, Disp: , Rfl:   •  glucose blood (Accu-Chek Elke Plus) test strip, 1 each by Other route 2 (Two) Times a Day. Use as instructed, Disp: 200 each, Rfl: 3  •  glyburide (DIAbeta) 5 MG tablet, Take 1 tablet by mouth Daily With Breakfast., Disp: 90 tablet, Rfl: 3  •  ketoconazole (NIZORAL) 2 % cream, ketoconazole 2 % topical cream  APPLY TO THE AFFECTED AREA(S) BY TOPICAL ROUTE ONCE DAILY, Disp: , Rfl:   •  lactobacillus acidophilus (RISAQUAD) capsule capsule, Take 1 capsule by mouth Daily., Disp: 90 capsule, Rfl: 1  •  metFORMIN (GLUCOPHAGE) 500 MG tablet, TAKE ONE TABLET BY MOUTH THREE TIMES A DAY, Disp: 270 tablet, Rfl: 0  •  TOVIAZ 8 MG tablet sustained-release 24 hour tablet, , Disp: , Rfl:       Objective     Physical Exam:   Vital Signs:   /64 (BP Location: Right arm, Patient Position: Sitting, Cuff Size: Adult)   Pulse 84   Temp 98.6 °F (37 °C) (Temporal)   Resp 16   Ht 177.8 cm (70\")   SpO2 98%   BMI 38.45 kg/m²      Physical Exam  Constitutional:       General: He is not in acute distress.  Speech is slow     Appearance: He is not ill-appearing.  Patient motorized wheelchair  Radial pulses are 2+ bilaterally.  Neurological:      Mental Status: He is alert.  Significant weakness to legs bilaterally.  Unable to lift left leg against gravity.  Minimal weakness to upper arms bilaterally.  Slight decreased  strength bilaterally.  Sensation intact to all " extremities.  Psychiatric:         Thought Content: Thought content normal.     Skin: Stasis changes to the leg and dorsal foot and toes bilaterally.  Some scaling of the skin.    See physical therapy assessment for full strength eval       Assessment / Plan      Assessment/Plan:   Diagnoses and all orders for this visit:    1. Type 2 diabetes mellitus without complication, without long-term current use of insulin ([facility]) (Primary)  -     POC Glycosylated Hemoglobin (Hb A1C)  -     Microalbumin / Creatinine Urine Ratio - Urine, Catheter; Future        -     A1c 7.1 and stable and emphasized need to adjust diet and potentially increase Metformin at next visit if he does not get below 7        -     Continue Metformin 500 mg 3 times daily        -     Continue glyburide 5 mg daily  -patient is certain he had pneumonia vaccine around 5 years ago. Not documented in our system.   -Discussed importance of diet    2. Neurogenic bladder        -     Intermittent cath about every 4 hours.  Does have some leakage.  Continue Toviaz.    3. Venous stasis        -Compression stockings.  Some skin changes but no ulceration.  Follows with podiatry.  Some of the lower extremity change likely due to gravity dependent edema.    4. Laryngitis         -1 day of decreased voice with no other symptoms.  Follow-up appointment if not resolving    5. Benign essential hypertension  -Home pressure monitoring readings are much higher than here in the office today.  At goal in the office  -Continue carvedilol 12.5 mg twice daily  -Continue amlodipine benazepril    6. Renal cell carcinoma of right kidney ([facility])  -Plans for retroperitoneal lymph node biopsy     7. Hx of spinal cord injury    8. Paraplegia (HCC)    9. Inability to bear weight    10. Spasm    11. Spinal cord injury at C5-C7 level with complete spinal cord lesion without bone injury, sequela ([facility])    Problem 7 through 11.  Patient in need of power wheelchair renewal and will send Rx  packet to patient aids.  Continue dantrolene and baclofen.  So the venous stasis changes likely contributed to his weakness in his lower extremities.  Compression stockings recommended.  Follows with podiatry.    patient is certain he had pneumonia vaccine around 5 years ago. Not documented in our system.       Follow Up:   Return in about 3 months (around 5/4/2022) for Follow-up diabetes , Wellness visit.        Chay Cameron MD  Family Medicine - [facility]

## 2022-02-18 ENCOUNTER — OFFICE VISIT (OUTPATIENT)
Dept: FAMILY MEDICINE CLINIC | Facility: CLINIC | Age: 62
End: 2022-02-18

## 2022-02-18 VITALS
SYSTOLIC BLOOD PRESSURE: 130 MMHG | OXYGEN SATURATION: 98 % | DIASTOLIC BLOOD PRESSURE: 70 MMHG | HEIGHT: 70 IN | BODY MASS INDEX: 38.8 KG/M2 | HEART RATE: 91 BPM | RESPIRATION RATE: 16 BRPM | TEMPERATURE: 98.4 F | WEIGHT: 271 LBS

## 2022-02-18 DIAGNOSIS — I10 ESSENTIAL HYPERTENSION: ICD-10-CM

## 2022-02-18 DIAGNOSIS — C85.10 B-CELL LYMPHOMA, UNSPECIFIED B-CELL LYMPHOMA TYPE, UNSPECIFIED BODY REGION: Primary | ICD-10-CM

## 2022-02-18 PROCEDURE — 99213 OFFICE O/P EST LOW 20 MIN: CPT | Performed by: STUDENT IN AN ORGANIZED HEALTH CARE EDUCATION/TRAINING PROGRAM

## 2022-02-18 RX ORDER — AMLODIPINE BESYLATE AND BENAZEPRIL HYDROCHLORIDE 10; 20 MG/1; MG/1
1 CAPSULE ORAL DAILY
Qty: 90 CAPSULE | Refills: 3 | Status: SHIPPED | OUTPATIENT
Start: 2022-02-18 | End: 2023-03-06 | Stop reason: SDUPTHER

## 2022-02-18 RX ORDER — SULFAMETHOXAZOLE AND TRIMETHOPRIM 800; 160 MG/1; MG/1
TABLET ORAL
COMMUNITY
End: 2022-02-18

## 2022-02-18 RX ORDER — AMLODIPINE BESYLATE AND BENAZEPRIL HYDROCHLORIDE 10; 20 MG/1; MG/1
1 CAPSULE ORAL DAILY
Qty: 90 CAPSULE | Refills: 3 | Status: SHIPPED | OUTPATIENT
Start: 2022-02-18 | End: 2022-02-18 | Stop reason: SDUPTHER

## 2022-02-18 NOTE — PROGRESS NOTES
Established Patient Office Visit      Subjective      Chief Complaint:  Follow-up (patient wanting to discuss recent lymphoma diagnosis from nephrologist, what to expect, he said he is going to supposed to get a PET SCAN and a CT scan of lower abdomen)      History of Present Illness: Kiran Belcher is a 61 y.o. male who presents for discussion about lymph node biopsy concerning for lymphoma.    FNA of needle biopsy shows atypical small B-cell.  He will be having a PET scan.  He is established with oncology at .  He has question about next steps and what to expect.    He has no new systemic symptoms.  No weight loss.  No fever.    Past Medical History:   Diagnosis Date   • Cancer (HCC)     hx spot on right kidney, for ~8 years, told cancer but no hx biopsy and no interventions and just watching it. Dr. vega used to watch this cyst, then started with  urology a year ago after he retired.   • Decreased mobility    • Depression    • Diabetes mellitus (HCC)    • Esophageal reflux    • Glaucoma    • History of methicillin resistant Staphylococcus aureus infection    • History of obstructive sleep apnea    • History of quadriplegia     of unknown etiology - C5-C7, incomplete    • History of spinal cord injury/quadriplegia 2008 09/14/2018   • Hyperlipidemia    • Hypertension    • Kidney disorder 9/14/2018   • Paraplegia (HCC)        Patient Active Problem List   Diagnosis   • Benign essential hypertension   • Depression   • Gastroesophageal reflux disease without esophagitis   • Hyperlipidemia   • NISA on CPAP   • Diabetes mellitus, type II (HCC)   • Spasm   • Obesity   • Right otitis externa   • Hx of spinal cord injury   • Kidney disorder   • Lower extremity weakness   • Myalgia   • Moderate episode of recurrent major depressive disorder (HCC)   • Dysphagia   • Toenail avulsion   • H/O kidney removal   • Renal cell carcinoma of right kidney (HCC)   • Paraplegia (HCC)   • Muscle contracture   • C5-C7 level spinal  "injury with complete lesion of spinal cord, without evidence of spinal bone injury (HCC)   • Inability to bear weight   • Neurogenic bladder   • Venous stasis   • B-cell lymphoma (HCC)         Current Outpatient Medications:   •  amLODIPine-benazepril (LOTREL) 10-20 MG per capsule, Take 1 capsule by mouth Daily., Disp: 90 capsule, Rfl: 3  •  atorvastatin (LIPITOR) 10 MG tablet, TAKE ONE TABLET BY MOUTH DAILY, Disp: 90 tablet, Rfl: 0  •  baclofen (LIORESAL) 20 MG tablet, TAKE ONE TABLET BY MOUTH THREE TIMES A DAY, Disp: 270 tablet, Rfl: 0  •  carvedilol (Coreg) 12.5 MG tablet, Take 1 tablet by mouth 2 (Two) Times a Day With Meals., Disp: 60 tablet, Rfl: 1  •  citalopram (CeleXA) 20 MG tablet, TAKE ONE TABLET BY MOUTH DAILY, Disp: 90 tablet, Rfl: 0  •  dantrolene (DANTRIUM) 100 MG capsule, TAKE ONE CAPSULE BY MOUTH TWICE A DAY, Disp: 180 capsule, Rfl: 1  •  esomeprazole (nexIUM) 40 MG capsule, TAKE ONE CAPSULE BY MOUTH EVERY MORNING BEFORE BREAKFAST, Disp: 90 capsule, Rfl: 1  •  fexofenadine (ALLEGRA) 180 MG tablet, Take 180 mg by mouth Daily., Disp: , Rfl:   •  fluocinonide (LIDEX) 0.05 % cream, fluocinonide 0.05 % topical cream, Disp: , Rfl:   •  glucose blood (Accu-Chek Elke Plus) test strip, 1 each by Other route 2 (Two) Times a Day. Use as instructed, Disp: 200 each, Rfl: 3  •  glyburide (DIAbeta) 5 MG tablet, Take 1 tablet by mouth Daily With Breakfast., Disp: 90 tablet, Rfl: 3  •  lactobacillus acidophilus (RISAQUAD) capsule capsule, Take 1 capsule by mouth Daily., Disp: 90 capsule, Rfl: 1  •  metFORMIN (GLUCOPHAGE) 500 MG tablet, TAKE ONE TABLET BY MOUTH THREE TIMES A DAY, Disp: 270 tablet, Rfl: 0  •  TOVIAZ 8 MG tablet sustained-release 24 hour tablet, , Disp: , Rfl:       Objective     Physical Exam:   Vital Signs:   /70 (BP Location: Left arm, Patient Position: Sitting, Cuff Size: Adult)   Pulse 91   Temp 98.4 °F (36.9 °C) (Temporal)   Resp 16   Ht 177.8 cm (70\")   Wt 123 kg (271 lb)   SpO2 98% "   BMI 38.88 kg/m²      Physical Exam  Constitutional:       General: He is not in acute distress.     Appearance: He is not ill-appearing.   Cardiovascular:      Rate and Rhythm: Normal rate and regular rhythm.   Pulmonary:      Effort: Pulmonary effort is normal.      Breath sounds: Normal breath sounds.   Neurological:      Mental Status: He is alert.   Psychiatric:         Thought Content: Thought content normal.          Assessment / Plan      Assessment/Plan:   Diagnoses and all orders for this visit:    1. B-cell lymphoma, unspecified B-cell lymphoma type, unspecified body region (HCC) (Primary)  -Unspecified B-cell abnormalities to the lymph node biopsy.  He will undergo PET scan and follow-up with [facility] to determine plan and further work-up.  -I discussed he does not need to change any of his daily life currently.    2. Essential hypertension  -     Refill amLODIPine-benazepril (LOTREL) 10-20 MG per capsule; Take 1 capsule by mouth Daily.  Dispense: 90 capsule; Refill: 3  -  Continue carvedilol  -Blood pressure goal    Follow Up:   No follow-ups on file.    Chay Cameron MD  Family Medicine - [facility]

## 2022-02-18 NOTE — PATIENT INSTRUCTIONS
Visit Details    Encounter Start Encounter End   3/11/2022  7:30 AM 3/11/2022  8:30 AM       Providers     Jonna Garrett MD   Hematology and Oncology   NPI: [id number]   [address] [facility] [address] [address]       Phone: [phone]

## 2022-02-23 ENCOUNTER — TELEPHONE (OUTPATIENT)
Dept: FAMILY MEDICINE CLINIC | Facility: CLINIC | Age: 62
End: 2022-02-23

## 2022-02-23 NOTE — TELEPHONE ENCOUNTER
I CALLED AND TOLD REPRESENTATIVE AT [facility] DR. BARNEY IS OK WITH PROCEEDING WITH POWER CHAIR ITEM PEER TO PEER, SHE SAID THAT THE MEDICAL DIRECTOR WILL CALL US BACK WITHIN THE NEXT 24 HOURS BUT SHE COULD NOT GIVE US A TIME. I TOLD HER TO ASK FOR ME AND I WOULD GET DR. BARNEY.

## 2022-02-23 NOTE — TELEPHONE ENCOUNTER
Caller: KRYSTAL    Relationship: Payer    Best call back number: [phone]    What is the best time to reach you: 8-4:30 CST    Who are you requesting to speak with (clinical staff, provider,  specific staff member): DR ROSARIO    What was the call regarding: POWERED WHEELCHAIR THAT WAS ORDERED- ONE ITEM THAT WAS ORDERED WITH THE POWER WHEELCHAIR- ZORA-BALANCE 3 STATIC SEAT - THAT ITEM IS BEING OFFERED AS PEER TO PEER-  IT IS BEING DENIED AS NOT MEDICALLY NECESSARY- THERE IS NOT ENOUGH CLINICALS TO SUPPORT THIS NEED.     REFERENCE NUMBER:     Do you require a callback: PEER TO PEER EXPIRES 2/28/22 AT 12:00 PM CST  PLEASE CALL KRYSTAL [phone] TO LET HER KNOW IF YOU WANT TO DO THE PEER TO PEER- THEN SHE WILL SET UP THE PEER TO PEER PROCESS.  IF THEY DO NOT HEAR BACK BY DEADLINE- THIS WILL BE DENIED  OR CAN FILE GRIEVANCE FOLLOWING DENIAL DEADLINE ONCE THE DENIAL LETTER IS RECEIVED THERE WILL BE A GRIEVANCE AND APPEAL PROCESS INCLUDED IN THAT LETTER.

## 2022-03-07 ENCOUNTER — TELEPHONE (OUTPATIENT)
Dept: ONCOLOGY | Facility: OTHER | Age: 62
End: 2022-03-07

## 2022-03-07 ENCOUNTER — TELEPHONE (OUTPATIENT)
Dept: FAMILY MEDICINE CLINIC | Facility: CLINIC | Age: 62
End: 2022-03-07

## 2022-03-07 DIAGNOSIS — C85.10 B-CELL LYMPHOMA, UNSPECIFIED B-CELL LYMPHOMA TYPE, UNSPECIFIED BODY REGION: Primary | ICD-10-CM

## 2022-03-07 NOTE — TELEPHONE ENCOUNTER
Patient wants to transfer oncology practice to [facility] and would like you place a referral for him so that the appointment can be scheduled. Please advise.

## 2022-03-07 NOTE — TELEPHONE ENCOUNTER
Patient prefers to be seen at [facility] and also patient has been rescheduled for April at  so also like to switch to [facility] for oncology.    I will make the referral.    Please include    Fine-needle aspiration and leukemia/lymphoma immunophenotyping by flow cytometry labs from 2/7/2022 from  records.    Please and include records from care everywhere at  for imaging studies including CT abdomen pelvis on 12/1/2021 and CT-guided biopsy lymph node from 2/7/2022    Chay Cameron MD  Family Medicine - [facility]

## 2022-03-07 NOTE — TELEPHONE ENCOUNTER
SPOUSE CALLED STATING THAT PATIENT IS CURRENTLY SCHEDULED TO HAVE A PET SCAN ON 3/9/22 AND CANNOT BE SEEN IN FU WITH DR. RONALD CASTRO (ONCOLOGIST) UNTIL THE FIRST PART OF April.       SHE HAS REQUESTED THAT PATIENT BE SEEN IN OUR OFFICE, AS THEY ARE EXTREMELY CONCERNED. INFORMED THAT A REF WOULD BE NEEDED.      CONTACTED DR. SAVITA ROSARIO'S ([facility]) OFFICE TO REQUEST A REFERRAL & SPOKE WITH EDDIE.  EDDIE SENT MESSAGE TO DR. BARNEY REQUESTING THAT HE PUT IN A REF FOR THIS PATIENT WHO HAS EITHER LEUKEMIA OR LYMPHOMA (PER SPOUSE) AND A SLOW GROWING SPOT IN HIS GROIN AREA FOR WHICH HE IS HAVING THE PET.

## 2022-03-08 DIAGNOSIS — E78.2 MIXED HYPERLIPIDEMIA: ICD-10-CM

## 2022-03-08 RX ORDER — ATORVASTATIN CALCIUM 10 MG/1
10 TABLET, FILM COATED ORAL DAILY
Qty: 90 TABLET | Refills: 0 | Status: SHIPPED | OUTPATIENT
Start: 2022-03-08 | End: 2022-06-09

## 2022-03-18 ENCOUNTER — TELEPHONE (OUTPATIENT)
Dept: ONCOLOGY | Facility: CLINIC | Age: 62
End: 2022-03-18

## 2022-03-18 NOTE — TELEPHONE ENCOUNTER
Caller: Anh Belcher    Relationship: Emergency Contact    Best call back number: [phone]    What is the best time to reach you: ASAP    Who are you requesting to speak with (clinical staff, provider,  specific staff member): NURSE    Do you know the name of the person who called:     What was the call regarding: PT ASKING ABOUT RESULTS TO RECENT TEST    Do you require a callback: YES

## 2022-03-18 NOTE — TELEPHONE ENCOUNTER
Spoke with patient's wife.  Wife wanting results of PET scan.  Explained to wife that results will be reviewed at visit on Monday.  Wife states understood.

## 2022-03-21 ENCOUNTER — CONSULT (OUTPATIENT)
Dept: ONCOLOGY | Facility: CLINIC | Age: 62
End: 2022-03-21

## 2022-03-21 ENCOUNTER — LAB (OUTPATIENT)
Dept: LAB | Facility: HOSPITAL | Age: 62
End: 2022-03-21

## 2022-03-21 VITALS
RESPIRATION RATE: 16 BRPM | HEIGHT: 70 IN | SYSTOLIC BLOOD PRESSURE: 150 MMHG | TEMPERATURE: 98 F | HEART RATE: 77 BPM | WEIGHT: 271 LBS | DIASTOLIC BLOOD PRESSURE: 78 MMHG | BODY MASS INDEX: 38.8 KG/M2 | OXYGEN SATURATION: 98 %

## 2022-03-21 DIAGNOSIS — C85.10 B-CELL LYMPHOMA, UNSPECIFIED B-CELL LYMPHOMA TYPE, UNSPECIFIED BODY REGION: ICD-10-CM

## 2022-03-21 DIAGNOSIS — C85.10 B-CELL LYMPHOMA, UNSPECIFIED B-CELL LYMPHOMA TYPE, UNSPECIFIED BODY REGION: Primary | ICD-10-CM

## 2022-03-21 LAB
ALBUMIN SERPL-MCNC: 3.9 G/DL (ref 3.5–5.2)
ALBUMIN/GLOB SERPL: 1 G/DL
ALP SERPL-CCNC: 99 U/L (ref 39–117)
ALT SERPL W P-5'-P-CCNC: 20 U/L (ref 1–41)
ANION GAP SERPL CALCULATED.3IONS-SCNC: 13 MMOL/L (ref 5–15)
AST SERPL-CCNC: 18 U/L (ref 1–40)
BASOPHILS # BLD AUTO: 0.02 10*3/MM3 (ref 0–0.2)
BASOPHILS NFR BLD AUTO: 0.2 % (ref 0–1.5)
BILIRUB SERPL-MCNC: 0.2 MG/DL (ref 0–1.2)
BUN SERPL-MCNC: 11 MG/DL (ref 6–20)
BUN/CREAT SERPL: 11 (ref 7–25)
CALCIUM SPEC-SCNC: 9.2 MG/DL (ref 8.6–10.5)
CHLORIDE SERPL-SCNC: 99 MMOL/L (ref 98–107)
CO2 SERPL-SCNC: 25 MMOL/L (ref 22–29)
CREAT SERPL-MCNC: 1 MG/DL (ref 0.76–1.27)
DEPRECATED RDW RBC AUTO: 47.7 FL (ref 37–54)
EGFRCR SERPLBLD CKD-EPI 2021: 86.7 ML/MIN/1.73
EOSINOPHIL # BLD AUTO: 0.27 10*3/MM3 (ref 0–0.4)
EOSINOPHIL NFR BLD AUTO: 3 % (ref 0.3–6.2)
ERYTHROCYTE [DISTWIDTH] IN BLOOD BY AUTOMATED COUNT: 14.6 % (ref 12.3–15.4)
GLOBULIN UR ELPH-MCNC: 3.8 GM/DL
GLUCOSE SERPL-MCNC: 107 MG/DL (ref 65–99)
HCT VFR BLD AUTO: 35.1 % (ref 37.5–51)
HGB BLD-MCNC: 11 G/DL (ref 13–17.7)
IMM GRANULOCYTES # BLD AUTO: 0.08 10*3/MM3 (ref 0–0.05)
IMM GRANULOCYTES NFR BLD AUTO: 0.9 % (ref 0–0.5)
LDH SERPL-CCNC: 128 U/L (ref 135–225)
LYMPHOCYTES # BLD AUTO: 1.38 10*3/MM3 (ref 0.7–3.1)
LYMPHOCYTES NFR BLD AUTO: 15.2 % (ref 19.6–45.3)
MCH RBC QN AUTO: 28.4 PG (ref 26.6–33)
MCHC RBC AUTO-ENTMCNC: 31.3 G/DL (ref 31.5–35.7)
MCV RBC AUTO: 90.7 FL (ref 79–97)
MONOCYTES # BLD AUTO: 0.61 10*3/MM3 (ref 0.1–0.9)
MONOCYTES NFR BLD AUTO: 6.7 % (ref 5–12)
NEUTROPHILS NFR BLD AUTO: 6.73 10*3/MM3 (ref 1.7–7)
NEUTROPHILS NFR BLD AUTO: 74 % (ref 42.7–76)
NRBC BLD AUTO-RTO: 0 /100 WBC (ref 0–0.2)
PLATELET # BLD AUTO: 319 10*3/MM3 (ref 140–450)
PMV BLD AUTO: 9.4 FL (ref 6–12)
POTASSIUM SERPL-SCNC: 4.2 MMOL/L (ref 3.5–5.2)
PROT SERPL-MCNC: 7.7 G/DL (ref 6–8.5)
RBC # BLD AUTO: 3.87 10*6/MM3 (ref 4.14–5.8)
SODIUM SERPL-SCNC: 137 MMOL/L (ref 136–145)
WBC NRBC COR # BLD: 9.09 10*3/MM3 (ref 3.4–10.8)

## 2022-03-21 PROCEDURE — 82784 ASSAY IGA/IGD/IGG/IGM EACH: CPT

## 2022-03-21 PROCEDURE — 85025 COMPLETE CBC W/AUTO DIFF WBC: CPT

## 2022-03-21 PROCEDURE — 82232 ASSAY OF BETA-2 PROTEIN: CPT

## 2022-03-21 PROCEDURE — 86334 IMMUNOFIX E-PHORESIS SERUM: CPT

## 2022-03-21 PROCEDURE — 99214 OFFICE O/P EST MOD 30 MIN: CPT | Performed by: INTERNAL MEDICINE

## 2022-03-21 PROCEDURE — 85060 BLOOD SMEAR INTERPRETATION: CPT

## 2022-03-21 PROCEDURE — 84165 PROTEIN E-PHORESIS SERUM: CPT

## 2022-03-21 PROCEDURE — 83615 LACTATE (LD) (LDH) ENZYME: CPT

## 2022-03-21 PROCEDURE — 80053 COMPREHEN METABOLIC PANEL: CPT

## 2022-03-21 PROCEDURE — 36415 COLL VENOUS BLD VENIPUNCTURE: CPT

## 2022-03-21 PROCEDURE — 83521 IG LIGHT CHAINS FREE EACH: CPT

## 2022-03-21 RX ORDER — SULFAMETHOXAZOLE AND TRIMETHOPRIM 800; 160 MG/1; MG/1
1 TABLET ORAL EVERY 12 HOURS
COMMUNITY
Start: 2022-02-22 | End: 2022-06-15

## 2022-03-21 NOTE — PROGRESS NOTES
New Patient Office Visit      Date: 2022     Patient Name: Kiran Belcher  MRN: [id number]  : 1960  Referring Physician: Chay Cameron    Chief Complaint: Establish care for concerns for lymphoma    History of Present Illness: Kiran Belcher is a pleasant 61 y.o. male past medical history of right RCC status post right nephrectomy, hypertension, hyperlipidemia, anxiety, type 2 diabetes who presents today for evaluation of concerns for lymphoma. The patient is accompanied by their wife who contributes to the history of their care.  Patient underwent a right nephrectomy on 2019 for renal cell carcinoma.  He has been followed by nephrology and urology since.  On his most recent scans there is concerns for enlarging retroperitoneal adenopathy.  He underwent an FNA which showed predominant small lymphocytes with a minor population of atypical B cells concerning for possible CLL/SLL.  PET/CT showed no abnormal hypermetabolic activity.  He overall feels well.  Denies any unexplained fevers, chills, night sweats, weight loss.  States that someone did not tell him he had an issue, he would feel completely normal at this time    Oncology History:    Oncology/Hematology History    No history exists.       Subjective      Review of Systems:     Constitutional: Negative for fevers, chills, or weight loss  Eyes: Negative for blurred vision or discharge         Ear/Nose/Throat: Negative for difficulty swallowing, sore throat, LAD                                                       Respiratory: Negative for cough, SOA, wheezing                                                                                        Cardiovascular: Negative for chest pain or palpitations                                                                  Gastrointestinal: Negative for nausea, vomiting or diarrhea                                                                     Genitourinary: Negative for dysuria or  "hematuria                                                                                           Musculoskeletal: Negative for any joint pains or muscle aches                                                                        Neurologic: Negative for any weakness, headaches, dizziness                                                                         Hematologic: Negative for any easy bleeding or bruising                                                                                   Psychiatric: Negative for anxiety or depression                             Past Medical History:   Past Medical History:   Diagnosis Date   • B-cell lymphoma (HCC) 02/2022    Undergoing work-up with    • Cancer (HCC)     hx spot on right kidney, for ~8 years, told cancer but no hx biopsy and no interventions and just watching it. Dr. vega used to watch this cyst, then started with  urology a year ago after he retired.   • Decreased mobility    • Depression    • Diabetes mellitus (HCC)    • Esophageal reflux    • Glaucoma    • History of methicillin resistant Staphylococcus aureus infection    • History of obstructive sleep apnea    • History of quadriplegia     of unknown etiology - C5-C7, incomplete    • History of spinal cord injury/quadriplegia 2008 09/14/2018   • Hyperlipidemia    • Hypertension    • Kidney disorder 9/14/2018   • Paraplegia (HCC)        Past Surgical History:   Past Surgical History:   Procedure Laterality Date   • CERVICAL DISC SURGERY      around 5/2008, fall/injury tripped over a safety gate for their dogs, reported SCI \"semi quadraplegic\" since. [facility]. [facility] after.   • SPINE SURGERY         Family History:   Family History   Problem Relation Age of Onset   • Hypertension Mother    • Heart disease Father    • Coronary artery disease Father    • Diabetes Other    • Hypertension Other        Social History:   Social History     Socioeconomic History   • Marital status:    Tobacco Use "   • Smoking status: Never Smoker   • Smokeless tobacco: Never Used   Vaping Use   • Vaping Use: Never used   Substance and Sexual Activity   • Alcohol use: No   • Drug use: No   • Sexual activity: Defer       Medications:     Current Outpatient Medications:   •  amLODIPine-benazepril (LOTREL) 10-20 MG per capsule, Take 1 capsule by mouth Daily., Disp: 90 capsule, Rfl: 3  •  atorvastatin (LIPITOR) 10 MG tablet, Take 1 tablet by mouth Daily., Disp: 90 tablet, Rfl: 0  •  carvedilol (Coreg) 12.5 MG tablet, Take 1 tablet by mouth 2 (Two) Times a Day With Meals., Disp: 60 tablet, Rfl: 1  •  citalopram (CeleXA) 20 MG tablet, TAKE ONE TABLET BY MOUTH DAILY, Disp: 90 tablet, Rfl: 0  •  dantrolene (DANTRIUM) 100 MG capsule, TAKE ONE CAPSULE BY MOUTH TWICE A DAY, Disp: 180 capsule, Rfl: 1  •  esomeprazole (nexIUM) 40 MG capsule, TAKE ONE CAPSULE BY MOUTH EVERY MORNING BEFORE BREAKFAST, Disp: 90 capsule, Rfl: 1  •  fexofenadine (ALLEGRA) 180 MG tablet, Take 180 mg by mouth Daily., Disp: , Rfl:   •  fluocinonide (LIDEX) 0.05 % cream, fluocinonide 0.05 % topical cream, Disp: , Rfl:   •  glucose blood (Accu-Chek Elke Plus) test strip, 1 each by Other route 2 (Two) Times a Day. Use as instructed, Disp: 200 each, Rfl: 3  •  glyburide (DIAbeta) 5 MG tablet, Take 1 tablet by mouth Daily With Breakfast., Disp: 90 tablet, Rfl: 3  •  lactobacillus acidophilus (RISAQUAD) capsule capsule, Take 1 capsule by mouth Daily., Disp: 90 capsule, Rfl: 1  •  metFORMIN (GLUCOPHAGE) 500 MG tablet, TAKE ONE TABLET BY MOUTH THREE TIMES A DAY, Disp: 270 tablet, Rfl: 0  •  sulfamethoxazole-trimethoprim (BACTRIM DS,SEPTRA DS) 800-160 MG per tablet, Take 1 tablet by mouth Every 12 (Twelve) Hours., Disp: , Rfl:   •  TOVIAZ 8 MG tablet sustained-release 24 hour tablet, , Disp: , Rfl:     Allergies:   Allergies   Allergen Reactions   • Levofloxacin Unknown (See Comments)     tendinopathy       Objective     Physical Exam:  Vital Signs:   Vitals:    03/21/22  "1503   BP: 150/78   Pulse: 77   Resp: 16   Temp: 98 °F (36.7 °C)   TempSrc: Infrared   SpO2: 98%   Weight: 123 kg (271 lb)   Height: 177.8 cm (70\")   PainSc: 0-No pain     Pain Score    03/21/22 1503   PainSc: 0-No pain     ECOG Performance Status: 0 - Asymptomatic    Constitutional: NAD, ECOG 0  Eyes: PERRLA, scleral anicteric  ENT: No LAD, no thyromegaly  Respiratory: CTAB, no wheezing, rales, rhonchi  Cardiovascular: RRR, no murmurs, pulses 2+ bilaterally  Abdomen: soft, NT/ND, no HSM  Musculoskeletal: strength 5/5 bilaterally, no c/c/e  Neurologic: A&O x 3, CN II-XII intact grossly  Psych: mood and affect congruent, no SI or HI    Results Review:   No visits with results within 2 Week(s) from this visit.   Latest known visit with results is:   Office Visit on 02/04/2022   Component Date Value Ref Range Status   • Hemoglobin A1C 02/04/2022 7.1  % Final   • Lot Number 02/04/2022 [id number]   Final   • Expiration Date 02/04/2022 11/2/23   Final       No results found.    Assessment / Plan      Assessment/Plan:   1. B-cell lymphoma, unspecified B-cell lymphoma type, unspecified body region (HCC) (Primary)  -Unclear etiology at this time  -Enlarging retroperitoneal adenopathy noted on recent CT scan  -FNA showing predominately small lymphocytes with a minor population of atypical B cells  -PET/CT showing no abnormal hypermetabolic activity at   -We will check labs including flow cytometry to rule in/out CLL  -In all likelihood, the patient probably has a indolent lymphoma at this time and can be monitored with active surveillance as he is asymptomatic  -No indication for bone marrow biopsy or treatment at this time  -     CBC & Differential; Future  -     Comprehensive Metabolic Panel; Future  -     Flow Cytometry, Blood; Future  -     Peripheral Blood Smear; Future  -     Lactate Dehydrogenase; Future  -     Beta 2 Microglobulin, Serum; Future  -     AURELIO + PE; Future  -     Immunoglobulin Free LT Chains Blood; " Future    2.  Right clear-cell renal cell carcinoma  -Status post nephrectomy in May 2019  -Has been without disease recurrence since      Follow Up:   Follow-up in 2 weeks     Jose Perdomo MD  Hematology and Oncology     Please note that portions of this note may have been completed with a voice recognition program. Efforts were made to edit the dictations, but occasionally words are mistranscribed.

## 2022-03-22 LAB
B2 MICROGLOB SERPL-MCNC: 3.4 MG/L (ref 0.8–2.2)
CYTOLOGIST CVX/VAG CYTO: NORMAL
PATH INTERP BLD-IMP: NORMAL

## 2022-03-23 LAB
ALBUMIN SERPL ELPH-MCNC: 3.4 G/DL (ref 2.9–4.4)
ALBUMIN/GLOB SERPL: 1 {RATIO} (ref 0.7–1.7)
ALPHA1 GLOB SERPL ELPH-MCNC: 0.3 G/DL (ref 0–0.4)
ALPHA2 GLOB SERPL ELPH-MCNC: 1 G/DL (ref 0.4–1)
B-GLOBULIN SERPL ELPH-MCNC: 1.2 G/DL (ref 0.7–1.3)
GAMMA GLOB SERPL ELPH-MCNC: 1.2 G/DL (ref 0.4–1.8)
GLOBULIN SER-MCNC: 3.6 G/DL (ref 2.2–3.9)
IGA SERPL-MCNC: 325 MG/DL (ref 90–386)
IGG SERPL-MCNC: 1396 MG/DL (ref 603–1613)
IGM SERPL-MCNC: 40 MG/DL (ref 20–172)
INTERPRETATION SERPL IEP-IMP: NORMAL
KAPPA LC FREE SER-MCNC: 60.1 MG/L (ref 3.3–19.4)
KAPPA LC FREE/LAMBDA FREE SER: 1.57 {RATIO} (ref 0.26–1.65)
LABORATORY COMMENT REPORT: NORMAL
LAMBDA LC FREE SERPL-MCNC: 38.3 MG/L (ref 5.7–26.3)
M PROTEIN SERPL ELPH-MCNC: NORMAL G/DL
PROT SERPL-MCNC: 7 G/DL (ref 6–8.5)

## 2022-03-24 LAB — REF LAB TEST METHOD: NORMAL

## 2022-03-28 DIAGNOSIS — E11.9 TYPE 2 DIABETES MELLITUS WITHOUT COMPLICATION, WITHOUT LONG-TERM CURRENT USE OF INSULIN: ICD-10-CM

## 2022-03-28 DIAGNOSIS — F32.0 CURRENT MILD EPISODE OF MAJOR DEPRESSIVE DISORDER WITHOUT PRIOR EPISODE: ICD-10-CM

## 2022-03-29 RX ORDER — ESOMEPRAZOLE MAGNESIUM 40 MG/1
CAPSULE, DELAYED RELEASE ORAL
Qty: 90 CAPSULE | Refills: 3 | Status: SHIPPED | OUTPATIENT
Start: 2022-03-29 | End: 2023-04-03

## 2022-03-29 RX ORDER — CITALOPRAM 20 MG/1
20 TABLET ORAL DAILY
Qty: 90 TABLET | Refills: 3 | Status: SHIPPED | OUTPATIENT
Start: 2022-03-29 | End: 2023-04-03

## 2022-03-29 RX ORDER — CARVEDILOL 12.5 MG/1
12.5 TABLET ORAL 2 TIMES DAILY WITH MEALS
Qty: 180 TABLET | Refills: 3 | Status: SHIPPED | OUTPATIENT
Start: 2022-03-29 | End: 2022-06-15

## 2022-04-04 ENCOUNTER — TELEPHONE (OUTPATIENT)
Dept: FAMILY MEDICINE CLINIC | Facility: CLINIC | Age: 62
End: 2022-04-04

## 2022-04-04 ENCOUNTER — OFFICE VISIT (OUTPATIENT)
Dept: ONCOLOGY | Facility: CLINIC | Age: 62
End: 2022-04-04

## 2022-04-04 VITALS
TEMPERATURE: 97.1 F | WEIGHT: 271 LBS | SYSTOLIC BLOOD PRESSURE: 128 MMHG | BODY MASS INDEX: 38.8 KG/M2 | OXYGEN SATURATION: 98 % | RESPIRATION RATE: 18 BRPM | HEART RATE: 85 BPM | HEIGHT: 70 IN | DIASTOLIC BLOOD PRESSURE: 66 MMHG

## 2022-04-04 DIAGNOSIS — C85.10 B-CELL LYMPHOMA, UNSPECIFIED B-CELL LYMPHOMA TYPE, UNSPECIFIED BODY REGION: Primary | ICD-10-CM

## 2022-04-04 PROCEDURE — 99214 OFFICE O/P EST MOD 30 MIN: CPT | Performed by: INTERNAL MEDICINE

## 2022-04-04 RX ORDER — AMOXICILLIN 875 MG/1
TABLET, COATED ORAL
COMMUNITY
Start: 2022-03-30 | End: 2022-06-15

## 2022-04-04 RX ORDER — FLUTICASONE PROPIONATE 50 MCG
SPRAY, SUSPENSION (ML) NASAL
COMMUNITY
Start: 2022-03-30

## 2022-04-04 RX ORDER — BACLOFEN 20 MG/1
20 TABLET ORAL 3 TIMES DAILY
Qty: 90 TABLET | Refills: 11 | Status: SHIPPED | OUTPATIENT
Start: 2022-04-04

## 2022-04-04 NOTE — PROGRESS NOTES
Follow Up Office Visit      Date: 2022     Patient Name: Kiran Belcher  MRN: [id number]  : 1960  Referring Physician: Chay Cameron     Chief Complaint:  Follow-up for concerns for lymphoma     History of Present Illness: Kiran Belcher is a pleasant 61 y.o. male past medical history of right RCC status post right nephrectomy, hypertension, hyperlipidemia, anxiety, type 2 diabetes who presents today for evaluation of concerns for lymphoma. The patient is accompanied by their wife who contributes to the history of their care.  Patient underwent a right nephrectomy on 2019 for renal cell carcinoma.  He has been followed by nephrology and urology since.  On his most recent scans there is concerns for enlarging retroperitoneal adenopathy.  He underwent an FNA which showed predominant small lymphocytes with a minor population of atypical B cells concerning for possible CLL/SLL.  PET/CT showed no abnormal hypermetabolic activity.  He overall feels well.  Denies any unexplained fevers, chills, night sweats, weight loss.  States that someone did not tell him he had an issue, he would feel completely normal at this time    Interval History:  Presents to clinic for follow-up.  Overall stable at this time.  Does note a upper respiratory infection which he is recovering from.  He otherwise denies any unexplained fevers or chills.  Denies any night sweats or weight loss    Oncology History:    Oncology/Hematology History    No history exists.       Subjective      Review of Systems:   Constitutional: Negative for fevers, chills, or weight loss  Eyes: Negative for blurred vision or discharge         Ear/Nose/Throat: Negative for difficulty swallowing, sore throat, LAD                                                       Respiratory: Negative for cough, SOA, wheezing                                                                                        Cardiovascular: Negative for chest pain or  palpitations                                                                  Gastrointestinal: Negative for nausea, vomiting or diarrhea                                                                     Genitourinary: Negative for dysuria or hematuria                                                                                           Musculoskeletal: Negative for any joint pains or muscle aches                                                                        Neurologic: Negative for any weakness, headaches, dizziness                                                                         Hematologic: Negative for any easy bleeding or bruising                                                                                   Psychiatric: Negative for anxiety or depression                          Past Medical History/Past Surgical History/ Family History/ Social History: Reviewed by me and unchanged from my previous documentation done on March 2022.     Medications:     Current Outpatient Medications:   •  amLODIPine-benazepril (LOTREL) 10-20 MG per capsule, Take 1 capsule by mouth Daily., Disp: 90 capsule, Rfl: 3  •  amoxicillin (AMOXIL) 875 MG tablet, , Disp: , Rfl:   •  atorvastatin (LIPITOR) 10 MG tablet, Take 1 tablet by mouth Daily., Disp: 90 tablet, Rfl: 0  •  baclofen (LIORESAL) 20 MG tablet, Take 1 tablet by mouth 3 (Three) Times a Day., Disp: 90 tablet, Rfl: 11  •  carvedilol (Coreg) 12.5 MG tablet, Take 1 tablet by mouth 2 (Two) Times a Day With Meals., Disp: 180 tablet, Rfl: 3  •  citalopram (CeleXA) 20 MG tablet, Take 1 tablet by mouth Daily., Disp: 90 tablet, Rfl: 3  •  dantrolene (DANTRIUM) 100 MG capsule, TAKE ONE CAPSULE BY MOUTH TWICE A DAY, Disp: 180 capsule, Rfl: 1  •  esomeprazole (nexIUM) 40 MG capsule, Take one tab PO daily, Disp: 90 capsule, Rfl: 3  •  fexofenadine (ALLEGRA) 180 MG tablet, Take 180 mg by mouth Daily., Disp: , Rfl:   •  fluocinonide (LIDEX) 0.05 % cream,  "fluocinonide 0.05 % topical cream, Disp: , Rfl:   •  fluticasone (FLONASE) 50 MCG/ACT nasal spray, , Disp: , Rfl:   •  glucose blood (Accu-Chek Elke Plus) test strip, 1 each by Other route 2 (Two) Times a Day. Use as instructed, Disp: 200 each, Rfl: 3  •  glyburide (DIAbeta) 5 MG tablet, Take 1 tablet by mouth Daily With Breakfast., Disp: 90 tablet, Rfl: 3  •  lactobacillus acidophilus (RISAQUAD) capsule capsule, Take 1 capsule by mouth Daily., Disp: 90 capsule, Rfl: 1  •  metFORMIN (GLUCOPHAGE) 500 MG tablet, Take 1 tablet by mouth 3 (Three) Times a Day., Disp: 270 tablet, Rfl: 3  •  sulfamethoxazole-trimethoprim (BACTRIM DS,SEPTRA DS) 800-160 MG per tablet, Take 1 tablet by mouth Every 12 (Twelve) Hours., Disp: , Rfl:   •  TOVIAZ 8 MG tablet sustained-release 24 hour tablet, , Disp: , Rfl:     Allergies:   Allergies   Allergen Reactions   • Levofloxacin Unknown (See Comments)     tendinopathy       Objective     Physical Exam:  Vital Signs:   Vitals:    04/04/22 1533   BP: 128/66   Pulse: 85   Resp: 18   Temp: 97.1 °F (36.2 °C)   TempSrc: Temporal   SpO2: 98%   Weight: 123 kg (271 lb)   Height: 177.8 cm (70\")   PainSc: 0-No pain     Pain Score    04/04/22 1533   PainSc: 0-No pain     ECOG Performance Status: 0 - Asymptomatic    Constitutional: NAD, ECOG 0  Eyes: PERRLA, scleral anicteric  ENT: No LAD, no thyromegaly  Respiratory: CTAB, no wheezing, rales, rhonchi  Cardiovascular: RRR, no murmurs, pulses 2+ bilaterally  Abdomen: soft, NT/ND, no HSM  Musculoskeletal: strength 5/5 bilaterally, no c/c/e  Neurologic: A&O x 3, CN II-XII intact grossly    Results Review:   No visits with results within 2 Week(s) from this visit.   Latest known visit with results is:   Lab on 03/21/2022   Component Date Value Ref Range Status   • Glucose 03/21/2022 107 (A) 65 - 99 mg/dL Final   • BUN 03/21/2022 11  6 - 20 mg/dL Final   • Creatinine 03/21/2022 1.00  0.76 - 1.27 mg/dL Final   • Sodium 03/21/2022 137  136 - 145 mmol/L Final "   • Potassium 03/21/2022 4.2  3.5 - 5.2 mmol/L Final   • Chloride 03/21/2022 99  98 - 107 mmol/L Final   • CO2 03/21/2022 25.0  22.0 - 29.0 mmol/L Final   • Calcium 03/21/2022 9.2  8.6 - 10.5 mg/dL Final   • Total Protein 03/21/2022 7.7  6.0 - 8.5 g/dL Final   • Albumin 03/21/2022 3.90  3.50 - 5.20 g/dL Final   • ALT (SGPT) 03/21/2022 20  1 - 41 U/L Final   • AST (SGOT) 03/21/2022 18  1 - 40 U/L Final   • Alkaline Phosphatase 03/21/2022 99  39 - 117 U/L Final   • Total Bilirubin 03/21/2022 0.2  0.0 - 1.2 mg/dL Final   • Globulin 03/21/2022 3.8  gm/dL Final    Calculated Result   • A/G Ratio 03/21/2022 1.0  g/dL Final   • BUN/Creatinine Ratio 03/21/2022 11.0  7.0 - 25.0 Final   • Anion Gap 03/21/2022 13.0  5.0 - 15.0 mmol/L Final   • eGFR 03/21/2022 86.7  >60.0 mL/min/1.73 Final    National Kidney Foundation and American Society of Nephrology (ASN) Task Force recommended calculation based on the Chronic Kidney Disease Epidemiology Collaboration (CKD-EPI) equation refit without adjustment for race.   • Reference Lab Report 03/21/2022    Final    See scanned report     • Performed by: 03/21/2022 Dr. Ayaan Bravo M.D.     Final   • Pathologist Interpretation 03/21/2022 Agree with differential. Negative for lymphocytosis.     Final   • LDH 03/21/2022 128 (A) 135 - 225 U/L Final   • Beta-2 Microglobulin 03/21/2022 3.4 (A) 0.8 - 2.2 mg/L Final   • IgG 03/21/2022 1396  603 - 1613 mg/dL Final   • IgA 03/21/2022 325  90 - 386 mg/dL Final   • IgM 03/21/2022 40  20 - 172 mg/dL Final   • Total Protein 03/21/2022 7.0  6.0 - 8.5 g/dL Final   • Albumin 03/21/2022 3.4  2.9 - 4.4 g/dL Final   • Alpha-1-Globulin 03/21/2022 0.3  0.0 - 0.4 g/dL Final   • Alpha-2-Globulin 03/21/2022 1.0  0.4 - 1.0 g/dL Final   • Beta Globulin 03/21/2022 1.2  0.7 - 1.3 g/dL Final   • Gamma Globulin 03/21/2022 1.2  0.4 - 1.8 g/dL Final   • M-Gennaro 03/21/2022 Not Observed  Not Observed g/dL Final   • Globulin 03/21/2022 3.6  2.2 - 3.9 g/dL Final   • A/G  Ratio 03/21/2022 1.0  0.7 - 1.7 Final   • Immunofixation Reflex, Serum 03/21/2022 Comment   Final    No monoclonality detected.   • Please note 03/21/2022 Comment   Final    Protein electrophoresis scan will follow via computer, mail, or   delivery.   • Free Light Chain, Venus 03/21/2022 60.1 (A) 3.3 - 19.4 mg/L Final   • Free Lambda Light Chains 03/21/2022 38.3 (A) 5.7 - 26.3 mg/L Final   • Kappa/Lambda Ratio 03/21/2022 1.57  0.26 - 1.65 Final   • WBC 03/21/2022 9.09  3.40 - 10.80 10*3/mm3 Final   • RBC 03/21/2022 3.87 (A) 4.14 - 5.80 10*6/mm3 Final   • Hemoglobin 03/21/2022 11.0 (A) 13.0 - 17.7 g/dL Final   • Hematocrit 03/21/2022 35.1 (A) 37.5 - 51.0 % Final   • MCV 03/21/2022 90.7  79.0 - 97.0 fL Final   • MCH 03/21/2022 28.4  26.6 - 33.0 pg Final   • MCHC 03/21/2022 31.3 (A) 31.5 - 35.7 g/dL Final   • RDW 03/21/2022 14.6  12.3 - 15.4 % Final   • RDW-SD 03/21/2022 47.7  37.0 - 54.0 fl Final   • MPV 03/21/2022 9.4  6.0 - 12.0 fL Final   • Platelets 03/21/2022 319  140 - 450 10*3/mm3 Final   • Neutrophil % 03/21/2022 74.0  42.7 - 76.0 % Final   • Lymphocyte % 03/21/2022 15.2 (A) 19.6 - 45.3 % Final   • Monocyte % 03/21/2022 6.7  5.0 - 12.0 % Final   • Eosinophil % 03/21/2022 3.0  0.3 - 6.2 % Final   • Basophil % 03/21/2022 0.2  0.0 - 1.5 % Final   • Immature Grans % 03/21/2022 0.9 (A) 0.0 - 0.5 % Final   • Neutrophils, Absolute 03/21/2022 6.73  1.70 - 7.00 10*3/mm3 Final   • Lymphocytes, Absolute 03/21/2022 1.38  0.70 - 3.10 10*3/mm3 Final   • Monocytes, Absolute 03/21/2022 0.61  0.10 - 0.90 10*3/mm3 Final   • Eosinophils, Absolute 03/21/2022 0.27  0.00 - 0.40 10*3/mm3 Final   • Basophils, Absolute 03/21/2022 0.02  0.00 - 0.20 10*3/mm3 Final   • Immature Grans, Absolute 03/21/2022 0.08 (A) 0.00 - 0.05 10*3/mm3 Final   • nRBC 03/21/2022 0.0  0.0 - 0.2 /100 WBC Final       No results found.    Assessment / Plan      Assessment/Plan:   1. B-cell lymphoma, unspecified B-cell lymphoma type, unspecified body  region (HCC) (Primary)  -Unclear etiology at this time  -Enlarging retroperitoneal adenopathy noted on recent CT scan  -FNA showing predominately small lymphocytes with a minor population of atypical B cells  -PET/CT showing no abnormal hypermetabolic activity at [facility]  -Peripheral flow without an immunophenotypic abnormality  -SPEP within normal limits  -LDH mildly decreased  -Kappa/lambda light chain ratio within normal limits, beta-2 microglobulin mildly elevated at 3.4  -At this time he likely has an indolent process.  We will plan to repeat CT scans in 3 months as well as a CBC, CMP, LDH  -Patient and wife are agreeable to this plan     2.  Right clear-cell renal cell carcinoma  -Status post nephrectomy in May 2019  -Has been without disease recurrence since         Follow Up:   Follow-up in 3 months with CT scans and labs     Jose Perdomo MD  Hematology and Oncology     Please note that portions of this note may have been completed with a voice recognition program. Efforts were made to edit the dictations, but occasionally words are mistranscribed.

## 2022-04-04 NOTE — TELEPHONE ENCOUNTER
Caller: Kiran Belcher    Relationship: Self    Best call back number: [phone]     What medication are you requesting: BACLOFEN 20 MG TABLET - 3 TIMES DAILY     What are your current symptoms: MUSCLE SPASMS AND TO KEEP THE PATIENT FROM BEING SO STIFF     How long have you been experiencing symptoms: THE PATIENT REPORTS HE HAS BEEN ON THIS MEDICATION FOR 15 YEARS (NOT ON HIS MEDICATION LIST)    Have you had these symptoms before:    [x] Yes  [] No    Have you been treated for these symptoms before:   [x] Yes  [] No    If a prescription is needed, what is your preferred pharmacy and phone number: [facility] [address] [place] [address] [phone] [facility] [phone]      Additional notes:  THE PATIENT REPORTS HE HAS BEEN OUT OF THIS MEDICATION SINCE Saturday, 04/02/2022    PLEASE CALL THE PATIENT IF ANY QUESTIONS OR CONCERNS -[phone]

## 2022-05-10 ENCOUNTER — OFFICE VISIT (OUTPATIENT)
Dept: SLEEP MEDICINE | Facility: HOSPITAL | Age: 62
End: 2022-05-10

## 2022-05-10 VITALS
HEART RATE: 71 BPM | OXYGEN SATURATION: 96 % | BODY MASS INDEX: 38.88 KG/M2 | DIASTOLIC BLOOD PRESSURE: 56 MMHG | SYSTOLIC BLOOD PRESSURE: 117 MMHG | HEIGHT: 70 IN

## 2022-05-10 DIAGNOSIS — G47.33 OSA (OBSTRUCTIVE SLEEP APNEA): Primary | ICD-10-CM

## 2022-05-10 PROCEDURE — 99213 OFFICE O/P EST LOW 20 MIN: CPT | Performed by: NURSE PRACTITIONER

## 2022-05-10 NOTE — PROGRESS NOTES
Chief Complaint:   Chief Complaint   Patient presents with   • Follow-up       HPI:    Kiran Belcher is a 61 y.o. male here for follow-up of sleep apnea.  Patient was last seen 3/8/2021.  Patient continues to do well with CPAP therapy.  Patient does get 8+ hours of sleep nightly and feels rested upon awakening.  Will take him 1 hour to go to sleep and he does not get up during the night.  Patient has an [place] score of 3/24.  Patient does feel CPAP is very beneficial.  He has no concerns or complaints and will continue therapy.        Current medications are:   Current Outpatient Medications:   •  amLODIPine-benazepril (LOTREL) 10-20 MG per capsule, Take 1 capsule by mouth Daily., Disp: 90 capsule, Rfl: 3  •  amoxicillin (AMOXIL) 875 MG tablet, , Disp: , Rfl:   •  atorvastatin (LIPITOR) 10 MG tablet, Take 1 tablet by mouth Daily., Disp: 90 tablet, Rfl: 0  •  baclofen (LIORESAL) 20 MG tablet, Take 1 tablet by mouth 3 (Three) Times a Day., Disp: 90 tablet, Rfl: 11  •  carvedilol (Coreg) 12.5 MG tablet, Take 1 tablet by mouth 2 (Two) Times a Day With Meals., Disp: 180 tablet, Rfl: 3  •  citalopram (CeleXA) 20 MG tablet, Take 1 tablet by mouth Daily., Disp: 90 tablet, Rfl: 3  •  dantrolene (DANTRIUM) 100 MG capsule, TAKE ONE CAPSULE BY MOUTH TWICE A DAY, Disp: 180 capsule, Rfl: 1  •  esomeprazole (nexIUM) 40 MG capsule, Take one tab PO daily, Disp: 90 capsule, Rfl: 3  •  fexofenadine (ALLEGRA) 180 MG tablet, Take 180 mg by mouth Daily., Disp: , Rfl:   •  fluocinonide (LIDEX) 0.05 % cream, fluocinonide 0.05 % topical cream, Disp: , Rfl:   •  fluticasone (FLONASE) 50 MCG/ACT nasal spray, , Disp: , Rfl:   •  glucose blood (Accu-Chek Elke Plus) test strip, 1 each by Other route 2 (Two) Times a Day. Use as instructed, Disp: 200 each, Rfl: 3  •  glyburide (DIAbeta) 5 MG tablet, Take 1 tablet by mouth Daily With Breakfast., Disp: 90 tablet, Rfl: 3  •  lactobacillus acidophilus (RISAQUAD) capsule capsule, Take 1  "capsule by mouth Daily., Disp: 90 capsule, Rfl: 1  •  metFORMIN (GLUCOPHAGE) 500 MG tablet, Take 1 tablet by mouth 3 (Three) Times a Day., Disp: 270 tablet, Rfl: 3  •  sulfamethoxazole-trimethoprim (BACTRIM DS,SEPTRA DS) 800-160 MG per tablet, Take 1 tablet by mouth Every 12 (Twelve) Hours., Disp: , Rfl:   •  TOVIAZ 8 MG tablet sustained-release 24 hour tablet, , Disp: , Rfl: .      The patient's relevant past medical, surgical, family and social history were reviewed and updated in Epic as appropriate.       Review of Systems   HENT: Positive for congestion.    Eyes: Positive for visual disturbance.   Respiratory: Positive for apnea.    Gastrointestinal:        Heartburn   Endocrine: Positive for polydipsia and polyuria.   Musculoskeletal: Positive for arthralgias, back pain, gait problem, joint swelling, myalgias and neck pain.   Allergic/Immunologic: Positive for environmental allergies.   Psychiatric/Behavioral: Positive for dysphoric mood and sleep disturbance. The patient is nervous/anxious.    All other systems reviewed and are negative.        Objective:    Physical Exam  Constitutional:       Appearance: Normal appearance.   HENT:      Head: Normocephalic and atraumatic.      Mouth/Throat:      Comments: Mallampati 4 anatomy  Cardiovascular:      Rate and Rhythm: Normal rate and regular rhythm.   Pulmonary:      Effort: Pulmonary effort is normal.      Breath sounds: Normal breath sounds.   Neurological:      Mental Status: He is alert and oriented to person, place, and time.   Psychiatric:         Mood and Affect: Mood normal.         Behavior: Behavior normal.         Thought Content: Thought content normal.         Judgment: Judgment normal.     /56   Pulse 71   Ht 177.8 cm (70\")   SpO2 96%   BMI 38.88 kg/m²     90/90 days of use  Greater than 4-hour use 100%  Setting 12 cm H2O  AHI of 3.2    ASSESSMENT/PLAN    Diagnoses and all orders for this visit:    1. NISA (obstructive sleep apnea) " (Primary)  -     PAP Therapy            1. Counseled patient regarding multimodal approach with healthy nutrition, healthy sleep, regular physical activity, social activities, counseling, and medications. Encouraged to practice lateral sleep position. Avoid alcohol and sedatives close to bedtime.  2. Refill supplies x1 year.  Return to clinic 1 year sooner symptoms warrant.    I have reviewed the results of my evaluation and impression and discussed my recommendations in detail with the patient.      Signed by  Janna Kramer, FARNAZ    May 10, 2022      CC: Chay Cameron MD          No ref. provider found

## 2022-06-09 DIAGNOSIS — E78.2 MIXED HYPERLIPIDEMIA: ICD-10-CM

## 2022-06-09 RX ORDER — ATORVASTATIN CALCIUM 10 MG/1
TABLET, FILM COATED ORAL
Qty: 90 TABLET | Refills: 0 | Status: SHIPPED | OUTPATIENT
Start: 2022-06-09 | End: 2022-09-16 | Stop reason: SDUPTHER

## 2022-06-15 ENCOUNTER — OFFICE VISIT (OUTPATIENT)
Dept: FAMILY MEDICINE CLINIC | Facility: CLINIC | Age: 62
End: 2022-06-15

## 2022-06-15 VITALS
DIASTOLIC BLOOD PRESSURE: 56 MMHG | SYSTOLIC BLOOD PRESSURE: 98 MMHG | WEIGHT: 270 LBS | BODY MASS INDEX: 38.65 KG/M2 | HEIGHT: 70 IN | HEART RATE: 81 BPM | TEMPERATURE: 98.6 F | RESPIRATION RATE: 24 BRPM | OXYGEN SATURATION: 97 %

## 2022-06-15 DIAGNOSIS — R05.9 COUGH: ICD-10-CM

## 2022-06-15 DIAGNOSIS — R82.90 FOUL SMELLING URINE: ICD-10-CM

## 2022-06-15 DIAGNOSIS — J02.9 PHARYNGITIS, UNSPECIFIED ETIOLOGY: ICD-10-CM

## 2022-06-15 DIAGNOSIS — I10 BENIGN ESSENTIAL HYPERTENSION: Chronic | ICD-10-CM

## 2022-06-15 LAB
EXPIRATION DATE: NORMAL
INTERNAL CONTROL: NORMAL
Lab: NORMAL
S PYO AG THROAT QL: NEGATIVE

## 2022-06-15 PROCEDURE — 99214 OFFICE O/P EST MOD 30 MIN: CPT | Performed by: STUDENT IN AN ORGANIZED HEALTH CARE EDUCATION/TRAINING PROGRAM

## 2022-06-15 PROCEDURE — 87880 STREP A ASSAY W/OPTIC: CPT | Performed by: STUDENT IN AN ORGANIZED HEALTH CARE EDUCATION/TRAINING PROGRAM

## 2022-06-15 RX ORDER — CARVEDILOL 12.5 MG/1
6.25 TABLET ORAL 2 TIMES DAILY WITH MEALS
Qty: 180 TABLET | Refills: 3 | Status: SHIPPED
Start: 2022-06-15

## 2022-06-15 RX ORDER — BENZONATATE 100 MG/1
100 CAPSULE ORAL 3 TIMES DAILY PRN
Qty: 30 CAPSULE | Refills: 0 | Status: SHIPPED | OUTPATIENT
Start: 2022-06-15 | End: 2022-10-11

## 2022-06-15 NOTE — PROGRESS NOTES
Established Patient Office Visit      Subjective      Chief Complaint:  Sore Throat (Monday night scratchy throat went into sore throat, now has a cough, worried about possible covid, no ill exposures. Cough sounded wet in chest when trying to cough.  Was also worried about having a uti but didn't bring catheter to check for this. He said last night he was having cold chills last night as he had the temp in house set to 73 and he felt cold but felt to hot this morning.)      History of Present Illness: Kiran Belcher is a 61 y.o. male who presents for sore throat.    Monday night scratchy throat went into sore throat, now has a cough, worried about possible covid, no ill exposures. Cough sounded wet in chest when trying to cough. NO SOB. +congestion     Patient had change to color of urine and some foul odor. No burning with urine.       Past Medical History:   Diagnosis Date   • B-cell lymphoma (HCC) 02/2022    Undergoing work-up with    • Cancer ([facility])     hx spot on right kidney, for ~8 years, told cancer but no hx biopsy and no interventions and just watching it. Dr. vega used to watch this cyst, then started with  urology a year ago after he retired.   • Decreased mobility    • Depression    • Diabetes mellitus ([facility])    • Esophageal reflux    • Glaucoma    • History of methicillin resistant Staphylococcus aureus infection    • History of obstructive sleep apnea    • History of quadriplegia     of unknown etiology - C5-C7, incomplete    • History of spinal cord injury/quadriplegia 2008 09/14/2018   • Hyperlipidemia    • Hypertension    • Kidney disorder 9/14/2018   • Paraplegia ([facility])        Patient Active Problem List   Diagnosis   • Benign essential hypertension   • Depression   • Gastroesophageal reflux disease without esophagitis   • Hyperlipidemia   • NISA on CPAP   • Diabetes mellitus, type II ([facility])   • Spasm   • Obesity   • Right otitis externa   • Hx of spinal cord injury   • Kidney disorder   •  Lower extremity weakness   • Myalgia   • Moderate episode of recurrent major depressive disorder (HCC)   • Dysphagia   • Toenail avulsion   • H/O kidney removal   • Renal cell carcinoma of right kidney (HCC)   • Paraplegia (HCC)   • Muscle contracture   • C5-C7 level spinal injury with complete lesion of spinal cord, without evidence of spinal bone injury (HCC)   • Inability to bear weight   • Neurogenic bladder   • Venous stasis   • B-cell lymphoma (HCC)         Current Outpatient Medications:   •  amLODIPine-benazepril (LOTREL) 10-20 MG per capsule, Take 1 capsule by mouth Daily., Disp: 90 capsule, Rfl: 3  •  atorvastatin (LIPITOR) 10 MG tablet, TAKE ONE TABLET BY MOUTH DAILY, Disp: 90 tablet, Rfl: 0  •  baclofen (LIORESAL) 20 MG tablet, Take 1 tablet by mouth 3 (Three) Times a Day., Disp: 90 tablet, Rfl: 11  •  carvedilol (Coreg) 12.5 MG tablet, Take 0.5 tablets by mouth 2 (Two) Times a Day With Meals., Disp: 180 tablet, Rfl: 3  •  citalopram (CeleXA) 20 MG tablet, Take 1 tablet by mouth Daily., Disp: 90 tablet, Rfl: 3  •  dantrolene (DANTRIUM) 100 MG capsule, TAKE ONE CAPSULE BY MOUTH TWICE A DAY, Disp: 180 capsule, Rfl: 1  •  esomeprazole (nexIUM) 40 MG capsule, Take one tab PO daily, Disp: 90 capsule, Rfl: 3  •  fexofenadine (ALLEGRA) 180 MG tablet, Take 180 mg by mouth Daily., Disp: , Rfl:   •  fluocinonide (LIDEX) 0.05 % cream, fluocinonide 0.05 % topical cream, Disp: , Rfl:   •  fluticasone (FLONASE) 50 MCG/ACT nasal spray, , Disp: , Rfl:   •  glucose blood (Accu-Chek Elke Plus) test strip, 1 each by Other route 2 (Two) Times a Day. Use as instructed, Disp: 200 each, Rfl: 3  •  glyburide (DIAbeta) 5 MG tablet, Take 1 tablet by mouth Daily With Breakfast., Disp: 90 tablet, Rfl: 3  •  lactobacillus acidophilus (RISAQUAD) capsule capsule, Take 1 capsule by mouth Daily., Disp: 90 capsule, Rfl: 1  •  metFORMIN (GLUCOPHAGE) 500 MG tablet, Take 1 tablet by mouth 3 (Three) Times a Day., Disp: 270 tablet, Rfl: 3  •  " TOVIAZ 8 MG tablet sustained-release 24 hour tablet, , Disp: , Rfl:   •  benzonatate (Tessalon Perles) 100 MG capsule, Take 1 capsule by mouth 3 (Three) Times a Day As Needed for Cough., Disp: 30 capsule, Rfl: 0      Objective     Physical Exam:   Vital Signs:   BP 98/56 (BP Location: Right arm, Patient Position: Sitting, Cuff Size: Adult)   Pulse 81   Temp 98.6 °F (37 °C) (Temporal)   Resp 24   Ht 177.8 cm (70\")   Wt 122 kg (270 lb)   SpO2 97%   BMI 38.74 kg/m²      Physical Exam  Constitutional:       General: He is not in acute distress.     Appearance: He is not ill-appearing.   Cardiovascular:      Rate and Rhythm: Normal rate and regular rhythm.   Pulmonary:      Effort: Pulmonary effort is normal.      Breath sounds: Normal breath sounds.   Neurological:      Mental Status: He is alert.   Psychiatric:         Thought Content: Thought content normal.   Mild cervical adenopathy   Oropharynx erythematous  No suprapubic tenderness  Left TM within normal limits.  Cerumen impaction right ear       Assessment / Plan      Assessment/Plan:   Diagnoses and all orders for this visit:    1. Pharyngitis, unspecified etiology  2. Cough  -     COVID-19 PCR, [facility], NP SWAB IN [facility] VIRAL TRANSPORT MEDIA/ORAL SWISH 24-30 HR TAT - Swab, Nasopharynx; Future  -     -     POCT rapid strep A  -    Strep negative possible viral URI check for COVID isolate until result  -Tessalon Perles    3. Foul smelling urine  -     Urine Culture - Urine, Urine, Catheter In/Out; Future  -     Urinalysis With Microscopic - Urine, Clean Catch; Future  -     Patient has no dysuria or increased urination and if he has any new symptoms he will go and bring in a urine specimen he requires urine catheterization.  He has neurogenic bladder    4. Benign essential hypertension  -Decrease carvedilol to 6.25 mg given mildly low blood pressure today   -Continue Amlodipine-benazepril    Other orders  -     carvedilol (Coreg) 12.5 MG tablet; Take " 0.5 tablets by mouth 2 (Two) Times a Day With Meals.  Dispense: 180 tablet; Refill: 3  -     benzonatate (Tessalon Perles) 100 MG capsule; Take 1 capsule by mouth 3 (Three) Times a Day As Needed for Cough.  Dispense: 30 capsule; Refill: 0          Follow Up:   No follow-ups on file.      MDM: 3 or greater diagnostic test prescription drug management    Chay Cameron MD  Family Medicine - [facility]

## 2022-06-16 ENCOUNTER — LAB (OUTPATIENT)
Dept: LAB | Facility: HOSPITAL | Age: 62
End: 2022-06-16

## 2022-06-16 DIAGNOSIS — R05.9 COUGH: ICD-10-CM

## 2022-06-16 PROCEDURE — U0004 COV-19 TEST NON-CDC HGH THRU: HCPCS

## 2022-06-17 ENCOUNTER — TELEPHONE (OUTPATIENT)
Dept: FAMILY MEDICINE CLINIC | Facility: CLINIC | Age: 62
End: 2022-06-17

## 2022-06-17 DIAGNOSIS — U07.1 COVID-19 VIRUS INFECTION: Primary | ICD-10-CM

## 2022-06-17 LAB — SARS-COV-2 RNA NOSE QL NAA+PROBE: DETECTED

## 2022-06-17 NOTE — TELEPHONE ENCOUNTER
I discussed potential side effects and rebound with pack Slo-Bid.  Patient does desire to go forward with pAXLOVID prescription discussed you a authorization will send in pack Slo-Bid.  Med list reviewed.  I have told him to hold atorvastatin while taking this medication.     Seek care for worsening \    Chay Cameron MD  Family Medicine - [facility]

## 2022-06-23 ENCOUNTER — TELEPHONE (OUTPATIENT)
Dept: FAMILY MEDICINE CLINIC | Facility: CLINIC | Age: 62
End: 2022-06-23

## 2022-06-23 NOTE — TELEPHONE ENCOUNTER
Caller: Kiran Belcher    Relationship: Self    Best call back number: [phone]    What is the best time to reach you: ANYTIME     Who are you requesting to speak with (clinical staff, provider,  specific staff member): CLINICAL STAFF     What was the call regarding: PATIENT TESTED POSITIVE FOR COVID LAST WEEK AND HAS TAKEN ALL OF HIS MEDICATION. HE ONLY HAS A COUGH BUT HE IS STILL TESTING POSITIVE. HE WOULD LIKE TO KNOW IF HE NEEDS TO CONTINUE ANY MEDICATIONS.     Do you require a callback: YES

## 2022-06-27 ENCOUNTER — HOSPITAL ENCOUNTER (OUTPATIENT)
Dept: CT IMAGING | Facility: HOSPITAL | Age: 62
Discharge: HOME | End: 2022-06-27

## 2022-06-30 DIAGNOSIS — S14.115A SPINAL CORD INJURY AT C5-C7 LEVEL WITH COMPLETE SPINAL CORD LESION: ICD-10-CM

## 2022-07-01 RX ORDER — DANTROLENE SODIUM 100 MG/1
100 CAPSULE ORAL 2 TIMES DAILY
Qty: 180 CAPSULE | Refills: 0 | Status: SHIPPED | OUTPATIENT
Start: 2022-07-01 | End: 2022-09-26

## 2022-07-12 ENCOUNTER — HOSPITAL ENCOUNTER (OUTPATIENT)
Dept: CT IMAGING | Facility: HOSPITAL | Age: 62
Discharge: HOME | End: 2022-07-12

## 2022-07-12 ENCOUNTER — LAB (OUTPATIENT)
Dept: LAB | Facility: HOSPITAL | Age: 62
End: 2022-07-12

## 2022-07-12 DIAGNOSIS — C85.10 B-CELL LYMPHOMA, UNSPECIFIED B-CELL LYMPHOMA TYPE, UNSPECIFIED BODY REGION: ICD-10-CM

## 2022-07-12 LAB
ALBUMIN SERPL-MCNC: 3.8 G/DL (ref 3.5–5.2)
ALBUMIN/GLOB SERPL: 1.1 G/DL
ALP SERPL-CCNC: 114 U/L (ref 39–117)
ALT SERPL W P-5'-P-CCNC: 16 U/L (ref 1–41)
ANION GAP SERPL CALCULATED.3IONS-SCNC: 12 MMOL/L (ref 5–15)
AST SERPL-CCNC: 25 U/L (ref 1–40)
BASOPHILS # BLD AUTO: 0.02 10*3/MM3 (ref 0–0.2)
BASOPHILS NFR BLD AUTO: 0.1 % (ref 0–1.5)
BILIRUB SERPL-MCNC: 0.3 MG/DL (ref 0–1.2)
BUN SERPL-MCNC: 13 MG/DL (ref 6–20)
BUN/CREAT SERPL: 14.3 (ref 7–25)
CALCIUM SPEC-SCNC: 8.8 MG/DL (ref 8.6–10.5)
CHLORIDE SERPL-SCNC: 97 MMOL/L (ref 98–107)
CO2 SERPL-SCNC: 25 MMOL/L (ref 22–29)
CREAT BLDA-MCNC: 0.9 MG/DL (ref 0.6–1.3)
CREAT SERPL-MCNC: 0.91 MG/DL (ref 0.76–1.27)
DEPRECATED RDW RBC AUTO: 47.5 FL (ref 37–54)
EGFRCR SERPLBLD CKD-EPI 2021: 97.1 ML/MIN/1.73
EOSINOPHIL # BLD AUTO: 0.3 10*3/MM3 (ref 0–0.4)
EOSINOPHIL NFR BLD AUTO: 2.1 % (ref 0.3–6.2)
ERYTHROCYTE [DISTWIDTH] IN BLOOD BY AUTOMATED COUNT: 14.6 % (ref 12.3–15.4)
GLOBULIN UR ELPH-MCNC: 3.5 GM/DL
GLUCOSE SERPL-MCNC: 154 MG/DL (ref 65–99)
HCT VFR BLD AUTO: 33.1 % (ref 37.5–51)
HGB BLD-MCNC: 10.5 G/DL (ref 13–17.7)
IMM GRANULOCYTES # BLD AUTO: 0.08 10*3/MM3 (ref 0–0.05)
IMM GRANULOCYTES NFR BLD AUTO: 0.6 % (ref 0–0.5)
LDH SERPL-CCNC: 185 U/L (ref 135–225)
LYMPHOCYTES # BLD AUTO: 1.05 10*3/MM3 (ref 0.7–3.1)
LYMPHOCYTES NFR BLD AUTO: 7.5 % (ref 19.6–45.3)
MCH RBC QN AUTO: 28.4 PG (ref 26.6–33)
MCHC RBC AUTO-ENTMCNC: 31.7 G/DL (ref 31.5–35.7)
MCV RBC AUTO: 89.5 FL (ref 79–97)
MONOCYTES # BLD AUTO: 0.95 10*3/MM3 (ref 0.1–0.9)
MONOCYTES NFR BLD AUTO: 6.8 % (ref 5–12)
NEUTROPHILS NFR BLD AUTO: 11.62 10*3/MM3 (ref 1.7–7)
NEUTROPHILS NFR BLD AUTO: 82.9 % (ref 42.7–76)
NRBC BLD AUTO-RTO: 0 /100 WBC (ref 0–0.2)
PLATELET # BLD AUTO: 368 10*3/MM3 (ref 140–450)
PMV BLD AUTO: 9.8 FL (ref 6–12)
POTASSIUM SERPL-SCNC: 4.2 MMOL/L (ref 3.5–5.2)
PROT SERPL-MCNC: 7.3 G/DL (ref 6–8.5)
RBC # BLD AUTO: 3.7 10*6/MM3 (ref 4.14–5.8)
SODIUM SERPL-SCNC: 134 MMOL/L (ref 136–145)
WBC NRBC COR # BLD: 14.02 10*3/MM3 (ref 3.4–10.8)

## 2022-07-12 PROCEDURE — 82565 ASSAY OF CREATININE: CPT

## 2022-07-12 PROCEDURE — 25010000002 IOPAMIDOL 61 % SOLUTION: Performed by: INTERNAL MEDICINE

## 2022-07-12 PROCEDURE — 83615 LACTATE (LD) (LDH) ENZYME: CPT

## 2022-07-12 PROCEDURE — 74177 CT ABD & PELVIS W/CONTRAST: CPT

## 2022-07-12 PROCEDURE — 80053 COMPREHEN METABOLIC PANEL: CPT

## 2022-07-12 PROCEDURE — 71260 CT THORAX DX C+: CPT

## 2022-07-12 PROCEDURE — 36415 COLL VENOUS BLD VENIPUNCTURE: CPT

## 2022-07-12 PROCEDURE — 85025 COMPLETE CBC W/AUTO DIFF WBC: CPT

## 2022-07-12 RX ADMIN — IOPAMIDOL 95 ML: 612 INJECTION, SOLUTION INTRAVENOUS at 15:58

## 2022-07-15 ENCOUNTER — OFFICE VISIT (OUTPATIENT)
Dept: ONCOLOGY | Facility: CLINIC | Age: 62
End: 2022-07-15

## 2022-07-15 VITALS
HEART RATE: 84 BPM | WEIGHT: 270 LBS | OXYGEN SATURATION: 98 % | HEIGHT: 70 IN | RESPIRATION RATE: 18 BRPM | SYSTOLIC BLOOD PRESSURE: 109 MMHG | DIASTOLIC BLOOD PRESSURE: 64 MMHG | TEMPERATURE: 98.2 F | BODY MASS INDEX: 38.65 KG/M2

## 2022-07-15 DIAGNOSIS — C85.10 B-CELL LYMPHOMA, UNSPECIFIED B-CELL LYMPHOMA TYPE, UNSPECIFIED BODY REGION: Primary | ICD-10-CM

## 2022-07-15 PROCEDURE — 99214 OFFICE O/P EST MOD 30 MIN: CPT | Performed by: INTERNAL MEDICINE

## 2022-07-15 RX ORDER — AMOXICILLIN AND CLAVULANATE POTASSIUM 500; 125 MG/1; MG/1
1 TABLET, FILM COATED ORAL 2 TIMES DAILY
Qty: 14 TABLET | Refills: 0 | Status: SHIPPED | OUTPATIENT
Start: 2022-07-15 | End: 2022-07-29

## 2022-07-15 NOTE — PROGRESS NOTES
Follow Up Office Visit      Date: 07/15/2022     Patient Name: Kiran Belcher  MRN: [id number]  : 1960  Referring Physician: Chay Cameron     Chief Complaint:  Follow-up for concerns for lymphoma     History of Present Illness: Kiran Belcher is a pleasant 61 y.o. male past medical history of right RCC status post right nephrectomy, hypertension, hyperlipidemia, anxiety, type 2 diabetes who presents today for evaluation of concerns for lymphoma. The patient is accompanied by their wife who contributes to the history of their care.  Patient underwent a right nephrectomy on 2019 for renal cell carcinoma.  He has been followed by nephrology and urology since.  On his most recent scans there is concerns for enlarging retroperitoneal adenopathy.  He underwent an FNA which showed predominant small lymphocytes with a minor population of atypical B cells concerning for possible CLL/SLL.  PET/CT showed no abnormal hypermetabolic activity.  He overall feels well.  Denies any unexplained fevers, chills, night sweats, weight loss.  States that someone did not tell him he had an issue, he would feel completely normal at this time     Interval History:  Presents to clinic for follow-up.  Recently diagnosed with COVID in 2022.  Still having some fatigue and cough related to this.  Denies any fevers or chills.  Denies any weight loss or night sweats    Oncology History:    Oncology/Hematology History    No history exists.       Subjective      Review of Systems:   Constitutional: Negative for fevers, chills, or weight loss  Eyes: Negative for blurred vision or discharge         Ear/Nose/Throat: Negative for difficulty swallowing, sore throat, LAD                                                       Respiratory: Negative for cough, SOA, wheezing                                                                                        Cardiovascular: Negative for chest pain or palpitations                                                                   Gastrointestinal: Negative for nausea, vomiting or diarrhea                                                                     Genitourinary: Negative for dysuria or hematuria                                                                                           Musculoskeletal: Negative for any joint pains or muscle aches                                                                        Neurologic: Negative for any weakness, headaches, dizziness                                                                         Hematologic: Negative for any easy bleeding or bruising                                                                                   Psychiatric: Negative for anxiety or depression                          Past Medical History/Past Surgical History/ Family History/ Social History: Reviewed by me and unchanged from my previous documentation done on April 2022.     Medications:     Current Outpatient Medications:   •  amLODIPine-benazepril (LOTREL) 10-20 MG per capsule, Take 1 capsule by mouth Daily., Disp: 90 capsule, Rfl: 3  •  atorvastatin (LIPITOR) 10 MG tablet, TAKE ONE TABLET BY MOUTH DAILY, Disp: 90 tablet, Rfl: 0  •  baclofen (LIORESAL) 20 MG tablet, Take 1 tablet by mouth 3 (Three) Times a Day., Disp: 90 tablet, Rfl: 11  •  benzonatate (Tessalon Perles) 100 MG capsule, Take 1 capsule by mouth 3 (Three) Times a Day As Needed for Cough., Disp: 30 capsule, Rfl: 0  •  carvedilol (Coreg) 12.5 MG tablet, Take 0.5 tablets by mouth 2 (Two) Times a Day With Meals., Disp: 180 tablet, Rfl: 3  •  citalopram (CeleXA) 20 MG tablet, Take 1 tablet by mouth Daily., Disp: 90 tablet, Rfl: 3  •  dantrolene (DANTRIUM) 100 MG capsule, Take 1 capsule by mouth 2 (Two) Times a Day., Disp: 180 capsule, Rfl: 0  •  esomeprazole (nexIUM) 40 MG capsule, Take one tab PO daily, Disp: 90 capsule, Rfl: 3  •  fexofenadine (ALLEGRA) 180 MG tablet, Take 180 mg by mouth  "Daily., Disp: , Rfl:   •  fluocinonide (LIDEX) 0.05 % cream, fluocinonide 0.05 % topical cream, Disp: , Rfl:   •  fluticasone (FLONASE) 50 MCG/ACT nasal spray, , Disp: , Rfl:   •  glucose blood (Accu-Chek Elke Plus) test strip, 1 each by Other route 2 (Two) Times a Day. Use as instructed, Disp: 200 each, Rfl: 3  •  glyburide (DIAbeta) 5 MG tablet, Take 1 tablet by mouth Daily With Breakfast., Disp: 90 tablet, Rfl: 3  •  lactobacillus acidophilus (RISAQUAD) capsule capsule, Take 1 capsule by mouth Daily., Disp: 90 capsule, Rfl: 1  •  metFORMIN (GLUCOPHAGE) 500 MG tablet, Take 1 tablet by mouth 3 (Three) Times a Day., Disp: 270 tablet, Rfl: 3  •  TOVIAZ 8 MG tablet sustained-release 24 hour tablet, , Disp: , Rfl:   •  amoxicillin-clavulanate (Augmentin) 500-125 MG per tablet, Take 1 tablet by mouth 2 (Two) Times a Day., Disp: 14 tablet, Rfl: 0    Allergies:   Allergies   Allergen Reactions   • Levofloxacin Unknown (See Comments)     tendinopathy       Objective     Physical Exam:  Vital Signs:   Vitals:    07/15/22 1309   BP: 109/64  Comment: LUE   Pulse: 84   Resp: 18   Temp: 98.2 °F (36.8 °C)   TempSrc: Infrared   SpO2: 98%  Comment: RA   Weight: 122 kg (270 lb)  Comment: per pt   Height: 177.8 cm (70\")   PainSc: 0-No pain     Pain Score    07/15/22 1309   PainSc: 0-No pain     ECOG Performance Status: 0 - Asymptomatic    Constitutional: NAD, ECOG 0  Eyes: PERRLA, scleral anicteric  ENT: No LAD, no thyromegaly  Respiratory: CTAB, no wheezing, rales, rhonchi  Cardiovascular: RRR, no murmurs, pulses 2+ bilaterally  Abdomen: soft, NT/ND, no HSM  Musculoskeletal: strength 5/5 bilaterally, no c/c/e  Neurologic: A&O x 3, CN II-XII intact grossly    Results Review:   Hospital Outpatient Visit on 07/12/2022   Component Date Value Ref Range Status   • Creatinine 07/12/2022 0.90  0.60 - 1.30 mg/dL Final    Serial Number: [id number]:  [id number]   Lab on 07/12/2022   Component Date Value Ref Range Status   • Glucose " 07/12/2022 154 (A) 65 - 99 mg/dL Final   • BUN 07/12/2022 13  6 - 20 mg/dL Final   • Creatinine 07/12/2022 0.91  0.76 - 1.27 mg/dL Final   • Sodium 07/12/2022 134 (A) 136 - 145 mmol/L Final   • Potassium 07/12/2022 4.2  3.5 - 5.2 mmol/L Final    Slight hemolysis detected by analyzer. Results may be affected.   • Chloride 07/12/2022 97 (A) 98 - 107 mmol/L Final   • CO2 07/12/2022 25.0  22.0 - 29.0 mmol/L Final   • Calcium 07/12/2022 8.8  8.6 - 10.5 mg/dL Final   • Total Protein 07/12/2022 7.3  6.0 - 8.5 g/dL Final   • Albumin 07/12/2022 3.80  3.50 - 5.20 g/dL Final   • ALT (SGPT) 07/12/2022 16  1 - 41 U/L Final   • AST (SGOT) 07/12/2022 25  1 - 40 U/L Final   • Alkaline Phosphatase 07/12/2022 114  39 - 117 U/L Final   • Total Bilirubin 07/12/2022 0.3  0.0 - 1.2 mg/dL Final   • Globulin 07/12/2022 3.5  gm/dL Final    Calculated Result   • A/G Ratio 07/12/2022 1.1  g/dL Final   • BUN/Creatinine Ratio 07/12/2022 14.3  7.0 - 25.0 Final   • Anion Gap 07/12/2022 12.0  5.0 - 15.0 mmol/L Final   • eGFR 07/12/2022 97.1  >60.0 mL/min/1.73 Final    National Kidney Foundation and American Society of Nephrology (ASN) Task Force recommended calculation based on the Chronic Kidney Disease Epidemiology Collaboration (CKD-EPI) equation refit without adjustment for race.   • LDH 07/12/2022 185  135 - 225 U/L Final   • WBC 07/12/2022 14.02 (A) 3.40 - 10.80 10*3/mm3 Final   • RBC 07/12/2022 3.70 (A) 4.14 - 5.80 10*6/mm3 Final   • Hemoglobin 07/12/2022 10.5 (A) 13.0 - 17.7 g/dL Final   • Hematocrit 07/12/2022 33.1 (A) 37.5 - 51.0 % Final   • MCV 07/12/2022 89.5  79.0 - 97.0 fL Final   • MCH 07/12/2022 28.4  26.6 - 33.0 pg Final   • MCHC 07/12/2022 31.7  31.5 - 35.7 g/dL Final   • RDW 07/12/2022 14.6  12.3 - 15.4 % Final   • RDW-SD 07/12/2022 47.5  37.0 - 54.0 fl Final   • MPV 07/12/2022 9.8  6.0 - 12.0 fL Final   • Platelets 07/12/2022 368  140 - 450 10*3/mm3 Final   • Neutrophil % 07/12/2022 82.9 (A) 42.7 - 76.0 % Final   • Lymphocyte %  07/12/2022 7.5 (A) 19.6 - 45.3 % Final   • Monocyte % 07/12/2022 6.8  5.0 - 12.0 % Final   • Eosinophil % 07/12/2022 2.1  0.3 - 6.2 % Final   • Basophil % 07/12/2022 0.1  0.0 - 1.5 % Final   • Immature Grans % 07/12/2022 0.6 (A) 0.0 - 0.5 % Final   • Neutrophils, Absolute 07/12/2022 11.62 (A) 1.70 - 7.00 10*3/mm3 Final   • Lymphocytes, Absolute 07/12/2022 1.05  0.70 - 3.10 10*3/mm3 Final   • Monocytes, Absolute 07/12/2022 0.95 (A) 0.10 - 0.90 10*3/mm3 Final   • Eosinophils, Absolute 07/12/2022 0.30  0.00 - 0.40 10*3/mm3 Final   • Basophils, Absolute 07/12/2022 0.02  0.00 - 0.20 10*3/mm3 Final   • Immature Grans, Absolute 07/12/2022 0.08 (A) 0.00 - 0.05 10*3/mm3 Final   • nRBC 07/12/2022 0.0  0.0 - 0.2 /100 WBC Final       CT Abdomen Pelvis With Contrast, CT Chest With Contrast Diagnostic    Result Date: 7/13/2022  Narrative: DATE OF EXAM: 7/12/2022 3:18 PM  PROCEDURE: CT CHEST W CONTRAST DIAGNOSTIC-, CT ABDOMEN PELVIS W CONTRAST-  INDICATIONS: enlarged retroperitoneal lymphadenopathy; C85.10-Unspecified B-cell lymphoma, unspecified site  COMPARISON: Outside PET/CT scan dated 03/09/2022 and multiple previous outside abdominal studies, generally of the abdomen and pelvis.  TECHNIQUE: Routine transaxial slices were obtained through the chest after the intravenous administration of 95 mL of Isovue 300. Reconstructed coronal and sagittal images were also obtained. Automated exposure control and iterative construction methods were used.  The radiation dose reduction device was turned on for each scan per the ALARA (As Low as Reasonably Achievable) protocol.  FINDINGS: Patient history indicates B-cell lymphoma with retroperitoneal lymphadenopathy. History also indicates right nephrectomy for renal cell carcinoma in 2019. More recent retroperitoneal vishnu biopsy, with findings of B-cell lymphoma. By history, outside PET/CT scan showed no abnormal hypermetabolic activity, suggesting indolent process.  CHEST CT SCAN WITH IV  CONTRAST:  FINDINGS: Today's exam shows no evidence of significant pulmonary parenchymal disease or evidence of pleural effusion. A couple of right lung granulomas are again noted. There is no evidence of significant axillary or lower cervical lymphadenopathy. Densely calcified right azygous lymph node is again seen. There are a couple of shotty right epicardial lymph nodes, also present on previous PET/CT scan and not significantly changed, the largest node approximately 11 mm in diameter. Bony structures appear intact. There is moderate multilevel thoracic spine degenerative disc disease.  Incidental note is made of a couple of small left lower thyroid nodules as on the prior study.      Impression: Small right epicardial lymph nodes unchanged. No evidence of significant adenopathy or other active chest pathology.    ABDOMEN AND PELVIS CT SCAN WITH IV CONTRAST:  FINDINGS: There is diffuse fatty liver change. No hepatic lesions are identified. Small gallstones are seen in the dependent portion of the otherwise normal-appearing gallbladder. Spleen is normal in size and contains multiple granulomatous calcifications. No significant abnormalities are seen of the pancreas, adrenal glands, or the remaining left kidney. In particular, left renal cortical enhancement appears uniform and normal. No upper abdominal ascites is seen. There is subtle retroperitoneal fat stranding between the aorta and lumbar spine, at the approximate level of the left renal vein, with shotty lymph nodes which appear to be slightly more numerous than on the prior study. The largest of these nodes, between the IVC and aorta appears slightly increased from 12 mm to approximately 15 mm. More inferiorly, just above the aortoiliac bifurcation, there is a larger left-sided node, increased from 20 mm in long axis to approximately 23 mm. Proximal left common iliac node is increased from 13 mm to 18 mm. Shotty right pelvic sidewall nodes and inguinal  lymph nodes appear mildly increased as well. The largest node is a partially fat-containing right internal iliac node or possibly confluent adjacent nodes, with combined diameter today of 37 mm, previously 32 mm. The bladder today appears mildly thick-walled and possibly inflamed, as there is some new surrounding fat stranding. No intrapelvic free fluid is seen. Bowel loops are normal in caliber and appearance. Bony structures appear to be intact. Delayed venous phase images show no evidence of obstructive uropathy.      IMPRESSION:  1. Mild uniform enlargement of the patient's retroperitoneal, pelvic and inguinal lymph nodes since 03/09/2022 PET CT scan consistent with mild progression of lymphadenopathy. 2. New bladder wall inflammation, suspicious for cystitis. 3. New mild retroperitoneal fat stranding associated with some of the proximal retroperitoneal nodes, which is nonspecific. Although this can be seen in association with worsening lymphoma, this could also represent reactive lymphadenopathy from the patient's presumed cystitis. Comparison with urinalysis is suggested. 4. Incidentally noted cholelithiasis.  This report was finalized on 7/13/2022 11:21 PM by Dr. Hayden Yi MD.      NM outside films    Result Date: 7/13/2022  Narrative: This procedure was auto-finalized with no dictation required.    US outside films    Result Date: 7/13/2022  Narrative: This procedure was auto-finalized with no dictation required.    US outside films    Result Date: 7/13/2022  Narrative: This procedure was auto-finalized with no dictation required.    US outside films    Result Date: 7/13/2022  Narrative: This procedure was auto-finalized with no dictation required.    CT outside films    Result Date: 7/13/2022  Narrative: This procedure was auto-finalized with no dictation required.    CT outside films    Result Date: 7/13/2022  Narrative: This procedure was auto-finalized with no dictation required.    CT outside  films    Result Date: 7/13/2022  Narrative: This procedure was auto-finalized with no dictation required.    CT outside films    Result Date: 7/13/2022  Narrative: This procedure was auto-finalized with no dictation required.    CT outside films    Result Date: 7/13/2022  Narrative: This procedure was auto-finalized with no dictation required.    CT outside films    Result Date: 7/13/2022  Narrative: This procedure was auto-finalized with no dictation required.    CT outside films    Result Date: 7/13/2022  Narrative: This procedure was auto-finalized with no dictation required.    CT outside films    Result Date: 7/13/2022  Narrative: This procedure was auto-finalized with no dictation required.      Assessment / Plan      Assessment/Plan:   1. B-cell lymphoma, unspecified B-cell lymphoma type, unspecified body region (HCC) (Primary)  -Unclear etiology at this time  -Enlarging retroperitoneal adenopathy noted on recent CT scan  -FNA showing predominately small lymphocytes with a minor population of atypical B cells  -PET/CT showing no abnormal hypermetabolic activity at [facility]  -Peripheral flow without an immunophenotypic abnormality  -SPEP within normal limits  -LDH mildly decreased  -Kappa/lambda light chain ratio within normal limits, beta-2 microglobulin mildly elevated at 3.4  -CT C/A/P in July 2022 with some mild increase in his retroperitoneal adenopathy along with concerns for cystitis  -After my discussion with patient, he continues to have some COVID-related symptoms which could explain his adenopathy as well as some mild dysuria  -We will plan to start Augmentin 500 mg twice daily x7 days  -We will repeat labs in 3 months and CT scans in 6 months as he is still asymptomatic from a constitutional symptom standpoint     2.  Right clear-cell renal cell carcinoma  -Status post nephrectomy in May 2019  -Has been without disease recurrence since       Follow Up:   Follow-up in 3 months     Jose Perdomo  MD  Hematology and Oncology     Please note that portions of this note may have been completed with a voice recognition program. Efforts were made to edit the dictations, but occasionally words are mistranscribed.

## 2022-07-29 ENCOUNTER — OFFICE VISIT (OUTPATIENT)
Dept: FAMILY MEDICINE CLINIC | Facility: CLINIC | Age: 62
End: 2022-07-29

## 2022-07-29 VITALS
SYSTOLIC BLOOD PRESSURE: 126 MMHG | TEMPERATURE: 98 F | HEIGHT: 70 IN | WEIGHT: 270 LBS | OXYGEN SATURATION: 98 % | HEART RATE: 84 BPM | BODY MASS INDEX: 38.65 KG/M2 | DIASTOLIC BLOOD PRESSURE: 76 MMHG | RESPIRATION RATE: 16 BRPM

## 2022-07-29 DIAGNOSIS — Z23 ENCOUNTER FOR IMMUNIZATION: ICD-10-CM

## 2022-07-29 DIAGNOSIS — G82.20 PARAPLEGIA: ICD-10-CM

## 2022-07-29 DIAGNOSIS — E11.9 TYPE 2 DIABETES MELLITUS WITHOUT COMPLICATION, WITHOUT LONG-TERM CURRENT USE OF INSULIN: ICD-10-CM

## 2022-07-29 DIAGNOSIS — Z00.00 ENCOUNTER FOR ROUTINE ADULT HEALTH EXAMINATION WITHOUT ABNORMAL FINDINGS: ICD-10-CM

## 2022-07-29 DIAGNOSIS — C85.10 B-CELL LYMPHOMA, UNSPECIFIED B-CELL LYMPHOMA TYPE, UNSPECIFIED BODY REGION: ICD-10-CM

## 2022-07-29 DIAGNOSIS — F33.1 MODERATE EPISODE OF RECURRENT MAJOR DEPRESSIVE DISORDER: ICD-10-CM

## 2022-07-29 DIAGNOSIS — I10 BENIGN ESSENTIAL HYPERTENSION: Chronic | ICD-10-CM

## 2022-07-29 DIAGNOSIS — E66.01 CLASS 2 SEVERE OBESITY DUE TO EXCESS CALORIES WITH SERIOUS COMORBIDITY AND BODY MASS INDEX (BMI) OF 38.0 TO 38.9 IN ADULT: Primary | ICD-10-CM

## 2022-07-29 DIAGNOSIS — E78.2 MIXED HYPERLIPIDEMIA: ICD-10-CM

## 2022-07-29 PROBLEM — C64.9 CANCER OF KIDNEY (HCC): Status: ACTIVE | Noted: 2019-06-05

## 2022-07-29 LAB
EXPIRATION DATE: NORMAL
HBA1C MFR BLD: 7.1 %
Lab: NORMAL

## 2022-07-29 PROCEDURE — 1159F MED LIST DOCD IN RCRD: CPT | Performed by: STUDENT IN AN ORGANIZED HEALTH CARE EDUCATION/TRAINING PROGRAM

## 2022-07-29 PROCEDURE — 90677 PCV20 VACCINE IM: CPT | Performed by: STUDENT IN AN ORGANIZED HEALTH CARE EDUCATION/TRAINING PROGRAM

## 2022-07-29 PROCEDURE — 96160 PT-FOCUSED HLTH RISK ASSMT: CPT | Performed by: STUDENT IN AN ORGANIZED HEALTH CARE EDUCATION/TRAINING PROGRAM

## 2022-07-29 PROCEDURE — G0009 ADMIN PNEUMOCOCCAL VACCINE: HCPCS | Performed by: STUDENT IN AN ORGANIZED HEALTH CARE EDUCATION/TRAINING PROGRAM

## 2022-07-29 PROCEDURE — 3051F HG A1C>EQUAL 7.0%<8.0%: CPT | Performed by: STUDENT IN AN ORGANIZED HEALTH CARE EDUCATION/TRAINING PROGRAM

## 2022-07-29 PROCEDURE — 83036 HEMOGLOBIN GLYCOSYLATED A1C: CPT | Performed by: STUDENT IN AN ORGANIZED HEALTH CARE EDUCATION/TRAINING PROGRAM

## 2022-07-29 PROCEDURE — 1170F FXNL STATUS ASSESSED: CPT | Performed by: STUDENT IN AN ORGANIZED HEALTH CARE EDUCATION/TRAINING PROGRAM

## 2022-07-29 PROCEDURE — G0439 PPPS, SUBSEQ VISIT: HCPCS | Performed by: STUDENT IN AN ORGANIZED HEALTH CARE EDUCATION/TRAINING PROGRAM

## 2022-07-29 RX ORDER — ASPIRIN 81 MG/1
81 TABLET, CHEWABLE ORAL DAILY
COMMUNITY

## 2022-07-29 RX ORDER — METFORMIN HYDROCHLORIDE 500 MG/1
1000 TABLET, EXTENDED RELEASE ORAL 2 TIMES DAILY
Qty: 360 TABLET | Refills: 3 | Status: SHIPPED | OUTPATIENT
Start: 2022-07-29

## 2022-07-29 NOTE — PATIENT INSTRUCTIONS
Recommend covid booster. Make appointment   Medicare Wellness  Personal Prevention Plan of Service     Date of Office Visit:    Encounter Provider:  Chay Cameron MD  Place of Service:  [facility]  Patient Name: Kiran Belcher  :  1960    As part of the Medicare Wellness portion of your visit today, we are providing you with this personalized preventive plan of services (PPPS). This plan is based upon recommendations of the [place] Preventive Services Task Force (USPSTF) and the Advisory Committee on Immunization Practices (ACIP).    This lists the preventive care services that should be considered, and provides dates of when you are due. Items listed as completed are up-to-date and do not require any further intervention.    Health Maintenance   Topic Date Due    Pneumococcal Vaccine 0-64 (1 - PCV) Never done    Hepatitis B (1 of 3 - Risk 3-dose series) Never done    URINE MICROALBUMIN  2018    DIABETIC FOOT EXAM  2019    COVID-19 Vaccine (4 - Booster for Pfizer series) 2022    LIPID PANEL  2022    INFLUENZA VACCINE  10/01/2022    HEMOGLOBIN A1C  2023    ANNUAL WELLNESS VISIT  2023    TDAP/TD VACCINES (2 - Td or Tdap) 2026    COLORECTAL CANCER SCREENING  2026    HEPATITIS C SCREENING  Discontinued    DIABETIC EYE EXAM  Discontinued    ZOSTER VACCINE  Discontinued       Orders Placed This Encounter   Procedures    Pneumococcal Conjugate Vaccine 20-Valent (PCV20)    Lipid Panel     Standing Status:   Future     Standing Expiration Date:   2023    POC Glycosylated Hemoglobin (Hb A1C)     Order Specific Question:   Release to patient     Answer:   Immediate       Return in about 3 months (around 10/29/2022) for Follow-up.

## 2022-07-29 NOTE — PROGRESS NOTES
The ABCs of the Annual Wellness Visit  Subsequent Medicare Wellness Visit    Chief Complaint   Patient presents with   • Medicare Wellness-subsequent     Subsequent medicare wellness, patient said needing a1c checked today      Subjective    History of Present Illness:  Kiran Belcher is a 61 y.o. male who presents for a Subsequent Medicare Wellness Visit.    The following portions of the patient's history were reviewed and   updated as appropriate: allergies, current medications, past family history, past medical history, past social history, past surgical history and problem list.    Compared to one year ago, the patient feels his physical   health is the same.    Compared to one year ago, the patient feels his mental   health is the same.    Recent Hospitalizations:  He was not admitted to the hospital during the last year.       Current Medical Providers:  Patient Care Team:  Chay Cameron MD as PCP - General (Family Medicine)  AlfaroBam MD (Urology)    Outpatient Medications Prior to Visit   Medication Sig Dispense Refill   • amLODIPine-benazepril (LOTREL) 10-20 MG per capsule Take 1 capsule by mouth Daily. 90 capsule 3   • aspirin 81 MG chewable tablet Chew 81 mg Daily.     • atorvastatin (LIPITOR) 10 MG tablet TAKE ONE TABLET BY MOUTH DAILY 90 tablet 0   • baclofen (LIORESAL) 20 MG tablet Take 1 tablet by mouth 3 (Three) Times a Day. 90 tablet 11   • benzonatate (Tessalon Perles) 100 MG capsule Take 1 capsule by mouth 3 (Three) Times a Day As Needed for Cough. 30 capsule 0   • carvedilol (Coreg) 12.5 MG tablet Take 0.5 tablets by mouth 2 (Two) Times a Day With Meals. 180 tablet 3   • citalopram (CeleXA) 20 MG tablet Take 1 tablet by mouth Daily. 90 tablet 3   • dantrolene (DANTRIUM) 100 MG capsule Take 1 capsule by mouth 2 (Two) Times a Day. 180 capsule 0   • esomeprazole (nexIUM) 40 MG capsule Take one tab PO daily 90 capsule 3   • fexofenadine (ALLEGRA) 180 MG tablet Take 180 mg by mouth Daily.      • glucose blood (Accu-Chek Elke Plus) test strip 1 each by Other route 2 (Two) Times a Day. Use as instructed 200 each 3   • glyburide (DIAbeta) 5 MG tablet Take 1 tablet by mouth Daily With Breakfast. 90 tablet 3   • lactobacillus acidophilus (RISAQUAD) capsule capsule Take 1 capsule by mouth Daily. 90 capsule 1   • TOVIAZ 8 MG tablet sustained-release 24 hour tablet      • metFORMIN (GLUCOPHAGE) 500 MG tablet Take 1 tablet by mouth 3 (Three) Times a Day. 270 tablet 3   • fluocinonide (LIDEX) 0.05 % cream fluocinonide 0.05 % topical cream     • fluticasone (FLONASE) 50 MCG/ACT nasal spray      • amoxicillin-clavulanate (Augmentin) 500-125 MG per tablet Take 1 tablet by mouth 2 (Two) Times a Day. 14 tablet 0     No facility-administered medications prior to visit.       No opioid medication identified on active medication list. I have reviewed chart for other potential  high risk medication/s and harmful drug interactions in the elderly.          Aspirin is not on active medication list.  Aspirin use is indicated based on review of current medical condition/s. Pros and cons of this therapy have been discussed with this patient. Benefits of this medication outweigh potential harm.  Patient has been instructed to start taking this medication..    Patient Active Problem List   Diagnosis   • Benign essential hypertension   • Depression   • Gastroesophageal reflux disease without esophagitis   • Hyperlipidemia   • NISA on CPAP   • Diabetes mellitus, type II (HCC)   • Spasm   • Obesity   • Right otitis externa   • Hx of spinal cord injury   • Kidney disorder   • Lower extremity weakness   • Myalgia   • Moderate episode of recurrent major depressive disorder (HCC)   • Dysphagia   • Toenail avulsion   • H/O kidney removal   • Renal cell carcinoma of right kidney (HCC)   • Paraplegia (HCC)   • Muscle contracture   • C5-C7 level spinal injury with complete lesion of spinal cord, without evidence of spinal bone injury (HCC)  "  • Inability to bear weight   • Neurogenic bladder   • Venous stasis   • B-cell lymphoma (HCC)   • Cancer of kidney (HCC)   • Hypertension     Advance Care Planning  Advance Directive is not on file.  ACP discussion was held with the patient during this visit. Patient does not have an advance directive, information provided.          Objective    Vitals:    07/29/22 1416   BP: 126/76   BP Location: Right arm   Patient Position: Sitting   Cuff Size: Adult   Pulse: 84   Resp: 16   Temp: 98 °F (36.7 °C)   TempSrc: Temporal   SpO2: 98%   Weight: 122 kg (270 lb)   Height: 177.8 cm (70\")     Estimated body mass index is 38.74 kg/m² as calculated from the following:    Height as of this encounter: 177.8 cm (70\").    Weight as of this encounter: 122 kg (270 lb).    Class 2 Severe Obesity (BMI >=35 and <=39.9). Obesity-related health conditions include the following: hypertension. Obesity is unchanged. BMI is is above average; BMI management plan is completed. We discussed portion control.      Does the patient have evidence of cognitive impairment? No    Physical Exam  Constitutional:       General: He is not in acute distress.     Appearance: He is not ill-appearing.   Cardiovascular:      Rate and Rhythm: Normal rate and regular rhythm.   Pulmonary:      Effort: Pulmonary effort is normal.      Breath sounds: Normal breath sounds.   Neurological:      Mental Status: He is alert.   Psychiatric:         Thought Content: Thought content normal.       Patient sitting in wheelchair.    Lab Results   Component Value Date    HGBA1C 7.1 07/29/2022            HEALTH RISK ASSESSMENT    Smoking Status:  Social History     Tobacco Use   Smoking Status Never Smoker   Smokeless Tobacco Never Used     Alcohol Consumption:  Social History     Substance and Sexual Activity   Alcohol Use No     Fall Risk Screen:    STEADI Fall Risk Assessment was completed, and patient is at LOW risk for falls.Assessment completed " on:7/29/2022    Depression Screening:  PHQ-2/PHQ-9 Depression Screening 7/29/2022   Retired PHQ-9 Total Score -   Retired Total Score -   Little Interest or Pleasure in Doing Things 0-->not at all   Feeling Down, Depressed or Hopeless 0-->not at all   PHQ-9: Brief Depression Severity Measure Score 0       Health Habits and Functional and Cognitive Screening:  Functional & Cognitive Status 7/29/2022   Do you have difficulty preparing food and eating? No   Do you have difficulty bathing yourself, getting dressed or grooming yourself? No   Do you have difficulty using the toilet? No   Do you have difficulty moving around from place to place? No   Do you have trouble with steps or getting out of a bed or a chair? No   Current Diet Well Balanced Diet   Dental Exam Not up to date   Eye Exam Not up to date   Exercise (times per week) 3 times per week   Current Exercises Include Weightlifting   Current Exercise Activities Include -   Do you need help using the phone?  No   Are you deaf or do you have serious difficulty hearing?  No   Do you need help with transportation? No   Do you need help shopping? No   Do you need help preparing meals?  No   Do you need help with housework?  No   Do you need help with laundry? No   Do you need help taking your medications? No   Do you need help managing money? No   Do you ever drive or ride in a car without wearing a seat belt? No   Have you felt unusual stress, anger or loneliness in the last month? No   Who do you live with? Spouse   If you need help, do you have trouble finding someone available to you? No   Have you been bothered in the last four weeks by sexual problems? No   Do you have difficulty concentrating, remembering or making decisions? No       Age-appropriate Screening Schedule:  Refer to the list below for future screening recommendations based on patient's age, sex and/or medical conditions. Orders for these recommended tests are listed in the plan section. The  patient has been provided with a written plan.    Health Maintenance   Topic Date Due   • URINE MICROALBUMIN  07/24/2018   • DIABETIC FOOT EXAM  01/29/2019   • LIPID PANEL  06/18/2022   • INFLUENZA VACCINE  10/01/2022   • HEMOGLOBIN A1C  01/29/2023   • TDAP/TD VACCINES (2 - Td or Tdap) 07/01/2026   • DIABETIC EYE EXAM  Discontinued   • ZOSTER VACCINE  Discontinued              Assessment & Plan   CMS Preventative Services Quick Reference  Risk Factors Identified During Encounter  wheel chair bound  The above risks/problems have been discussed with the patient.  Follow up actions/plans if indicated are seen below in the Assessment/Plan Section.  Pertinent information has been shared with the patient in the After Visit Summary.    Diagnoses and all orders for this visit:    1. Encounter for routine adult health examination without abnormal findings  -Pneumococcal vaccine given today.  Discussed healthy diet.  Advanced care plan discussed    2. Type 2 diabetes mellitus without complication, without long-term current use of insulin (HCC)  -     POC Glycosylated Hemoglobin (Hb A1C), 7.1 unchanged  -     Pneumococcal Conjugate Vaccine 20-Valent (PCV20)  -    Increase to metFORMIN ER (GLUCOPHAGE-XR) 500 MG 24 hr tablet; Take 2 tablets by mouth 2 (Two) Times a Day.  Dispense: 360 tablet; Refill: 3 (from 1500 mg daily)  -    Continue glyburide 5 mg daily  -I have asked him return for urine sample for microalbumin    3. B-cell lymphoma, unspecified B-cell lymphoma type, unspecified body region (HCC)  -newer diagnosis over the last several months. Initially thought to be benign but had some increase node size. Following with oncology in regards to concerns for some enlarged nodes.  They are to repeat a CT scan in 3 to 6 months.  Concern for possible UTI leading some adenopathy and is treated with Augmentin at that time.    4. Mixed hyperlipidemia  -     Lipid Panel; Future       - continue statin   5. Encounter for  immunization  -     Pneumococcal Conjugate Vaccine 20-Valent (PCV20)    6. Paraplegia (HCC)  -Continue wheelchair continue, continue baclofen,     7. Benign essential hypertension  -Stable continue carvedilol, amlodipine, benazepril    8.  Depression  -Continue citalopram, chronic stable     9. Obesity  -discussed diet. Activity limited due to paraplegia     Taking a break from PT.      Follow Up:   Return in about 3 months (around 10/29/2022) for Follow-up.     An After Visit Summary and PPPS were made available to the patient.    Chay Cameron MD  Family Medicine - [facility] [place]

## 2022-08-29 NOTE — OUTREACH NOTE
Multiple attempts have been made to contact pt to complete SHIRLENE call and confirm appt.  All attempts have been documented. The patient has a follow up scheduled with Dr. Fernandez 9/28/18 and TCM is applicable.    
Statement Selected

## 2022-09-16 DIAGNOSIS — E78.2 MIXED HYPERLIPIDEMIA: ICD-10-CM

## 2022-09-16 RX ORDER — ATORVASTATIN CALCIUM 10 MG/1
TABLET, FILM COATED ORAL
Qty: 90 TABLET | Refills: 0 | Status: SHIPPED | OUTPATIENT
Start: 2022-09-16 | End: 2022-12-21 | Stop reason: SDUPTHER

## 2022-09-16 NOTE — TELEPHONE ENCOUNTER
Rx Refill Note  Requested Prescriptions     Pending Prescriptions Disp Refills   • atorvastatin (LIPITOR) 10 MG tablet [Pharmacy Med Name: ATORVASTATIN 10 MG TABLET] 90 tablet 0     Sig: TAKE ONE TABLET BY MOUTH DAILY      Last office visit with prescribing clinician: 7/29/2022      Next office visit with prescribing clinician: 10/31/2022            Maria T Green LPN  09/16/22, 14:55 EDT

## 2022-09-25 DIAGNOSIS — S14.115A SPINAL CORD INJURY AT C5-C7 LEVEL WITH COMPLETE SPINAL CORD LESION: ICD-10-CM

## 2022-09-26 RX ORDER — DANTROLENE SODIUM 100 MG/1
CAPSULE ORAL
Qty: 180 CAPSULE | Refills: 1 | Status: SHIPPED | OUTPATIENT
Start: 2022-09-26 | End: 2023-04-04 | Stop reason: SDUPTHER

## 2022-09-26 NOTE — TELEPHONE ENCOUNTER
Rx Refill Note  Requested Prescriptions     Pending Prescriptions Disp Refills   • dantrolene (DANTRIUM) 100 MG capsule [Pharmacy Med Name: DANTROLENE SODIUM 100 MG CAP] 180 capsule 0     Sig: TAKE ONE CAPSULE BY MOUTH TWICE A DAY      Last office visit with prescribing clinician: Visit date not found      Next office visit with prescribing clinician: Visit date not found            Stefan Nice MA  09/26/22, 07:42 EDT

## 2022-10-07 ENCOUNTER — LAB (OUTPATIENT)
Dept: LAB | Facility: HOSPITAL | Age: 62
End: 2022-10-07

## 2022-10-07 DIAGNOSIS — C85.10 B-CELL LYMPHOMA, UNSPECIFIED B-CELL LYMPHOMA TYPE, UNSPECIFIED BODY REGION: ICD-10-CM

## 2022-10-07 LAB
BASOPHILS # BLD AUTO: 0.02 10*3/MM3 (ref 0–0.2)
BASOPHILS NFR BLD AUTO: 0.2 % (ref 0–1.5)
DEPRECATED RDW RBC AUTO: 44 FL (ref 37–54)
EOSINOPHIL # BLD AUTO: 0.24 10*3/MM3 (ref 0–0.4)
EOSINOPHIL NFR BLD AUTO: 2.8 % (ref 0.3–6.2)
ERYTHROCYTE [DISTWIDTH] IN BLOOD BY AUTOMATED COUNT: 14.2 % (ref 12.3–15.4)
HCT VFR BLD AUTO: 32.1 % (ref 37.5–51)
HGB BLD-MCNC: 10.3 G/DL (ref 13–17.7)
IMM GRANULOCYTES # BLD AUTO: 0.04 10*3/MM3 (ref 0–0.05)
IMM GRANULOCYTES NFR BLD AUTO: 0.5 % (ref 0–0.5)
LYMPHOCYTES # BLD AUTO: 1.17 10*3/MM3 (ref 0.7–3.1)
LYMPHOCYTES NFR BLD AUTO: 13.5 % (ref 19.6–45.3)
MCH RBC QN AUTO: 27 PG (ref 26.6–33)
MCHC RBC AUTO-ENTMCNC: 32.1 G/DL (ref 31.5–35.7)
MCV RBC AUTO: 84.3 FL (ref 79–97)
MONOCYTES # BLD AUTO: 0.61 10*3/MM3 (ref 0.1–0.9)
MONOCYTES NFR BLD AUTO: 7 % (ref 5–12)
NEUTROPHILS NFR BLD AUTO: 6.61 10*3/MM3 (ref 1.7–7)
NEUTROPHILS NFR BLD AUTO: 76 % (ref 42.7–76)
NRBC BLD AUTO-RTO: 0 /100 WBC (ref 0–0.2)
PLATELET # BLD AUTO: 298 10*3/MM3 (ref 140–450)
PMV BLD AUTO: 9.9 FL (ref 6–12)
RBC # BLD AUTO: 3.81 10*6/MM3 (ref 4.14–5.8)
WBC NRBC COR # BLD: 8.69 10*3/MM3 (ref 3.4–10.8)

## 2022-10-07 PROCEDURE — 83615 LACTATE (LD) (LDH) ENZYME: CPT

## 2022-10-07 PROCEDURE — 85025 COMPLETE CBC W/AUTO DIFF WBC: CPT

## 2022-10-07 PROCEDURE — 36415 COLL VENOUS BLD VENIPUNCTURE: CPT

## 2022-10-07 PROCEDURE — 80053 COMPREHEN METABOLIC PANEL: CPT

## 2022-10-08 LAB
ALBUMIN SERPL-MCNC: 4.1 G/DL (ref 3.5–5.2)
ALBUMIN/GLOB SERPL: 1.4 G/DL
ALP SERPL-CCNC: 98 U/L (ref 39–117)
ALT SERPL W P-5'-P-CCNC: 17 U/L (ref 1–41)
ANION GAP SERPL CALCULATED.3IONS-SCNC: 10 MMOL/L (ref 5–15)
AST SERPL-CCNC: 17 U/L (ref 1–40)
BILIRUB SERPL-MCNC: 0.2 MG/DL (ref 0–1.2)
BUN SERPL-MCNC: 16 MG/DL (ref 6–20)
BUN/CREAT SERPL: 15.8 (ref 7–25)
CALCIUM SPEC-SCNC: 9.5 MG/DL (ref 8.6–10.5)
CHLORIDE SERPL-SCNC: 100 MMOL/L (ref 98–107)
CO2 SERPL-SCNC: 26 MMOL/L (ref 22–29)
CREAT SERPL-MCNC: 1.01 MG/DL (ref 0.76–1.27)
EGFRCR SERPLBLD CKD-EPI 2021: 85.7 ML/MIN/1.73
GLOBULIN UR ELPH-MCNC: 2.9 GM/DL
GLUCOSE SERPL-MCNC: 231 MG/DL (ref 65–99)
LDH SERPL-CCNC: 129 U/L (ref 135–225)
POTASSIUM SERPL-SCNC: 4 MMOL/L (ref 3.5–5.2)
PROT SERPL-MCNC: 7 G/DL (ref 6–8.5)
SODIUM SERPL-SCNC: 136 MMOL/L (ref 136–145)

## 2022-10-11 ENCOUNTER — OFFICE VISIT (OUTPATIENT)
Dept: ONCOLOGY | Facility: CLINIC | Age: 62
End: 2022-10-11

## 2022-10-11 VITALS
DIASTOLIC BLOOD PRESSURE: 67 MMHG | HEIGHT: 70 IN | RESPIRATION RATE: 18 BRPM | WEIGHT: 270 LBS | TEMPERATURE: 97.9 F | OXYGEN SATURATION: 97 % | SYSTOLIC BLOOD PRESSURE: 121 MMHG | BODY MASS INDEX: 38.65 KG/M2 | HEART RATE: 79 BPM

## 2022-10-11 DIAGNOSIS — C85.10 B-CELL LYMPHOMA, UNSPECIFIED B-CELL LYMPHOMA TYPE, UNSPECIFIED BODY REGION: Primary | ICD-10-CM

## 2022-10-11 PROCEDURE — 99214 OFFICE O/P EST MOD 30 MIN: CPT | Performed by: INTERNAL MEDICINE

## 2022-10-11 NOTE — PROGRESS NOTES
Follow Up Office Visit      Date: 10/11/2022     Patient Name: Kiran Belcher  MRN: [id number]  : 1960  Referring Physician: Chay Cameron     Chief Complaint:  Follow-up for concerns for lymphoma     History of Present Illness: Kiran Belcher is a pleasant 61 y.o. male past medical history of right RCC status post right nephrectomy, hypertension, hyperlipidemia, anxiety, type 2 diabetes who presents today for evaluation of concerns for lymphoma. The patient is accompanied by their wife who contributes to the history of their care.  Patient underwent a right nephrectomy on 2019 for renal cell carcinoma.  He has been followed by nephrology and urology since.  On his most recent scans there is concerns for enlarging retroperitoneal adenopathy.  He underwent an FNA which showed predominant small lymphocytes with a minor population of atypical B cells concerning for possible CLL/SLL.  PET/CT showed no abnormal hypermetabolic activity.  He overall feels well.  Denies any unexplained fevers, chills, night sweats, weight loss.  States that someone did not tell him he had an issue, he would feel completely normal at this time     Interval History:  Presents to clinic for follow-up.  Doing well at this time.  Denies any fevers, chills, night sweats, weight loss.  Notes that his wife was recently hospitalized for severe wound infection requiring intensive surgery and debridement and now IV antibiotics at home and wound VAC placement.  Has been busy being her primary caregiver lately    Oncology History:    Oncology/Hematology History    No history exists.       Subjective      Review of Systems:   Constitutional: Negative for fevers, chills, or weight loss  Eyes: Negative for blurred vision or discharge         Ear/Nose/Throat: Negative for difficulty swallowing, sore throat, LAD                                                       Respiratory: Negative for cough, SOA, wheezing                                                                                         Cardiovascular: Negative for chest pain or palpitations                                                                  Gastrointestinal: Negative for nausea, vomiting or diarrhea                                                                     Genitourinary: Negative for dysuria or hematuria                                                                                           Musculoskeletal: Negative for any joint pains or muscle aches                                                                        Neurologic: Negative for any weakness, headaches, dizziness                                                                         Hematologic: Negative for any easy bleeding or bruising                                                                                   Psychiatric: Negative for anxiety or depression                          Past Medical History/Past Surgical History/ Family History/ Social History: Reviewed by me and unchanged from my previous documentation done on July 2022.     Medications:     Current Outpatient Medications:   •  amLODIPine-benazepril (LOTREL) 10-20 MG per capsule, Take 1 capsule by mouth Daily., Disp: 90 capsule, Rfl: 3  •  aspirin 81 MG chewable tablet, Chew 81 mg Daily., Disp: , Rfl:   •  atorvastatin (LIPITOR) 10 MG tablet, TAKE ONE TABLET BY MOUTH DAILY, Disp: 90 tablet, Rfl: 0  •  baclofen (LIORESAL) 20 MG tablet, Take 1 tablet by mouth 3 (Three) Times a Day., Disp: 90 tablet, Rfl: 11  •  carvedilol (Coreg) 12.5 MG tablet, Take 0.5 tablets by mouth 2 (Two) Times a Day With Meals., Disp: 180 tablet, Rfl: 3  •  citalopram (CeleXA) 20 MG tablet, Take 1 tablet by mouth Daily., Disp: 90 tablet, Rfl: 3  •  dantrolene (DANTRIUM) 100 MG capsule, TAKE ONE CAPSULE BY MOUTH TWICE A DAY, Disp: 180 capsule, Rfl: 1  •  esomeprazole (nexIUM) 40 MG capsule, Take one tab PO daily, Disp: 90 capsule, Rfl: 3  •   "fexofenadine (ALLEGRA) 180 MG tablet, Take 180 mg by mouth Daily., Disp: , Rfl:   •  fluocinonide (LIDEX) 0.05 % cream, fluocinonide 0.05 % topical cream, Disp: , Rfl:   •  fluticasone (FLONASE) 50 MCG/ACT nasal spray, , Disp: , Rfl:   •  glucose blood (Accu-Chek Elke Plus) test strip, 1 each by Other route 2 (Two) Times a Day. Use as instructed, Disp: 200 each, Rfl: 3  •  glyburide (DIAbeta) 5 MG tablet, Take 1 tablet by mouth Daily With Breakfast., Disp: 90 tablet, Rfl: 3  •  lactobacillus acidophilus (RISAQUAD) capsule capsule, Take 1 capsule by mouth Daily., Disp: 90 capsule, Rfl: 1  •  metFORMIN (GLUCOPHAGE) 500 MG tablet, , Disp: , Rfl:   •  metFORMIN ER (GLUCOPHAGE-XR) 500 MG 24 hr tablet, Take 2 tablets by mouth 2 (Two) Times a Day., Disp: 360 tablet, Rfl: 3  •  TOVIAZ 8 MG tablet sustained-release 24 hour tablet, , Disp: , Rfl:     Allergies:   Allergies   Allergen Reactions   • Levofloxacin Unknown (See Comments)     tendinopathy       Objective     Physical Exam:  Vital Signs:   Vitals:    10/11/22 1430   BP: 121/67   Pulse: 79   Resp: 18   Temp: 97.9 °F (36.6 °C)   SpO2: 97%   Weight: 122 kg (270 lb)  Comment: WT per PT   Height: 177.8 cm (70\")   PainSc: 0-No pain     Pain Score    10/11/22 1430   PainSc: 0-No pain     ECOG Performance Status: 0 - Asymptomatic    Constitutional: NAD, ECOG 0  Eyes: PERRLA, scleral anicteric  ENT: No LAD, no thyromegaly  Respiratory: CTAB, no wheezing, rales, rhonchi  Cardiovascular: RRR, no murmurs, pulses 2+ bilaterally  Abdomen: soft, NT/ND, no HSM  Musculoskeletal: strength 5/5 bilaterally, no c/c/e  Neurologic: A&O x 3, CN II-XII intact grossly    Results Review:   Lab on 10/07/2022   Component Date Value Ref Range Status   • LDH 10/07/2022 129 (L)  135 - 225 U/L Final   • Glucose 10/07/2022 231 (H)  65 - 99 mg/dL Final   • BUN 10/07/2022 16  6 - 20 mg/dL Final   • Creatinine 10/07/2022 1.01  0.76 - 1.27 mg/dL Final   • Sodium 10/07/2022 136  136 - 145 mmol/L Final "   • Potassium 10/07/2022 4.0  3.5 - 5.2 mmol/L Final   • Chloride 10/07/2022 100  98 - 107 mmol/L Final   • CO2 10/07/2022 26.0  22.0 - 29.0 mmol/L Final   • Calcium 10/07/2022 9.5  8.6 - 10.5 mg/dL Final   • Total Protein 10/07/2022 7.0  6.0 - 8.5 g/dL Final   • Albumin 10/07/2022 4.10  3.50 - 5.20 g/dL Final   • ALT (SGPT) 10/07/2022 17  1 - 41 U/L Final   • AST (SGOT) 10/07/2022 17  1 - 40 U/L Final   • Alkaline Phosphatase 10/07/2022 98  39 - 117 U/L Final   • Total Bilirubin 10/07/2022 0.2  0.0 - 1.2 mg/dL Final   • Globulin 10/07/2022 2.9  gm/dL Final   • A/G Ratio 10/07/2022 1.4  g/dL Final   • BUN/Creatinine Ratio 10/07/2022 15.8  7.0 - 25.0 Final   • Anion Gap 10/07/2022 10.0  5.0 - 15.0 mmol/L Final   • eGFR 10/07/2022 85.7  >60.0 mL/min/1.73 Final    National Kidney Foundation and American Society of Nephrology (ASN) Task Force recommended calculation based on the Chronic Kidney Disease Epidemiology Collaboration (CKD-EPI) equation refit without adjustment for race.   • WBC 10/07/2022 8.69  3.40 - 10.80 10*3/mm3 Final   • RBC 10/07/2022 3.81 (L)  4.14 - 5.80 10*6/mm3 Final   • Hemoglobin 10/07/2022 10.3 (L)  13.0 - 17.7 g/dL Final   • Hematocrit 10/07/2022 32.1 (L)  37.5 - 51.0 % Final   • MCV 10/07/2022 84.3  79.0 - 97.0 fL Final   • MCH 10/07/2022 27.0  26.6 - 33.0 pg Final   • MCHC 10/07/2022 32.1  31.5 - 35.7 g/dL Final   • RDW 10/07/2022 14.2  12.3 - 15.4 % Final   • RDW-SD 10/07/2022 44.0  37.0 - 54.0 fl Final   • MPV 10/07/2022 9.9  6.0 - 12.0 fL Final   • Platelets 10/07/2022 298  140 - 450 10*3/mm3 Final   • Neutrophil % 10/07/2022 76.0  42.7 - 76.0 % Final   • Lymphocyte % 10/07/2022 13.5 (L)  19.6 - 45.3 % Final   • Monocyte % 10/07/2022 7.0  5.0 - 12.0 % Final   • Eosinophil % 10/07/2022 2.8  0.3 - 6.2 % Final   • Basophil % 10/07/2022 0.2  0.0 - 1.5 % Final   • Immature Grans % 10/07/2022 0.5  0.0 - 0.5 % Final   • Neutrophils, Absolute 10/07/2022 6.61  1.70 - 7.00 10*3/mm3 Final   •  Lymphocytes, Absolute 10/07/2022 1.17  0.70 - 3.10 10*3/mm3 Final   • Monocytes, Absolute 10/07/2022 0.61  0.10 - 0.90 10*3/mm3 Final   • Eosinophils, Absolute 10/07/2022 0.24  0.00 - 0.40 10*3/mm3 Final   • Basophils, Absolute 10/07/2022 0.02  0.00 - 0.20 10*3/mm3 Final   • Immature Grans, Absolute 10/07/2022 0.04  0.00 - 0.05 10*3/mm3 Final   • nRBC 10/07/2022 0.0  0.0 - 0.2 /100 WBC Final       No results found.    Assessment / Plan      Assessment/Plan:   1. B-cell lymphoma, unspecified B-cell lymphoma type, unspecified body region (HCC) (Primary)  -Unclear etiology at this time  -Enlarging retroperitoneal adenopathy noted on recent CT scan  -FNA showing predominately small lymphocytes with a minor population of atypical B cells  -PET/CT showing no abnormal hypermetabolic activity at [facility]  -Peripheral flow without an immunophenotypic abnormality  -SPEP within normal limits  -LDH mildly decreased  -Kappa/lambda light chain ratio within normal limits, beta-2 microglobulin mildly elevated at 3.4  -CT C/A/P in July 2022 with some mild increase in his retroperitoneal adenopathy along with concerns for cystitis  -Labs in October 2022 reviewed and within normal limits with no significant lymphocytosis or leukocytosis  -Continues to remain asymptomatic with no significant constitutional symptoms  -We will plan for repeat labs and CT scans in 3 months.  Ordered today     2.  Right clear-cell renal cell carcinoma  -Status post nephrectomy in May 2019  -Has been without disease recurrence since         Follow Up:   Follow-up in 3 months     Jose Perdomo MD  Hematology and Oncology     Please note that portions of this note may have been completed with a voice recognition program. Efforts were made to edit the dictations, but occasionally words are mistranscribed.

## 2022-10-24 ENCOUNTER — OFFICE VISIT (OUTPATIENT)
Dept: FAMILY MEDICINE CLINIC | Facility: CLINIC | Age: 62
End: 2022-10-24

## 2022-10-24 VITALS
SYSTOLIC BLOOD PRESSURE: 148 MMHG | WEIGHT: 268.96 LBS | BODY MASS INDEX: 38.51 KG/M2 | TEMPERATURE: 94.8 F | DIASTOLIC BLOOD PRESSURE: 80 MMHG | OXYGEN SATURATION: 98 % | HEART RATE: 81 BPM | HEIGHT: 70 IN

## 2022-10-24 DIAGNOSIS — L02.415 ABSCESS OF RIGHT THIGH: Primary | ICD-10-CM

## 2022-10-24 PROCEDURE — 99213 OFFICE O/P EST LOW 20 MIN: CPT | Performed by: PHYSICIAN ASSISTANT

## 2022-10-24 RX ORDER — AMOXICILLIN AND CLAVULANATE POTASSIUM 875; 125 MG/1; MG/1
1 TABLET, FILM COATED ORAL 2 TIMES DAILY
Qty: 20 TABLET | Refills: 0 | Status: SHIPPED | OUTPATIENT
Start: 2022-10-24 | End: 2022-11-10

## 2022-11-10 ENCOUNTER — OFFICE VISIT (OUTPATIENT)
Dept: FAMILY MEDICINE CLINIC | Facility: CLINIC | Age: 62
End: 2022-11-10

## 2022-11-10 VITALS
BODY MASS INDEX: 38.59 KG/M2 | SYSTOLIC BLOOD PRESSURE: 126 MMHG | OXYGEN SATURATION: 97 % | TEMPERATURE: 98 F | RESPIRATION RATE: 16 BRPM | DIASTOLIC BLOOD PRESSURE: 64 MMHG | HEIGHT: 70 IN | HEART RATE: 91 BPM

## 2022-11-10 DIAGNOSIS — I10 PRIMARY HYPERTENSION: ICD-10-CM

## 2022-11-10 DIAGNOSIS — E11.9 TYPE 2 DIABETES MELLITUS WITHOUT COMPLICATION, UNSPECIFIED WHETHER LONG TERM INSULIN USE: Chronic | ICD-10-CM

## 2022-11-10 DIAGNOSIS — C85.10 B-CELL LYMPHOMA, UNSPECIFIED B-CELL LYMPHOMA TYPE, UNSPECIFIED BODY REGION: ICD-10-CM

## 2022-11-10 DIAGNOSIS — Z23 IMMUNIZATION DUE: ICD-10-CM

## 2022-11-10 LAB
EXPIRATION DATE: NORMAL
HBA1C MFR BLD: 6.5 %
Lab: NORMAL

## 2022-11-10 PROCEDURE — 82570 ASSAY OF URINE CREATININE: CPT | Performed by: STUDENT IN AN ORGANIZED HEALTH CARE EDUCATION/TRAINING PROGRAM

## 2022-11-10 PROCEDURE — 3044F HG A1C LEVEL LT 7.0%: CPT | Performed by: STUDENT IN AN ORGANIZED HEALTH CARE EDUCATION/TRAINING PROGRAM

## 2022-11-10 PROCEDURE — 90686 IIV4 VACC NO PRSV 0.5 ML IM: CPT | Performed by: STUDENT IN AN ORGANIZED HEALTH CARE EDUCATION/TRAINING PROGRAM

## 2022-11-10 PROCEDURE — 99214 OFFICE O/P EST MOD 30 MIN: CPT | Performed by: STUDENT IN AN ORGANIZED HEALTH CARE EDUCATION/TRAINING PROGRAM

## 2022-11-10 PROCEDURE — 82043 UR ALBUMIN QUANTITATIVE: CPT | Performed by: STUDENT IN AN ORGANIZED HEALTH CARE EDUCATION/TRAINING PROGRAM

## 2022-11-10 PROCEDURE — 83036 HEMOGLOBIN GLYCOSYLATED A1C: CPT | Performed by: STUDENT IN AN ORGANIZED HEALTH CARE EDUCATION/TRAINING PROGRAM

## 2022-11-10 PROCEDURE — G0008 ADMIN INFLUENZA VIRUS VAC: HCPCS | Performed by: STUDENT IN AN ORGANIZED HEALTH CARE EDUCATION/TRAINING PROGRAM

## 2022-11-10 NOTE — ASSESSMENT & PLAN NOTE
- A1c improved and at goal today 6.5  -Continue glyburide and metformin  -Urine sample left for microalbumin and will try to get this to the lab tomorrow throat

## 2022-11-11 ENCOUNTER — LAB (OUTPATIENT)
Dept: LAB | Facility: HOSPITAL | Age: 62
End: 2022-11-11

## 2022-11-11 DIAGNOSIS — R82.90 NONSPECIFIC FINDING ON EXAMINATION OF URINE: Primary | ICD-10-CM

## 2022-11-11 LAB
BILIRUB UR QL STRIP: NEGATIVE
CLARITY UR: ABNORMAL
COLOR UR: YELLOW
GLUCOSE UR STRIP-MCNC: NEGATIVE MG/DL
HGB UR QL STRIP.AUTO: NEGATIVE
KETONES UR QL STRIP: NEGATIVE
LEUKOCYTE ESTERASE UR QL STRIP.AUTO: ABNORMAL
NITRITE UR QL STRIP: NEGATIVE
PH UR STRIP.AUTO: 6.5 [PH] (ref 5–8)
PROT UR QL STRIP: ABNORMAL
SP GR UR STRIP: 1.02 (ref 1–1.03)
UROBILINOGEN UR QL STRIP: ABNORMAL

## 2022-11-11 PROCEDURE — 81001 URINALYSIS AUTO W/SCOPE: CPT

## 2022-11-12 LAB
BACTERIA UR QL AUTO: ABNORMAL /HPF
HYALINE CASTS UR QL AUTO: ABNORMAL /LPF
RBC # UR STRIP: ABNORMAL /HPF
REF LAB TEST METHOD: ABNORMAL
SQUAMOUS #/AREA URNS HPF: ABNORMAL /HPF
WBC # UR STRIP: ABNORMAL /HPF

## 2022-11-22 ENCOUNTER — IMMUNIZATION (OUTPATIENT)
Dept: FAMILY MEDICINE CLINIC | Facility: CLINIC | Age: 62
End: 2022-11-22

## 2022-11-22 DIAGNOSIS — Z23 IMMUNIZATION DUE: Primary | ICD-10-CM

## 2022-11-22 PROCEDURE — 0124A PR ADM SARSCOV2 30MCG/0.3ML BST: CPT | Performed by: STUDENT IN AN ORGANIZED HEALTH CARE EDUCATION/TRAINING PROGRAM

## 2022-11-22 PROCEDURE — 91312 COVID-19 (PFIZER) BIVALENT BOOSTER 12+YRS: CPT | Performed by: STUDENT IN AN ORGANIZED HEALTH CARE EDUCATION/TRAINING PROGRAM

## 2022-12-21 DIAGNOSIS — E78.2 MIXED HYPERLIPIDEMIA: ICD-10-CM

## 2022-12-21 NOTE — TELEPHONE ENCOUNTER
Rx Refill Note  Requested Prescriptions     Pending Prescriptions Disp Refills   • atorvastatin (LIPITOR) 10 MG tablet 90 tablet 0     Sig: Take 1 tablet by mouth Daily.      Last office visit with prescribing clinician: 11/10/2022   Last telemedicine visit with prescribing clinician: 2/10/2023   Next office visit with prescribing clinician: 2/10/2023                         Would you like a call back once the refill request has been completed: [] Yes [] No    If the office needs to give you a call back, can they leave a voicemail: [] Yes [] No    Lakesha Nunez MA  12/21/22, 15:52 EST

## 2022-12-22 RX ORDER — ATORVASTATIN CALCIUM 10 MG/1
10 TABLET, FILM COATED ORAL DAILY
Qty: 90 TABLET | Refills: 0 | Status: SHIPPED | OUTPATIENT
Start: 2022-12-22

## 2022-12-29 ENCOUNTER — HOSPITAL ENCOUNTER (OUTPATIENT)
Dept: CT IMAGING | Facility: HOSPITAL | Age: 62
Discharge: HOME | End: 2022-12-29
Admitting: INTERNAL MEDICINE

## 2022-12-29 DIAGNOSIS — C85.10 B-CELL LYMPHOMA, UNSPECIFIED B-CELL LYMPHOMA TYPE, UNSPECIFIED BODY REGION: ICD-10-CM

## 2022-12-29 LAB — CREAT BLDA-MCNC: 1.1 MG/DL (ref 0.6–1.3)

## 2022-12-29 PROCEDURE — 82565 ASSAY OF CREATININE: CPT

## 2022-12-29 PROCEDURE — 71260 CT THORAX DX C+: CPT

## 2022-12-29 PROCEDURE — 25010000002 IOPAMIDOL 61 % SOLUTION: Performed by: INTERNAL MEDICINE

## 2022-12-29 PROCEDURE — 74177 CT ABD & PELVIS W/CONTRAST: CPT

## 2022-12-29 RX ADMIN — IOPAMIDOL 95 ML: 612 INJECTION, SOLUTION INTRAVENOUS at 14:33

## 2023-01-10 ENCOUNTER — LAB (OUTPATIENT)
Dept: LAB | Facility: HOSPITAL | Age: 63
End: 2023-01-10
Payer: MEDICARE

## 2023-01-10 ENCOUNTER — OFFICE VISIT (OUTPATIENT)
Dept: ONCOLOGY | Facility: CLINIC | Age: 63
End: 2023-01-10
Payer: MEDICARE

## 2023-01-10 VITALS
BODY MASS INDEX: 38.65 KG/M2 | TEMPERATURE: 97.6 F | HEART RATE: 73 BPM | SYSTOLIC BLOOD PRESSURE: 137 MMHG | DIASTOLIC BLOOD PRESSURE: 74 MMHG | HEIGHT: 70 IN | OXYGEN SATURATION: 98 % | RESPIRATION RATE: 18 BRPM | WEIGHT: 270 LBS

## 2023-01-10 DIAGNOSIS — C85.10 B-CELL LYMPHOMA, UNSPECIFIED B-CELL LYMPHOMA TYPE, UNSPECIFIED BODY REGION: ICD-10-CM

## 2023-01-10 DIAGNOSIS — C85.10 B-CELL LYMPHOMA, UNSPECIFIED B-CELL LYMPHOMA TYPE, UNSPECIFIED BODY REGION: Primary | ICD-10-CM

## 2023-01-10 LAB
ALBUMIN SERPL-MCNC: 4 G/DL (ref 3.5–5.2)
ALBUMIN/GLOB SERPL: 1.3 G/DL
ALP SERPL-CCNC: 102 U/L (ref 39–117)
ALT SERPL W P-5'-P-CCNC: 24 U/L (ref 1–41)
ANION GAP SERPL CALCULATED.3IONS-SCNC: 12 MMOL/L (ref 5–15)
AST SERPL-CCNC: 23 U/L (ref 1–40)
BASOPHILS # BLD AUTO: 0.02 10*3/MM3 (ref 0–0.2)
BASOPHILS NFR BLD AUTO: 0.2 % (ref 0–1.5)
BILIRUB SERPL-MCNC: 0.3 MG/DL (ref 0–1.2)
BUN SERPL-MCNC: 12 MG/DL (ref 8–23)
BUN/CREAT SERPL: 13 (ref 7–25)
CALCIUM SPEC-SCNC: 9.1 MG/DL (ref 8.6–10.5)
CHLORIDE SERPL-SCNC: 101 MMOL/L (ref 98–107)
CO2 SERPL-SCNC: 25 MMOL/L (ref 22–29)
CREAT SERPL-MCNC: 0.92 MG/DL (ref 0.76–1.27)
DEPRECATED RDW RBC AUTO: 49 FL (ref 37–54)
EGFRCR SERPLBLD CKD-EPI 2021: 95.2 ML/MIN/1.73
EOSINOPHIL # BLD AUTO: 0.35 10*3/MM3 (ref 0–0.4)
EOSINOPHIL NFR BLD AUTO: 3.7 % (ref 0.3–6.2)
ERYTHROCYTE [DISTWIDTH] IN BLOOD BY AUTOMATED COUNT: 14.9 % (ref 12.3–15.4)
GLOBULIN UR ELPH-MCNC: 3.2 GM/DL
GLUCOSE SERPL-MCNC: 163 MG/DL (ref 65–99)
HCT VFR BLD AUTO: 35 % (ref 37.5–51)
HGB BLD-MCNC: 11 G/DL (ref 13–17.7)
IMM GRANULOCYTES # BLD AUTO: 0.02 10*3/MM3 (ref 0–0.05)
IMM GRANULOCYTES NFR BLD AUTO: 0.2 % (ref 0–0.5)
LDH SERPL-CCNC: 127 U/L (ref 135–225)
LYMPHOCYTES # BLD AUTO: 1.38 10*3/MM3 (ref 0.7–3.1)
LYMPHOCYTES NFR BLD AUTO: 14.7 % (ref 19.6–45.3)
MCH RBC QN AUTO: 28.1 PG (ref 26.6–33)
MCHC RBC AUTO-ENTMCNC: 31.4 G/DL (ref 31.5–35.7)
MCV RBC AUTO: 89.5 FL (ref 79–97)
MONOCYTES # BLD AUTO: 0.63 10*3/MM3 (ref 0.1–0.9)
MONOCYTES NFR BLD AUTO: 6.7 % (ref 5–12)
NEUTROPHILS NFR BLD AUTO: 6.99 10*3/MM3 (ref 1.7–7)
NEUTROPHILS NFR BLD AUTO: 74.5 % (ref 42.7–76)
PLATELET # BLD AUTO: 290 10*3/MM3 (ref 140–450)
PMV BLD AUTO: 9.5 FL (ref 6–12)
POTASSIUM SERPL-SCNC: 4.1 MMOL/L (ref 3.5–5.2)
PROT SERPL-MCNC: 7.2 G/DL (ref 6–8.5)
RBC # BLD AUTO: 3.91 10*6/MM3 (ref 4.14–5.8)
SODIUM SERPL-SCNC: 138 MMOL/L (ref 136–145)
WBC NRBC COR # BLD: 9.39 10*3/MM3 (ref 3.4–10.8)

## 2023-01-10 PROCEDURE — 85025 COMPLETE CBC W/AUTO DIFF WBC: CPT

## 2023-01-10 PROCEDURE — 99214 OFFICE O/P EST MOD 30 MIN: CPT | Performed by: INTERNAL MEDICINE

## 2023-01-10 PROCEDURE — 80053 COMPREHEN METABOLIC PANEL: CPT

## 2023-01-10 PROCEDURE — 83615 LACTATE (LD) (LDH) ENZYME: CPT

## 2023-01-10 PROCEDURE — 36415 COLL VENOUS BLD VENIPUNCTURE: CPT

## 2023-01-10 NOTE — PROGRESS NOTES
Follow Up Office Visit      Date: 01/10/2023     Patient Name: Kiran Belcher  MRN: [id number]  : 1960  Referring Physician: Chay Cameron     Chief Complaint:  Follow-up for concerns for lymphoma     History of Present Illness: Kiran Belcher is a pleasant 61 y.o. male past medical history of right RCC status post right nephrectomy, hypertension, hyperlipidemia, anxiety, type 2 diabetes who presents today for evaluation of concerns for lymphoma. The patient is accompanied by their wife who contributes to the history of their care.  Patient underwent a right nephrectomy on 2019 for renal cell carcinoma.  He has been followed by nephrology and urology since.  On his most recent scans there is concerns for enlarging retroperitoneal adenopathy.  He underwent an FNA which showed predominant small lymphocytes with a minor population of atypical B cells concerning for possible CLL/SLL.  PET/CT showed no abnormal hypermetabolic activity.  He overall feels well.  Denies any unexplained fevers, chills, night sweats, weight loss.  States that someone did not tell him he had an issue, he would feel completely normal at this time     Interval History:  Presents to clinic for follow-up.  Continues to do well at this time.  Denies any unexplained fevers, chills, night sweats.  Does note some recurrent UTIs but denies any upper respiratory infection    Oncology History:    Oncology/Hematology History    No history exists.       Subjective      Review of Systems:   Constitutional: Negative for fevers, chills, or weight loss  Eyes: Negative for blurred vision or discharge         Ear/Nose/Throat: Negative for difficulty swallowing, sore throat, LAD                                                       Respiratory: Negative for cough, SOA, wheezing                                                                                        Cardiovascular: Negative for chest pain or palpitations                                                                   Gastrointestinal: Negative for nausea, vomiting or diarrhea                                                                     Genitourinary: Negative for dysuria or hematuria                                                                                           Musculoskeletal: Negative for any joint pains or muscle aches                                                                        Neurologic: Negative for any weakness, headaches, dizziness                                                                         Hematologic: Negative for any easy bleeding or bruising                                                                                   Psychiatric: Negative for anxiety or depression                          Past Medical History/Past Surgical History/ Family History/ Social History: Reviewed by me and unchanged from my previous documentation done on September 2022.     Medications:     Current Outpatient Medications:   •  amLODIPine-benazepril (LOTREL) 10-20 MG per capsule, Take 1 capsule by mouth Daily., Disp: 90 capsule, Rfl: 3  •  aspirin 81 MG chewable tablet, Chew 81 mg Daily., Disp: , Rfl:   •  atorvastatin (LIPITOR) 10 MG tablet, Take 1 tablet by mouth Daily., Disp: 90 tablet, Rfl: 0  •  baclofen (LIORESAL) 20 MG tablet, Take 1 tablet by mouth 3 (Three) Times a Day., Disp: 90 tablet, Rfl: 11  •  carvedilol (Coreg) 12.5 MG tablet, Take 0.5 tablets by mouth 2 (Two) Times a Day With Meals., Disp: 180 tablet, Rfl: 3  •  citalopram (CeleXA) 20 MG tablet, Take 1 tablet by mouth Daily., Disp: 90 tablet, Rfl: 3  •  dantrolene (DANTRIUM) 100 MG capsule, TAKE ONE CAPSULE BY MOUTH TWICE A DAY, Disp: 180 capsule, Rfl: 1  •  esomeprazole (nexIUM) 40 MG capsule, Take one tab PO daily, Disp: 90 capsule, Rfl: 3  •  fexofenadine (ALLEGRA) 180 MG tablet, Take 180 mg by mouth Daily., Disp: , Rfl:   •  fluocinonide (LIDEX) 0.05 % cream, fluocinonide 0.05 %  topical cream, Disp: , Rfl:   •  fluticasone (FLONASE) 50 MCG/ACT nasal spray, , Disp: , Rfl:   •  glucose blood (Accu-Chek Elke Plus) test strip, 1 each by Other route 2 (Two) Times a Day. Use as instructed, Disp: 200 each, Rfl: 3  •  glyburide (DIAbeta) 5 MG tablet, Take 1 tablet by mouth Daily With Breakfast., Disp: 90 tablet, Rfl: 3  •  lactobacillus acidophilus (RISAQUAD) capsule capsule, Take 1 capsule by mouth Daily., Disp: 90 capsule, Rfl: 1  •  metFORMIN ER (GLUCOPHAGE-XR) 500 MG 24 hr tablet, Take 2 tablets by mouth 2 (Two) Times a Day., Disp: 360 tablet, Rfl: 3  •  TOVIAZ 8 MG tablet sustained-release 24 hour tablet, , Disp: , Rfl:     Allergies:   Allergies   Allergen Reactions   • Levofloxacin Unknown (See Comments)     tendinopathy       Objective     Physical Exam:  Vital Signs:   Vitals:    01/10/23 1449   BP: 137/74   Pulse: 73   Resp: 18   Temp: 97.6 °F (36.4 °C)   SpO2: 98%   Weight: 122 kg (270 lb)  Comment: WT per PT   Height: 177.8 cm (70\")   PainSc: 0-No pain     Pain Score    01/10/23 1449   PainSc: 0-No pain     ECOG Performance Status: 0 - Asymptomatic    Constitutional: NAD, ECOG 0  Eyes: PERRLA, scleral anicteric  ENT: No LAD, no thyromegaly  Respiratory: CTAB, no wheezing, rales, rhonchi  Cardiovascular: RRR, no murmurs, pulses 2+ bilaterally  Abdomen: soft, NT/ND, no HSM  Musculoskeletal: strength 5/5 bilaterally, no c/c/e  Neurologic: A&O x 3, CN II-XII intact grossly    Results Review:   Lab on 01/10/2023   Component Date Value Ref Range Status   • WBC 01/10/2023 9.39  3.40 - 10.80 10*3/mm3 Final   • RBC 01/10/2023 3.91 (L)  4.14 - 5.80 10*6/mm3 Final   • Hemoglobin 01/10/2023 11.0 (L)  13.0 - 17.7 g/dL Final   • Hematocrit 01/10/2023 35.0 (L)  37.5 - 51.0 % Final   • MCV 01/10/2023 89.5  79.0 - 97.0 fL Final   • MCH 01/10/2023 28.1  26.6 - 33.0 pg Final   • MCHC 01/10/2023 31.4 (L)  31.5 - 35.7 g/dL Final   • RDW 01/10/2023 14.9  12.3 - 15.4 % Final   • RDW-SD 01/10/2023 49.0  37.0  - 54.0 fl Final   • MPV 01/10/2023 9.5  6.0 - 12.0 fL Final   • Platelets 01/10/2023 290  140 - 450 10*3/mm3 Final   • Neutrophil % 01/10/2023 74.5  42.7 - 76.0 % Final   • Lymphocyte % 01/10/2023 14.7 (L)  19.6 - 45.3 % Final   • Monocyte % 01/10/2023 6.7  5.0 - 12.0 % Final   • Eosinophil % 01/10/2023 3.7  0.3 - 6.2 % Final   • Basophil % 01/10/2023 0.2  0.0 - 1.5 % Final   • Immature Grans % 01/10/2023 0.2  0.0 - 0.5 % Final   • Neutrophils, Absolute 01/10/2023 6.99  1.70 - 7.00 10*3/mm3 Final   • Lymphocytes, Absolute 01/10/2023 1.38  0.70 - 3.10 10*3/mm3 Final   • Monocytes, Absolute 01/10/2023 0.63  0.10 - 0.90 10*3/mm3 Final   • Eosinophils, Absolute 01/10/2023 0.35  0.00 - 0.40 10*3/mm3 Final   • Basophils, Absolute 01/10/2023 0.02  0.00 - 0.20 10*3/mm3 Final   • Immature Grans, Absolute 01/10/2023 0.02  0.00 - 0.05 10*3/mm3 Final   Hospital Outpatient Visit on 12/29/2022   Component Date Value Ref Range Status   • Creatinine 12/29/2022 1.10  0.60 - 1.30 mg/dL Final    Serial Number: [id number]:  [id number]       CT Chest With Contrast Diagnostic    Result Date: 12/30/2022  Narrative: CT CHEST W CONTRAST DIAGNOSTIC-  Date of Exam: 12/29/2022 1:50 PM  Indication: b-cell lymphoma; C85.10-Unspecified B-cell lymphoma, unspecified site.  Comparison: 7/12/2022  Technique: Serial and axial CT images of the chest were obtained following the uneventful intravenous administration of 95 mL of Isovue-370.  Reconstructions in the coronal and sagittal planes were also performed.  Automated exposure control and iterative reconstruction methods were used.  FINDINGS: There are subcentimeter left thyroid nodules, unchanged from prior. The esophagus is unremarkable. No axillary adenopathy is identified. There are calcified mediastinal lymph nodes. There are a few epicardial fat pad lymph nodes. The largest measuring 1.2 cm in diameter, previously measuring 1.5 cm in diameter. The aorta and pulmonary artery are normal in  caliber. Mild coronary artery vascular calcifications.  The central airways are patent. No suspicious pulmonary nodules are identified.  No aggressive appearing lytic or sclerotic bone lesions are identified.      Impression:  1. Slight decrease in size of epicardial fat pad lymph node.    This report was finalized on 12/30/2022 3:58 PM by Danie Briscoe MD.      CT Abdomen Pelvis With Contrast    Result Date: 12/30/2022  Narrative: DATE OF EXAM: 12/29/2022 1:50 PM  PROCEDURE: CT ABDOMEN PELVIS W CONTRAST-  INDICATIONS: b-cell lymphoma; C85.10-Unspecified B-cell lymphoma, unspecified site  COMPARISON: 07/12/2022  TECHNIQUE: Routine transaxial slices were obtained through the abdomen and pelvis after the intravenous administration of 95 mL of Isovue 300. Reconstructed coronal and sagittal images were also obtained. Automated exposure control and iterative construction methods were used.  The radiation dose reduction device was turned on for each scan per the ALARA (As Low as Reasonably Achievable) protocol.  FINDINGS: Previous exam report from 07/12/2022 noted mild increased size of retroperitoneal pelvic and inguinal lymph nodes since earlier 03/09/2022 PET scan. New bladder wall inflammation.  Today's study shows the included lung bases to appear clear. There is diffuse fatty liver change, but no liver lesions are identified. Spleen is not enlarged. Pancreas, adrenal glands, and remaining left kidney appear unremarkable. Multiple small gallstones are seen in the dependent portion of the otherwise normal-appearing gallbladder. No upper abdominal ascites is seen. Small epicardial lymph nodes appear stable.  Regarding the lower abdomen and pelvis, shotty retroperitoneal lymph nodes with associated mild fat stranding appears stable. The largest periaortic node remains approximately 23 x 15 mm. Left iliac nodes appear stable. There is mild right iliac adenopathy as well, but no apparent interval change in size of these  nodes. As example, the largest external iliac node remains 25 mm in diameter. Inguinal nodes are normal in size.  There is mild fecal stasis. No bowel wall inflammation is appreciated. Terminal ileum, cecum and appendix appear normal. There is persistent or recurrent, increasing inflammation of the bladder wall, with mildly thickened bladder wall and moderate surrounding fat stranding. No inflammatory focus is seen elsewhere. Delayed venous phase images show no evidence of obstructive uropathy. Bony structures appear to be intact.      Impression:  1. Stable shotty aortoiliac lymphadenopathy compared to 07/12/2022 exam. 2. Persistent or recurrent bladder inflammation, with inflammatory changes appearing a little increased from the prior study. 3. Cholelithiasis. 4. Mild fecal stasis.  This report was finalized on 12/30/2022 11:45 PM by Dr. Hayden Yi MD.        Assessment / Plan      Assessment/Plan:   1. B-cell lymphoma, unspecified B-cell lymphoma type, unspecified body region (HCC) (Primary)  -Unclear etiology at this time  -Enlarging retroperitoneal adenopathy noted on recent CT scan  -FNA showing predominately small lymphocytes with a minor population of atypical B cells  -PET/CT showing no abnormal hypermetabolic activity at [facility]  -Peripheral flow without an immunophenotypic abnormality  -SPEP within normal limits  -LDH mildly decreased  -Kappa/lambda light chain ratio within normal limits, beta-2 microglobulin mildly elevated at 3.4  -CT C/A/P in July 2022 with some mild increase in his retroperitoneal adenopathy along with concerns for cystitis  -Labs in October 2022 reviewed and within normal limits with no significant lymphocytosis or leukocytosis  -Continues to remain asymptomatic with no significant constitutional symptoms  -CT C/A/P in December 2022 reviewed without evidence of recurrent or metastatic disease  -Labs in January 2022 reviewed and overall stable  -We will plan for repeat labs in 6 months and  repeat CT scans in 1 year     2.  Right clear-cell renal cell carcinoma  -Status post nephrectomy in May 2019  -Has been without disease recurrence since         Follow Up:   Follow-up in 6 months     Jose Perdomo MD  Hematology and Oncology     Please note that portions of this note may have been completed with a voice recognition program. Efforts were made to edit the dictations, but occasionally words are mistranscribed.

## 2023-02-10 ENCOUNTER — TELEPHONE (OUTPATIENT)
Dept: FAMILY MEDICINE CLINIC | Facility: CLINIC | Age: 63
End: 2023-02-10

## 2023-02-27 ENCOUNTER — OFFICE VISIT (OUTPATIENT)
Dept: FAMILY MEDICINE CLINIC | Facility: CLINIC | Age: 63
End: 2023-02-27
Payer: MEDICARE

## 2023-02-27 VITALS
WEIGHT: 270 LBS | TEMPERATURE: 97.3 F | HEIGHT: 70 IN | HEART RATE: 75 BPM | BODY MASS INDEX: 38.65 KG/M2 | DIASTOLIC BLOOD PRESSURE: 70 MMHG | SYSTOLIC BLOOD PRESSURE: 128 MMHG | OXYGEN SATURATION: 98 % | RESPIRATION RATE: 20 BRPM

## 2023-02-27 DIAGNOSIS — R80.9 TYPE 2 DIABETES MELLITUS WITH MICROALBUMINURIA, WITHOUT LONG-TERM CURRENT USE OF INSULIN: Primary | ICD-10-CM

## 2023-02-27 DIAGNOSIS — E11.29 TYPE 2 DIABETES MELLITUS WITH MICROALBUMINURIA, WITHOUT LONG-TERM CURRENT USE OF INSULIN: Primary | ICD-10-CM

## 2023-02-27 DIAGNOSIS — I10 BENIGN ESSENTIAL HYPERTENSION: Chronic | ICD-10-CM

## 2023-02-27 DIAGNOSIS — C85.10 B-CELL LYMPHOMA, UNSPECIFIED B-CELL LYMPHOMA TYPE, UNSPECIFIED BODY REGION: ICD-10-CM

## 2023-02-27 LAB
EXPIRATION DATE: NORMAL
HBA1C MFR BLD: 7.3 %
Lab: NORMAL

## 2023-02-27 PROCEDURE — 3051F HG A1C>EQUAL 7.0%<8.0%: CPT | Performed by: STUDENT IN AN ORGANIZED HEALTH CARE EDUCATION/TRAINING PROGRAM

## 2023-02-27 PROCEDURE — 99214 OFFICE O/P EST MOD 30 MIN: CPT | Performed by: STUDENT IN AN ORGANIZED HEALTH CARE EDUCATION/TRAINING PROGRAM

## 2023-02-27 PROCEDURE — 83036 HEMOGLOBIN GLYCOSYLATED A1C: CPT | Performed by: STUDENT IN AN ORGANIZED HEALTH CARE EDUCATION/TRAINING PROGRAM

## 2023-02-27 RX ORDER — GLYBURIDE 5 MG/1
5 TABLET ORAL
Qty: 90 TABLET | Refills: 3 | Status: SHIPPED | OUTPATIENT
Start: 2023-02-27

## 2023-02-27 NOTE — PROGRESS NOTES
Established Patient Office Visit        Subjective      Chief Complaint:  Diabetes (3 month follow up)      History of Present Illness: Kiran Belcher is a 62 y.o. male who presents for follow-up of diabetes and hypertension    Reviewed oncology office note from 1/10.  CT chest abdomen and pelvis also reviewed.  Oncology note shows reassuring scans in December for unspecified B-cell lymphoma and plan to repeat labs in 6 months and CT scan in a year.  Patient Active Problem List   Diagnosis   • Benign essential hypertension   • Depression   • Gastroesophageal reflux disease without esophagitis   • Hyperlipidemia   • NISA on CPAP   • Spasm   • Obesity   • Right otitis externa   • Hx of spinal cord injury   • Kidney disorder   • Lower extremity weakness   • Myalgia   • Moderate episode of recurrent major depressive disorder (HCC)   • Dysphagia   • Toenail avulsion   • H/O kidney removal   • Renal cell carcinoma of right kidney (HCC)   • Paraplegia (HCC)   • Muscle contracture   • C5-C7 level spinal injury with complete lesion of spinal cord, without evidence of spinal bone injury (HCC)   • Inability to bear weight   • Neurogenic bladder   • Venous stasis   • B-cell lymphoma (HCC)   • Cancer of kidney (HCC)   • Hypertension         Current Outpatient Medications:   •  amLODIPine-benazepril (LOTREL) 10-20 MG per capsule, Take 1 capsule by mouth Daily., Disp: 90 capsule, Rfl: 3  •  aspirin 81 MG chewable tablet, Chew 81 mg Daily., Disp: , Rfl:   •  atorvastatin (LIPITOR) 10 MG tablet, Take 1 tablet by mouth Daily., Disp: 90 tablet, Rfl: 0  •  baclofen (LIORESAL) 20 MG tablet, Take 1 tablet by mouth 3 (Three) Times a Day., Disp: 90 tablet, Rfl: 11  •  carvedilol (Coreg) 12.5 MG tablet, Take 0.5 tablets by mouth 2 (Two) Times a Day With Meals., Disp: 180 tablet, Rfl: 3  •  citalopram (CeleXA) 20 MG tablet, Take 1 tablet by mouth Daily., Disp: 90 tablet, Rfl: 3  •  dantrolene (DANTRIUM) 100 MG capsule, TAKE ONE CAPSULE BY  "MOUTH TWICE A DAY, Disp: 180 capsule, Rfl: 1  •  esomeprazole (nexIUM) 40 MG capsule, Take one tab PO daily, Disp: 90 capsule, Rfl: 3  •  fexofenadine (ALLEGRA) 180 MG tablet, Take 180 mg by mouth Daily., Disp: , Rfl:   •  fluocinonide (LIDEX) 0.05 % cream, fluocinonide 0.05 % topical cream, Disp: , Rfl:   •  fluticasone (FLONASE) 50 MCG/ACT nasal spray, , Disp: , Rfl:   •  glucose blood (Accu-Chek Elke Plus) test strip, 1 each by Other route 2 (Two) Times a Day. Use as instructed, Disp: 200 each, Rfl: 3  •  glyburide (DIAbeta) 5 MG tablet, Take 1 tablet by mouth Daily With Breakfast., Disp: 90 tablet, Rfl: 3  •  lactobacillus acidophilus (RISAQUAD) capsule capsule, Take 1 capsule by mouth Daily., Disp: 90 capsule, Rfl: 1  •  metFORMIN ER (GLUCOPHAGE-XR) 500 MG 24 hr tablet, Take 2 tablets by mouth 2 (Two) Times a Day., Disp: 360 tablet, Rfl: 3  •  TOVIAZ 8 MG tablet sustained-release 24 hour tablet, , Disp: , Rfl:        Objective     Physical Exam:   Vital Signs:   /70   Pulse 75   Temp 97.3 °F (36.3 °C) (Temporal)   Resp 20   Ht 177.8 cm (70\")   Wt 122 kg (270 lb)   SpO2 98%   BMI 38.74 kg/m²      Physical Exam  Cardiovascular:      Pulses:           Dorsalis pedis pulses are 2+ on the right side and 2+ on the left side.   Musculoskeletal:      Right foot: No deformity.      Left foot: No deformity.   Feet:      Right foot:      Skin integrity: Skin integrity normal.      Toenail Condition: Right toenails are normal.      Left foot:      Skin integrity: Skin integrity normal.      Toenail Condition: Left toenails are normal.      Comments: Diabetic Foot Exam Performed  Sensation intact to foot and toes bilaterally      Constitutional:       General: He is not in acute distress.  Patient's and electric chair     Appearance: He is not ill-appearing.   Cardiovascular:      Rate and Rhythm: Normal rate and regular rhythm.   Pulmonary:      Effort: Pulmonary effort is normal.      Breath sounds: Normal " breath sounds.   Neurological:      Mental Status: He is alert.  Extremity weakness bilaterally  Psychiatric:         Thought Content: Thought content normal.     Patient has some swelling to lower extremities bilaterally           Assessment / Plan      Assessment/Plan:   Diagnoses and all orders for this visit:    1. Type 2 diabetes mellitus with microalbuminuria, without long-term current use of insulin (HCC) (Primary)  -     POC Glycosylated Hemoglobin (Hb A1C), 7.3  -     Continue glyburide (DIAbeta) 5 MG tablet; Take 1 tablet by mouth Daily With Breakfast.  Dispense: 90 tablet; Refill: 3  -     Continue metformin  -   A1c slightly above goal however he desires to improve his diet and not adjust medication today.  -Continue ACE inhibitor for microalbuminuria.  Not a good candidate for SGLT2.     2. Benign essential hypertension  Assessment & Plan:  Stable and controlled and improved  Continue carvedilol, amlodipine, benazepril      3. B-cell lymphoma, unspecified B-cell lymphoma type, unspecified body region (HCC)  Assessment & Plan:  Office visit with oncology reassuring.  No treatment this time repeat labs in 6 months and CT in about a year per oncology note             Follow Up:   Return in about 3 months (around 5/27/2023) for Follow-up.      MDM:     Chay Cameron MD  [place] Medicine - [facility] [place]

## 2023-03-06 DIAGNOSIS — I10 ESSENTIAL HYPERTENSION: ICD-10-CM

## 2023-03-06 RX ORDER — AMLODIPINE BESYLATE AND BENAZEPRIL HYDROCHLORIDE 10; 20 MG/1; MG/1
1 CAPSULE ORAL DAILY
Qty: 90 CAPSULE | Refills: 3 | Status: SHIPPED | OUTPATIENT
Start: 2023-03-06

## 2023-04-03 DIAGNOSIS — F32.0 CURRENT MILD EPISODE OF MAJOR DEPRESSIVE DISORDER WITHOUT PRIOR EPISODE: ICD-10-CM

## 2023-04-03 RX ORDER — ESOMEPRAZOLE MAGNESIUM 40 MG/1
CAPSULE, DELAYED RELEASE ORAL
Qty: 90 CAPSULE | Refills: 3 | Status: SHIPPED | OUTPATIENT
Start: 2023-04-03

## 2023-04-03 RX ORDER — CITALOPRAM 20 MG/1
TABLET ORAL
Qty: 90 TABLET | Refills: 3 | Status: SHIPPED | OUTPATIENT
Start: 2023-04-03

## 2023-04-04 DIAGNOSIS — S14.115A SPINAL CORD INJURY AT C5-C7 LEVEL WITH COMPLETE SPINAL CORD LESION: ICD-10-CM

## 2023-04-04 RX ORDER — DANTROLENE SODIUM 100 MG/1
100 CAPSULE ORAL 2 TIMES DAILY
Qty: 180 CAPSULE | Refills: 1 | Status: SHIPPED | OUTPATIENT
Start: 2023-04-04

## 2023-05-05 ENCOUNTER — OFFICE VISIT (OUTPATIENT)
Dept: FAMILY MEDICINE CLINIC | Facility: CLINIC | Age: 63
End: 2023-05-05
Payer: MEDICARE

## 2023-05-05 VITALS
OXYGEN SATURATION: 98 % | TEMPERATURE: 98.4 F | HEIGHT: 70 IN | WEIGHT: 270 LBS | DIASTOLIC BLOOD PRESSURE: 70 MMHG | SYSTOLIC BLOOD PRESSURE: 122 MMHG | BODY MASS INDEX: 38.65 KG/M2 | HEART RATE: 91 BPM | RESPIRATION RATE: 18 BRPM

## 2023-05-05 DIAGNOSIS — K52.9 GASTROENTERITIS: Primary | ICD-10-CM

## 2023-05-05 DIAGNOSIS — E78.2 MIXED HYPERLIPIDEMIA: ICD-10-CM

## 2023-05-05 DIAGNOSIS — I10 BENIGN ESSENTIAL HYPERTENSION: Chronic | ICD-10-CM

## 2023-05-05 RX ORDER — FESOTERODINE FUMARATE 8 MG/1
8 TABLET, EXTENDED RELEASE ORAL DAILY
Qty: 30 TABLET | Refills: 11 | COMMUNITY
Start: 2023-03-24 | End: 2024-03-23

## 2023-05-05 RX ORDER — AMMONIUM LACTATE 120 MG/G
1 CREAM TOPICAL AS NEEDED
COMMUNITY

## 2023-05-05 RX ORDER — ATORVASTATIN CALCIUM 10 MG/1
10 TABLET, FILM COATED ORAL DAILY
Qty: 90 TABLET | Refills: 3 | Status: SHIPPED | OUTPATIENT
Start: 2023-05-05

## 2023-05-05 NOTE — PROGRESS NOTES
Established Patient Office Visit        Subjective      Chief Complaint:  Diarrhea (Diarrhea, wife has been ill with this as well. Patient has had dry heaves as well he said. Needing a refill of cholesterol med and baclofen, said he is out of this)      History of Present Illness: Kiran Belcher is a 62 y.o. male who presents for diarrhea    Diarrhea and upset stomach started about 3 days ago. Had some dry heaves. Last night with diarrhea. No fever. No stool for 6 hours now.   Wife has similar symptoms   Patient Active Problem List   Diagnosis   • Benign essential hypertension   • Depression   • Gastroesophageal reflux disease without esophagitis   • Hyperlipidemia   • NISA on CPAP   • Spasm   • Obesity   • Right otitis externa   • Hx of spinal cord injury   • Kidney disorder   • Lower extremity weakness   • Myalgia   • Moderate episode of recurrent major depressive disorder   • Dysphagia   • Toenail avulsion   • H/O kidney removal   • Renal cell carcinoma of right kidney   • Paraplegia   • Muscle contracture   • C5-C7 level spinal injury with complete lesion of spinal cord, without evidence of spinal bone injury   • Inability to bear weight   • Neurogenic bladder   • Venous stasis   • B-cell lymphoma   • Cancer of kidney   • Hypertension         Current Outpatient Medications:   •  amLODIPine-benazepril (LOTREL) 10-20 MG per capsule, Take 1 capsule by mouth Daily., Disp: 90 capsule, Rfl: 3  •  ammonium lactate (AMLACTIN) 12 % cream, Apply 1 g topically to the appropriate area as directed As Needed for Dry Skin. Apply as directed , prescribed by podiatrist, Disp: , Rfl:   •  aspirin 81 MG chewable tablet, Chew 1 tablet Daily., Disp: , Rfl:   •  atorvastatin (LIPITOR) 10 MG tablet, Take 1 tablet by mouth Daily., Disp: 90 tablet, Rfl: 3  •  baclofen (LIORESAL) 20 MG tablet, Take 1 tablet by mouth 3 (Three) Times a Day., Disp: 90 tablet, Rfl: 11  •  carvedilol (Coreg) 12.5 MG tablet, Take 0.5 tablets by mouth 2  "(Two) Times a Day With Meals., Disp: 180 tablet, Rfl: 3  •  citalopram (CeleXA) 20 MG tablet, TAKE ONE TABLET BY MOUTH DAILY, Disp: 90 tablet, Rfl: 3  •  dantrolene (DANTRIUM) 100 MG capsule, Take 1 capsule by mouth 2 (Two) Times a Day., Disp: 180 capsule, Rfl: 1  •  esomeprazole (nexIUM) 40 MG capsule, TAKE ONE CAPSULE BY MOUTH DAILY, Disp: 90 capsule, Rfl: 3  •  fesoterodine fumarate (TOVIAZ ER) 8 MG tablet sustained-release 24 hour tablet, Take 1 tablet by mouth Daily., Disp: 30 tablet, Rfl: 11  •  fexofenadine (ALLEGRA) 180 MG tablet, Take 1 tablet by mouth Daily., Disp: , Rfl:   •  fluocinonide (LIDEX) 0.05 % cream, fluocinonide 0.05 % topical cream, Disp: , Rfl:   •  fluticasone (FLONASE) 50 MCG/ACT nasal spray, , Disp: , Rfl:   •  glucose blood (Accu-Chek Elke Plus) test strip, 1 each by Other route 2 (Two) Times a Day. Use as instructed, Disp: 200 each, Rfl: 3  •  glyburide (DIAbeta) 5 MG tablet, Take 1 tablet by mouth Daily With Breakfast., Disp: 90 tablet, Rfl: 3  •  lactobacillus acidophilus (RISAQUAD) capsule capsule, Take 1 capsule by mouth Daily., Disp: 90 capsule, Rfl: 1  •  metFORMIN ER (GLUCOPHAGE-XR) 500 MG 24 hr tablet, Take 2 tablets by mouth 2 (Two) Times a Day., Disp: 360 tablet, Rfl: 3  •  TOVIAZ 8 MG tablet sustained-release 24 hour tablet, , Disp: , Rfl:        Objective     Physical Exam:   Vital Signs:   /70 (BP Location: Right arm, Patient Position: Sitting, Cuff Size: Adult)   Pulse 91   Temp 98.4 °F (36.9 °C) (Temporal)   Resp 18   Ht 177.8 cm (70\")   Wt 122 kg (270 lb) Comment: patient us unable to weight at this time.  SpO2 98%   BMI 38.74 kg/m²      Physical Exam  Constitutional:       General: He is not in acute distress.     Appearance: He is not ill-appearing.   Cardiovascular:      Rate and Rhythm: Normal rate and regular rhythm.   Pulmonary:      Effort: Pulmonary effort is normal.      Breath sounds: Normal breath sounds.   Neurological:      Mental Status: He is " alert.   Psychiatric:         Thought Content: Thought content normal.   No abdominal tenderness         Assessment / Plan      Assessment/Plan:   Diagnoses and all orders for this visit:    1. Gastroenteritis (Primary)  Ok for loperamide as needed   Follow-up if not improving  Increase fluid intake  Consider probiotic    2. Mixed hyperlipidemia  -     atorvastatin (LIPITOR) 10 MG tablet; Take 1 tablet by mouth Daily.  Dispense: 90 tablet; Refill: 3  -Chronic stable refill Lipitor    3. Benign essential hypertension  -Chronic stable at goal see med list     Follow Up:   No follow-ups on file.      MDM:     hCay Cameron MD  Family Medicine - [facility]

## 2023-05-10 RX ORDER — BACLOFEN 20 MG/1
20 TABLET ORAL 3 TIMES DAILY
Qty: 90 TABLET | Refills: 11 | Status: SHIPPED | OUTPATIENT
Start: 2023-05-10

## 2023-05-10 NOTE — TELEPHONE ENCOUNTER
Caller: Kiran Belcher    Relationship: Self    Best call back number: [phone]    Requested Prescriptions:   Requested Prescriptions     Pending Prescriptions Disp Refills   • baclofen (LIORESAL) 20 MG tablet 90 tablet 11     Sig: Take 1 tablet by mouth 3 (Three) Times a Day.        Pharmacy where request should be sent: [facility] [id number] [address] [phone] [facility] [phone]      Last office visit with prescribing clinician: 5/5/2023   Last telemedicine visit with prescribing clinician: 5/5/2023   Next office visit with prescribing clinician: 5/30/2023     Additional details provided by patient:     Does the patient have less than a 3 day supply:  [x] Yes  [] No    Would you like a call back once the refill request has been completed: [x] Yes [] No    If the office needs to give you a call back, can they leave a voicemail: [x] Yes [] No    Dede Grossman Rep   05/10/23 13:19 EDT

## 2023-05-30 ENCOUNTER — OFFICE VISIT (OUTPATIENT)
Dept: FAMILY MEDICINE CLINIC | Facility: CLINIC | Age: 63
End: 2023-05-30

## 2023-05-30 VITALS
OXYGEN SATURATION: 95 % | BODY MASS INDEX: 38.74 KG/M2 | HEART RATE: 64 BPM | HEIGHT: 70 IN | SYSTOLIC BLOOD PRESSURE: 122 MMHG | TEMPERATURE: 99.1 F | DIASTOLIC BLOOD PRESSURE: 74 MMHG | RESPIRATION RATE: 20 BRPM

## 2023-05-30 DIAGNOSIS — F33.1 MODERATE EPISODE OF RECURRENT MAJOR DEPRESSIVE DISORDER: ICD-10-CM

## 2023-05-30 DIAGNOSIS — E66.01 CLASS 2 SEVERE OBESITY DUE TO EXCESS CALORIES WITH SERIOUS COMORBIDITY AND BODY MASS INDEX (BMI) OF 38.0 TO 38.9 IN ADULT: ICD-10-CM

## 2023-05-30 DIAGNOSIS — S14.115S: ICD-10-CM

## 2023-05-30 DIAGNOSIS — E11.29 TYPE 2 DIABETES MELLITUS WITH MICROALBUMINURIA, WITHOUT LONG-TERM CURRENT USE OF INSULIN: Primary | ICD-10-CM

## 2023-05-30 DIAGNOSIS — I10 BENIGN ESSENTIAL HYPERTENSION: Chronic | ICD-10-CM

## 2023-05-30 DIAGNOSIS — R80.9 TYPE 2 DIABETES MELLITUS WITH MICROALBUMINURIA, WITHOUT LONG-TERM CURRENT USE OF INSULIN: Primary | ICD-10-CM

## 2023-05-30 LAB
EXPIRATION DATE: NORMAL
HBA1C MFR BLD: 7.4 %
Lab: NORMAL

## 2023-05-30 NOTE — ASSESSMENT & PLAN NOTE
A1c increased to A1c 7.4  Patient declines adjustment of medication today could be candidate for addition of GLP-1 in the future  Continue metformin twice daily and glyburide 5 mg once daily.  Discussed healthy diet

## 2023-05-30 NOTE — PROGRESS NOTES
Established Patient Office Visit        Subjective      Chief Complaint:  Diabetes (Diabetes follow up)    History of Present Illness: Kiran Belcher is a 62 y.o. male who presents for diabetes and hypertension.    He could improve on his diet.  No side effects medication      Patient Active Problem List   Diagnosis   • Benign essential hypertension   • Depression   • Gastroesophageal reflux disease without esophagitis   • Hyperlipidemia   • NISA on CPAP   • Type 2 diabetes mellitus with microalbuminuria, without long-term current use of insulin   • Spasm   • Obesity   • Right otitis externa   • Hx of spinal cord injury   • Kidney disorder   • Lower extremity weakness   • Myalgia   • Moderate episode of recurrent major depressive disorder   • Dysphagia   • Toenail avulsion   • H/O kidney removal   • Renal cell carcinoma of right kidney   • Paraplegia   • Muscle contracture   • C5-C7 level spinal injury with complete lesion of spinal cord, without evidence of spinal bone injury   • Inability to bear weight   • Neurogenic bladder   • Venous stasis   • B-cell lymphoma   • Cancer of kidney   • Hypertension         Current Outpatient Medications:   •  amLODIPine-benazepril (LOTREL) 10-20 MG per capsule, Take 1 capsule by mouth Daily., Disp: 90 capsule, Rfl: 3  •  ammonium lactate (AMLACTIN) 12 % cream, Apply 1 g topically to the appropriate area as directed As Needed for Dry Skin. Apply as directed , prescribed by podiatrist, Disp: , Rfl:   •  aspirin 81 MG chewable tablet, Chew 1 tablet Daily., Disp: , Rfl:   •  atorvastatin (LIPITOR) 10 MG tablet, Take 1 tablet by mouth Daily., Disp: 90 tablet, Rfl: 3  •  baclofen (LIORESAL) 20 MG tablet, Take 1 tablet by mouth 3 (Three) Times a Day., Disp: 90 tablet, Rfl: 11  •  carvedilol (Coreg) 12.5 MG tablet, Take 0.5 tablets by mouth 2 (Two) Times a Day With Meals., Disp: 180 tablet, Rfl: 3  •  citalopram (CeleXA) 20 MG tablet, TAKE ONE TABLET BY MOUTH DAILY, Disp: 90 tablet,  "Rfl: 3  •  dantrolene (DANTRIUM) 100 MG capsule, Take 1 capsule by mouth 2 (Two) Times a Day., Disp: 180 capsule, Rfl: 1  •  esomeprazole (nexIUM) 40 MG capsule, TAKE ONE CAPSULE BY MOUTH DAILY, Disp: 90 capsule, Rfl: 3  •  fesoterodine fumarate (TOVIAZ ER) 8 MG tablet sustained-release 24 hour tablet, Take 1 tablet by mouth Daily., Disp: 30 tablet, Rfl: 11  •  fexofenadine (ALLEGRA) 180 MG tablet, Take 1 tablet by mouth Daily., Disp: , Rfl:   •  fluocinonide (LIDEX) 0.05 % cream, fluocinonide 0.05 % topical cream, Disp: , Rfl:   •  fluticasone (FLONASE) 50 MCG/ACT nasal spray, , Disp: , Rfl:   •  glucose blood (Accu-Chek Elke Plus) test strip, 1 each by Other route 2 (Two) Times a Day. Use as instructed, Disp: 200 each, Rfl: 3  •  glyburide (DIAbeta) 5 MG tablet, Take 1 tablet by mouth Daily With Breakfast., Disp: 90 tablet, Rfl: 3  •  lactobacillus acidophilus (RISAQUAD) capsule capsule, Take 1 capsule by mouth Daily., Disp: 90 capsule, Rfl: 1  •  metFORMIN ER (GLUCOPHAGE-XR) 500 MG 24 hr tablet, Take 2 tablets by mouth 2 (Two) Times a Day., Disp: 360 tablet, Rfl: 3       Objective     Physical Exam:   Vital Signs:   /74 (BP Location: Right arm, Patient Position: Sitting, Cuff Size: Adult)   Pulse 64   Temp 99.1 °F (37.3 °C) (Temporal)   Resp 20   Ht 177.8 cm (70\")   SpO2 95%   BMI 38.74 kg/m²      Physical Exam  Constitutional:       General: He is not in acute distress.     Appearance: He is not ill-appearing.   Cardiovascular:      Rate and Rhythm: Normal rate and regular rhythm.   Pulmonary:      Effort: Pulmonary effort is normal.      Breath sounds: Normal breath sounds.   1+ edema bilaterally to LE edema.    Patient is in electric wheelchair           Assessment / Plan      Assessment/Plan:   Diagnoses and all orders for this visit:    1. Type 2 diabetes mellitus with microalbuminuria, without long-term current use of insulin (Primary)  Assessment & Plan:  A1c increased to A1c 7.4  Patient " declines adjustment of medication today could be candidate for addition of GLP-1 in the future  Continue metformin twice daily and glyburide 5 mg once daily.  Discussed healthy diet        Orders:  -     POC Glycosylated Hemoglobin (Hb A1C)    2. Spinal cord injury at C5-C7 level with complete spinal cord lesion without bone injury, sequela  Assessment & Plan:  Continue baclofen.       3. Moderate episode of recurrent major depressive disorder  -Continue Celexa    4. Class 2 severe obesity due to excess calories with serious comorbidity and body mass index (BMI) of 38.0 to 38.9 in adult  -Discussed healthy diet and exercise. Hand out given.     5. Benign essential hypertension  Assessment & Plan:  Stable and controlled and improved  Continue carvedilol, amlodipine, benazepril           Follow Up:   Return in about 3 months (around 8/30/2023) for Follow-up reschedule 7/31 appt to 8/30, [employer] [facility].      MDM:     Chay Cameron MD  Family Medicine - [facility] [place]

## 2023-06-16 NOTE — PROGRESS NOTES
Established Patient Office Visit        Subjective      Chief Complaint:  Diabetes (Follow up on diabetes, a1c)      History of Present Illness: Kiran Belcher is a 62 y.o. male who presents for follow-up of diabetes.    He is doing well with no concerns.  No side effects of medication.    Reviewed oncology note for his B-cell lymphoma with unclear etiology and this is being monitored without current treatment by hematology with plan for repeat CT in 3 months    His cellulitis has resolved    Patient Active Problem List   Diagnosis   • Benign essential hypertension   • Depression   • Gastroesophageal reflux disease without esophagitis   • Hyperlipidemia   • NISA on CPAP   • Diabetes mellitus, type II (HCC)   • Spasm   • Obesity   • Right otitis externa   • Hx of spinal cord injury   • Kidney disorder   • Lower extremity weakness   • Myalgia   • Moderate episode of recurrent major depressive disorder (HCC)   • Dysphagia   • Toenail avulsion   • H/O kidney removal   • Renal cell carcinoma of right kidney (HCC)   • Paraplegia (HCC)   • Muscle contracture   • C5-C7 level spinal injury with complete lesion of spinal cord, without evidence of spinal bone injury (HCC)   • Inability to bear weight   • Neurogenic bladder   • Venous stasis   • B-cell lymphoma (HCC)   • Cancer of kidney (HCC)   • Hypertension         Current Outpatient Medications:   •  amLODIPine-benazepril (LOTREL) 10-20 MG per capsule, Take 1 capsule by mouth Daily., Disp: 90 capsule, Rfl: 3  •  aspirin 81 MG chewable tablet, Chew 81 mg Daily., Disp: , Rfl:   •  atorvastatin (LIPITOR) 10 MG tablet, TAKE ONE TABLET BY MOUTH DAILY, Disp: 90 tablet, Rfl: 0  •  baclofen (LIORESAL) 20 MG tablet, Take 1 tablet by mouth 3 (Three) Times a Day., Disp: 90 tablet, Rfl: 11  •  carvedilol (Coreg) 12.5 MG tablet, Take 0.5 tablets by mouth 2 (Two) Times a Day With Meals., Disp: 180 tablet, Rfl: 3  •  citalopram (CeleXA) 20 MG tablet, Take 1 tablet by mouth Daily.,  "Disp: 90 tablet, Rfl: 3  •  dantrolene (DANTRIUM) 100 MG capsule, TAKE ONE CAPSULE BY MOUTH TWICE A DAY, Disp: 180 capsule, Rfl: 1  •  esomeprazole (nexIUM) 40 MG capsule, Take one tab PO daily, Disp: 90 capsule, Rfl: 3  •  fexofenadine (ALLEGRA) 180 MG tablet, Take 180 mg by mouth Daily., Disp: , Rfl:   •  fluocinonide (LIDEX) 0.05 % cream, fluocinonide 0.05 % topical cream, Disp: , Rfl:   •  fluticasone (FLONASE) 50 MCG/ACT nasal spray, , Disp: , Rfl:   •  glucose blood (Accu-Chek Elke Plus) test strip, 1 each by Other route 2 (Two) Times a Day. Use as instructed, Disp: 200 each, Rfl: 3  •  glyburide (DIAbeta) 5 MG tablet, Take 1 tablet by mouth Daily With Breakfast., Disp: 90 tablet, Rfl: 3  •  lactobacillus acidophilus (RISAQUAD) capsule capsule, Take 1 capsule by mouth Daily., Disp: 90 capsule, Rfl: 1  •  metFORMIN ER (GLUCOPHAGE-XR) 500 MG 24 hr tablet, Take 2 tablets by mouth 2 (Two) Times a Day., Disp: 360 tablet, Rfl: 3  •  TOVIAZ 8 MG tablet sustained-release 24 hour tablet, , Disp: , Rfl:        Objective     Physical Exam:   Vital Signs:   /64   Pulse 91   Temp 98 °F (36.7 °C)   Resp 16   Ht 177.8 cm (70\")   SpO2 97%   BMI 38.59 kg/m²      Physical Exam  Cardiovascular:      Pulses:           Dorsalis pedis pulses are 2+ on the right side and 2+ on the left side.   Musculoskeletal:      Right foot: No deformity.      Left foot: No deformity.   Feet:      Right foot:      Protective Sensation: 10 sites tested. 10 sites sensed.      Skin integrity: Skin integrity normal.      Toenail Condition: Right toenails are normal.      Left foot:      Protective Sensation: 10 sites tested. 10 sites sensed.      Skin integrity: Skin integrity normal.      Toenail Condition: Left toenails are normal.      Comments: Diabetic Foot Exam Performed  Dry skin and some stasis changes to dorsal feet    Some swelling to ankles.          Constitutional:       General: He is not in acute distress.     Appearance: He " is not ill-appearing.  He is in electric wheelchair  Cardiovascular:      Rate and Rhythm: Normal rate and regular rhythm.   Pulmonary:      Effort: Pulmonary effort is normal.      Breath sounds: Normal breath sounds.   Neurological:      Mental Status: He is alert.  Significant weakness to legs bilaterally.  Psychiatric:         Thought Content: Thought content normal.            Assessment / Plan      Assessment/Plan:   Diagnoses and all orders for this visit:    1. Type 2 diabetes mellitus without complication, unspecified whether long term insulin use (HCC)  Assessment & Plan:  - A1c improved and at goal today 6.5  -Continue glyburide and metformin  -Urine sample left for microalbumin and will try to get this to the lab tomorrow throat    Orders:  -     POC Glycosylated Hemoglobin (Hb A1C)    2. B-cell lymphoma, unspecified B-cell lymphoma type, unspecified body region (HCC)  Assessment & Plan:  No active treatment and is monitoring  Repeat CT scan in 3 months and follows with oncology      3. Primary hypertension  Assessment & Plan:  Stable and controlled and improved  Continue carvedilol, amlodipine, benazepril      4. Immunization due  -     FluLaval/Fluzone >6 mos (2140-7332)         Follow Up:   Return in about 3 months (around 2/10/2023) for Follow-up diabetes .      MDM:     Chay Cameron MD  Family Medicine - [facility]   nausea/vomiting

## 2023-07-27 RX ORDER — CARVEDILOL 12.5 MG/1
6.25 TABLET ORAL 2 TIMES DAILY WITH MEALS
Qty: 180 TABLET | Refills: 3
Start: 2023-07-27

## 2023-07-27 NOTE — TELEPHONE ENCOUNTER
Caller: Kiran Belcher    Relationship: Self    Best call back number: [phone]     Requested Prescriptions:   Requested Prescriptions     Pending Prescriptions Disp Refills    carvedilol (Coreg) 12.5 MG tablet 180 tablet 3     Sig: Take 0.5 tablets by mouth 2 (Two) Times a Day With Meals.        Pharmacy where request should be sent: [facility] [id number] [address] [phone] [facility] [phone]      Last office visit with prescribing clinician: 5/30/2023   Last telemedicine visit with prescribing clinician: Visit date not found   Next office visit with prescribing clinician: 9/1/2023     Additional details provided by patient: PATIENT HAS ONE DAY LEFT     Does the patient have less than a 3 day supply:  [x] Yes  [] No    Would you like a call back once the refill request has been completed: [] Yes [x] No    If the office needs to give you a call back, can they leave a voicemail: [] Yes [x] No    Cadance Dunaway, RegSched Rep   07/27/23 16:22 EDT

## 2023-08-31 DIAGNOSIS — E11.9 TYPE 2 DIABETES MELLITUS WITHOUT COMPLICATION, WITHOUT LONG-TERM CURRENT USE OF INSULIN: ICD-10-CM

## 2023-08-31 NOTE — TELEPHONE ENCOUNTER
Caller: Kiran Belcher    Relationship: Self    Best call back number: [phone]     Requested Prescriptions:   Requested Prescriptions     Pending Prescriptions Disp Refills    metFORMIN ER (GLUCOPHAGE-XR) 500 MG 24 hr tablet 360 tablet 3     Sig: Take 2 tablets by mouth 2 (Two) Times a Day.        Pharmacy where request should be sent: [facility] [id number] [address] [phone] [facility] [phone]      Last office visit with prescribing clinician: 5/30/2023   Last telemedicine visit with prescribing clinician: Visit date not found   Next office visit with prescribing clinician: 9/19/2023     Additional details provided by patient: PATIENT HAS 2 PILLS LEFT    Does the patient have less than a 3 day supply:  [] Yes  [x] No    Would you like a call back once the refill request has been completed: [] Yes [x] No    If the office needs to give you a call back, can they leave a voicemail: [] Yes [x] No    Dede Gutierrez Rep   08/31/23 16:27 EDT

## 2023-09-01 RX ORDER — METFORMIN HYDROCHLORIDE 500 MG/1
1000 TABLET, EXTENDED RELEASE ORAL 2 TIMES DAILY
Qty: 360 TABLET | Refills: 3 | Status: SHIPPED | OUTPATIENT
Start: 2023-09-01

## 2023-09-05 ENCOUNTER — OFFICE VISIT (OUTPATIENT)
Dept: FAMILY MEDICINE CLINIC | Facility: CLINIC | Age: 63
End: 2023-09-05
Payer: MEDICARE

## 2023-09-05 ENCOUNTER — LAB (OUTPATIENT)
Dept: LAB | Facility: HOSPITAL | Age: 63
End: 2023-09-05
Payer: MEDICARE

## 2023-09-05 VITALS
SYSTOLIC BLOOD PRESSURE: 131 MMHG | OXYGEN SATURATION: 96 % | HEIGHT: 70 IN | HEART RATE: 86 BPM | DIASTOLIC BLOOD PRESSURE: 78 MMHG | TEMPERATURE: 97.5 F | BODY MASS INDEX: 38.74 KG/M2

## 2023-09-05 DIAGNOSIS — G82.20 PARAPLEGIA: ICD-10-CM

## 2023-09-05 DIAGNOSIS — R31.9 URINARY TRACT INFECTION WITH HEMATURIA, SITE UNSPECIFIED: ICD-10-CM

## 2023-09-05 DIAGNOSIS — N39.0 URINARY TRACT INFECTION WITH HEMATURIA, SITE UNSPECIFIED: ICD-10-CM

## 2023-09-05 DIAGNOSIS — R35.0 FREQUENCY OF URINATION: Primary | ICD-10-CM

## 2023-09-05 DIAGNOSIS — Z90.5 H/O KIDNEY REMOVAL: ICD-10-CM

## 2023-09-05 DIAGNOSIS — N31.9 NEUROGENIC BLADDER: ICD-10-CM

## 2023-09-05 PROBLEM — H60.391: Status: ACTIVE | Noted: 2018-09-14

## 2023-09-05 PROBLEM — G82.50 QUADRIPLEGIA: Status: ACTIVE | Noted: 2018-09-19

## 2023-09-05 PROBLEM — D72.823 NEUTROPHILIC LEUKEMOID REACTION: Status: ACTIVE | Noted: 2018-09-19

## 2023-09-05 PROBLEM — Z86.14 PERSONAL HISTORY OF METHICILLIN RESISTANT STAPHYLOCOCCUS AUREUS INFECTION: Status: ACTIVE | Noted: 2018-09-19

## 2023-09-05 LAB
BILIRUB BLD-MCNC: NEGATIVE MG/DL
CLARITY, POC: ABNORMAL
COLOR UR: YELLOW
EXPIRATION DATE: ABNORMAL
GLUCOSE UR STRIP-MCNC: NEGATIVE MG/DL
KETONES UR QL: NEGATIVE
LEUKOCYTE EST, POC: ABNORMAL
Lab: ABNORMAL
NITRITE UR-MCNC: NEGATIVE MG/ML
PH UR: 6 [PH] (ref 5–8)
PROT UR STRIP-MCNC: ABNORMAL MG/DL
RBC # UR STRIP: NEGATIVE /UL
SP GR UR: 1.02 (ref 1–1.03)
UROBILINOGEN UR QL: NORMAL

## 2023-09-05 PROCEDURE — 81003 URINALYSIS AUTO W/O SCOPE: CPT | Performed by: NURSE PRACTITIONER

## 2023-09-05 PROCEDURE — 1159F MED LIST DOCD IN RCRD: CPT | Performed by: NURSE PRACTITIONER

## 2023-09-05 PROCEDURE — 3078F DIAST BP <80 MM HG: CPT | Performed by: NURSE PRACTITIONER

## 2023-09-05 PROCEDURE — 3051F HG A1C>EQUAL 7.0%<8.0%: CPT | Performed by: NURSE PRACTITIONER

## 2023-09-05 PROCEDURE — 99213 OFFICE O/P EST LOW 20 MIN: CPT | Performed by: NURSE PRACTITIONER

## 2023-09-05 PROCEDURE — 3075F SYST BP GE 130 - 139MM HG: CPT | Performed by: NURSE PRACTITIONER

## 2023-09-05 PROCEDURE — 1160F RVW MEDS BY RX/DR IN RCRD: CPT | Performed by: NURSE PRACTITIONER

## 2023-09-05 RX ORDER — SULFAMETHOXAZOLE AND TRIMETHOPRIM 800; 160 MG/1; MG/1
1 TABLET ORAL 2 TIMES DAILY
Qty: 20 TABLET | Refills: 0 | Status: SHIPPED | OUTPATIENT
Start: 2023-09-05 | End: 2023-09-11

## 2023-09-05 NOTE — PROGRESS NOTES
"Chief Complaint  Urinary Tract Infection (Per patient he has urinary urgency, frequency, dysuria)    Subjective          Kiran Belcher presents to [facility]  History of Present Illness  Patient is a 62-year-old male.  He is here for complaint of UTI symptoms. He has one kidney. His function is normal.   He self catheterizes due to paralysis.   He is currently in a wheel chair.     Urinary Tract Infection   This is a new problem. The current episode started 1 to 4 weeks ago. The quality of the pain is described as burning. The pain is at a severity of 4/10. The pain is mild. There has been no fever (didnt check). There is No history of pyelonephritis. Associated symptoms include chills, frequency and urgency. His past medical history is significant for catheterization and a single kidney. self cath - paralysis     The following portions of the patient's history were reviewed and updated as appropriate: allergies, current medications, past family history, past medical history, past social history, past surgical history and problem list.    Review of Systems   Constitutional:  Positive for chills.   Genitourinary:  Positive for frequency and urgency.   Skin: Negative.        Objective   Vital Signs:   /78   Pulse 86   Temp 97.5 °F (36.4 °C) (Infrared)   Ht 177.8 cm (70\")   SpO2 96%   BMI 38.74 kg/m²                  Physical Exam  Vitals reviewed.   Cardiovascular:      Rate and Rhythm: Normal rate and regular rhythm.      Pulses: Normal pulses.   Pulmonary:      Effort: Pulmonary effort is normal.   Abdominal:      Tenderness: There is no abdominal tenderness. There is no right CVA tenderness or left CVA tenderness.   Skin:     General: Skin is warm and dry.      Capillary Refill: Capillary refill takes less than 2 seconds.   Neurological:      Mental Status: He is alert and oriented to person, place, and time.      Sensory: Sensory deficit present.      Gait: Gait " abnormal.   Psychiatric:         Mood and Affect: Mood normal.         Behavior: Behavior normal.         Thought Content: Thought content normal.      Result Review :                 Assessment and Plan    Diagnoses and all orders for this visit:    1. Frequency of urination (Primary)  -     POC Urinalysis Dipstick, Automated    2. Urinary tract infection with hematuria, site unspecified  -     Urine Culture - Urine, Urine, Clean Catch; Future  -     sulfamethoxazole-trimethoprim (BACTRIM DS,SEPTRA DS) 800-160 MG per tablet; Take 1 tablet by mouth 2 (Two) Times a Day for 10 days.  Dispense: 20 tablet; Refill: 0    3. Neurogenic bladder    4. H/O kidney removal    5. Paraplegia    POCT urine: Positive for leukocytes, blood, cloudy.  Sending for urine culture.  No old cultures on file.  He self catheterizes due to C5-c7 injury         Follow Up   Return if symptoms worsen or fail to improve.  Patient was given instructions and counseling regarding his condition or for health maintenance advice. Please see specific information pulled into the AVS if appropriate.

## 2023-09-09 LAB
BACTERIA UR CULT: ABNORMAL
BACTERIA UR CULT: ABNORMAL
OTHER ANTIBIOTIC SUSC ISLT: ABNORMAL

## 2023-09-11 ENCOUNTER — TELEPHONE (OUTPATIENT)
Dept: FAMILY MEDICINE CLINIC | Facility: CLINIC | Age: 63
End: 2023-09-11
Payer: MEDICARE

## 2023-09-11 DIAGNOSIS — N30.00 ACUTE CYSTITIS WITHOUT HEMATURIA: ICD-10-CM

## 2023-09-11 DIAGNOSIS — R31.9 URINARY TRACT INFECTION WITH HEMATURIA, SITE UNSPECIFIED: Primary | ICD-10-CM

## 2023-09-11 DIAGNOSIS — N39.0 URINARY TRACT INFECTION WITH HEMATURIA, SITE UNSPECIFIED: Primary | ICD-10-CM

## 2023-09-11 RX ORDER — CEFUROXIME AXETIL 250 MG/1
250 TABLET ORAL 2 TIMES DAILY
Qty: 20 TABLET | Refills: 0 | Status: SHIPPED | OUTPATIENT
Start: 2023-09-11

## 2023-09-11 NOTE — TELEPHONE ENCOUNTER
Called patient regarding his urine culture results. There is no answer.   He is positive for Providencia  Rettgeri - he is currently on bactrim.   Will need to change his medication.   He has allergy to levofloxacin. The report lists sensitivity to ceftin.   Will order 250 mg BID x 10 days.   STop the bactrim     Follow up with if no improvement.     FARNAZ Pavon   Attempted a second call. No answer left message to call office on his answering machine.     FARNAZ Pavon

## 2023-09-12 ENCOUNTER — TELEPHONE (OUTPATIENT)
Dept: FAMILY MEDICINE CLINIC | Facility: CLINIC | Age: 63
End: 2023-09-12
Payer: MEDICARE

## 2023-09-12 NOTE — TELEPHONE ENCOUNTER
Called patient no answer.  Calling regarding his UTI.  Needing to change his Bactrim to Ceftin.  Prescription was called in yesterday.  This is a third phone call.      Will follow up.     Sun Vasquez, APRN

## 2023-09-13 ENCOUNTER — TELEPHONE (OUTPATIENT)
Dept: FAMILY MEDICINE CLINIC | Facility: CLINIC | Age: 63
End: 2023-09-13
Payer: MEDICARE

## 2023-09-14 NOTE — TELEPHONE ENCOUNTER
Called - no answer left another message. #4.   Asking patient to call back to office.     It is re-guarding his urine culture results and the need to change medications.     Sun Vasquez, APRN

## 2023-09-18 ENCOUNTER — TELEPHONE (OUTPATIENT)
Dept: FAMILY MEDICINE CLINIC | Facility: CLINIC | Age: 63
End: 2023-09-18
Payer: MEDICARE

## 2023-09-18 NOTE — TELEPHONE ENCOUNTER
Call patient's number on file.   Calling regarding his urine culture.   There was no answer. Left message to call office.   Need to ensure patient picked up his antibiotics last week.     Sun Vasquez, APRN

## 2023-09-25 ENCOUNTER — OFFICE VISIT (OUTPATIENT)
Dept: FAMILY MEDICINE CLINIC | Facility: CLINIC | Age: 63
End: 2023-09-25
Payer: MEDICARE

## 2023-09-25 VITALS
TEMPERATURE: 98.6 F | DIASTOLIC BLOOD PRESSURE: 76 MMHG | HEART RATE: 120 BPM | RESPIRATION RATE: 26 BRPM | OXYGEN SATURATION: 96 % | SYSTOLIC BLOOD PRESSURE: 122 MMHG

## 2023-09-25 DIAGNOSIS — R53.83 OTHER FATIGUE: ICD-10-CM

## 2023-09-25 DIAGNOSIS — S14.115S: ICD-10-CM

## 2023-09-25 DIAGNOSIS — R00.0 RAPID PULSE: Primary | ICD-10-CM

## 2023-09-25 RX ORDER — CARVEDILOL 12.5 MG/1
6.25 TABLET ORAL 2 TIMES DAILY WITH MEALS
Qty: 90 TABLET | Refills: 0 | Status: SHIPPED | OUTPATIENT
Start: 2023-09-25

## 2023-09-25 NOTE — PROGRESS NOTES
Office Note     Name: Kiran Belcher    : 1960     MRN: [id number]     Primary Concern  Fatigue (Symptoms started about 23. He thinks  he may have had a reaction to cefuroxime.), Back Pain, and Rapid pulse    Subjective     Subjective     History of Present Illness:  Kiran Belcher is a 62 y.o. male who presents today to [facility] for  the following .    HPI:   Fatigue  Associated symptoms include fatigue. Pertinent negatives include no chest pain.   Back Pain  Pertinent negatives include no chest pain or dysuria.   Tachycardia: Not taking carvedilol.    After taking bactrim he felt fine.  Weakness and tiredness and racing pulse.  Noticed it last Wednesday/Thursday. Mid back pain, moved some boxes recently. Called the pharmacy and instructed to top ABX Saturday.  He still has fatigue, back pain and weakness, which he accredits to the abx.   Not talking carvedilol  Been  for about a month He says that pharmacy won't fill it.      He said that he thinks the uti is gone.  Is asking for a bottle to void in and to bring it back on Wednesday.  His wife has a dr appointment.  He wants to cath at home and bringt.  He can for sure go to [facility] and get this medicine He drives with ha controls. He has a blood pressure machine at home to check rate.      Prescription for PT.  Wants hard copy.   use to give physical copy.    Has previously dont PT at [facility].  [facility]eda therapist that can work with spinal cord injury.      15:21 [facility] calling [facility]     Review of Systems:    Review of Systems   Constitutional:  Positive for fatigue.   Respiratory:  Negative for shortness of breath.    Cardiovascular:  Negative for chest pain and palpitations.   Genitourinary:  Negative for dysuria, flank pain, frequency and hematuria.   Musculoskeletal:  Positive for back pain.     The following portions of the patient's history were reviewed and updated as  "appropriate: allergies, current medications, past family history, past medical history, past social history, past surgical history and problem list.    Past Medical History:   Past Medical History:   Diagnosis Date   • B-cell lymphoma 02/2022    Undergoing work-up with    • Cancer     hx spot on right kidney, for ~8 years, told cancer but no hx biopsy and no interventions and just watching it. Dr. vega used to watch this cyst, then started with  urology a year ago after he retired.   • Decreased mobility    • Depression    • Diabetes mellitus    • Esophageal reflux    • Glaucoma    • History of methicillin resistant Staphylococcus aureus infection    • History of obstructive sleep apnea    • History of quadriplegia     of unknown etiology - C5-C7, incomplete    • History of spinal cord injury/quadriplegia 2008 09/14/2018   • Hyperlipidemia    • Hypertension    • Kidney disorder 9/14/2018   • Paraplegia        Past Surgical History:   Past Surgical History:   Procedure Laterality Date   • CERVICAL DISC SURGERY      around 5/2008, fall/injury tripped over a safety gate for their dogs, reported SCI \"semi quadraplegic\" since. [facility]. [facility] after.   • SPINE SURGERY         Immunizations:   Immunization History   Administered Date(s) Administered   • COVID-19 (PFIZER) BIVALENT 12+YRS 11/22/2022   • COVID-19 (PFIZER) Purple Cap Monovalent 03/11/2021, 04/07/2021, 11/08/2021   • Fluzone (or Fluarix & Flulaval for VFC) >6mos 10/07/2020, 10/20/2021, 11/10/2022   • Fluzone High Dose =>65 Years ([employer] ONLY) 01/04/2017, 10/30/2017, 10/11/2019   • Influenza Quad Vaccine (Inpatient) 10/18/2010   • Pneumococcal Conjugate 20-Valent (PCV20) 07/29/2022        Current Medications:     Current Outpatient Medications:   •  amLODIPine-benazepril (LOTREL) 10-20 MG per capsule, Take 1 capsule by mouth Daily., Disp: 90 capsule, Rfl: 3  •  ammonium lactate (AMLACTIN) 12 % cream, Apply 1 g topically to the appropriate area as " directed As Needed for Dry Skin. Apply as directed , prescribed by podiatrist, Disp: , Rfl:   •  aspirin 81 MG chewable tablet, Chew 1 tablet Daily., Disp: , Rfl:   •  atorvastatin (LIPITOR) 10 MG tablet, Take 1 tablet by mouth Daily., Disp: 90 tablet, Rfl: 3  •  baclofen (LIORESAL) 20 MG tablet, Take 1 tablet by mouth 3 (Three) Times a Day., Disp: 90 tablet, Rfl: 11  •  carvedilol (Coreg) 12.5 MG tablet, Take 0.5 tablets by mouth 2 (Two) Times a Day With Meals., Disp: 90 tablet, Rfl: 0  •  citalopram (CeleXA) 20 MG tablet, TAKE ONE TABLET BY MOUTH DAILY, Disp: 90 tablet, Rfl: 3  •  dantrolene (DANTRIUM) 100 MG capsule, Take 1 capsule by mouth 2 (Two) Times a Day., Disp: 180 capsule, Rfl: 1  •  esomeprazole (nexIUM) 40 MG capsule, TAKE ONE CAPSULE BY MOUTH DAILY, Disp: 90 capsule, Rfl: 3  •  ferrous sulfate 325 (65 FE) MG tablet, Take 1 tablet by mouth Daily With Breakfast., Disp: 30 tablet, Rfl: 5  •  fesoterodine fumarate (TOVIAZ ER) 8 MG tablet sustained-release 24 hour tablet, Take 1 tablet by mouth Daily., Disp: 30 tablet, Rfl: 11  •  fexofenadine (ALLEGRA) 180 MG tablet, Take 1 tablet by mouth Daily., Disp: , Rfl:   •  fluocinonide (LIDEX) 0.05 % cream, fluocinonide 0.05 % topical cream, Disp: , Rfl:   •  fluticasone (FLONASE) 50 MCG/ACT nasal spray, , Disp: , Rfl:   •  glucose blood (Accu-Chek Elke Plus) test strip, 1 each by Other route 2 (Two) Times a Day. Use as instructed, Disp: 200 each, Rfl: 3  •  glyburide (DIAbeta) 5 MG tablet, Take 1 tablet by mouth Daily With Breakfast., Disp: 90 tablet, Rfl: 3  •  lactobacillus acidophilus (RISAQUAD) capsule capsule, Take 1 capsule by mouth Daily., Disp: 90 capsule, Rfl: 1  •  metFORMIN ER (GLUCOPHAGE-XR) 500 MG 24 hr tablet, Take 2 tablets by mouth 2 (Two) Times a Day., Disp: 360 tablet, Rfl: 3    Allergies:   Allergies   Allergen Reactions   • Levofloxacin Unknown (See Comments)     tendinopathy   • Cefuroxime Palpitations     Tachycardia, back pain, fatigue,  "and weakness.        Family History:   Family History   Problem Relation Age of Onset   • Hypertension Mother    • Heart disease Father    • Coronary artery disease Father    • Diabetes Other    • Hypertension Other        Social History:   Social History     Socioeconomic History   • Marital status:    Tobacco Use   • Smoking status: Never   • Smokeless tobacco: Never   Vaping Use   • Vaping Use: Never used   Substance and Sexual Activity   • Alcohol use: No   • Drug use: No   • Sexual activity: Defer           Objective     Objective     Vital Signs  /76   Pulse 120   Temp 98.6 °F (37 °C) (Infrared)   Resp 26   SpO2 96%   Estimated body mass index is 38.74 kg/m² as calculated from the following:    Height as of 9/5/23: 177.8 cm (70\").    Weight as of 8/27/23: 122 kg (270 lb).            Physical Exam:  Physical Exam    Procedures     Results for orders placed or performed in visit on 09/05/23   Urine Culture - Urine, Urine, Clean Catch    Specimen: Urine, Clean Catch    Urine  Release to bertrand   Result Value Ref Range    Urine Culture Final report (A)     Result 1 Providencia rettgeri (A)     Susceptibility Testing Comment            Assessment / Plan    Assessment and Plan   Diagnoses and all orders for this visit:    1. Rapid pulse (Primary)  -     carvedilol (Coreg) 12.5 MG tablet; Take 0.5 tablets by mouth 2 (Two) Times a Day With Meals.  Dispense: 90 tablet; Refill: 0    2. Other fatigue  -     The patient was seen in clinic for a UTI on 9/5/2023.  Finish antibiotic prescribed did not provide coverage based on the urine culture.  For this reason he was prescribed another round of levofloxacin, in which he states he had a reaction to, and was instructed by the pharmacy to discontinue medication.  I discussed with him the need for additional antibiotic coverage as the initial antibiotic prescribed on 9/5/2023 would not be adequate.  Because the patient's symptoms had essentially resolved, he " felt he did not need another antibiotic and declined.  Asked the patient for UA today during the clinic visit.  Patient self caths and does not have a cath with him.  He request a urine specimen cup to take home with him, and to return to clinic on Wednesday, as his wife has an appointment scheduled, and he will be onsite.  I gave the patient a urine specimen cup.  I have entered a point-of-care test UA for the patient, and also a UA for the clinic lab, so that the patient will be able to give his urine specimen to clinic staff.  I will cancel whichever order is not used.  Urine culture was also ordered.    3. Spinal cord injury at C5-C7 level with complete spinal cord lesion without bone injury, sequela  -     Ambulatory Referral to Physical Therapy Evaluate and treat  -     The patient is requesting a prescription for physical therapy, and states that his old provider Nadira Fernandez is to do this.  I have initiated a ambulatory referral for physical therapy to eval and treat.  I indicated in the referral that the patient was previously established at physical therapy [facility].  On Wednesday when the patient comes to the clinic with his wife he is likely to ask about a prescription.  I am leaving the referral printed out for him at the front counter and will leave a note informing him that our referral coordinator will contact him for specifics and the next steps to initiate physical therapy.        Follow Up   Return if symptoms worsen or fail to improve.    Discussed possible differential diagnoses, testing, treatment, recommended non-pharmacological interventions, risks, warning signs to monitor for that would indicate need for follow-up in clinic or ER. If no improvement with these regimens or you have new or worsening symptoms follow-up. Patient verbalizes understanding and agreement with plan of care. Denies further needs or concerns.     Patient was given instructions and counseling regarding his  condition or for health maintenance advice. Please see specific information pulled into the AVS if appropriate.     Adal Allison  [facility] [place]

## 2023-09-27 ENCOUNTER — LAB (OUTPATIENT)
Dept: LAB | Facility: HOSPITAL | Age: 63
End: 2023-09-27
Payer: MEDICARE

## 2023-09-27 DIAGNOSIS — N39.0 URINARY TRACT INFECTION WITHOUT HEMATURIA, SITE UNSPECIFIED: Primary | ICD-10-CM

## 2023-09-27 DIAGNOSIS — N39.0 URINARY TRACT INFECTION WITHOUT HEMATURIA, SITE UNSPECIFIED: ICD-10-CM

## 2023-09-28 LAB
APPEARANCE UR: CLEAR
BACTERIA #/AREA URNS HPF: ABNORMAL /HPF
BILIRUB UR QL STRIP: NEGATIVE
CASTS URNS MICRO: ABNORMAL
COLOR UR: YELLOW
EPI CELLS #/AREA URNS HPF: ABNORMAL /HPF
GLUCOSE UR QL STRIP: NEGATIVE
HGB UR QL STRIP: NEGATIVE
KETONES UR QL STRIP: NEGATIVE
LEUKOCYTE ESTERASE UR QL STRIP: ABNORMAL
NITRITE UR QL STRIP: NEGATIVE
PH UR STRIP: 6.5 [PH] (ref 5–8)
PROT UR QL STRIP: NEGATIVE
RBC #/AREA URNS HPF: ABNORMAL /HPF
SP GR UR STRIP: 1.01 (ref 1–1.03)
UROBILINOGEN UR STRIP-MCNC: ABNORMAL MG/DL
WBC #/AREA URNS HPF: ABNORMAL /HPF

## 2023-09-29 RX ORDER — CEFUROXIME AXETIL 250 MG/1
250 TABLET ORAL 2 TIMES DAILY
Qty: 14 TABLET | Refills: 0 | Status: SHIPPED | OUTPATIENT
Start: 2023-09-29 | End: 2023-10-06

## 2023-10-03 ENCOUNTER — TELEPHONE (OUTPATIENT)
Dept: FAMILY MEDICINE CLINIC | Facility: CLINIC | Age: 63
End: 2023-10-03
Payer: MEDICARE

## 2023-10-03 NOTE — TELEPHONE ENCOUNTER
HUB CAN READ!    ----- Message from FARNAZ Cabrales sent at 9/29/2023  9:56 AM EDT -----  Please contact the patient and let him know he needs additional antibiotic coverage based on urinalysis report.  I sent an antibiotic to the pharmacy that we have on file.  Please instruct him to pick this up today and begin treatment.

## 2023-10-09 ENCOUNTER — TELEPHONE (OUTPATIENT)
Dept: FAMILY MEDICINE CLINIC | Facility: CLINIC | Age: 63
End: 2023-10-09
Payer: MEDICARE

## 2023-10-09 DIAGNOSIS — N31.9 NEUROGENIC BLADDER: Primary | ICD-10-CM

## 2023-10-09 DIAGNOSIS — S14.115A SPINAL CORD INJURY AT C5-C7 LEVEL WITH COMPLETE SPINAL CORD LESION: ICD-10-CM

## 2023-10-09 NOTE — TELEPHONE ENCOUNTER
Caller: Kiran Belcher    Relationship: Self    Best call back number: [phone]     Requested Prescriptions:   Requested Prescriptions     Pending Prescriptions Disp Refills    dantrolene (DANTRIUM) 100 MG capsule 180 capsule 1     Sig: Take 1 capsule by mouth 2 (Two) Times a Day.        Pharmacy where request should be sent: [facility] [id number] [address] [phone] [facility] [phone]      Last office visit with prescribing clinician: 5/30/2023   Last telemedicine visit with prescribing clinician: Visit date not found   Next office visit with prescribing clinician: 10/19/2023     Additional details provided by patient: HAS 2 PILLS LEFT    Does the patient have less than a 3 day supply:  [x] Yes  [] No    Would you like a call back once the refill request has been completed: [x] Yes [] No    If the office needs to give you a call back, can they leave a voicemail: [] Yes [] No    Dede Mckeon Rep   10/09/23 14:33 EDT

## 2023-10-11 RX ORDER — DANTROLENE SODIUM 100 MG/1
100 CAPSULE ORAL 2 TIMES DAILY
Qty: 180 CAPSULE | Refills: 3 | Status: SHIPPED | OUTPATIENT
Start: 2023-10-11

## 2023-10-19 ENCOUNTER — OFFICE VISIT (OUTPATIENT)
Dept: FAMILY MEDICINE CLINIC | Facility: CLINIC | Age: 63
End: 2023-10-19
Payer: MEDICARE

## 2023-10-19 VITALS
HEIGHT: 70 IN | SYSTOLIC BLOOD PRESSURE: 130 MMHG | OXYGEN SATURATION: 97 % | HEART RATE: 80 BPM | TEMPERATURE: 97.7 F | BODY MASS INDEX: 38.74 KG/M2 | DIASTOLIC BLOOD PRESSURE: 76 MMHG | RESPIRATION RATE: 19 BRPM

## 2023-10-19 DIAGNOSIS — Z12.11 ENCOUNTER FOR SCREENING FOR MALIGNANT NEOPLASM OF COLON: ICD-10-CM

## 2023-10-19 DIAGNOSIS — Z23 IMMUNIZATION DUE: ICD-10-CM

## 2023-10-19 DIAGNOSIS — Z00.00 ENCOUNTER FOR ROUTINE ADULT HEALTH EXAMINATION WITHOUT ABNORMAL FINDINGS: ICD-10-CM

## 2023-10-19 DIAGNOSIS — S14.115S: Primary | ICD-10-CM

## 2023-10-19 DIAGNOSIS — K59.09 OTHER CONSTIPATION: ICD-10-CM

## 2023-10-19 DIAGNOSIS — C64.1 RENAL CELL CARCINOMA OF RIGHT KIDNEY: ICD-10-CM

## 2023-10-19 DIAGNOSIS — E11.9 TYPE 2 DIABETES MELLITUS WITHOUT COMPLICATION, WITHOUT LONG-TERM CURRENT USE OF INSULIN: ICD-10-CM

## 2023-10-19 DIAGNOSIS — Z13.9 SCREENING DUE: ICD-10-CM

## 2023-10-19 DIAGNOSIS — Z91.81 AT RISK FOR FALLS: ICD-10-CM

## 2023-10-19 PROBLEM — H60.391: Status: RESOLVED | Noted: 2018-09-14 | Resolved: 2023-10-19

## 2023-10-19 LAB
EXPIRATION DATE: ABNORMAL
HBA1C MFR BLD: 6.8 % (ref 4.5–5.7)
Lab: ABNORMAL

## 2023-10-19 NOTE — PATIENT INSTRUCTIONS
Metamucil once daily     Senna as needed.     Likely due for COVID and TDAP (tetanus shot at pharmacy)    Medicare Wellness  Personal Prevention Plan of Service     Date of Office Visit:    Encounter Provider:  Chay Cameron MD  Place of Service:  [facility]  Patient Name: Kiran Belcher  :  1960    As part of the Medicare Wellness portion of your visit today, we are providing you with this personalized preventive plan of services (PPPS). This plan is based upon recommendations of the [place] Preventive Services Task Force (USPSTF) and the Advisory Committee on Immunization Practices (ACIP).    This lists the preventive care services that should be considered, and provides dates of when you are due. Items listed as completed are up-to-date and do not require any further intervention.    Health Maintenance   Topic Date Due    LIPID PANEL  2022    ANNUAL WELLNESS VISIT  2023    COVID-19 Vaccine ( - - season) 2023    BMI FOLLOWUP  10/24/2023    URINE MICROALBUMIN  11/10/2023    DIABETIC FOOT EXAM  2024    HEMOGLOBIN A1C  2024    TDAP/TD VACCINES (2 - Td or Tdap) 2026    COLORECTAL CANCER SCREENING  2026    Pneumococcal Vaccine 0-64  Completed    INFLUENZA VACCINE  Completed    HEPATITIS C SCREENING  Discontinued    DIABETIC EYE EXAM  Discontinued    ZOSTER VACCINE  Discontinued       Orders Placed This Encounter   Procedures    Fluzone >6 Months ([id number])    Lipid Panel     Standing Status:   Future     Standing Expiration Date:   10/19/2024     Order Specific Question:   Release to patient     Answer:   Routine Release [[id number]]    Cologuard - Stool, Per Rectum     Standing Status:   Future     Standing Expiration Date:   10/19/2024     Order Specific Question:   Release to patient     Answer:   Routine Release [[id number]]    POC Glycosylated Hemoglobin (Hb A1C)     Order Specific Question:   Release to patient      Answer:   Routine Release [[id number]]       No follow-ups on file.        Preventive Care 40-64 Years Old, Female  Preventive care refers to lifestyle choices and visits with your health care provider that can promote health and wellness. Preventive care visits are also called wellness exams.  What can I expect for my preventive care visit?  Counseling  Your health care provider may ask you questions about your:  Medical history, including:  Past medical problems.  Family medical history.  Pregnancy history.  Current health, including:  Menstrual cycle.  Method of birth control.  Emotional well-being.  Home life and relationship well-being.  Sexual activity and sexual health.  Lifestyle, including:  Alcohol, nicotine or tobacco, and drug use.  Access to firearms.  Diet, exercise, and sleep habits.  Work and work environment.  Sunscreen use.  Safety issues such as seatbelt and bike helmet use.  Physical exam  Your health care provider will check your:  Height and weight. These may be used to calculate your BMI (body mass index). BMI is a measurement that tells if you are at a healthy weight.  Waist circumference. This measures the distance around your waistline. This measurement also tells if you are at a healthy weight and may help predict your risk of certain diseases, such as type 2 diabetes and high blood pressure.  Heart rate and blood pressure.  Body temperature.  Skin for abnormal spots.  What immunizations do I need?    Vaccines are usually given at various ages, according to a schedule. Your health care provider will recommend vaccines for you based on your age, medical history, and lifestyle or other factors, such as travel or where you work.  What tests do I need?  Screening  Your health care provider may recommend screening tests for certain conditions. This may include:  Lipid and cholesterol levels.  Diabetes screening. This is done by checking your blood sugar (glucose) after you have not eaten  for a while (fasting).  Pelvic exam and Pap test.  Hepatitis B test.  Hepatitis C test.  HIV (human immunodeficiency virus) test.  STI (sexually transmitted infection) testing, if you are at risk.  Lung cancer screening.  Colorectal cancer screening.  Mammogram. Talk with your health care provider about when you should start having regular mammograms. This may depend on whether you have a family history of breast cancer.  BRCA-related cancer screening. This may be done if you have a family history of breast, ovarian, tubal, or peritoneal cancers.  Bone density scan. This is done to screen for osteoporosis.  Talk with your health care provider about your test results, treatment options, and if necessary, the need for more tests.  Follow these instructions at home:  Eating and drinking    Eat a diet that includes fresh fruits and vegetables, whole grains, lean protein, and low-fat dairy products.  Take vitamin and mineral supplements as recommended by your health care provider.  Do not drink alcohol if:  Your health care provider tells you not to drink.  You are pregnant, may be pregnant, or are planning to become pregnant.  If you drink alcohol:  Limit how much you have to 0-1 drink a day.  Know how much alcohol is in your drink. In the [place] one drink equals one 12 oz bottle of beer (355 mL), one 5 oz glass of wine (148 mL), or one 1½ oz glass of hard liquor (44 mL).  Lifestyle  Brush your teeth every morning and night with fluoride toothpaste. Floss one time each day.  Exercise for at least 30 minutes 5 or more days each week.  Do not use any products that contain nicotine or tobacco. These products include cigarettes, chewing tobacco, and vaping devices, such as e-cigarettes. If you need help quitting, ask your health care provider.  Do not use drugs.  If you are sexually active, practice safe sex. Use a condom or other form of protection to prevent STIs.  If you do not wish to become pregnant, use a form of birth  control. If you plan to become pregnant, see your health care provider for a prepregnancy visit.  Take aspirin only as told by your health care provider. Make sure that you understand how much to take and what form to take. Work with your health care provider to find out whether it is safe and beneficial for you to take aspirin daily.  Find healthy ways to manage stress, such as:  Meditation, yoga, or listening to music.  Journaling.  Talking to a trusted person.  Spending time with friends and family.  Minimize exposure to UV radiation to reduce your risk of skin cancer.  Safety  Always wear your seat belt while driving or riding in a vehicle.  Do not drive:  If you have been drinking alcohol. Do not ride with someone who has been drinking.  When you are tired or distracted.  While texting.  If you have been using any mind-altering substances or drugs.  Wear a helmet and other protective equipment during sports activities.  If you have firearms in your house, make sure you follow all gun safety procedures.  Seek help if you have been physically or sexually abused.  What's next?  Visit your health care provider once a year for an annual wellness visit.  Ask your health care provider how often you should have your eyes and teeth checked.  Stay up to date on all vaccines.  This information is not intended to replace advice given to you by your health care provider. Make sure you discuss any questions you have with your health care provider.  Document Revised: 06/15/2022 Document Reviewed: 06/15/2022  [employer] Patient Education © 2023 [employer]

## 2023-10-19 NOTE — ASSESSMENT & PLAN NOTE
A1c 6.8  Continue metformin twice daily and glyburide 5 mg once daily.  Discussed healthy diet

## 2023-10-19 NOTE — PROGRESS NOTES
The ABCs of the Annual Wellness Visit  Subsequent Medicare Wellness Visit    Subjective      Kiran Belcher is a 63 y.o. male who presents for a Subsequent Medicare Wellness Visit.    The following portions of the patient's history were reviewed and   updated as appropriate: allergies, current medications, past family history, past medical history, past social history, past surgical history, and problem list.    Compared to one year ago, the patient feels his physical   health is the same.    Compared to one year ago, the patient feels his mental   health is the same.    Recent Hospitalizations:  He was not admitted to the hospital during the last year.       Current Medical Providers:  Patient Care Team:  Chay Cameron MD as PCP - General (Family Medicine)  Bam Alfaro MD (Urology)  Jose Perdomo MD as Consulting Physician (Hematology and Oncology)    Outpatient Medications Prior to Visit   Medication Sig Dispense Refill    amLODIPine-benazepril (LOTREL) 10-20 MG per capsule Take 1 capsule by mouth Daily. 90 capsule 3    ammonium lactate (AMLACTIN) 12 % cream Apply 1 g topically to the appropriate area as directed As Needed for Dry Skin. Apply as directed , prescribed by podiatrist      aspirin 81 MG chewable tablet Chew 1 tablet Daily.      atorvastatin (LIPITOR) 10 MG tablet Take 1 tablet by mouth Daily. 90 tablet 3    baclofen (LIORESAL) 20 MG tablet Take 1 tablet by mouth 3 (Three) Times a Day. 90 tablet 11    carvedilol (Coreg) 12.5 MG tablet Take 0.5 tablets by mouth 2 (Two) Times a Day With Meals. 90 tablet 0    citalopram (CeleXA) 20 MG tablet TAKE ONE TABLET BY MOUTH DAILY 90 tablet 3    dantrolene (DANTRIUM) 100 MG capsule Take 1 capsule by mouth 2 (Two) Times a Day. 180 capsule 3    esomeprazole (nexIUM) 40 MG capsule TAKE ONE CAPSULE BY MOUTH DAILY 90 capsule 3    ferrous sulfate 325 (65 FE) MG tablet Take 1 tablet by mouth Daily With Breakfast. 30 tablet 5    fesoterodine fumarate  (TOVIAZ ER) 8 MG tablet sustained-release 24 hour tablet Take 1 tablet by mouth Daily. 30 tablet 11    fexofenadine (ALLEGRA) 180 MG tablet Take 1 tablet by mouth Daily.      fluocinonide (LIDEX) 0.05 % cream fluocinonide 0.05 % topical cream      fluticasone (FLONASE) 50 MCG/ACT nasal spray       glucose blood (Accu-Chek Elke Plus) test strip 1 each by Other route 2 (Two) Times a Day. Use as instructed 200 each 3    glyburide (DIAbeta) 5 MG tablet Take 1 tablet by mouth Daily With Breakfast. 90 tablet 3    lactobacillus acidophilus (RISAQUAD) capsule capsule Take 1 capsule by mouth Daily. 90 capsule 1    metFORMIN ER (GLUCOPHAGE-XR) 500 MG 24 hr tablet Take 2 tablets by mouth 2 (Two) Times a Day. 360 tablet 3     No facility-administered medications prior to visit.       No opioid medication identified on active medication list. I have reviewed chart for other potential  high risk medication/s and harmful drug interactions in the elderly.        Aspirin is on active medication list. Aspirin use is indicated based on review of current medical condition/s. Pros and cons of this therapy have been discussed today. Benefits of this medication outweigh potential harm.  Patient has been encouraged to continue taking this medication.  .      Patient Active Problem List   Diagnosis    Benign essential hypertension    Depression    Gastroesophageal reflux disease without esophagitis    Hyperlipidemia    NISA on CPAP    Type 2 diabetes mellitus without complications    Spasm    Other obesity due to excess calories    Hx of spinal cord injury    Kidney disorder    Lower extremity weakness    Myalgia    Moderate episode of recurrent major depressive disorder    Dysphagia    Toenail avulsion    H/O kidney removal    Renal cell carcinoma of right kidney    Quadriplegia    Muscle contracture    C5-C7 level spinal injury with complete lesion of spinal cord, without evidence of spinal bone injury    Inability to bear weight     "Neurogenic bladder    Venous stasis    B-cell lymphoma    Cancer of kidney    Hypertension    Neutrophilic leukemoid reaction    Personal history of Methicillin resistant Staphylococcus aureus infection    Paraplegia    Other constipation     Advance Care Planning   Advance Care Planning     Advance Directive is not on file.  ACP discussion was held with the patient during this visit. Patient does not have an advance directive, information provided.     Objective    Vitals:    10/19/23 1117   BP: 130/76   Pulse: 80   Resp: 19   Temp: 97.7 °F (36.5 °C)   SpO2: 97%   Height: 177.8 cm (70\")   PainSc: 0-No pain     Estimated body mass index is 38.74 kg/m² as calculated from the following:    Height as of this encounter: 177.8 cm (70\").    Weight as of 8/27/23: 122 kg (270 lb).           Does the patient have evidence of cognitive impairment?   No    Lab Results   Component Value Date    HGBA1C 6.8 (A) 10/19/2023          HEALTH RISK ASSESSMENT    Smoking Status:  Social History     Tobacco Use   Smoking Status Never   Smokeless Tobacco Never     Alcohol Consumption:  Social History     Substance and Sexual Activity   Alcohol Use No     Fall Risk Screen:    STEADI Fall Risk Assessment was completed, and patient is at MODERATE risk for falls. Assessment completed on:10/19/2023    Depression Screening:      10/19/2023    11:21 AM   PHQ-2/PHQ-9 Depression Screening   Little Interest or Pleasure in Doing Things 0-->not at all   Feeling Down, Depressed or Hopeless 0-->not at all   PHQ-9: Brief Depression Severity Measure Score 0       Health Habits and Functional and Cognitive Screening:      10/19/2023    11:21 AM   Functional & Cognitive Status   Do you have difficulty preparing food and eating? No   Do you have difficulty bathing yourself, getting dressed or grooming yourself? No   Do you have difficulty using the toilet? No   Do you have difficulty moving around from place to place? No   Do you have trouble with steps or " getting out of a bed or a chair? Yes   Current Diet Well Balanced Diet   Dental Exam Not up to date   Eye Exam Not up to date   Exercise (times per week) 3 times per week   Current Exercises Include Weightlifting   Do you need help using the phone?  No   Are you deaf or do you have serious difficulty hearing?  No   Do you need help to go to places out of walking distance? No   Do you need help shopping? No   Do you need help preparing meals?  No   Do you need help with housework?  No   Do you need help with laundry? No   Do you need help taking your medications? No   Do you need help managing money? No   Do you ever drive or ride in a car without wearing a seat belt? No   Have you felt unusual stress, anger or loneliness in the last month? No   Who do you live with? Spouse   If you need help, do you have trouble finding someone available to you? No       Age-appropriate Screening Schedule:  Refer to the list below for future screening recommendations based on patient's age, sex and/or medical conditions. Orders for these recommended tests are listed in the plan section. The patient has been provided with a written plan.    Health Maintenance   Topic Date Due    LIPID PANEL  06/18/2022    ANNUAL WELLNESS VISIT  07/29/2023    COVID-19 Vaccine (5 - 2023-24 season) 09/01/2023    URINE MICROALBUMIN  11/10/2023    DIABETIC FOOT EXAM  02/27/2024    HEMOGLOBIN A1C  04/19/2024    BMI FOLLOWUP  09/27/2024    TDAP/TD VACCINES (2 - Td or Tdap) 07/01/2026    COLORECTAL CANCER SCREENING  07/01/2026    Pneumococcal Vaccine 0-64  Completed    INFLUENZA VACCINE  Completed    HEPATITIS C SCREENING  Discontinued    DIABETIC EYE EXAM  Discontinued    ZOSTER VACCINE  Discontinued                Physical Exam  Constitutional:       General: He is not in acute distress.     Appearance: He is not ill-appearing.   Cardiovascular:      Rate and Rhythm: Normal rate and regular rhythm.   Pulmonary:      Effort: Pulmonary effort is normal.       Breath sounds: Normal breath sounds.   Neurological:      Mental Status: He is alert.   Psychiatric:         Thought Content: Thought content normal.       Minimal movement against gravity to lower extremities b/l  Some weakness to arms.     CMS Preventative Services Quick Reference  Risk Factors Identified During Encounter:    Fall Risk-High or Moderate: Discussed Fall Prevention in the home    The above risks/problems have been discussed with the patient.  Pertinent information has been shared with the patient in the After Visit Summary.    Diagnoses and all orders for this visit:    1. Spinal cord injury at C5-C7 level with complete spinal cord lesion without bone injury, sequela (Primary)  Assessment & Plan:  Continue baclofen.   Significant LE weakness remains       2. Encounter for routine adult health examination without abnormal findings  -     Lipid Panel; Future    3. Immunization due  -     Fluzone >6 Months ([id number])    4. Type 2 diabetes mellitus without complication, without long-term current use of insulin  Assessment & Plan:  A1c 6.8  Continue metformin twice daily and glyburide 5 mg once daily.  Discussed healthy diet        Orders:  -     POC Glycosylated Hemoglobin (Hb A1C)    5. Screening due  -     Cologuard - Stool, Per Rectum; Future    6. Encounter for screening for malignant neoplasm of colon  -     Cologuard - Stool, Per Rectum; Future    7. Renal cell carcinoma of right kidney  Assessment & Plan:  S/p nephrectomy       8. At risk for falls    9. Other constipation  Assessment & Plan:  Add metamucil   Add senna as needed         Flu uptodate  Get covid at pharmacy     Annual physical exam was performed in addition to the Medicare wellness visit today we specifically reviewed healthy diet and physical activity, vaccines, screening test.  Wellness handout given.      Follow Up:   Next Medicare Wellness visit to be scheduled in 1 year.      An After Visit Summary and PPPS were made available  to the patient.

## 2023-11-20 ENCOUNTER — OFFICE VISIT (OUTPATIENT)
Dept: FAMILY MEDICINE CLINIC | Facility: CLINIC | Age: 63
End: 2023-11-20
Payer: MEDICARE

## 2023-11-20 VITALS
HEART RATE: 73 BPM | BODY MASS INDEX: 38.65 KG/M2 | SYSTOLIC BLOOD PRESSURE: 132 MMHG | DIASTOLIC BLOOD PRESSURE: 80 MMHG | HEIGHT: 70 IN | WEIGHT: 270 LBS | OXYGEN SATURATION: 97 %

## 2023-11-20 DIAGNOSIS — H61.21 IMPACTED CERUMEN OF RIGHT EAR: Primary | ICD-10-CM

## 2023-11-20 NOTE — PROGRESS NOTES
Established Patient Office Visit        Subjective      Chief Complaint:  Ear Fullness (Ear is stopped up and it started hurting into his neck x 3 days)      History of Present Illness: Kiran Belcher is a 63 y.o. male who presents for right ear fullness and mild pain. Some pain to the right anterior neck as well.   No cough dyspnea or fever       Patient Active Problem List   Diagnosis    Benign essential hypertension    Depression    Gastroesophageal reflux disease without esophagitis    Hyperlipidemia    NISA on CPAP    Type 2 diabetes mellitus without complications    Spasm    Other obesity due to excess calories    Hx of spinal cord injury    Kidney disorder    Lower extremity weakness    Myalgia    Moderate episode of recurrent major depressive disorder    Dysphagia    Toenail avulsion    H/O kidney removal    Renal cell carcinoma of right kidney    Quadriplegia    Muscle contracture    C5-C7 level spinal injury with complete lesion of spinal cord, without evidence of spinal bone injury    Inability to bear weight    Neurogenic bladder    Venous stasis    B-cell lymphoma    Cancer of kidney    Hypertension    Neutrophilic leukemoid reaction    Personal history of Methicillin resistant Staphylococcus aureus infection    Paraplegia    Other constipation         Current Outpatient Medications:     amLODIPine-benazepril (LOTREL) 10-20 MG per capsule, Take 1 capsule by mouth Daily., Disp: 90 capsule, Rfl: 3    ammonium lactate (AMLACTIN) 12 % cream, Apply 1 g topically to the appropriate area as directed As Needed for Dry Skin. Apply as directed , prescribed by podiatrist, Disp: , Rfl:     aspirin 81 MG chewable tablet, Chew 1 tablet Daily., Disp: , Rfl:     atorvastatin (LIPITOR) 10 MG tablet, Take 1 tablet by mouth Daily., Disp: 90 tablet, Rfl: 3    baclofen (LIORESAL) 20 MG tablet, Take 1 tablet by mouth 3 (Three) Times a Day., Disp: 90 tablet, Rfl: 11    carvedilol (Coreg) 12.5 MG tablet, Take 0.5 tablets by  "mouth 2 (Two) Times a Day With Meals., Disp: 90 tablet, Rfl: 0    citalopram (CeleXA) 20 MG tablet, TAKE ONE TABLET BY MOUTH DAILY, Disp: 90 tablet, Rfl: 3    dantrolene (DANTRIUM) 100 MG capsule, Take 1 capsule by mouth 2 (Two) Times a Day., Disp: 180 capsule, Rfl: 3    esomeprazole (nexIUM) 40 MG capsule, TAKE ONE CAPSULE BY MOUTH DAILY, Disp: 90 capsule, Rfl: 3    ferrous sulfate 325 (65 FE) MG tablet, Take 1 tablet by mouth Daily With Breakfast., Disp: 30 tablet, Rfl: 5    fesoterodine fumarate (TOVIAZ ER) 8 MG tablet sustained-release 24 hour tablet, Take 1 tablet by mouth Daily., Disp: 30 tablet, Rfl: 11    fexofenadine (ALLEGRA) 180 MG tablet, Take 1 tablet by mouth Daily., Disp: , Rfl:     fluocinonide (LIDEX) 0.05 % cream, fluocinonide 0.05 % topical cream, Disp: , Rfl:     fluticasone (FLONASE) 50 MCG/ACT nasal spray, , Disp: , Rfl:     glucose blood (Accu-Chek Elke Plus) test strip, 1 each by Other route 2 (Two) Times a Day. Use as instructed, Disp: 200 each, Rfl: 3    glyburide (DIAbeta) 5 MG tablet, Take 1 tablet by mouth Daily With Breakfast., Disp: 90 tablet, Rfl: 3    lactobacillus acidophilus (RISAQUAD) capsule capsule, Take 1 capsule by mouth Daily., Disp: 90 capsule, Rfl: 1    metFORMIN ER (GLUCOPHAGE-XR) 500 MG 24 hr tablet, Take 2 tablets by mouth 2 (Two) Times a Day., Disp: 360 tablet, Rfl: 3       Objective     Physical Exam:   Vital Signs:   /80   Pulse 73   Ht 177.8 cm (70\")   Wt 122 kg (270 lb)   SpO2 97%   BMI 38.74 kg/m²      Physical Exam  Constitutional:       General: He is not in acute distress.     Appearance: He is not ill-appearing.   Cardiovascular:      Rate and Rhythm: Normal rate and regular rhythm.   Pulmonary:      Effort: Pulmonary effort is normal.      Breath sounds: Normal breath sounds.   Neurological:      Mental Status: He is alert.   Psychiatric:         Thought Content: Thought content normal.   Oropharynx WNL. No tonsillar hypertrophy.  Right ear canal: " cerumen impaction   Left ear: TM WNL   Mild anterior cervical adenopathy       Ear Cerumen Removal    Date/Time: 11/20/2023 4:00 PM    Performed by: Chay Cameron MD  Authorized by: Chay Cameron MD  Consent: Verbal consent obtained. Written consent obtained.  Location details: right ear  Patient tolerance: patient tolerated the procedure well with no immediate complications  Comments: Right ear successful ear irrigation with water and hydrogen peroxide mix performed by nursing staff.   Procedure type: irrigation           Assessment / Plan      Assessment/Plan:   Diagnoses and all orders for this visit:    1. Impacted cerumen of right ear (Primary)  -     Ear Cerumen Removal       Sweet oil daily  Debrox PRN  F/u for lack of improvement or resolution.     Follow Up:   No follow-ups on file.      MDM: otc med     Chay Cameron MD  [facility] - [facility]

## 2023-11-27 ENCOUNTER — OFFICE VISIT (OUTPATIENT)
Dept: SLEEP MEDICINE | Facility: CLINIC | Age: 63
End: 2023-11-27
Payer: MEDICARE

## 2023-11-27 VITALS
HEART RATE: 88 BPM | OXYGEN SATURATION: 96 % | SYSTOLIC BLOOD PRESSURE: 146 MMHG | WEIGHT: 270 LBS | DIASTOLIC BLOOD PRESSURE: 84 MMHG | HEIGHT: 70 IN | BODY MASS INDEX: 38.65 KG/M2

## 2023-11-27 DIAGNOSIS — G47.33 OSA (OBSTRUCTIVE SLEEP APNEA): Primary | ICD-10-CM

## 2023-11-27 PROCEDURE — 3079F DIAST BP 80-89 MM HG: CPT | Performed by: NURSE PRACTITIONER

## 2023-11-27 PROCEDURE — 99213 OFFICE O/P EST LOW 20 MIN: CPT | Performed by: NURSE PRACTITIONER

## 2023-11-27 PROCEDURE — 3077F SYST BP >= 140 MM HG: CPT | Performed by: NURSE PRACTITIONER

## 2023-11-27 NOTE — PROGRESS NOTES
Chief Complaint:   Chief Complaint   Patient presents with    Follow-up     Yearly       HPI:    Kiran Belcher is a 63 y.o. male here for follow-up of sleep apnea.  Patient was last seen 5/10/2022.  Patient continues to do well with CPAP therapy.  Patient sleeps 8+ hours nightly and does feel rested upon awakening.  Patient has an [place] score of 11/24.  Patient states his machine is 20 years old and we are no longer able to read his SIM card.  We will do an order for a new machine and sent to his DME.        Current medications are:   Current Outpatient Medications:     amLODIPine-benazepril (LOTREL) 10-20 MG per capsule, Take 1 capsule by mouth Daily., Disp: 90 capsule, Rfl: 3    ammonium lactate (AMLACTIN) 12 % cream, Apply 1 g topically to the appropriate area as directed As Needed for Dry Skin. Apply as directed , prescribed by podiatrist, Disp: , Rfl:     aspirin 81 MG chewable tablet, Chew 1 tablet Daily., Disp: , Rfl:     atorvastatin (LIPITOR) 10 MG tablet, Take 1 tablet by mouth Daily., Disp: 90 tablet, Rfl: 3    baclofen (LIORESAL) 20 MG tablet, Take 1 tablet by mouth 3 (Three) Times a Day., Disp: 90 tablet, Rfl: 11    carvedilol (Coreg) 12.5 MG tablet, Take 0.5 tablets by mouth 2 (Two) Times a Day With Meals., Disp: 90 tablet, Rfl: 0    citalopram (CeleXA) 20 MG tablet, TAKE ONE TABLET BY MOUTH DAILY, Disp: 90 tablet, Rfl: 3    dantrolene (DANTRIUM) 100 MG capsule, Take 1 capsule by mouth 2 (Two) Times a Day., Disp: 180 capsule, Rfl: 3    esomeprazole (nexIUM) 40 MG capsule, TAKE ONE CAPSULE BY MOUTH DAILY, Disp: 90 capsule, Rfl: 3    ferrous sulfate 325 (65 FE) MG tablet, Take 1 tablet by mouth Daily With Breakfast., Disp: 30 tablet, Rfl: 5    fesoterodine fumarate (TOVIAZ ER) 8 MG tablet sustained-release 24 hour tablet, Take 1 tablet by mouth Daily., Disp: 30 tablet, Rfl: 11    fexofenadine (ALLEGRA) 180 MG tablet, Take 1 tablet by mouth Daily., Disp: , Rfl:     fluocinonide (LIDEX) 0.05 % cream,  fluocinonide 0.05 % topical cream, Disp: , Rfl:     fluticasone (FLONASE) 50 MCG/ACT nasal spray, , Disp: , Rfl:     glucose blood (Accu-Chek Elke Plus) test strip, 1 each by Other route 2 (Two) Times a Day. Use as instructed, Disp: 200 each, Rfl: 3    glyburide (DIAbeta) 5 MG tablet, Take 1 tablet by mouth Daily With Breakfast., Disp: 90 tablet, Rfl: 3    lactobacillus acidophilus (RISAQUAD) capsule capsule, Take 1 capsule by mouth Daily., Disp: 90 capsule, Rfl: 1    metFORMIN ER (GLUCOPHAGE-XR) 500 MG 24 hr tablet, Take 2 tablets by mouth 2 (Two) Times a Day., Disp: 360 tablet, Rfl: 3.      The patient's relevant past medical, surgical, family and social history were reviewed and updated in Epic as appropriate.       Review of Systems   HENT:  Positive for congestion.    Eyes:  Positive for visual disturbance.   Respiratory:  Positive for apnea.    Gastrointestinal:         Heartburn   Musculoskeletal:  Positive for arthralgias, back pain, gait problem, myalgias and neck pain.   Allergic/Immunologic: Positive for environmental allergies.   Psychiatric/Behavioral:  Positive for dysphoric mood and sleep disturbance. The patient is nervous/anxious.    All other systems reviewed and are negative.        Objective:    Physical Exam  Constitutional:       Appearance: Normal appearance.   HENT:      Head: Normocephalic and atraumatic.      Mouth/Throat:      Comments: Class 4 airway  Cardiovascular:      Rate and Rhythm: Normal rate and regular rhythm.   Pulmonary:      Effort: Pulmonary effort is normal.      Breath sounds: Normal breath sounds.   Skin:     General: Skin is warm and dry.   Neurological:      Mental Status: He is alert and oriented to person, place, and time.   Psychiatric:         Mood and Affect: Mood normal.         Behavior: Behavior normal.         Thought Content: Thought content normal.         Judgment: Judgment normal.         CPAP Report    Heart rate or not working  The patient continues to  use and benefit from CPAP therapy.    ASSESSMENT/PLAN    Diagnoses and all orders for this visit:    1. NISA (obstructive sleep apnea) (Primary)  -     PAP Therapy        Counseled patient regarding multimodal approach with healthy nutrition, healthy sleep, regular physical activity, social activities, counseling, and medications. Encouraged to practice lateral sleep position. Avoid alcohol and sedatives close to bedtime.    Order for new machine sent to DME I will see patient back in 31 to 90 days.    I have reviewed the results of my evaluation and impression and discussed my recommendations in detail with the patient.      Signed by  FARNAZ Whitfield    November 27, 2023      CC: Chay Cameron MD         No ref. provider found

## 2023-12-19 ENCOUNTER — TELEPHONE (OUTPATIENT)
Dept: FAMILY MEDICINE CLINIC | Facility: CLINIC | Age: 63
End: 2023-12-19

## 2023-12-19 NOTE — TELEPHONE ENCOUNTER
[facility] received a fax that requires your attention. The document has been indexed to the patient’s chart for your review.      Reason for sending: MEDICAL RECORDS NOTIFICATION    Documents Description: EXT MED RECS_[id number]    Name of Sender: NALLELY TORRES    Date Indexed: 12-19-23    Notes (if needed): PLEASE SIGN AND FAX BACK

## 2023-12-26 DIAGNOSIS — R00.0 RAPID PULSE: ICD-10-CM

## 2023-12-26 NOTE — TELEPHONE ENCOUNTER
Caller: Kiran Belcher    Relationship: Self    Best call back number: [phone]    Requested Prescriptions:   Requested Prescriptions     Pending Prescriptions Disp Refills    carvedilol (Coreg) 12.5 MG tablet 90 tablet 0     Sig: Take 0.5 tablets by mouth 2 (Two) Times a Day With Meals.        Pharmacy where request should be sent: [facility] [id number] [address] [phone] [facility] [phone]      Last office visit with prescribing clinician: 11/20/2023   Last telemedicine visit with prescribing clinician: Visit date not found   Next office visit with prescribing clinician: 1/19/2024     Additional details provided by patient: PT IS OUT    Does the patient have less than a 3 day supply:  [x] Yes  [] No    Would you like a call back once the refill request has been completed: [x] Yes [] No    If the office needs to give you a call back, can they leave a voicemail: [] Yes [] No    Dede Wei Rep   12/26/23 16:32 EST

## 2023-12-27 RX ORDER — CARVEDILOL 12.5 MG/1
6.25 TABLET ORAL 2 TIMES DAILY WITH MEALS
Qty: 90 TABLET | Refills: 0 | Status: SHIPPED | OUTPATIENT
Start: 2023-12-27

## 2024-01-08 ENCOUNTER — OFFICE VISIT (OUTPATIENT)
Dept: FAMILY MEDICINE CLINIC | Facility: CLINIC | Age: 64
End: 2024-01-08
Payer: MEDICARE

## 2024-01-08 VITALS
DIASTOLIC BLOOD PRESSURE: 78 MMHG | OXYGEN SATURATION: 98 % | SYSTOLIC BLOOD PRESSURE: 132 MMHG | TEMPERATURE: 98 F | BODY MASS INDEX: 39.99 KG/M2 | WEIGHT: 270 LBS | HEIGHT: 69 IN | HEART RATE: 89 BPM

## 2024-01-08 DIAGNOSIS — R05.9 COUGH, UNSPECIFIED TYPE: ICD-10-CM

## 2024-01-08 DIAGNOSIS — H66.92 LEFT OTITIS MEDIA, UNSPECIFIED OTITIS MEDIA TYPE: ICD-10-CM

## 2024-01-08 DIAGNOSIS — J02.9 SORE THROAT: ICD-10-CM

## 2024-01-08 DIAGNOSIS — J01.00 ACUTE MAXILLARY SINUSITIS, RECURRENCE NOT SPECIFIED: Primary | ICD-10-CM

## 2024-01-08 DIAGNOSIS — J30.9 ALLERGIC RHINITIS, UNSPECIFIED SEASONALITY, UNSPECIFIED TRIGGER: ICD-10-CM

## 2024-01-08 LAB
EXPIRATION DATE: ABNORMAL
EXPIRATION DATE: NORMAL
INTERNAL CONTROL: ABNORMAL
INTERNAL CONTROL: NORMAL
Lab: ABNORMAL
Lab: NORMAL
S PYO AG THROAT QL: NEGATIVE
SARS-COV-2 AG UPPER RESP QL IA.RAPID: NOT DETECTED

## 2024-01-08 PROCEDURE — 3075F SYST BP GE 130 - 139MM HG: CPT | Performed by: FAMILY MEDICINE

## 2024-01-08 PROCEDURE — 87426 SARSCOV CORONAVIRUS AG IA: CPT | Performed by: FAMILY MEDICINE

## 2024-01-08 PROCEDURE — 99214 OFFICE O/P EST MOD 30 MIN: CPT | Performed by: FAMILY MEDICINE

## 2024-01-08 PROCEDURE — 87880 STREP A ASSAY W/OPTIC: CPT | Performed by: FAMILY MEDICINE

## 2024-01-08 PROCEDURE — 3078F DIAST BP <80 MM HG: CPT | Performed by: FAMILY MEDICINE

## 2024-01-08 RX ORDER — AMOXICILLIN 875 MG/1
875 TABLET, COATED ORAL 2 TIMES DAILY
Qty: 20 TABLET | Refills: 0 | Status: SHIPPED | OUTPATIENT
Start: 2024-01-08 | End: 2024-01-18

## 2024-01-08 RX ORDER — FLUTICASONE PROPIONATE 50 MCG
2 SPRAY, SUSPENSION (ML) NASAL DAILY
Qty: 16 G | Refills: 0 | Status: SHIPPED | OUTPATIENT
Start: 2024-01-08

## 2024-01-08 NOTE — PROGRESS NOTES
Follow Up Office Visit      Date: 2024   Patient Name: Kiran Belcher  : 1960   MRN: [id number]     Chief Complaint:    Chief Complaint   Patient presents with    Sore Throat     X2 days        History of Present Illness: Kiran Belcher is a 63 y.o. male who presents today Reports onset Friday or Saturday-2-3 days ago.    Reports woke up with sore throat.  Reports sinus pressure and thinks it has gone to his ears.    Taking regular medications.    Has been taking Allegra but has been out.  Did take Loratadine for the last three days.  Out of Flonase.      Subjective      Review of Systems:   Review of Systems   HENT:  Positive for rhinorrhea, sinus pressure and sore throat.    Respiratory:  Positive for cough (occ).    Gastrointestinal:  Negative for diarrhea, nausea and vomiting.       I have reviewed the patients family history, social history, past medical history, past surgical history and have updated it as appropriate.     Medications:     Current Outpatient Medications:     amLODIPine-benazepril (LOTREL) 10-20 MG per capsule, Take 1 capsule by mouth Daily., Disp: 90 capsule, Rfl: 3    ammonium lactate (AMLACTIN) 12 % cream, Apply 1 g topically to the appropriate area as directed As Needed for Dry Skin. Apply as directed , prescribed by podiatrist, Disp: , Rfl:     aspirin 81 MG chewable tablet, Chew 1 tablet Daily., Disp: , Rfl:     atorvastatin (LIPITOR) 10 MG tablet, Take 1 tablet by mouth Daily., Disp: 90 tablet, Rfl: 3    baclofen (LIORESAL) 20 MG tablet, Take 1 tablet by mouth 3 (Three) Times a Day., Disp: 90 tablet, Rfl: 11    carvedilol (Coreg) 12.5 MG tablet, Take 0.5 tablets by mouth 2 (Two) Times a Day With Meals., Disp: 90 tablet, Rfl: 0    citalopram (CeleXA) 20 MG tablet, TAKE ONE TABLET BY MOUTH DAILY, Disp: 90 tablet, Rfl: 3    dantrolene (DANTRIUM) 100 MG capsule, Take 1 capsule by mouth 2 (Two) Times a Day., Disp: 180 capsule, Rfl: 3    esomeprazole (nexIUM) 40 MG  "capsule, TAKE ONE CAPSULE BY MOUTH DAILY, Disp: 90 capsule, Rfl: 3    ferrous sulfate 325 (65 FE) MG tablet, Take 1 tablet by mouth Daily With Breakfast., Disp: 30 tablet, Rfl: 5    fesoterodine fumarate (TOVIAZ ER) 8 MG tablet sustained-release 24 hour tablet, Take 1 tablet by mouth Daily., Disp: 30 tablet, Rfl: 11    fexofenadine (ALLEGRA) 180 MG tablet, Take 1 tablet by mouth Daily., Disp: , Rfl:     fluocinonide (LIDEX) 0.05 % cream, fluocinonide 0.05 % topical cream, Disp: , Rfl:     fluticasone (FLONASE) 50 MCG/ACT nasal spray, 2 sprays into the nostril(s) as directed by provider Daily., Disp: 16 g, Rfl: 0    glucose blood (Accu-Chek Elke Plus) test strip, 1 each by Other route 2 (Two) Times a Day. Use as instructed, Disp: 200 each, Rfl: 3    glyburide (DIAbeta) 5 MG tablet, Take 1 tablet by mouth Daily With Breakfast., Disp: 90 tablet, Rfl: 3    lactobacillus acidophilus (RISAQUAD) capsule capsule, Take 1 capsule by mouth Daily., Disp: 90 capsule, Rfl: 1    metFORMIN ER (GLUCOPHAGE-XR) 500 MG 24 hr tablet, Take 2 tablets by mouth 2 (Two) Times a Day., Disp: 360 tablet, Rfl: 3    amoxicillin (AMOXIL) 875 MG tablet, Take 1 tablet by mouth 2 (Two) Times a Day for 10 days., Disp: 20 tablet, Rfl: 0    Allergies:   Allergies   Allergen Reactions    Levofloxacin Unknown (See Comments)     tendinopathy    Cefuroxime Palpitations     Tachycardia, back pain, fatigue, and weakness.        Objective     Physical Exam: Please see above  Vital Signs:   Vitals:    01/08/24 1609   BP: 132/78   Pulse: 89   Temp: 98 °F (36.7 °C)   TempSrc: Infrared   SpO2: 98%   Weight: 122 kg (270 lb)  Comment: wheelchair bond   Height: 175.3 cm (69\")   PainSc: 0-No pain     Body mass index is 39.87 kg/m².          Physical Exam  Vitals and nursing note reviewed.   Constitutional:       Appearance: Normal appearance.      Comments: Using a motorized wheelchair.   HENT:      Head: Normocephalic and atraumatic.        Right Ear: Tympanic " membrane normal.      Ears:      Comments: Left TM erythematous with loss of landmarks.     Nose: Congestion and rhinorrhea present.   Neck:      Vascular: No carotid bruit.   Cardiovascular:      Rate and Rhythm: Normal rate and regular rhythm.      Heart sounds: Normal heart sounds. No murmur heard.  Pulmonary:      Effort: Pulmonary effort is normal.      Breath sounds: Normal breath sounds.   Abdominal:      General: Bowel sounds are normal.      Palpations: Abdomen is soft. There is no mass.      Tenderness: There is no abdominal tenderness.   Musculoskeletal:      Cervical back: Neck supple.      Right lower leg: No edema.      Left lower leg: No edema.   Skin:     Coloration: Skin is not jaundiced or pale.      Findings: No erythema.   Neurological:      Mental Status: He is alert. Mental status is at baseline.   Psychiatric:         Mood and Affect: Mood normal.         Behavior: Behavior normal.         Procedures    Results:   Labs:   Hemoglobin A1C   Date Value Ref Range Status   10/19/2023 6.8 (A) 4.5 - 5.7 % Final   09/14/2018 7.00 (H) 4.80 - 5.60 % Final     TSH   Date Value Ref Range Status   06/18/2021 2.250 0.450 - 4.500 uIU/mL Final   04/12/2019 1.470 0.270 - 4.200 mIU/mL Final        POCT Results (if applicable):   Results for orders placed or performed in visit on 01/08/24   POCT rapid strep A    Specimen: Swab   Result Value Ref Range    Rapid Strep A Screen Negative Negative, VALID, INVALID, Not Performed    Internal Control Passed Passed    Lot Number [id number]     Expiration Date 07/27/2024    POCT SARS-CoV-2 Antigen    Specimen: Nasopharynx; Swab   Result Value Ref Range    SARS Antigen Not Detected Not Detected, Presumptive Negative    Internal Control Passed Passed    Lot Number [id number]     Expiration Date 06/11/2024        Imaging:   No valid procedures specified.         Smoking Cessation:   Does not smoke      Assessment / Plan      Assessment/Plan:     Strep A screen-Negative, and  Covid antigen testing =Not detected on 1/8/24.    1. Left otitis media, unspecified otitis media type    Amoxicillin course.    - amoxicillin (AMOXIL) 875 MG tablet; Take 1 tablet by mouth 2 (Two) Times a Day for 10 days.  Dispense: 20 tablet; Refill: 0    2. Acute maxillary sinusitis, recurrence not specified  Amoxicillin course.    Tylenol if needed for fever.    - amoxicillin (AMOXIL) 875 MG tablet; Take 1 tablet by mouth 2 (Two) Times a Day for 10 days.  Dispense: 20 tablet; Refill: 0    3. Cough, unspecified type  Covid antigen testing was negative/not detected.  Has been out of some of his allergy medications.  Restart Flonase, and allegra.    - POCT SARS-CoV-2 Antigen    4. Allergic rhinitis, unspecified seasonality, unspecified trigger  Flonase as ordered.  Restart allegra.    - fluticasone (FLONASE) 50 MCG/ACT nasal spray; 2 sprays into the nostril(s) as directed by provider Daily.  Dispense: 16 g; Refill: 0    5.  Sore throat.    Rapid strep A screen is negative in office.      Vaccine Counseling:      Follow Up:   Return if symptoms worsen or fail to improve, or as scheduled with pcp..      DO VIANEY Watson

## 2024-01-10 ENCOUNTER — TELEPHONE (OUTPATIENT)
Dept: FAMILY MEDICINE CLINIC | Facility: CLINIC | Age: 64
End: 2024-01-10
Payer: MEDICARE

## 2024-01-10 NOTE — TELEPHONE ENCOUNTER
Pt was seen by Dr. Horta on Monday and was given an antibiotics. He says since Monday he has continued to feel bad and now has a very deep cough and congestion/ wheezing. He is wanting to know if he needs to come back in.

## 2024-01-10 NOTE — TELEPHONE ENCOUNTER
Would need to be seen and evaluated.  Sees Dr. Cameron for pcp, but another provider could see.  I am out of office for several days.

## 2024-01-12 ENCOUNTER — TELEPHONE (OUTPATIENT)
Dept: ONCOLOGY | Facility: CLINIC | Age: 64
End: 2024-01-12
Payer: MEDICARE

## 2024-01-12 NOTE — TELEPHONE ENCOUNTER
Caller: Kiran Belcher    Relationship to patient: Self    Best call back number: [phone]     Chief complaint: PATIENT TO RESCHEDULE FROM 1/16/24 APPT    Type of visit: FU2    Requested date:AFTER 1/23/24 . WOULD PREFER AROUND THE WEEK OF 2/5/24

## 2024-01-13 ENCOUNTER — OFFICE VISIT (OUTPATIENT)
Dept: FAMILY MEDICINE CLINIC | Facility: CLINIC | Age: 64
End: 2024-01-13
Payer: MEDICARE

## 2024-01-13 VITALS
HEART RATE: 84 BPM | SYSTOLIC BLOOD PRESSURE: 132 MMHG | BODY MASS INDEX: 40.73 KG/M2 | OXYGEN SATURATION: 98 % | WEIGHT: 275 LBS | HEIGHT: 69 IN | TEMPERATURE: 97.6 F | DIASTOLIC BLOOD PRESSURE: 92 MMHG

## 2024-01-13 DIAGNOSIS — J01.00 ACUTE NON-RECURRENT MAXILLARY SINUSITIS: Primary | ICD-10-CM

## 2024-01-13 PROCEDURE — 1160F RVW MEDS BY RX/DR IN RCRD: CPT | Performed by: PHYSICIAN ASSISTANT

## 2024-01-13 PROCEDURE — 96372 THER/PROPH/DIAG INJ SC/IM: CPT | Performed by: PHYSICIAN ASSISTANT

## 2024-01-13 PROCEDURE — 3080F DIAST BP >= 90 MM HG: CPT | Performed by: PHYSICIAN ASSISTANT

## 2024-01-13 PROCEDURE — 3075F SYST BP GE 130 - 139MM HG: CPT | Performed by: PHYSICIAN ASSISTANT

## 2024-01-13 PROCEDURE — 1159F MED LIST DOCD IN RCRD: CPT | Performed by: PHYSICIAN ASSISTANT

## 2024-01-13 PROCEDURE — 99213 OFFICE O/P EST LOW 20 MIN: CPT | Performed by: PHYSICIAN ASSISTANT

## 2024-01-13 RX ORDER — METHYLPREDNISOLONE ACETATE 80 MG/ML
80 INJECTION, SUSPENSION INTRA-ARTICULAR; INTRALESIONAL; INTRAMUSCULAR; SOFT TISSUE ONCE
Status: COMPLETED | OUTPATIENT
Start: 2024-01-13 | End: 2024-01-13

## 2024-01-13 RX ORDER — DOXYCYCLINE HYCLATE 100 MG/1
100 CAPSULE ORAL 2 TIMES DAILY
Qty: 20 CAPSULE | Refills: 0 | Status: SHIPPED | OUTPATIENT
Start: 2024-01-13

## 2024-01-13 RX ADMIN — METHYLPREDNISOLONE ACETATE 80 MG: 80 INJECTION, SUSPENSION INTRA-ARTICULAR; INTRALESIONAL; INTRAMUSCULAR; SOFT TISSUE at 12:05

## 2024-01-13 NOTE — PROGRESS NOTES
Follow Up Office Visit      Date: 2024   Patient Name: Kiran Belcher  : 1960   MRN: [id number]     Chief Complaint:    Chief Complaint   Patient presents with    Sinusitis     Seen Monday but pt states that he is feeling worse  Pt states that he feels worse  There is so much snot no mater hoe much he blows       History of Present Illness: Kiran Belcher is a 63 y.o. male who is here today to follow up with sinusitis.    The patient is a 63-year-old male who comes in with productive cough, sinus congestion, lots of drainage and just not getting better.    He was told that he had a little infection in his ears. He is currently taking amoxicillin.    Supplemental Information  His blood sugar has not been running too high.      Subjective      Review of systems:  Review of Systems     I have reviewed and the following portions of the patient's history were updated as appropriate: past family history, past medical history, past social history, past surgical history and problem list.    Medications:     Current Outpatient Medications:     amLODIPine-benazepril (LOTREL) 10-20 MG per capsule, Take 1 capsule by mouth Daily., Disp: 90 capsule, Rfl: 3    ammonium lactate (AMLACTIN) 12 % cream, Apply 1 g topically to the appropriate area as directed As Needed for Dry Skin. Apply as directed , prescribed by podiatrist, Disp: , Rfl:     amoxicillin (AMOXIL) 875 MG tablet, Take 1 tablet by mouth 2 (Two) Times a Day for 10 days., Disp: 20 tablet, Rfl: 0    aspirin 81 MG chewable tablet, Chew 1 tablet Daily., Disp: , Rfl:     atorvastatin (LIPITOR) 10 MG tablet, Take 1 tablet by mouth Daily., Disp: 90 tablet, Rfl: 3    baclofen (LIORESAL) 20 MG tablet, Take 1 tablet by mouth 3 (Three) Times a Day., Disp: 90 tablet, Rfl: 11    carvedilol (Coreg) 12.5 MG tablet, Take 0.5 tablets by mouth 2 (Two) Times a Day With Meals., Disp: 90 tablet, Rfl: 0    citalopram (CeleXA) 20 MG tablet, TAKE ONE TABLET BY MOUTH  "DAILY, Disp: 90 tablet, Rfl: 3    dantrolene (DANTRIUM) 100 MG capsule, Take 1 capsule by mouth 2 (Two) Times a Day., Disp: 180 capsule, Rfl: 3    esomeprazole (nexIUM) 40 MG capsule, TAKE ONE CAPSULE BY MOUTH DAILY, Disp: 90 capsule, Rfl: 3    ferrous sulfate 325 (65 FE) MG tablet, Take 1 tablet by mouth Daily With Breakfast., Disp: 30 tablet, Rfl: 5    fesoterodine fumarate (TOVIAZ ER) 8 MG tablet sustained-release 24 hour tablet, Take 1 tablet by mouth Daily., Disp: 30 tablet, Rfl: 11    fexofenadine (ALLEGRA) 180 MG tablet, Take 1 tablet by mouth Daily., Disp: , Rfl:     fluocinonide (LIDEX) 0.05 % cream, fluocinonide 0.05 % topical cream, Disp: , Rfl:     fluticasone (FLONASE) 50 MCG/ACT nasal spray, 2 sprays into the nostril(s) as directed by provider Daily., Disp: 16 g, Rfl: 0    glucose blood (Accu-Chek Elke Plus) test strip, 1 each by Other route 2 (Two) Times a Day. Use as instructed, Disp: 200 each, Rfl: 3    glyburide (DIAbeta) 5 MG tablet, Take 1 tablet by mouth Daily With Breakfast., Disp: 90 tablet, Rfl: 3    lactobacillus acidophilus (RISAQUAD) capsule capsule, Take 1 capsule by mouth Daily., Disp: 90 capsule, Rfl: 1    metFORMIN ER (GLUCOPHAGE-XR) 500 MG 24 hr tablet, Take 2 tablets by mouth 2 (Two) Times a Day., Disp: 360 tablet, Rfl: 3    doxycycline (VIBRAMYCIN) 100 MG capsule, Take 1 capsule by mouth 2 (Two) Times a Day., Disp: 20 capsule, Rfl: 0    Allergies:   Allergies   Allergen Reactions    Levofloxacin Unknown (See Comments)     tendinopathy    Cefuroxime Palpitations     Tachycardia, back pain, fatigue, and weakness.        Objective     Vital Signs:   Vitals:    01/13/24 1136   BP: 132/92   Pulse: 84   Temp: 97.6 °F (36.4 °C)   TempSrc: Infrared   SpO2: 98%   Weight: 125 kg (275 lb)   Height: 175.3 cm (69.02\")   PainSc: 0-No pain     Body mass index is 40.59 kg/m².          Physical Exam:   Physical Exam  Vitals and nursing note reviewed.   Constitutional:       Appearance: Normal " appearance.   HENT:      Head: Normocephalic and atraumatic.      Right Ear: Tympanic membrane and ear canal normal.      Left Ear: Tympanic membrane and ear canal normal.      Nose: Congestion and rhinorrhea present. Rhinorrhea is purulent.      Right Sinus: Maxillary sinus tenderness present.      Left Sinus: Maxillary sinus tenderness present.      Mouth/Throat:      Mouth: Mucous membranes are moist.      Pharynx: Oropharynx is clear. No posterior oropharyngeal erythema.   Cardiovascular:      Rate and Rhythm: Normal rate and regular rhythm.   Pulmonary:      Effort: Pulmonary effort is normal.      Breath sounds: Normal breath sounds. No decreased breath sounds, wheezing, rhonchi or rales.   Musculoskeletal:      Cervical back: Neck supple.      Right lower leg: No edema.      Left lower leg: No edema.   Lymphadenopathy:      Cervical: No cervical adenopathy.   Neurological:      Mental Status: He is alert.          Procedures     Assessment / Plan      Assessment/Plan:   Diagnoses and all orders for this visit:    1. Acute non-recurrent maxillary sinusitis (Primary)  -     doxycycline (VIBRAMYCIN) 100 MG capsule; Take 1 capsule by mouth 2 (Two) Times a Day.  Dispense: 20 capsule; Refill: 0  -     methylPREDNISolone acetate (DEPO-medrol) injection 80 mg         1. Productive cough and sinus congestion.  I will change antibiotic to doxycycline. I will also give Depo-Medrol IM today. He will continue Flonase. He will push fluids.    Follow Up:   No follow-ups on file.    Gaby Mendoza PA-C   [facility]    Transcribed from ambient dictation for Gaby Mendoza PA-C by Yolanda Hanks.  01/15/24   09:37 EST    Patient or patient representative verbalized consent to the visit recording.  I have personally performed the services described in this document as transcribed by the above individual, and it is both accurate and complete.

## 2024-01-22 RX ORDER — FERROUS SULFATE 325(65) MG
1 TABLET ORAL
Qty: 30 TABLET | Refills: 5 | Status: SHIPPED | OUTPATIENT
Start: 2024-01-22

## 2024-01-23 ENCOUNTER — LAB (OUTPATIENT)
Dept: LAB | Facility: HOSPITAL | Age: 64
End: 2024-01-23
Payer: MEDICARE

## 2024-01-23 ENCOUNTER — HOSPITAL ENCOUNTER (OUTPATIENT)
Dept: CT IMAGING | Facility: HOSPITAL | Age: 64
Discharge: HOME | End: 2024-01-23
Payer: MEDICARE

## 2024-01-23 DIAGNOSIS — C85.10 B-CELL LYMPHOMA, UNSPECIFIED B-CELL LYMPHOMA TYPE, UNSPECIFIED BODY REGION: ICD-10-CM

## 2024-01-23 LAB
ALBUMIN SERPL-MCNC: 3.9 G/DL (ref 3.5–5.2)
ALBUMIN/GLOB SERPL: 1.2 G/DL
ALP SERPL-CCNC: 100 U/L (ref 39–117)
ALT SERPL W P-5'-P-CCNC: 20 U/L (ref 1–41)
ANION GAP SERPL CALCULATED.3IONS-SCNC: 13 MMOL/L (ref 5–15)
AST SERPL-CCNC: 26 U/L (ref 1–40)
BASOPHILS # BLD AUTO: 0.02 10*3/MM3 (ref 0–0.2)
BASOPHILS NFR BLD AUTO: 0.2 % (ref 0–1.5)
BILIRUB SERPL-MCNC: 0.3 MG/DL (ref 0–1.2)
BUN SERPL-MCNC: 13 MG/DL (ref 8–23)
BUN/CREAT SERPL: 14.9 (ref 7–25)
CALCIUM SPEC-SCNC: 8.7 MG/DL (ref 8.6–10.5)
CHLORIDE SERPL-SCNC: 100 MMOL/L (ref 98–107)
CO2 SERPL-SCNC: 25 MMOL/L (ref 22–29)
CREAT BLDA-MCNC: 1 MG/DL (ref 0.6–1.3)
CREAT SERPL-MCNC: 0.87 MG/DL (ref 0.76–1.27)
DEPRECATED RDW RBC AUTO: 50.6 FL (ref 37–54)
EGFRCR SERPLBLD CKD-EPI 2021: 98.2 ML/MIN/1.73
EOSINOPHIL # BLD AUTO: 0.47 10*3/MM3 (ref 0–0.4)
EOSINOPHIL NFR BLD AUTO: 4.7 % (ref 0.3–6.2)
ERYTHROCYTE [DISTWIDTH] IN BLOOD BY AUTOMATED COUNT: 15.6 % (ref 12.3–15.4)
GLOBULIN UR ELPH-MCNC: 3.3 GM/DL
GLUCOSE SERPL-MCNC: 135 MG/DL (ref 65–99)
HCT VFR BLD AUTO: 35.7 % (ref 37.5–51)
HGB BLD-MCNC: 10.9 G/DL (ref 13–17.7)
IMM GRANULOCYTES # BLD AUTO: 0.04 10*3/MM3 (ref 0–0.05)
IMM GRANULOCYTES NFR BLD AUTO: 0.4 % (ref 0–0.5)
LDH SERPL-CCNC: 167 U/L (ref 135–225)
LYMPHOCYTES # BLD AUTO: 1.85 10*3/MM3 (ref 0.7–3.1)
LYMPHOCYTES NFR BLD AUTO: 18.5 % (ref 19.6–45.3)
MCH RBC QN AUTO: 27.1 PG (ref 26.6–33)
MCHC RBC AUTO-ENTMCNC: 30.5 G/DL (ref 31.5–35.7)
MCV RBC AUTO: 88.8 FL (ref 79–97)
MONOCYTES # BLD AUTO: 0.72 10*3/MM3 (ref 0.1–0.9)
MONOCYTES NFR BLD AUTO: 7.2 % (ref 5–12)
NEUTROPHILS NFR BLD AUTO: 6.88 10*3/MM3 (ref 1.7–7)
NEUTROPHILS NFR BLD AUTO: 69 % (ref 42.7–76)
NRBC BLD AUTO-RTO: 0 /100 WBC (ref 0–0.2)
PLATELET # BLD AUTO: 287 10*3/MM3 (ref 140–450)
PMV BLD AUTO: 10.3 FL (ref 6–12)
POTASSIUM SERPL-SCNC: 4 MMOL/L (ref 3.5–5.2)
PROT SERPL-MCNC: 7.2 G/DL (ref 6–8.5)
RBC # BLD AUTO: 4.02 10*6/MM3 (ref 4.14–5.8)
SODIUM SERPL-SCNC: 138 MMOL/L (ref 136–145)
WBC NRBC COR # BLD AUTO: 9.98 10*3/MM3 (ref 3.4–10.8)

## 2024-01-23 PROCEDURE — 82565 ASSAY OF CREATININE: CPT

## 2024-01-23 PROCEDURE — 83615 LACTATE (LD) (LDH) ENZYME: CPT

## 2024-01-23 PROCEDURE — 36415 COLL VENOUS BLD VENIPUNCTURE: CPT

## 2024-01-23 PROCEDURE — 71260 CT THORAX DX C+: CPT

## 2024-01-23 PROCEDURE — 80053 COMPREHEN METABOLIC PANEL: CPT

## 2024-01-23 PROCEDURE — 25510000001 IOPAMIDOL 61 % SOLUTION: Performed by: INTERNAL MEDICINE

## 2024-01-23 PROCEDURE — 85025 COMPLETE CBC W/AUTO DIFF WBC: CPT

## 2024-01-23 PROCEDURE — 74177 CT ABD & PELVIS W/CONTRAST: CPT

## 2024-01-23 RX ADMIN — IOPAMIDOL 90 ML: 612 INJECTION, SOLUTION INTRAVENOUS at 15:06

## 2024-01-26 ENCOUNTER — OFFICE VISIT (OUTPATIENT)
Dept: ONCOLOGY | Facility: CLINIC | Age: 64
End: 2024-01-26
Payer: MEDICARE

## 2024-01-26 VITALS
OXYGEN SATURATION: 99 % | HEART RATE: 88 BPM | TEMPERATURE: 97.1 F | DIASTOLIC BLOOD PRESSURE: 77 MMHG | SYSTOLIC BLOOD PRESSURE: 136 MMHG

## 2024-01-26 DIAGNOSIS — C85.10 B-CELL LYMPHOMA, UNSPECIFIED B-CELL LYMPHOMA TYPE, UNSPECIFIED BODY REGION: Primary | ICD-10-CM

## 2024-01-26 PROCEDURE — 1126F AMNT PAIN NOTED NONE PRSNT: CPT | Performed by: INTERNAL MEDICINE

## 2024-01-26 PROCEDURE — 3078F DIAST BP <80 MM HG: CPT | Performed by: INTERNAL MEDICINE

## 2024-01-26 PROCEDURE — 99214 OFFICE O/P EST MOD 30 MIN: CPT | Performed by: INTERNAL MEDICINE

## 2024-01-26 PROCEDURE — 3075F SYST BP GE 130 - 139MM HG: CPT | Performed by: INTERNAL MEDICINE

## 2024-01-26 NOTE — PROGRESS NOTES
Follow Up Office Visit      Date: 2024     Patient Name: Kiran Belcher  MRN: [id number]  : 1960  Referring Physician: Chay Cameron     Chief Complaint:  Follow-up for concerns for lymphoma     History of Present Illness: Kiran Belcher is a pleasant 61 y.o. male past medical history of right RCC status post right nephrectomy, hypertension, hyperlipidemia, anxiety, type 2 diabetes who presents today for evaluation of concerns for lymphoma. The patient is accompanied by their wife who contributes to the history of their care.  Patient underwent a right nephrectomy on 2019 for renal cell carcinoma.  He has been followed by nephrology and urology since.  On his most recent scans there is concerns for enlarging retroperitoneal adenopathy.  He underwent an FNA which showed predominant small lymphocytes with a minor population of atypical B cells concerning for possible CLL/SLL.  PET/CT showed no abnormal hypermetabolic activity.  He overall feels well.  Denies any unexplained fevers, chills, night sweats, weight loss.  States that someone did not tell him he had an issue, he would feel completely normal at this time     Interval History:  Presents to clinic for follow-up.  Continues to do well.  Denies any unexplained fevers, chills, night sweats, weight loss.  Tolerating oral iron well at this time    Oncology History:    Oncology/Hematology History    No history exists.       Subjective      Review of Systems:   Constitutional: Negative for fevers, chills, or weight loss  Eyes: Negative for blurred vision or discharge         Ear/Nose/Throat: Negative for difficulty swallowing, sore throat, LAD                                                       Respiratory: Negative for cough, SOA, wheezing                                                                                        Cardiovascular: Negative for chest pain or palpitations                                                                   Gastrointestinal: Negative for nausea, vomiting or diarrhea                                                                     Genitourinary: Negative for dysuria or hematuria                                                                                           Musculoskeletal: Negative for any joint pains or muscle aches                                                                        Neurologic: Negative for any weakness, headaches, dizziness                                                                         Hematologic: Negative for any easy bleeding or bruising                                                                                   Psychiatric: Negative for anxiety or depression                          Past Medical History/Past Surgical History/ Family History/ Social History: Reviewed by me and unchanged from my previous documentation done on July 2023.     Medications:     Current Outpatient Medications:     amLODIPine-benazepril (LOTREL) 10-20 MG per capsule, Take 1 capsule by mouth Daily., Disp: 90 capsule, Rfl: 3    ammonium lactate (AMLACTIN) 12 % cream, Apply 1 g topically to the appropriate area as directed As Needed for Dry Skin. Apply as directed , prescribed by podiatrist, Disp: , Rfl:     aspirin 81 MG chewable tablet, Chew 1 tablet Daily., Disp: , Rfl:     atorvastatin (LIPITOR) 10 MG tablet, Take 1 tablet by mouth Daily., Disp: 90 tablet, Rfl: 3    baclofen (LIORESAL) 20 MG tablet, Take 1 tablet by mouth 3 (Three) Times a Day., Disp: 90 tablet, Rfl: 11    carvedilol (Coreg) 12.5 MG tablet, Take 0.5 tablets by mouth 2 (Two) Times a Day With Meals., Disp: 90 tablet, Rfl: 0    citalopram (CeleXA) 20 MG tablet, TAKE ONE TABLET BY MOUTH DAILY, Disp: 90 tablet, Rfl: 3    dantrolene (DANTRIUM) 100 MG capsule, Take 1 capsule by mouth 2 (Two) Times a Day., Disp: 180 capsule, Rfl: 3    esomeprazole (nexIUM) 40 MG capsule, TAKE ONE CAPSULE BY MOUTH DAILY, Disp: 90 capsule,  Rfl: 3    FeroSul 325 (65 Fe) MG tablet, TAKE 1 TABLET BY MOUTH DAILY WITH BREAKFAST, Disp: 30 tablet, Rfl: 5    fesoterodine fumarate (TOVIAZ ER) 8 MG tablet sustained-release 24 hour tablet, Take 1 tablet by mouth Daily., Disp: 30 tablet, Rfl: 11    fexofenadine (ALLEGRA) 180 MG tablet, Take 1 tablet by mouth Daily., Disp: , Rfl:     fluocinonide (LIDEX) 0.05 % cream, fluocinonide 0.05 % topical cream, Disp: , Rfl:     fluticasone (FLONASE) 50 MCG/ACT nasal spray, 2 sprays into the nostril(s) as directed by provider Daily., Disp: 16 g, Rfl: 0    glucose blood (Accu-Chek Elke Plus) test strip, 1 each by Other route 2 (Two) Times a Day. Use as instructed, Disp: 200 each, Rfl: 3    glyburide (DIAbeta) 5 MG tablet, Take 1 tablet by mouth Daily With Breakfast., Disp: 90 tablet, Rfl: 3    lactobacillus acidophilus (RISAQUAD) capsule capsule, Take 1 capsule by mouth Daily., Disp: 90 capsule, Rfl: 1    metFORMIN ER (GLUCOPHAGE-XR) 500 MG 24 hr tablet, Take 2 tablets by mouth 2 (Two) Times a Day., Disp: 360 tablet, Rfl: 3    Allergies:   Allergies   Allergen Reactions    Levofloxacin Unknown (See Comments)     tendinopathy    Cefuroxime Palpitations     Tachycardia, back pain, fatigue, and weakness.        Objective     Physical Exam:  Vital Signs:   Vitals:    01/26/24 1402   BP: 136/77   Pulse: 88   Temp: 97.1 °F (36.2 °C)   TempSrc: Infrared   SpO2: 99%   Weight: Comment: unable to weigh   PainSc: 0-No pain     Pain Score    01/26/24 1402   PainSc: 0-No pain     ECOG Performance Status:0    Constitutional: NAD, ECOG 0  Eyes: PERRLA, scleral anicteric  ENT: No LAD, no thyromegaly  Respiratory: CTAB, no wheezing, rales, rhonchi  Cardiovascular: RRR, no murmurs, pulses 2+ bilaterally  Abdomen: soft, NT/ND, no HSM  Musculoskeletal: strength 5/5 bilaterally, no c/c/e  Neurologic: A&O x 3, CN II-XII intact grossly    Results Review:   Hospital Outpatient Visit on 01/23/2024   Component Date Value Ref Range Status     Creatinine 01/23/2024 1.00  0.60 - 1.30 mg/dL Final    Serial Number: [id number]  [id number]   Lab on 01/23/2024   Component Date Value Ref Range Status    Glucose 01/23/2024 135 (H)  65 - 99 mg/dL Final    BUN 01/23/2024 13  8 - 23 mg/dL Final    Creatinine 01/23/2024 0.87  0.76 - 1.27 mg/dL Final    Sodium 01/23/2024 138  136 - 145 mmol/L Final    Potassium 01/23/2024 4.0  3.5 - 5.2 mmol/L Final    Chloride 01/23/2024 100  98 - 107 mmol/L Final    CO2 01/23/2024 25.0  22.0 - 29.0 mmol/L Final    Calcium 01/23/2024 8.7  8.6 - 10.5 mg/dL Final    Total Protein 01/23/2024 7.2  6.0 - 8.5 g/dL Final    Albumin 01/23/2024 3.9  3.5 - 5.2 g/dL Final    ALT (SGPT) 01/23/2024 20  1 - 41 U/L Final    AST (SGOT) 01/23/2024 26  1 - 40 U/L Final    Alkaline Phosphatase 01/23/2024 100  39 - 117 U/L Final    Total Bilirubin 01/23/2024 0.3  0.0 - 1.2 mg/dL Final    Globulin 01/23/2024 3.3  gm/dL Final    Calculated Result    A/G Ratio 01/23/2024 1.2  g/dL Final    BUN/Creatinine Ratio 01/23/2024 14.9  7.0 - 25.0 Final    Anion Gap 01/23/2024 13.0  5.0 - 15.0 mmol/L Final    eGFR 01/23/2024 98.2  >60.0 mL/min/1.73 Final    LDH 01/23/2024 167  135 - 225 U/L Final    WBC 01/23/2024 9.98  3.40 - 10.80 10*3/mm3 Final    RBC 01/23/2024 4.02 (L)  4.14 - 5.80 10*6/mm3 Final    Hemoglobin 01/23/2024 10.9 (L)  13.0 - 17.7 g/dL Final    Hematocrit 01/23/2024 35.7 (L)  37.5 - 51.0 % Final    MCV 01/23/2024 88.8  79.0 - 97.0 fL Final    MCH 01/23/2024 27.1  26.6 - 33.0 pg Final    MCHC 01/23/2024 30.5 (L)  31.5 - 35.7 g/dL Final    RDW 01/23/2024 15.6 (H)  12.3 - 15.4 % Final    RDW-SD 01/23/2024 50.6  37.0 - 54.0 fl Final    MPV 01/23/2024 10.3  6.0 - 12.0 fL Final    Platelets 01/23/2024 287  140 - 450 10*3/mm3 Final    Neutrophil % 01/23/2024 69.0  42.7 - 76.0 % Final    Lymphocyte % 01/23/2024 18.5 (L)  19.6 - 45.3 % Final    Monocyte % 01/23/2024 7.2  5.0 - 12.0 % Final    Eosinophil % 01/23/2024 4.7  0.3 - 6.2 % Final    Basophil %  01/23/2024 0.2  0.0 - 1.5 % Final    Immature Grans % 01/23/2024 0.4  0.0 - 0.5 % Final    Neutrophils, Absolute 01/23/2024 6.88  1.70 - 7.00 10*3/mm3 Final    Lymphocytes, Absolute 01/23/2024 1.85  0.70 - 3.10 10*3/mm3 Final    Monocytes, Absolute 01/23/2024 0.72  0.10 - 0.90 10*3/mm3 Final    Eosinophils, Absolute 01/23/2024 0.47 (H)  0.00 - 0.40 10*3/mm3 Final    Basophils, Absolute 01/23/2024 0.02  0.00 - 0.20 10*3/mm3 Final    Immature Grans, Absolute 01/23/2024 0.04  0.00 - 0.05 10*3/mm3 Final    nRBC 01/23/2024 0.0  0.0 - 0.2 /100 WBC Final       CT Abdomen Pelvis With Contrast    Result Date: 1/24/2024  Narrative: CT CHEST W CONTRAST DIAGNOSTIC, CT ABDOMEN PELVIS W CONTRAST Date of Exam: 1/23/2024 1:42 PM CST Indication: lymphoma. Comparison: 12/29/2022 Technique: Axial CT images were obtained of the chest, abdomen, and pelvis after the uneventful intravenous administration of 90 cc Isovue-300.  Reconstructed coronal and sagittal images were also obtained. Automated exposure control and iterative construction methods were used. Findings: CT CHEST: MEDIASTINUM: There is a stable densely calcified right paratracheal lymph node. Aortic and heart size are normal. No mass nor pericardial effusion. There is a 1.0 x 0.9 cm right epicardial lymph node (image 70, series 2), unchanged. CORONARY ARTERIES: There is calcified atherosclerotic disease. LUNGS: There is right basal atelectasis/parenchymal scarring. No suspicious nodule. Stable anterior right upper lobe pneumatocele. PLEURAL SPACE: No effusion, mass, nor pneumothorax. CT ABDOMEN AND PELVIS:  LIVER: Unchanged parenchyma without focal lesion. BILIARY/GALLBLADDER: There are calcified gallstones. There are no gallbladder inflammatory changes. SPLEEN:  Unremarkable PANCREAS:  Unremarkable ADRENAL:  Unremarkable KIDNEYS: Right nephrectomy. Unremarkable left renal parenchyma with no solid mass identified. No obstruction.  No calculus identified.  GASTROINTESTINAL/MESENTERY:  No evidence of obstruction nor inflammation. AORTA/IVC:  Normal caliber. RETROPERITONEUM/LYMPH NODES: Similar adenopathy including: *2.3 x 1.7 cm left para-aortic node (image 102, series 5), previously 2.3 x 1.5 cm. *3.6 x 1.9 cm left external iliac node (image 142, series 5), previously 3.7 x 1.9 cm. REPRODUCTIVE:  Unremarkable BLADDER: There is diffuse urinary bladder wall thickening, similar prior examination. OSSEUS STRUCTURES:  Typical for age with no acute process identified.     Impression: Impression: 1.Stable exam without evidence of disease progression. Electronically Signed: Maximino Lam MD  1/24/2024 11:34 AM CST  Workstation ID: [id number]    CT Chest With Contrast Diagnostic    Result Date: 1/24/2024  Narrative: CT CHEST W CONTRAST DIAGNOSTIC, CT ABDOMEN PELVIS W CONTRAST Date of Exam: 1/23/2024 1:42 PM CST Indication: lymphoma. Comparison: 12/29/2022 Technique: Axial CT images were obtained of the chest, abdomen, and pelvis after the uneventful intravenous administration of 90 cc Isovue-300.  Reconstructed coronal and sagittal images were also obtained. Automated exposure control and iterative construction methods were used. Findings: CT CHEST: MEDIASTINUM: There is a stable densely calcified right paratracheal lymph node. Aortic and heart size are normal. No mass nor pericardial effusion. There is a 1.0 x 0.9 cm right epicardial lymph node (image 70, series 2), unchanged. CORONARY ARTERIES: There is calcified atherosclerotic disease. LUNGS: There is right basal atelectasis/parenchymal scarring. No suspicious nodule. Stable anterior right upper lobe pneumatocele. PLEURAL SPACE: No effusion, mass, nor pneumothorax. CT ABDOMEN AND PELVIS:  LIVER: Unchanged parenchyma without focal lesion. BILIARY/GALLBLADDER: There are calcified gallstones. There are no gallbladder inflammatory changes. SPLEEN:  Unremarkable PANCREAS:  Unremarkable ADRENAL:  Unremarkable KIDNEYS: Right  nephrectomy. Unremarkable left renal parenchyma with no solid mass identified. No obstruction.  No calculus identified. GASTROINTESTINAL/MESENTERY:  No evidence of obstruction nor inflammation. AORTA/IVC:  Normal caliber. RETROPERITONEUM/LYMPH NODES: Similar adenopathy including: *2.3 x 1.7 cm left para-aortic node (image 102, series 5), previously 2.3 x 1.5 cm. *3.6 x 1.9 cm left external iliac node (image 142, series 5), previously 3.7 x 1.9 cm. REPRODUCTIVE:  Unremarkable BLADDER: There is diffuse urinary bladder wall thickening, similar prior examination. OSSEUS STRUCTURES:  Typical for age with no acute process identified.     Impression: Impression: 1.Stable exam without evidence of disease progression. Electronically Signed: Maximino Lam MD  1/24/2024 11:34 AM CST  Workstation ID: [id number]     Assessment / Plan      Assessment/Plan:   1. B-cell lymphoma, unspecified B-cell lymphoma type, unspecified body region (HCC) (Primary)  -Unclear etiology at this time  -Enlarging retroperitoneal adenopathy noted on recent CT scan  -FNA showing predominately small lymphocytes with a minor population of atypical B cells  -PET/CT showing no abnormal hypermetabolic activity at [facility]  -Peripheral flow without an immunophenotypic abnormality  -SPEP within normal limits  -LDH mildly decreased  -Kappa/lambda light chain ratio within normal limits, beta-2 microglobulin mildly elevated at 3.4  -CT C/A/P in July 2022 with some mild increase in his retroperitoneal adenopathy along with concerns for cystitis  -Labs in October 2022 reviewed and within normal limits with no significant lymphocytosis or leukocytosis  -Continues to remain asymptomatic with no significant constitutional symptoms  -CT C/A/P in December 2022 reviewed without evidence of recurrent or metastatic disease.  Labs overall stable  -CBC in July 2023 overall stable.  LDH within normal limits  - Labs in January 2024 reviewed and overall stable.  LDH within normal  limits  -CT C/A/P in January 2024 with no disease progression and stable adenopathy  -We will plan for repeat CT scans and labs in 6 months.  Ordered today.  If he remains stable, will transition to yearly labs and imaging     2.  Right clear-cell renal cell carcinoma  -Status post nephrectomy in May 2019  -Has been without disease recurrence since     3.  Iron deficiency anemia  -Iron studies in July 2023 consistent with mild iron deficiency anemia  -Started on oral iron and tolerating well  -Hemoglobin only slightly improved today  -Repeat iron studies pending  -May need to consider transition to IV iron pending his results         Follow Up:   Follow-up in 6 months     Jose Perdomo MD  Hematology and Oncology     Please note that portions of this note may have been completed with a voice recognition program. Efforts were made to edit the dictations, but occasionally words are mistranscribed.

## 2024-02-09 ENCOUNTER — OFFICE VISIT (OUTPATIENT)
Dept: FAMILY MEDICINE CLINIC | Facility: CLINIC | Age: 64
End: 2024-02-09
Payer: MEDICARE

## 2024-02-09 VITALS
RESPIRATION RATE: 18 BRPM | HEIGHT: 69 IN | DIASTOLIC BLOOD PRESSURE: 76 MMHG | TEMPERATURE: 98 F | OXYGEN SATURATION: 97 % | SYSTOLIC BLOOD PRESSURE: 142 MMHG | BODY MASS INDEX: 40.59 KG/M2 | HEART RATE: 95 BPM

## 2024-02-09 DIAGNOSIS — H69.93 DYSFUNCTION OF BOTH EUSTACHIAN TUBES: ICD-10-CM

## 2024-02-09 DIAGNOSIS — E11.9 TYPE 2 DIABETES MELLITUS WITHOUT COMPLICATION, WITHOUT LONG-TERM CURRENT USE OF INSULIN: Primary | ICD-10-CM

## 2024-02-09 DIAGNOSIS — J01.00 ACUTE NON-RECURRENT MAXILLARY SINUSITIS: ICD-10-CM

## 2024-02-09 DIAGNOSIS — I10 PRIMARY HYPERTENSION: ICD-10-CM

## 2024-02-09 LAB
EXPIRATION DATE: ABNORMAL
HBA1C MFR BLD: 6.5 % (ref 4.5–5.7)
Lab: ABNORMAL

## 2024-02-09 RX ORDER — AMOXICILLIN AND CLAVULANATE POTASSIUM 562.5; 437.5; 62.5 MG/1; MG/1; MG/1
2 TABLET, MULTILAYER, EXTENDED RELEASE ORAL 2 TIMES DAILY
Qty: 40 TABLET | Refills: 0 | Status: SHIPPED | OUTPATIENT
Start: 2024-02-09 | End: 2024-02-19

## 2024-02-09 RX ORDER — PREDNISONE 10 MG/1
TABLET ORAL
Qty: 12 TABLET | Refills: 0 | Status: SHIPPED | OUTPATIENT
Start: 2024-02-09

## 2024-02-09 NOTE — PROGRESS NOTES
Established Patient Office Visit        Subjective      Chief Complaint:  Diabetes (3 month follow up diabetes, still having fluid possibly accumulated in ears and would like this looked at as well.)      History of Present Illness: Kiran Belcher is a 63 y.o. male who presents for BP and DM and HTN     Amoxil on the 8th of January then started on doxycycline. 1/13 with no improvement. Still with bothersome maxillary pressure still with nasal drainage. Ear pressure.     -140 at home        Patient Active Problem List   Diagnosis    Benign essential hypertension    Depression    Gastroesophageal reflux disease without esophagitis    Hyperlipidemia    NISA on CPAP    Type 2 diabetes mellitus without complications    Spasm    Other obesity due to excess calories    Hx of spinal cord injury    Kidney disorder    Lower extremity weakness    Myalgia    Moderate episode of recurrent major depressive disorder    Dysphagia    Toenail avulsion    H/O kidney removal    Renal cell carcinoma of right kidney    Quadriplegia    Muscle contracture    C5-C7 level spinal injury with complete lesion of spinal cord, without evidence of spinal bone injury    Inability to bear weight    Neurogenic bladder    Venous stasis    B-cell lymphoma    Cancer of kidney    Hypertension    Neutrophilic leukemoid reaction    Personal history of Methicillin resistant Staphylococcus aureus infection    Paraplegia    Other constipation         Current Outpatient Medications:     amLODIPine-benazepril (LOTREL) 10-20 MG per capsule, Take 1 capsule by mouth Daily., Disp: 90 capsule, Rfl: 3    ammonium lactate (AMLACTIN) 12 % cream, Apply 1 g topically to the appropriate area as directed As Needed for Dry Skin. Apply as directed , prescribed by podiatrist, Disp: , Rfl:     aspirin 81 MG chewable tablet, Chew 1 tablet Daily., Disp: , Rfl:     atorvastatin (LIPITOR) 10 MG tablet, Take 1 tablet by mouth Daily., Disp: 90 tablet, Rfl: 3    baclofen  (LIORESAL) 20 MG tablet, Take 1 tablet by mouth 3 (Three) Times a Day., Disp: 90 tablet, Rfl: 11    carvedilol (Coreg) 12.5 MG tablet, Take 0.5 tablets by mouth 2 (Two) Times a Day With Meals., Disp: 90 tablet, Rfl: 0    citalopram (CeleXA) 20 MG tablet, TAKE ONE TABLET BY MOUTH DAILY, Disp: 90 tablet, Rfl: 3    dantrolene (DANTRIUM) 100 MG capsule, Take 1 capsule by mouth 2 (Two) Times a Day., Disp: 180 capsule, Rfl: 3    esomeprazole (nexIUM) 40 MG capsule, TAKE ONE CAPSULE BY MOUTH DAILY, Disp: 90 capsule, Rfl: 3    FeroSul 325 (65 Fe) MG tablet, TAKE 1 TABLET BY MOUTH DAILY WITH BREAKFAST, Disp: 30 tablet, Rfl: 5    fesoterodine fumarate (TOVIAZ ER) 8 MG tablet sustained-release 24 hour tablet, Take 1 tablet by mouth Daily., Disp: 30 tablet, Rfl: 11    fexofenadine (ALLEGRA) 180 MG tablet, Take 1 tablet by mouth Daily., Disp: , Rfl:     fluocinonide (LIDEX) 0.05 % cream, fluocinonide 0.05 % topical cream, Disp: , Rfl:     fluticasone (FLONASE) 50 MCG/ACT nasal spray, 2 sprays into the nostril(s) as directed by provider Daily., Disp: 16 g, Rfl: 0    glucose blood (Accu-Chek Elke Plus) test strip, 1 each by Other route 2 (Two) Times a Day. Use as instructed, Disp: 200 each, Rfl: 3    glyburide (DIAbeta) 5 MG tablet, Take 1 tablet by mouth Daily With Breakfast., Disp: 90 tablet, Rfl: 3    lactobacillus acidophilus (RISAQUAD) capsule capsule, Take 1 capsule by mouth Daily., Disp: 90 capsule, Rfl: 1    metFORMIN ER (GLUCOPHAGE-XR) 500 MG 24 hr tablet, Take 2 tablets by mouth 2 (Two) Times a Day., Disp: 360 tablet, Rfl: 3    amoxicillin-clavulanate XR (AUGMENTIN XR) 1000-62.5 MG per 12 hr tablet, Take 2 tablets by mouth 2 (Two) Times a Day for 10 days., Disp: 40 tablet, Rfl: 0    predniSONE (DELTASONE) 10 MG tablet, Take 2 tablets once day 1-4. Take 1 tablet once daily day 5-8., Disp: 12 tablet, Rfl: 0       Objective     Physical Exam:   Vital Signs:   /76 (BP Location: Left arm, Patient Position: Sitting,  "Cuff Size: Adult)   Pulse 95   Temp 98 °F (36.7 °C) (Temporal)   Resp 18   Ht 175.3 cm (69.02\")   SpO2 97%   BMI 40.59 kg/m²      Physical Exam  Constitutional:       General: He is not in acute distress.     Appearance: He is not ill-appearing.   Cardiovascular:      Rate and Rhythm: Normal rate and regular rhythm.   Pulmonary:      Effort: Pulmonary effort is normal.      Breath sounds: Normal breath sounds.   Neurological:      Mental Status: He is alert.   Psychiatric:         Thought Content: Thought content normal.   Tms Wnl b/l  Maxillary sinus tenderness   Patient in wheelchair         Assessment / Plan      Assessment/Plan:   Diagnoses and all orders for this visit:    1. Type 2 diabetes mellitus without complication, without long-term current use of insulin (Primary)  Assessment & Plan:  At goal A1c 6.5  Continue metformin  and glyburide           Orders:  -     POC Glycosylated Hemoglobin (Hb A1C)    2. Primary hypertension  Assessment & Plan:  Continue carvedilol, amlodipine, benazepril  A little high today  Monitor at home   Could increase carvedilol f/u in 6 weeks       3. Acute non-recurrent maxillary sinusitis  -     predniSONE (DELTASONE) 10 MG tablet; Take 2 tablets once day 1-4. Take 1 tablet once daily day 5-8.  Dispense: 12 tablet; Refill: 0  -     amoxicillin-clavulanate XR (AUGMENTIN XR) 1000-62.5 MG per 12 hr tablet; Take 2 tablets by mouth 2 (Two) Times a Day for 10 days.  Dispense: 40 tablet; Refill: 0    4. Dysfunction of both eustachian tubes       He has failed amoxicillin and doxycycline still has continued maxillary sinus pressure.  Treated with high-dose Augmentin and a day prednisone taper.  If not resolved after this I will send him to ENT    Follow Up:   Return in about 6 weeks (around 3/22/2024) for Medicare Wellness.    MDM:     Chay Cameron MD  Family Medicine - [facility]  "

## 2024-02-09 NOTE — ASSESSMENT & PLAN NOTE
Continue carvedilol, amlodipine, benazepril  A little high today  Monitor at home   Could increase carvedilol f/u in 6 weeks

## 2024-03-13 ENCOUNTER — TELEPHONE (OUTPATIENT)
Dept: FAMILY MEDICINE CLINIC | Facility: CLINIC | Age: 64
End: 2024-03-13

## 2024-03-13 ENCOUNTER — LAB (OUTPATIENT)
Dept: LAB | Facility: HOSPITAL | Age: 64
End: 2024-03-13
Payer: MEDICARE

## 2024-03-13 ENCOUNTER — OFFICE VISIT (OUTPATIENT)
Dept: FAMILY MEDICINE CLINIC | Facility: CLINIC | Age: 64
End: 2024-03-13
Payer: MEDICARE

## 2024-03-13 VITALS
SYSTOLIC BLOOD PRESSURE: 122 MMHG | HEIGHT: 69 IN | TEMPERATURE: 98.1 F | WEIGHT: 270 LBS | HEART RATE: 88 BPM | BODY MASS INDEX: 39.99 KG/M2 | DIASTOLIC BLOOD PRESSURE: 68 MMHG | OXYGEN SATURATION: 96 %

## 2024-03-13 DIAGNOSIS — R35.0 FREQUENCY OF URINATION: Primary | ICD-10-CM

## 2024-03-13 DIAGNOSIS — N30.00 ACUTE CYSTITIS WITHOUT HEMATURIA: ICD-10-CM

## 2024-03-13 LAB
BILIRUB BLD-MCNC: NEGATIVE MG/DL
CLARITY, POC: CLEAR
COLOR UR: YELLOW
EXPIRATION DATE: ABNORMAL
GLUCOSE UR STRIP-MCNC: NEGATIVE MG/DL
KETONES UR QL: NEGATIVE
LEUKOCYTE EST, POC: ABNORMAL
Lab: ABNORMAL
NITRITE UR-MCNC: NEGATIVE MG/ML
PH UR: 6 [PH] (ref 5–8)
PROT UR STRIP-MCNC: ABNORMAL MG/DL
RBC # UR STRIP: NEGATIVE /UL
SP GR UR: 1.02 (ref 1–1.03)
UROBILINOGEN UR QL: NORMAL

## 2024-03-13 PROCEDURE — 87186 SC STD MICRODIL/AGAR DIL: CPT

## 2024-03-13 PROCEDURE — 3078F DIAST BP <80 MM HG: CPT | Performed by: NURSE PRACTITIONER

## 2024-03-13 PROCEDURE — 99213 OFFICE O/P EST LOW 20 MIN: CPT | Performed by: NURSE PRACTITIONER

## 2024-03-13 PROCEDURE — 1160F RVW MEDS BY RX/DR IN RCRD: CPT | Performed by: NURSE PRACTITIONER

## 2024-03-13 PROCEDURE — 87077 CULTURE AEROBIC IDENTIFY: CPT

## 2024-03-13 PROCEDURE — 3044F HG A1C LEVEL LT 7.0%: CPT | Performed by: NURSE PRACTITIONER

## 2024-03-13 PROCEDURE — 87086 URINE CULTURE/COLONY COUNT: CPT

## 2024-03-13 PROCEDURE — 3074F SYST BP LT 130 MM HG: CPT | Performed by: NURSE PRACTITIONER

## 2024-03-13 PROCEDURE — 81003 URINALYSIS AUTO W/O SCOPE: CPT | Performed by: NURSE PRACTITIONER

## 2024-03-13 PROCEDURE — 1159F MED LIST DOCD IN RCRD: CPT | Performed by: NURSE PRACTITIONER

## 2024-03-13 RX ORDER — SULFAMETHOXAZOLE AND TRIMETHOPRIM 800; 160 MG/1; MG/1
1 TABLET ORAL 2 TIMES DAILY
Qty: 20 TABLET | Refills: 0 | Status: SHIPPED | OUTPATIENT
Start: 2024-03-13

## 2024-03-13 RX ORDER — CEFUROXIME AXETIL 250 MG/1
250 TABLET ORAL 2 TIMES DAILY
Qty: 20 TABLET | Refills: 0 | Status: SHIPPED | OUTPATIENT
Start: 2024-03-13

## 2024-03-13 NOTE — TELEPHONE ENCOUNTER
Caller: [facility] [id number] - [facility] [address] [place] [address] - [phone]  - [phone]     Relationship: Pharmacy    Best call back number: [phone]     Which medication are you concerned about:     cefuroxime (CEFTIN) 250 MG tablet       Who prescribed you this medication: ROSITA BARTON        What are your concerns: PHARMACY ADVISED THE PATIENT IS ALLERGIC TO THE ABOVE MEDIATION THAT WAS SENT IN. ADVISE

## 2024-03-13 NOTE — PROGRESS NOTES
"Chief Complaint  Urinary Frequency and Difficulty Urinating    Subjective          Kiran Belcehr presents to [facility]  History of Present Illness  Patient is a 63-year-old male.  He is currently in a wheelchair.  He is here for complaint of urinary urgency, frequency and some dysuria.  He states it started 3 days ago.  He denies any fever, chills, nausea, or vomiting.  He denies any back pain or pain to suprapubic area.    Urinary Frequency   This is a new problem. The current episode started in the past 7 days. The problem has been worse. The quality of the pain is described as burning. The pain is at a severity of 3/10. The pain is mild. There has been no fever. Associated symptoms include frequency and urgency. He has tried increased fluids for the symptoms. The treatment provided no relief.       The following portions of the patient's history were reviewed and updated as appropriate: allergies, current medications, past family history, past medical history, past social history, past surgical history and problem list.    Review of Systems   Constitutional:  Positive for fatigue. Negative for fever.   HENT: Negative.     Cardiovascular: Negative.    Gastrointestinal: Negative.    Genitourinary:  Positive for dysuria, frequency and urgency.   Musculoskeletal:  Positive for gait problem.        Patient presents wheelchair   Skin: Negative.    Allergic/Immunologic: Negative.    Hematological: Negative.    Psychiatric/Behavioral: Negative.           Objective   Vital Signs:   /68 (BP Location: Right arm, Patient Position: Sitting, Cuff Size: Adult)   Pulse 88   Temp 98.1 °F (36.7 °C) (Temporal)   Ht 175.3 cm (69.02\")   Wt 122 kg (270 lb)   SpO2 96%   BMI 39.85 kg/m²           PHQ-2/9 Depression Screening  PHQ-9 Total Score:      NADINE-7 Anxiety Screening  NADINE-7             Physical Exam  Vitals reviewed.   HENT:      Mouth/Throat:      Mouth: Mucous membranes are " moist.   Eyes:      Pupils: Pupils are equal, round, and reactive to light.   Cardiovascular:      Rate and Rhythm: Normal rate and regular rhythm.      Pulses: Normal pulses.      Heart sounds: Normal heart sounds.   Pulmonary:      Effort: Pulmonary effort is normal.      Breath sounds: Normal breath sounds.   Abdominal:      General: Bowel sounds are normal.      Palpations: Abdomen is soft.   Skin:     General: Skin is warm.      Capillary Refill: Capillary refill takes less than 2 seconds.   Neurological:      Mental Status: He is alert and oriented to person, place, and time.   Psychiatric:         Mood and Affect: Mood normal.         Behavior: Behavior normal.        Result Review :               POCT urinalysis dipstick, automated (03/13/2024 13:26)   Assessment and Plan    Diagnoses and all orders for this visit:    1. Frequency of urination (Primary)  -     POCT urinalysis dipstick, automated    2. Acute cystitis without hematuria  -     Urine Culture - Urine, Urine, Clean Catch; Future  -     cefuroxime (CEFTIN) 250 MG tablet; Take 1 tablet by mouth 2 (Two) Times a Day.  Dispense: 20 tablet; Refill: 0  -     sulfamethoxazole-trimethoprim (BACTRIM DS,SEPTRA DS) 800-160 MG per tablet; Take 1 tablet by mouth 2 (Two) Times a Day.  Dispense: 20 tablet; Refill: 0        Follow Up   Return if symptoms worsen or fail to improve.  Patient was given instructions and counseling regarding his condition or for health maintenance advice. Please see specific information pulled into the AVS if appropriate.

## 2024-03-18 LAB — BACTERIA SPEC AEROBE CULT: ABNORMAL

## 2024-03-19 DIAGNOSIS — I10 ESSENTIAL HYPERTENSION: ICD-10-CM

## 2024-03-19 RX ORDER — AMLODIPINE BESYLATE AND BENAZEPRIL HYDROCHLORIDE 10; 20 MG/1; MG/1
1 CAPSULE ORAL DAILY
Qty: 90 CAPSULE | Refills: 3 | Status: SHIPPED | OUTPATIENT
Start: 2024-03-19

## 2024-03-19 NOTE — TELEPHONE ENCOUNTER
Caller: Kiran Belcher    Relationship: Self    Best call back number: [phone]     Requested Prescriptions:   Requested Prescriptions     Pending Prescriptions Disp Refills    amLODIPine-benazepril (LOTREL) 10-20 MG per capsule 90 capsule 3     Sig: Take 1 capsule by mouth Daily.      Pharmacy where request should be sent: [facility] [id number] [address] [phone] [facility] [phone]      Last office visit with prescribing clinician: 2/9/2024   Last telemedicine visit with prescribing clinician: Visit date not found   Next office visit with prescribing clinician: 3/22/2024     Does the patient have less than a 3 day supply:  [x] Yes  [] No    Dede Vaughn Rep   03/19/24 13:01 EDT

## 2024-03-25 DIAGNOSIS — R80.9 TYPE 2 DIABETES MELLITUS WITH MICROALBUMINURIA, WITHOUT LONG-TERM CURRENT USE OF INSULIN: ICD-10-CM

## 2024-03-25 DIAGNOSIS — R00.0 RAPID PULSE: ICD-10-CM

## 2024-03-25 DIAGNOSIS — E11.29 TYPE 2 DIABETES MELLITUS WITH MICROALBUMINURIA, WITHOUT LONG-TERM CURRENT USE OF INSULIN: ICD-10-CM

## 2024-03-25 NOTE — TELEPHONE ENCOUNTER
Caller: Kiran Belcher    Relationship: Self     Best call back number: [phone]     Requested Prescriptions:   Requested Prescriptions     Pending Prescriptions Disp Refills    carvedilol (COREG) 12.5 MG tablet [Pharmacy Med Name: CARVEDILOL 12.5 MG TABLET] 90 tablet 0     Sig: TAKE 1/2 TABLET BY MOUTH TWICE A DAY WITH MEALS.    glyburide (DIAbeta) 5 MG tablet 90 tablet 3     Sig: Take 1 tablet by mouth Daily With Breakfast.        Pharmacy where request should be sent: [facility] [id number] [address] [phone] [facility] [phone]      Last office visit with prescribing clinician: 2/9/2024   Last telemedicine visit with prescribing clinician: Visit date not found   Next office visit with prescribing clinician: 4/4/2024     Additional details provided by patient: TWO PILLS LEFT.    Does the patient have less than a 3 day supply:  [x] Yes  [] No    Would you like a call back once the refill request has been completed: [] Yes [x] No    If the office needs to give you a call back, can they leave a voicemail: [] Yes [x] No    Dede Lieberman Rep   03/25/24 15:20 EDT            Yes

## 2024-03-25 NOTE — TELEPHONE ENCOUNTER
Rx Refill Note  Requested Prescriptions     Pending Prescriptions Disp Refills    carvedilol (COREG) 12.5 MG tablet [Pharmacy Med Name: CARVEDILOL 12.5 MG TABLET] 90 tablet 0     Sig: TAKE 1/2 TABLET BY MOUTH TWICE A DAY WITH MEALS.    glyburide (DIAbeta) 5 MG tablet 90 tablet 3     Sig: Take 1 tablet by mouth Daily With Breakfast.      Last office visit with prescribing clinician: 2/9/2024   Last telemedicine visit with prescribing clinician: Visit date not found   Next office visit with prescribing clinician: 4/4/2024                         Would you like a call back once the refill request has been completed: [] Yes [] No    If the office needs to give you a call back, can they leave a voicemail: [] Yes [] No    Maria T Green LPN  03/25/24, 16:38 EDT

## 2024-03-25 NOTE — TELEPHONE ENCOUNTER
Rx Refill Note  Requested Prescriptions     Pending Prescriptions Disp Refills    carvedilol (COREG) 12.5 MG tablet [Pharmacy Med Name: CARVEDILOL 12.5 MG TABLET] 90 tablet 0     Sig: TAKE 1/2 TABLET BY MOUTH TWICE A DAY WITH MEALS.    glyburide (DIAbeta) 5 MG tablet 90 tablet 3     Sig: Take 1 tablet by mouth Daily With Breakfast.      Last office visit with prescribing clinician: 2/9/2024   Last telemedicine visit with prescribing clinician: Visit date not found   Next office visit with prescribing clinician: 4/4/2024                         Would you like a call back once the refill request has been completed: [] Yes [] No    If the office needs to give you a call back, can they leave a voicemail: [] Yes [] No    Maria T Green LPN  03/25/24, 16:17 EDT

## 2024-03-26 RX ORDER — GLYBURIDE 5 MG/1
5 TABLET ORAL
Qty: 90 TABLET | Refills: 3 | Status: SHIPPED | OUTPATIENT
Start: 2024-03-26

## 2024-03-26 RX ORDER — CARVEDILOL 12.5 MG/1
TABLET ORAL
Qty: 90 TABLET | Refills: 3 | Status: SHIPPED | OUTPATIENT
Start: 2024-03-26

## 2024-04-04 ENCOUNTER — OFFICE VISIT (OUTPATIENT)
Dept: FAMILY MEDICINE CLINIC | Facility: CLINIC | Age: 64
End: 2024-04-04
Payer: MEDICARE

## 2024-04-04 VITALS
WEIGHT: 270 LBS | TEMPERATURE: 98.4 F | OXYGEN SATURATION: 97 % | DIASTOLIC BLOOD PRESSURE: 82 MMHG | SYSTOLIC BLOOD PRESSURE: 130 MMHG | HEIGHT: 69 IN | HEART RATE: 98 BPM | BODY MASS INDEX: 39.99 KG/M2

## 2024-04-04 DIAGNOSIS — I10 BENIGN ESSENTIAL HYPERTENSION: Primary | Chronic | ICD-10-CM

## 2024-04-04 DIAGNOSIS — E11.9 TYPE 2 DIABETES MELLITUS WITHOUT COMPLICATION, UNSPECIFIED WHETHER LONG TERM INSULIN USE: ICD-10-CM

## 2024-04-04 DIAGNOSIS — N31.9 NEUROGENIC BLADDER: ICD-10-CM

## 2024-04-04 RX ORDER — FESOTERODINE FUMARATE 8 MG/1
8 TABLET, EXTENDED RELEASE ORAL DAILY
COMMUNITY
Start: 2024-03-28 | End: 2025-03-28

## 2024-04-04 NOTE — PROGRESS NOTES
Established Patient Office Visit        Subjective      Chief Complaint:  Hypertension and Diabetes      History of Present Illness: Kiran Belcher is a 63 y.o. male who presents for hypertension.  Blood pressure has been elevated in 160s to 190s at home.   He recently started Myrbetriq and his urologist told him to watch his blood pressures.      Patient Active Problem List   Diagnosis    Benign essential hypertension    Depression    Gastroesophageal reflux disease without esophagitis    Hyperlipidemia    NISA on CPAP    Type 2 diabetes mellitus without complications    Spasm    Other obesity due to excess calories    Hx of spinal cord injury    Kidney disorder    Lower extremity weakness    Myalgia    Moderate episode of recurrent major depressive disorder    Dysphagia    Toenail avulsion    H/O kidney removal    Renal cell carcinoma of right kidney    Quadriplegia    Muscle contracture    C5-C7 level spinal injury with complete lesion of spinal cord, without evidence of spinal bone injury    Inability to bear weight    Neurogenic bladder    Venous stasis    B-cell lymphoma    Cancer of kidney    Hypertension    Neutrophilic leukemoid reaction    Personal history of Methicillin resistant Staphylococcus aureus infection    Paraplegia    Other constipation         Current Outpatient Medications:     amLODIPine-benazepril (LOTREL) 10-20 MG per capsule, Take 1 capsule by mouth Daily., Disp: 90 capsule, Rfl: 3    ammonium lactate (AMLACTIN) 12 % cream, Apply 1 g topically to the appropriate area as directed As Needed for Dry Skin. Apply as directed , prescribed by podiatrist, Disp: , Rfl:     atorvastatin (LIPITOR) 10 MG tablet, Take 1 tablet by mouth Daily., Disp: 90 tablet, Rfl: 3    baclofen (LIORESAL) 20 MG tablet, Take 1 tablet by mouth 3 (Three) Times a Day., Disp: 90 tablet, Rfl: 11    carvedilol (COREG) 12.5 MG tablet, TAKE 1/2 TABLET BY MOUTH TWICE A DAY WITH MEALS., Disp: 90 tablet, Rfl: 3    citalopram  "(CeleXA) 20 MG tablet, TAKE ONE TABLET BY MOUTH DAILY, Disp: 90 tablet, Rfl: 3    dantrolene (DANTRIUM) 100 MG capsule, Take 1 capsule by mouth 2 (Two) Times a Day., Disp: 180 capsule, Rfl: 3    esomeprazole (nexIUM) 40 MG capsule, TAKE ONE CAPSULE BY MOUTH DAILY, Disp: 90 capsule, Rfl: 3    FeroSul 325 (65 Fe) MG tablet, TAKE 1 TABLET BY MOUTH DAILY WITH BREAKFAST, Disp: 30 tablet, Rfl: 5    fesoterodine fumarate (TOVIAZ ER) 8 MG tablet sustained-release 24 hour tablet, Take 1 tablet by mouth Daily., Disp: , Rfl:     fexofenadine (ALLEGRA) 180 MG tablet, Take 1 tablet by mouth Daily., Disp: , Rfl:     fluocinonide (LIDEX) 0.05 % cream, fluocinonide 0.05 % topical cream, Disp: , Rfl:     fluticasone (FLONASE) 50 MCG/ACT nasal spray, 2 sprays into the nostril(s) as directed by provider Daily., Disp: 16 g, Rfl: 0    glucose blood (Accu-Chek Elke Plus) test strip, 1 each by Other route 2 (Two) Times a Day. Use as instructed, Disp: 200 each, Rfl: 3    glyburide (DIAbeta) 5 MG tablet, Take 1 tablet by mouth Daily With Breakfast., Disp: 90 tablet, Rfl: 3    lactobacillus acidophilus (RISAQUAD) capsule capsule, Take 1 capsule by mouth Daily., Disp: 90 capsule, Rfl: 1    metFORMIN ER (GLUCOPHAGE-XR) 500 MG 24 hr tablet, Take 2 tablets by mouth 2 (Two) Times a Day., Disp: 360 tablet, Rfl: 3    Mirabegron ER (MYRBETRIQ) 50 MG tablet sustained-release 24 hour 24 hr tablet, Take 50 mg by mouth Daily., Disp: , Rfl:     aspirin 81 MG chewable tablet, Chew 1 tablet Daily. (Patient not taking: Reported on 3/13/2024), Disp: , Rfl:        Objective     Physical Exam:   Vital Signs:   /82 (BP Location: Left arm, Patient Position: Sitting, Cuff Size: Adult)   Pulse 98   Temp 98.4 °F (36.9 °C) (Temporal)   Ht 175.3 cm (69.02\")   Wt 122 kg (270 lb)   SpO2 97%   BMI 39.85 kg/m²      Physical Exam  Constitutional:       General: He is not in acute distress.     Appearance: He is not ill-appearing.  Patient in motorized " wheelchair  Cardiovascular:      Rate and Rhythm: Normal rate and regular rhythm.   Pulmonary:      Effort: Pulmonary effort is normal.      Breath sounds: Normal breath sounds.   Neurological:      Mental Status: He is alert.   Psychiatric:         Thought Content: Thought content normal.            Assessment / Plan      Assessment/Plan:   Diagnoses and all orders for this visit:    1. Benign essential hypertension (Primary)  Assessment & Plan:  At goal here today needs to bring his cuff to calibrate it.  Blood pressures are above goal at home.  Continue carvedilol, amlodipine, benazepril      2. Neurogenic bladder  Assessment & Plan:  Myrbetriq was added recently and we are monitoring pressures      3. Type 2 diabetes mellitus without complication, unspecified whether long term insulin use  Assessment & Plan:  Continue metformin  and glyburide without adjustment today check A1c at next visit            If pressures remain elevated at home please follow-up with me      Follow Up:   Return in about 2 months (around 6/4/2024) for Follow-up.    MDM:     Chay Cameron MD  [facility]

## 2024-04-04 NOTE — ASSESSMENT & PLAN NOTE
At goal here today needs to bring his cuff to calibrate it.  Blood pressures are above goal at home.  Continue carvedilol, amlodipine, benazepril

## 2024-04-09 RX ORDER — ESOMEPRAZOLE MAGNESIUM 40 MG/1
CAPSULE, DELAYED RELEASE ORAL
Qty: 90 CAPSULE | Refills: 3 | Status: SHIPPED | OUTPATIENT
Start: 2024-04-09

## 2024-04-09 NOTE — TELEPHONE ENCOUNTER
Caller: Kiran Belcher    Relationship: Self    Best call back number: [phone]     Requested Prescriptions:   Requested Prescriptions     Pending Prescriptions Disp Refills    esomeprazole (nexIUM) 40 MG capsule 90 capsule 3     Sig: TAKE ONE CAPSULE BY MOUTH DAILY        Pharmacy where request should be sent: [facility] [id number] [facility] [address] [place] [address] [phone] [facility] [phone]      Last office visit with prescribing clinician: 4/4/2024   Last telemedicine visit with prescribing clinician: Visit date not found   Next office visit with prescribing clinician: 6/18/2024     Does the patient have less than a 3 day supply:  [] Yes  [x] No    Would you like a call back once the refill request has been completed: [] Yes [x] No    If the office needs to give you a call back, can they leave a voicemail: [] Yes [x] No    Dede Obregon   04/09/24 16:02 EDT

## 2024-04-14 PROCEDURE — 87086 URINE CULTURE/COLONY COUNT: CPT | Performed by: FAMILY MEDICINE

## 2024-04-14 PROCEDURE — 87077 CULTURE AEROBIC IDENTIFY: CPT | Performed by: FAMILY MEDICINE

## 2024-04-14 PROCEDURE — 87186 SC STD MICRODIL/AGAR DIL: CPT | Performed by: FAMILY MEDICINE

## 2024-04-16 ENCOUNTER — TELEPHONE (OUTPATIENT)
Dept: URGENT CARE | Facility: CLINIC | Age: 64
End: 2024-04-16
Payer: MEDICARE

## 2024-04-16 NOTE — TELEPHONE ENCOUNTER
Patient returned call. No fever/chills, sweats, nausea or vomiting. He was advised to call PCP in AM to arrange for outpatient treatment of multidrug resistant UTI. Patient will need to go to ED if he develops systemic symptoms, weakness or any symptoms that he believes needs immediate medical evaluation.

## 2024-04-16 NOTE — TELEPHONE ENCOUNTER
Bacteria present on urine culture is resistant to sulfa drugs, patient was prescribed Bactrim at day of visit.  Spoke to lab/micro- bacteria is also resistant to tetracyclines. Patient is allergic to cephalosporin and flouroquinolones.  Other possible treatments IM/IV are not available in our clinic.    Patient had a possible allergic reaction to cefuroxime, likely would not tolerated 3rd generation cephalosporin but did want to discuss further with patient. I was unable to reach the patient via phone, however left a message for patient to return call.

## 2024-04-22 DIAGNOSIS — F32.0 CURRENT MILD EPISODE OF MAJOR DEPRESSIVE DISORDER WITHOUT PRIOR EPISODE: ICD-10-CM

## 2024-04-22 RX ORDER — CITALOPRAM 20 MG/1
20 TABLET ORAL DAILY
Qty: 90 TABLET | Refills: 3 | Status: SHIPPED | OUTPATIENT
Start: 2024-04-22

## 2024-04-22 NOTE — TELEPHONE ENCOUNTER
Caller: Kiran Belcher    Relationship: Self    Best call back number: [phone]     Requested Prescriptions:   Requested Prescriptions     Pending Prescriptions Disp Refills    citalopram (CeleXA) 20 MG tablet 90 tablet 3     Sig: Take 1 tablet by mouth Daily.        Pharmacy where request should be sent: [facility] [id number] [address] [phone] [facility] [phone] FX     Last office visit with prescribing clinician: 4/4/2024   Last telemedicine visit with prescribing clinician: Visit date not found   Next office visit with prescribing clinician: 6/18/2024     Additional details provided by patient:     Does the patient have less than a 3 day supply:  [x] Yes  [] No    Would you like a call back once the refill request has been completed: [] Yes [x] No    If the office needs to give you a call back, can they leave a voicemail: [] Yes [x] No    Dede Macedo   04/22/24 15:05 EDT

## 2024-05-01 ENCOUNTER — OFFICE VISIT (OUTPATIENT)
Dept: FAMILY MEDICINE CLINIC | Facility: CLINIC | Age: 64
End: 2024-05-01
Payer: MEDICARE

## 2024-05-01 VITALS
DIASTOLIC BLOOD PRESSURE: 60 MMHG | OXYGEN SATURATION: 96 % | BODY MASS INDEX: 39.85 KG/M2 | HEART RATE: 85 BPM | TEMPERATURE: 98.4 F | HEIGHT: 69 IN | SYSTOLIC BLOOD PRESSURE: 104 MMHG | RESPIRATION RATE: 20 BRPM

## 2024-05-01 DIAGNOSIS — M79.89 SWELLING OF FINGER, LEFT: Primary | ICD-10-CM

## 2024-05-01 PROCEDURE — 1126F AMNT PAIN NOTED NONE PRSNT: CPT | Performed by: NURSE PRACTITIONER

## 2024-05-01 PROCEDURE — 3074F SYST BP LT 130 MM HG: CPT | Performed by: NURSE PRACTITIONER

## 2024-05-01 PROCEDURE — 3078F DIAST BP <80 MM HG: CPT | Performed by: NURSE PRACTITIONER

## 2024-05-01 PROCEDURE — 3044F HG A1C LEVEL LT 7.0%: CPT | Performed by: NURSE PRACTITIONER

## 2024-05-01 PROCEDURE — 99213 OFFICE O/P EST LOW 20 MIN: CPT | Performed by: NURSE PRACTITIONER

## 2024-05-01 RX ORDER — METHENAMINE HIPPURATE 1000 MG/1
1 TABLET ORAL 2 TIMES DAILY WITH MEALS
COMMUNITY
Start: 2024-04-19

## 2024-05-01 NOTE — PROGRESS NOTES
"Chief Complaint  Hand Pain (Pt right hand ring finger is swollen and painful since Saturday. )    Subjective          Kiran Belcher presents to [facility]  History of Present Illness  Patient is a 63-year-old male.  He is here for complaint of his right ring finger being slightly swollen.  He states it started 4 days ago without any known injury.  He denies gout or arthritis. 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          The following portions of the patient's history were reviewed and updated as appropriate: allergies, current medications, past family history, past medical history, past social history, past surgical history and problem list.    Review of Systems   Constitutional:  Negative for activity change and fever.   Respiratory: Negative.     Cardiovascular: Negative.    Gastrointestinal: Negative.    Musculoskeletal:         Finger swelling   Skin: Negative.    Hematological: Negative.    Psychiatric/Behavioral: Negative.         Chart  Objective   Vital Signs:   /60   Pulse 85   Temp 98.4 °F (36.9 °C) (Temporal)   Resp 20   Ht 175.3 cm (69.02\")   SpO2 96%   BMI 39.85 kg/m²           PHQ-2/9 Depression Screening  PHQ-9 Total Score: 0          Physical Exam  Musculoskeletal:        Hands:       Comments: Mild swelling   No erythema, no drainage, no streaking, no sight of any injury   No change in temperate.           Result Review :           Assessment and Plan    Diagnoses and all orders for this visit:    1. Swelling of finger, left (Primary)  -     Diclofenac Sodium (VOLTAREN) 1 % gel gel; Apply 4 g topically to the appropriate area as directed 4 (Four) Times a Day As Needed (right 4th finger pain).  Dispense: 100 g; Refill: 2    Elevated, apply ice as needed, use Voltaren gel as needed.  Due to patient having one kidney.      Follow-up if worsening.    He declines any lab work for a uric acid test at this time.         Follow Up   Return if symptoms " worsen or fail to improve.  Patient was given instructions and counseling regarding his condition or for health maintenance advice. Please see specific information pulled into the AVS if appropriate.

## 2024-05-13 ENCOUNTER — OFFICE VISIT (OUTPATIENT)
Dept: FAMILY MEDICINE CLINIC | Facility: CLINIC | Age: 64
End: 2024-05-13
Payer: MEDICARE

## 2024-05-13 VITALS
BODY MASS INDEX: 39.85 KG/M2 | HEART RATE: 103 BPM | OXYGEN SATURATION: 97 % | RESPIRATION RATE: 17 BRPM | TEMPERATURE: 98.2 F | DIASTOLIC BLOOD PRESSURE: 66 MMHG | SYSTOLIC BLOOD PRESSURE: 100 MMHG | HEIGHT: 69 IN

## 2024-05-13 DIAGNOSIS — R19.7 DIARRHEA, UNSPECIFIED TYPE: ICD-10-CM

## 2024-05-13 DIAGNOSIS — L03.317 CELLULITIS OF BUTTOCK: Primary | ICD-10-CM

## 2024-05-13 PROCEDURE — 3074F SYST BP LT 130 MM HG: CPT | Performed by: STUDENT IN AN ORGANIZED HEALTH CARE EDUCATION/TRAINING PROGRAM

## 2024-05-13 PROCEDURE — 3044F HG A1C LEVEL LT 7.0%: CPT | Performed by: STUDENT IN AN ORGANIZED HEALTH CARE EDUCATION/TRAINING PROGRAM

## 2024-05-13 PROCEDURE — 99214 OFFICE O/P EST MOD 30 MIN: CPT | Performed by: STUDENT IN AN ORGANIZED HEALTH CARE EDUCATION/TRAINING PROGRAM

## 2024-05-13 PROCEDURE — 1126F AMNT PAIN NOTED NONE PRSNT: CPT | Performed by: STUDENT IN AN ORGANIZED HEALTH CARE EDUCATION/TRAINING PROGRAM

## 2024-05-13 PROCEDURE — 3078F DIAST BP <80 MM HG: CPT | Performed by: STUDENT IN AN ORGANIZED HEALTH CARE EDUCATION/TRAINING PROGRAM

## 2024-05-13 RX ORDER — CEPHALEXIN 500 MG/1
500 CAPSULE ORAL 4 TIMES DAILY
Qty: 28 CAPSULE | Refills: 0 | Status: SHIPPED | OUTPATIENT
Start: 2024-05-13 | End: 2024-05-20

## 2024-05-13 NOTE — PROGRESS NOTES
Established Patient Office Visit        Subjective      Chief Complaint:  Skin Problem (Boil on bottom, upset stomach and diarrhea-unsure if med-methenamine caused diarrhea or if this was viral? Gets waves of nausea )      History of Present Illness: Kiran Belcher is a 63 y.o. male who presents for diarrhea and upset stomach over the past couple weeks.  He started methenamine and this developed.  He stopped the methenamine and this is improved.  No longer having diarrhea.  Still has occasional nausea.    He is developed a boil to his right buttock.      Patient Active Problem List   Diagnosis    Benign essential hypertension    Depression    Gastroesophageal reflux disease without esophagitis    Hyperlipidemia    NISA on CPAP    Type 2 diabetes mellitus without complications    Spasm    Other obesity due to excess calories    Hx of spinal cord injury    Kidney disorder    Lower extremity weakness    Myalgia    Moderate episode of recurrent major depressive disorder    Dysphagia    Toenail avulsion    H/O kidney removal    Renal cell carcinoma of right kidney    Quadriplegia    Muscle contracture    C5-C7 level spinal injury with complete lesion of spinal cord, without evidence of spinal bone injury    Inability to bear weight    Neurogenic bladder    Venous stasis    B-cell lymphoma    Cancer of kidney    Hypertension    Neutrophilic leukemoid reaction    Personal history of Methicillin resistant Staphylococcus aureus infection    Paraplegia    Other constipation         Current Outpatient Medications:     amLODIPine-benazepril (LOTREL) 10-20 MG per capsule, Take 1 capsule by mouth Daily., Disp: 90 capsule, Rfl: 3    ammonium lactate (AMLACTIN) 12 % cream, Apply 1 g topically to the appropriate area as directed As Needed for Dry Skin. Apply as directed , prescribed by podiatrist, Disp: , Rfl:     aspirin 81 MG chewable tablet, Chew 1 tablet Daily., Disp: , Rfl:     atorvastatin (LIPITOR) 10 MG tablet, Take 1  tablet by mouth Daily., Disp: 90 tablet, Rfl: 3    baclofen (LIORESAL) 20 MG tablet, Take 1 tablet by mouth 3 (Three) Times a Day., Disp: 90 tablet, Rfl: 11    carvedilol (COREG) 12.5 MG tablet, TAKE 1/2 TABLET BY MOUTH TWICE A DAY WITH MEALS., Disp: 90 tablet, Rfl: 3    citalopram (CeleXA) 20 MG tablet, Take 1 tablet by mouth Daily., Disp: 90 tablet, Rfl: 3    dantrolene (DANTRIUM) 100 MG capsule, Take 1 capsule by mouth 2 (Two) Times a Day., Disp: 180 capsule, Rfl: 3    Diclofenac Sodium (VOLTAREN) 1 % gel gel, Apply 4 g topically to the appropriate area as directed 4 (Four) Times a Day As Needed (right 4th finger pain)., Disp: 100 g, Rfl: 2    esomeprazole (nexIUM) 40 MG capsule, TAKE ONE CAPSULE BY MOUTH DAILY, Disp: 90 capsule, Rfl: 3    FeroSul 325 (65 Fe) MG tablet, TAKE 1 TABLET BY MOUTH DAILY WITH BREAKFAST, Disp: 30 tablet, Rfl: 5    fesoterodine fumarate (TOVIAZ ER) 8 MG tablet sustained-release 24 hour tablet, Take 1 tablet by mouth Daily., Disp: , Rfl:     fexofenadine (ALLEGRA) 180 MG tablet, Take 1 tablet by mouth Daily., Disp: , Rfl:     fluocinonide (LIDEX) 0.05 % cream, fluocinonide 0.05 % topical cream, Disp: , Rfl:     fluticasone (FLONASE) 50 MCG/ACT nasal spray, 2 sprays into the nostril(s) as directed by provider Daily., Disp: 16 g, Rfl: 0    glucose blood (Accu-Chek Elke Plus) test strip, 1 each by Other route 2 (Two) Times a Day. Use as instructed, Disp: 200 each, Rfl: 3    glyburide (DIAbeta) 5 MG tablet, Take 1 tablet by mouth Daily With Breakfast., Disp: 90 tablet, Rfl: 3    lactobacillus acidophilus (RISAQUAD) capsule capsule, Take 1 capsule by mouth Daily., Disp: 90 capsule, Rfl: 1    metFORMIN ER (GLUCOPHAGE-XR) 500 MG 24 hr tablet, Take 2 tablets by mouth 2 (Two) Times a Day., Disp: 360 tablet, Rfl: 3    methenamine (HIPREX) 1 g tablet, Take 1 tablet by mouth 2 (Two) Times a Day With Meals., Disp: , Rfl:     Mirabegron ER (MYRBETRIQ) 50 MG tablet sustained-release 24 hour 24 hr  "tablet, Take 50 mg by mouth Daily., Disp: , Rfl:     cephalexin (Keflex) 500 MG capsule, Take 1 capsule by mouth 4 (Four) Times a Day for 7 days., Disp: 28 capsule, Rfl: 0       Objective     Physical Exam:   Vital Signs:   /66 (BP Location: Left arm, Patient Position: Sitting, Cuff Size: Adult)   Pulse 103   Temp 98.2 °F (36.8 °C) (Temporal)   Resp 17   Ht 175.3 cm (69.02\")   SpO2 97%   BMI 39.85 kg/m²      Physical Exam  Constitutional:       General: He is not in acute distress.     Appearance: He is not ill-appearing.   Patient is in electric wheelchair  Patient has subcentimeter break in the skin to the right upper buttock and has significant induration to the majority of the right upper medial buttock.  I do not feel fluctuance.  Clear drainage on rosalia pad.           Assessment / Plan      Assessment/Plan:   Diagnoses and all orders for this visit:    1. Cellulitis of buttock (Primary)  -     cephalexin (Keflex) 500 MG capsule; Take 1 capsule by mouth 4 (Four) Times a Day for 7 days.  Dispense: 28 capsule; Refill: 0  -     Recommend try to offload this area as he is in a motorized wheelchair.  -     Start Keflex as I do not feel any abscess and there is no purulent drainage.  -Follow-up in 48 hours he has been instructed to call me for worsening.  If not improving or worsening we need to switch to the MRSA coverage    2. Diarrhea, unspecified type    Diarrhea is resolved seems like it was a side effect of methenamine.  I recommend he continue to hold on taking this medication and once he is feeling well he can try to restarted it once daily and then increase to twice daily and see if he gets side effects again.      Follow Up:   Return in about 2 days (around 5/15/2024) for Follow-up.    MDM: Side effect of medication.  Medication management    Chay Cameron MD  Family Medicine - [facility]  "

## 2024-05-15 ENCOUNTER — OFFICE VISIT (OUTPATIENT)
Dept: FAMILY MEDICINE CLINIC | Facility: CLINIC | Age: 64
End: 2024-05-15
Payer: MEDICARE

## 2024-05-15 VITALS
OXYGEN SATURATION: 97 % | HEART RATE: 97 BPM | DIASTOLIC BLOOD PRESSURE: 78 MMHG | RESPIRATION RATE: 20 BRPM | SYSTOLIC BLOOD PRESSURE: 130 MMHG | TEMPERATURE: 98.2 F | HEIGHT: 69 IN | BODY MASS INDEX: 39.85 KG/M2

## 2024-05-15 DIAGNOSIS — L03.90 WOUND CELLULITIS: Primary | ICD-10-CM

## 2024-05-15 DIAGNOSIS — E11.9 TYPE 2 DIABETES MELLITUS WITHOUT COMPLICATION, WITHOUT LONG-TERM CURRENT USE OF INSULIN: ICD-10-CM

## 2024-05-15 DIAGNOSIS — S14.115S: ICD-10-CM

## 2024-05-15 DIAGNOSIS — L72.9 SCROTAL CYST: ICD-10-CM

## 2024-05-15 PROCEDURE — 1126F AMNT PAIN NOTED NONE PRSNT: CPT | Performed by: STUDENT IN AN ORGANIZED HEALTH CARE EDUCATION/TRAINING PROGRAM

## 2024-05-15 PROCEDURE — 99214 OFFICE O/P EST MOD 30 MIN: CPT | Performed by: STUDENT IN AN ORGANIZED HEALTH CARE EDUCATION/TRAINING PROGRAM

## 2024-05-15 PROCEDURE — G2211 COMPLEX E/M VISIT ADD ON: HCPCS | Performed by: STUDENT IN AN ORGANIZED HEALTH CARE EDUCATION/TRAINING PROGRAM

## 2024-05-15 PROCEDURE — 3075F SYST BP GE 130 - 139MM HG: CPT | Performed by: STUDENT IN AN ORGANIZED HEALTH CARE EDUCATION/TRAINING PROGRAM

## 2024-05-15 PROCEDURE — 3044F HG A1C LEVEL LT 7.0%: CPT | Performed by: STUDENT IN AN ORGANIZED HEALTH CARE EDUCATION/TRAINING PROGRAM

## 2024-05-15 PROCEDURE — 3078F DIAST BP <80 MM HG: CPT | Performed by: STUDENT IN AN ORGANIZED HEALTH CARE EDUCATION/TRAINING PROGRAM

## 2024-05-15 NOTE — PROGRESS NOTES
Established Patient Office Visit        Subjective      Chief Complaint:  Cellulitis (Follow up on cellulitis, wound on buttock)      History of Present Illness: Kiran Belcher is a 63 y.o. male who presents for wound to the right buttock.  Feels like pressure has improved to the right buttock but still having draining from the scrotum.      Patient Active Problem List   Diagnosis    Benign essential hypertension    Depression    Gastroesophageal reflux disease without esophagitis    Hyperlipidemia    NISA on CPAP    Type 2 diabetes mellitus without complications    Spasm    Other obesity due to excess calories    Hx of spinal cord injury    Kidney disorder    Lower extremity weakness    Myalgia    Moderate episode of recurrent major depressive disorder    Dysphagia    Toenail avulsion    H/O kidney removal    Renal cell carcinoma of right kidney    Quadriplegia    Muscle contracture    C5-C7 level spinal injury with complete lesion of spinal cord, without evidence of spinal bone injury    Inability to bear weight    Neurogenic bladder    Venous stasis    B-cell lymphoma    Cancer of kidney    Hypertension    Neutrophilic leukemoid reaction    Personal history of Methicillin resistant Staphylococcus aureus infection    Paraplegia    Other constipation         Current Outpatient Medications:     amLODIPine-benazepril (LOTREL) 10-20 MG per capsule, Take 1 capsule by mouth Daily., Disp: 90 capsule, Rfl: 3    ammonium lactate (AMLACTIN) 12 % cream, Apply 1 g topically to the appropriate area as directed As Needed for Dry Skin. Apply as directed , prescribed by podiatrist, Disp: , Rfl:     aspirin 81 MG chewable tablet, Chew 1 tablet Daily., Disp: , Rfl:     atorvastatin (LIPITOR) 10 MG tablet, Take 1 tablet by mouth Daily., Disp: 90 tablet, Rfl: 3    baclofen (LIORESAL) 20 MG tablet, Take 1 tablet by mouth 3 (Three) Times a Day., Disp: 90 tablet, Rfl: 11    carvedilol (COREG) 12.5 MG tablet, TAKE 1/2 TABLET BY MOUTH  TWICE A DAY WITH MEALS., Disp: 90 tablet, Rfl: 3    cephalexin (Keflex) 500 MG capsule, Take 1 capsule by mouth 4 (Four) Times a Day for 7 days., Disp: 28 capsule, Rfl: 0    citalopram (CeleXA) 20 MG tablet, Take 1 tablet by mouth Daily., Disp: 90 tablet, Rfl: 3    dantrolene (DANTRIUM) 100 MG capsule, Take 1 capsule by mouth 2 (Two) Times a Day., Disp: 180 capsule, Rfl: 3    Diclofenac Sodium (VOLTAREN) 1 % gel gel, Apply 4 g topically to the appropriate area as directed 4 (Four) Times a Day As Needed (right 4th finger pain)., Disp: 100 g, Rfl: 2    esomeprazole (nexIUM) 40 MG capsule, TAKE ONE CAPSULE BY MOUTH DAILY, Disp: 90 capsule, Rfl: 3    FeroSul 325 (65 Fe) MG tablet, TAKE 1 TABLET BY MOUTH DAILY WITH BREAKFAST, Disp: 30 tablet, Rfl: 5    fesoterodine fumarate (TOVIAZ ER) 8 MG tablet sustained-release 24 hour tablet, Take 1 tablet by mouth Daily., Disp: , Rfl:     fexofenadine (ALLEGRA) 180 MG tablet, Take 1 tablet by mouth Daily., Disp: , Rfl:     fluocinonide (LIDEX) 0.05 % cream, fluocinonide 0.05 % topical cream, Disp: , Rfl:     fluticasone (FLONASE) 50 MCG/ACT nasal spray, 2 sprays into the nostril(s) as directed by provider Daily., Disp: 16 g, Rfl: 0    glucose blood (Accu-Chek Elke Plus) test strip, 1 each by Other route 2 (Two) Times a Day. Use as instructed, Disp: 200 each, Rfl: 3    glyburide (DIAbeta) 5 MG tablet, Take 1 tablet by mouth Daily With Breakfast., Disp: 90 tablet, Rfl: 3    lactobacillus acidophilus (RISAQUAD) capsule capsule, Take 1 capsule by mouth Daily., Disp: 90 capsule, Rfl: 1    metFORMIN ER (GLUCOPHAGE-XR) 500 MG 24 hr tablet, Take 2 tablets by mouth 2 (Two) Times a Day., Disp: 360 tablet, Rfl: 3    methenamine (HIPREX) 1 g tablet, Take 1 tablet by mouth 2 (Two) Times a Day With Meals., Disp: , Rfl:     Mirabegron ER (MYRBETRIQ) 50 MG tablet sustained-release 24 hour 24 hr tablet, Take 50 mg by mouth Daily., Disp: , Rfl:        Objective     Physical Exam:   Vital Signs:  "  /78 (BP Location: Left arm, Patient Position: Sitting, Cuff Size: Adult)   Pulse 97   Temp 98.2 °F (36.8 °C) (Temporal)   Resp 20   Ht 175.3 cm (69.02\")   SpO2 97%   BMI 39.85 kg/m²      Physical Exam  Constitutional:       General: He is not in acute distress.     Appearance: He is not ill-appearing.   Patient is in motorized wheelchair  2 cm x 2 cm wound with central area of necrosis to the right upper buttock.  The area of induration around this has improved since last visit 48 hours ago.  No fluctuance felt.  There is a small half centimeter wound to the distal most scrotum/perineum that is draining clear fluid and additional clear fluid was expressed from this.  No purulence.  No surrounding erythema         Assessment / Plan      Assessment/Plan:   Diagnoses and all orders for this visit:    1. Wound cellulitis (Primary)  -     Miscellaneous DME  -     Ambulatory Referral to Wound Clinic    2. Spinal cord injury at C5-C7 level with complete spinal cord lesion without bone injury, sequela  -     Miscellaneous DME  -     Ambulatory Referral to Wound Clinic    3. Type 2 diabetes mellitus without complication, without long-term current use of insulin    4. Scrotal cyst  -     Ambulatory Referral to Wound Clinic       He has a 2 cm wound to the right upper buttock.  Seems to have started a cellulitis and then developed a wound.  Continue 7 days of Keflex and will recheck wound at day 8  Offloading of wound discussed  Refer to wound care    Also has a draining cyst of the scrotum    Seek care for worsening.    Chronic spinal cord injury with type 2 diabetes complicates care today  Follow Up:   No follow-ups on file.    MDM:     Chay Cameron MD  Family Medicine - [facility] [place]  "

## 2024-05-20 ENCOUNTER — OFFICE VISIT (OUTPATIENT)
Dept: FAMILY MEDICINE CLINIC | Facility: CLINIC | Age: 64
End: 2024-05-20
Payer: MEDICARE

## 2024-05-20 VITALS
TEMPERATURE: 98.2 F | BODY MASS INDEX: 39.85 KG/M2 | RESPIRATION RATE: 20 BRPM | OXYGEN SATURATION: 96 % | DIASTOLIC BLOOD PRESSURE: 80 MMHG | SYSTOLIC BLOOD PRESSURE: 112 MMHG | HEART RATE: 81 BPM | HEIGHT: 69 IN

## 2024-05-20 DIAGNOSIS — L03.90 WOUND CELLULITIS: ICD-10-CM

## 2024-05-20 DIAGNOSIS — E11.9 TYPE 2 DIABETES MELLITUS WITHOUT COMPLICATION, WITHOUT LONG-TERM CURRENT USE OF INSULIN: ICD-10-CM

## 2024-05-20 DIAGNOSIS — G82.50 QUADRIPLEGIA: ICD-10-CM

## 2024-05-20 LAB
EXPIRATION DATE: ABNORMAL
HBA1C MFR BLD: 6.9 % (ref 4.5–5.7)
Lab: ABNORMAL

## 2024-05-20 PROCEDURE — 83036 HEMOGLOBIN GLYCOSYLATED A1C: CPT | Performed by: STUDENT IN AN ORGANIZED HEALTH CARE EDUCATION/TRAINING PROGRAM

## 2024-05-20 PROCEDURE — 3044F HG A1C LEVEL LT 7.0%: CPT | Performed by: STUDENT IN AN ORGANIZED HEALTH CARE EDUCATION/TRAINING PROGRAM

## 2024-05-20 PROCEDURE — 3074F SYST BP LT 130 MM HG: CPT | Performed by: STUDENT IN AN ORGANIZED HEALTH CARE EDUCATION/TRAINING PROGRAM

## 2024-05-20 PROCEDURE — 11042 DBRDMT SUBQ TIS 1ST 20SQCM/<: CPT | Performed by: STUDENT IN AN ORGANIZED HEALTH CARE EDUCATION/TRAINING PROGRAM

## 2024-05-20 PROCEDURE — 99214 OFFICE O/P EST MOD 30 MIN: CPT | Performed by: STUDENT IN AN ORGANIZED HEALTH CARE EDUCATION/TRAINING PROGRAM

## 2024-05-20 PROCEDURE — 1126F AMNT PAIN NOTED NONE PRSNT: CPT | Performed by: STUDENT IN AN ORGANIZED HEALTH CARE EDUCATION/TRAINING PROGRAM

## 2024-05-20 PROCEDURE — 3079F DIAST BP 80-89 MM HG: CPT | Performed by: STUDENT IN AN ORGANIZED HEALTH CARE EDUCATION/TRAINING PROGRAM

## 2024-05-20 RX ORDER — COLLAGENASE SANTYL 250 [ARB'U]/G
1 OINTMENT TOPICAL DAILY
Qty: 30 G | Refills: 1 | Status: SHIPPED | OUTPATIENT
Start: 2024-05-20

## 2024-05-20 NOTE — ASSESSMENT & PLAN NOTE
Occasionally uses walker but mainly in motorized wheelchair.  This complicates risk of wound to the bite buttock

## 2024-05-20 NOTE — PROGRESS NOTES
Established Patient Office Visit        Subjective      Chief Complaint:  wound cellulitis (fu)      History of Present Illness: Kiran Belcher is a 63 y.o. male who presents for wound cellulitis.     Will finish keflex today. Missed a couple doses.     Scrotal drainage improved.     Patient Active Problem List   Diagnosis    Benign essential hypertension    Depression    Gastroesophageal reflux disease without esophagitis    Hyperlipidemia    NISA on CPAP    Type 2 diabetes mellitus without complications    Spasm    Other obesity due to excess calories    Hx of spinal cord injury    Kidney disorder    Lower extremity weakness    Myalgia    Moderate episode of recurrent major depressive disorder    Dysphagia    Toenail avulsion    H/O kidney removal    Renal cell carcinoma of right kidney    Quadriplegia    Muscle contracture    C5-C7 level spinal injury with complete lesion of spinal cord, without evidence of spinal bone injury    Inability to bear weight    Neurogenic bladder    Venous stasis    B-cell lymphoma    Cancer of kidney    Hypertension    Neutrophilic leukemoid reaction    Personal history of Methicillin resistant Staphylococcus aureus infection    Paraplegia    Other constipation         Current Outpatient Medications:     amLODIPine-benazepril (LOTREL) 10-20 MG per capsule, Take 1 capsule by mouth Daily., Disp: 90 capsule, Rfl: 3    ammonium lactate (AMLACTIN) 12 % cream, Apply 1 g topically to the appropriate area as directed As Needed for Dry Skin. Apply as directed , prescribed by podiatrist, Disp: , Rfl:     aspirin 81 MG chewable tablet, Chew 1 tablet Daily., Disp: , Rfl:     atorvastatin (LIPITOR) 10 MG tablet, Take 1 tablet by mouth Daily., Disp: 90 tablet, Rfl: 3    baclofen (LIORESAL) 20 MG tablet, Take 1 tablet by mouth 3 (Three) Times a Day., Disp: 90 tablet, Rfl: 11    carvedilol (COREG) 12.5 MG tablet, TAKE 1/2 TABLET BY MOUTH TWICE A DAY WITH MEALS., Disp: 90 tablet, Rfl: 3     citalopram (CeleXA) 20 MG tablet, Take 1 tablet by mouth Daily., Disp: 90 tablet, Rfl: 3    dantrolene (DANTRIUM) 100 MG capsule, Take 1 capsule by mouth 2 (Two) Times a Day., Disp: 180 capsule, Rfl: 3    Diclofenac Sodium (VOLTAREN) 1 % gel gel, Apply 4 g topically to the appropriate area as directed 4 (Four) Times a Day As Needed (right 4th finger pain)., Disp: 100 g, Rfl: 2    esomeprazole (nexIUM) 40 MG capsule, TAKE ONE CAPSULE BY MOUTH DAILY, Disp: 90 capsule, Rfl: 3    FeroSul 325 (65 Fe) MG tablet, TAKE 1 TABLET BY MOUTH DAILY WITH BREAKFAST, Disp: 30 tablet, Rfl: 5    fesoterodine fumarate (TOVIAZ ER) 8 MG tablet sustained-release 24 hour tablet, Take 1 tablet by mouth Daily., Disp: , Rfl:     fexofenadine (ALLEGRA) 180 MG tablet, Take 1 tablet by mouth Daily., Disp: , Rfl:     fluocinonide (LIDEX) 0.05 % cream, fluocinonide 0.05 % topical cream, Disp: , Rfl:     fluticasone (FLONASE) 50 MCG/ACT nasal spray, 2 sprays into the nostril(s) as directed by provider Daily., Disp: 16 g, Rfl: 0    glucose blood (Accu-Chek Elke Plus) test strip, 1 each by Other route 2 (Two) Times a Day. Use as instructed, Disp: 200 each, Rfl: 3    glyburide (DIAbeta) 5 MG tablet, Take 1 tablet by mouth Daily With Breakfast., Disp: 90 tablet, Rfl: 3    lactobacillus acidophilus (RISAQUAD) capsule capsule, Take 1 capsule by mouth Daily., Disp: 90 capsule, Rfl: 1    metFORMIN ER (GLUCOPHAGE-XR) 500 MG 24 hr tablet, Take 2 tablets by mouth 2 (Two) Times a Day., Disp: 360 tablet, Rfl: 3    methenamine (HIPREX) 1 g tablet, Take 1 tablet by mouth 2 (Two) Times a Day With Meals., Disp: , Rfl:     Mirabegron ER (MYRBETRIQ) 50 MG tablet sustained-release 24 hour 24 hr tablet, Take 50 mg by mouth Daily., Disp: , Rfl:     collagenase (Santyl) 250 UNIT/GM ointment, Apply 1 Application topically to the appropriate area as directed Daily., Disp: 30 g, Rfl: 1       Objective     Physical Exam:   Vital Signs:   /80   Pulse 81   Temp 98.2  "°F (36.8 °C) (Temporal)   Resp 20   Ht 175.3 cm (69.02\")   SpO2 96%   BMI 39.85 kg/m²      Physical Exam  Constitutional:       General: He is not in acute distress.     Appearance: He is not ill-appearing.   Cardiovascular:      Rate and Rhythm: Normal rate and regular rhythm.   Pulmonary:      Effort: Pulmonary effort is normal.      Breath sounds: Normal breath sounds.   Neurological:      Mental Status: He is alert.   Psychiatric:         Thought Content: Thought content normal.   Patient has 2.8 cm wide x 2.2 cm height wound.  Area of necrosis centrally some sloughing white tissue surrounding central necrosis.  No surrounding erythema to this wound.  Some residual induration around the wound at this point.  Wound still appears to be in dermis.    Written consent for sharp debridement obtained  Timeout performed  Area was flushed with sterile saline.  #15 blade was used to gently debride nonviable dermis tissue of wound to the right upper buttock..  Some nonviable tissue remained centrally.   #15 blade debridement            Assessment / Plan      Assessment/Plan:   Diagnoses and all orders for this visit:    1. Wound cellulitis  -     collagenase (Santyl) 250 UNIT/GM ointment; Apply 1 Application topically to the appropriate area as directed Daily.  Dispense: 30 g; Refill: 1    2. Type 2 diabetes mellitus without complication, without long-term current use of insulin  Assessment & Plan:  Continue metformin  and glyburide without adjustment  A1c at goal at 6.9          Orders:  -     POC Glycosylated Hemoglobin (Hb A1C)    3. Quadriplegia  Assessment & Plan:  Occasionally uses walker but mainly in motorized wheelchair.  This complicates risk of wound to the bite buttock         Initially had cellulitis and now has wound from the cellulitis.  He is finishing his Keflex today.  Cellulitis resolved and I performed mild debridement today.  Still with residual necrosis to the central area.  No fluctuance felt.  " Sent to wound care.  Start Santyl for debridement of central area of wound.  Refer to wound care.    Continue offloading.    Follow Up:   Return in about 4 days (around 5/24/2024) for Follow-up.    MDM: Chronic illnesses complicate care today    Chay Cameron MD  Family Medicine - [facility]

## 2024-05-22 ENCOUNTER — HOSPITAL ENCOUNTER (OUTPATIENT)
Dept: PHYSICAL THERAPY | Facility: HOSPITAL | Age: 64
Setting detail: THERAPIES SERIES
Discharge: HOME | End: 2024-05-22
Payer: MEDICARE

## 2024-05-22 DIAGNOSIS — E78.2 MIXED HYPERLIPIDEMIA: ICD-10-CM

## 2024-05-22 DIAGNOSIS — S31.819D OPEN WOUND OF RIGHT BUTTOCK, SUBSEQUENT ENCOUNTER: ICD-10-CM

## 2024-05-22 DIAGNOSIS — L02.31 CELLULITIS AND ABSCESS OF BUTTOCK: Primary | ICD-10-CM

## 2024-05-22 DIAGNOSIS — L03.317 CELLULITIS AND ABSCESS OF BUTTOCK: Primary | ICD-10-CM

## 2024-05-22 PROCEDURE — 97597 DBRDMT OPN WND 1ST 20 CM/<: CPT

## 2024-05-22 PROCEDURE — 97162 PT EVAL MOD COMPLEX 30 MIN: CPT

## 2024-05-22 RX ORDER — ATORVASTATIN CALCIUM 10 MG/1
10 TABLET, FILM COATED ORAL DAILY
Qty: 90 TABLET | Refills: 3 | Status: SHIPPED | OUTPATIENT
Start: 2024-05-22

## 2024-05-22 RX ORDER — BACLOFEN 20 MG/1
20 TABLET ORAL 3 TIMES DAILY
Qty: 90 TABLET | Refills: 11 | Status: SHIPPED | OUTPATIENT
Start: 2024-05-22

## 2024-05-22 NOTE — TELEPHONE ENCOUNTER
Caller: Kiran Belcher    Relationship: Self    Best call back number: [phone]     Requested Prescriptions:   Requested Prescriptions     Pending Prescriptions Disp Refills    baclofen (LIORESAL) 20 MG tablet 90 tablet 11     Sig: Take 1 tablet by mouth 3 (Three) Times a Day.    atorvastatin (LIPITOR) 10 MG tablet 90 tablet 3     Sig: Take 1 tablet by mouth Daily.        Pharmacy where request should be sent: [facility] [id number] [address] [phone] [facility] [phone]      Last office visit with prescribing clinician: 5/20/2024   Last telemedicine visit with prescribing clinician: Visit date not found   Next office visit with prescribing clinician: 5/24/2024     Additional details provided by patient: OUT OF MEDICATION     Does the patient have less than a 3 day supply:  [x] Yes  [] No    Would you like a call back once the refill request has been completed: [] Yes [x] No    If the office needs to give you a call back, can they leave a voicemail: [] Yes [x] No    Dede Valverde   05/22/24 13:00 EDT

## 2024-05-22 NOTE — THERAPY EVALUATION
Outpatient Rehabilitation - Wound/Debridement Initial Eval  [facility]     Patient Name: Kiran Belcher  : 1960  MRN: [id number]  Today's Date: 2024                  Admit Date: 2024    Visit Dx:    ICD-10-CM ICD-9-CM   1. Cellulitis and abscess of buttock  L02.31 682.5    L03.317    2. Open wound of right buttock, subsequent encounter  S31.819D V58.89     877.0       Patient Active Problem List   Diagnosis    Benign essential hypertension    Depression    Gastroesophageal reflux disease without esophagitis    Hyperlipidemia    NISA on CPAP    Type 2 diabetes mellitus without complications    Spasm    Other obesity due to excess calories    Hx of spinal cord injury    Kidney disorder    Lower extremity weakness    Myalgia    Moderate episode of recurrent major depressive disorder    Dysphagia    Toenail avulsion    H/O kidney removal    Renal cell carcinoma of right kidney    Quadriplegia    Muscle contracture    C5-C7 level spinal injury with complete lesion of spinal cord, without evidence of spinal bone injury    Inability to bear weight    Neurogenic bladder    Venous stasis    B-cell lymphoma    Cancer of kidney    Hypertension    Neutrophilic leukemoid reaction    Personal history of Methicillin resistant Staphylococcus aureus infection    Paraplegia    Other constipation        Past Medical History:   Diagnosis Date    B-cell lymphoma 2022    Undergoing work-up with [facility]    Cancer     hx spot on right kidney, for ~8 years, told cancer but no hx biopsy and no interventions and just watching it. Dr. vega used to watch this cyst, then started with  urology a year ago after he retired.    Decreased mobility     Depression     Diabetes mellitus     Esophageal reflux     Glaucoma     History of methicillin resistant Staphylococcus aureus infection     History of obstructive sleep apnea     History of quadriplegia     of unknown etiology - C5-C7, incomplete     History of spinal cord  "injury/quadriplegia 2008 09/14/2018    Hyperlipidemia     Hypertension     Kidney disorder 9/14/2018    Paraplegia         Past Surgical History:   Procedure Laterality Date    CERVICAL DISC SURGERY      around 5/2008, fall/injury tripped over a safety gate for their dogs, reported SCI \"semi quadraplegic\" since. [facility]. [facility] after.    SPINE SURGERY          Patient History       Row Name 05/22/24 7194             History    Chief Complaint Ulcer, wound or other skin conditions  -      Brief Description of Current Complaint Pt developed a boil / blister to R buttock, was prescribed antibiotics and area improved, but cont to have a moderate amount of nonviable tissue to wound base.  -      Patient/Caregiver Goals Heal wound  -      Patient's Rating of General Health Fair  -         Pain     Pain Location Buttocks  -      Pain at Present 0  -      Pain at Best 0  -      Pain at Worst 3  -         Daily Activities    Primary Language English  -      Pt Participated in POC and Goals Yes  -                User Key  (r) = Recorded By, (t) = Taken By, (c) = Cosigned By      Initials Name Provider Type    Lenard Iverson, PT Physical Therapist                    EVALUATION   PT Ortho       Row Name 05/22/24 2483       Subjective    Subjective Comments Pt with no issues / complaints with wound today  -       Subjective Pain    Able to rate subjective pain? yes  -MF    Pre-Treatment Pain Level 0  -MF    Post-Treatment Pain Level 0  -MF       Transfers    Bed-Chair [place] (Transfers) modified independence;set up  -MF    Chair-Bed [place]ona (Transfers) modified independence;set up  -MF    Comment, (Transfers) pt to and from dept via electric w/c.  Pt seen LSL on hi lo table  -              User Key  (r) = Recorded By, (t) = Taken By, (c) = Cosigned By      Initials Name Provider Type    Lenard Iverson, PT Physical Therapist                   LDA Wound       Row Name 05/22/24 3427 "             Wound 05/22/24 1045 Right posterior hip Abcess    Wound - Properties Group Placement Date: 05/22/24  - Placement Time: 1045  -MF Side: Right  -MF Orientation: posterior  -MF Location: hip  -MF Primary Wound Type: Abcess  -MF    Wound Image Images linked: 1  -MF      Dressing Appearance intact;moist drainage  -MF      Base moist;slough;yellow  -MF      Black (%), Wound Tissue Color 0  -MF      Red (%), Wound Tissue Color 0  -MF      Yellow (%), Wound Tissue Color 100  -MF      Periwound intact;dry;indurated  -MF      Periwound Temperature warm  -MF      Periwound Skin Turgor soft  -MF      Edges irregular  -MF      Wound Length (cm) 3 cm  -MF      Wound Width (cm) 3.5 cm  -MF      Wound Depth (cm) 0.5 cm  depth obscued by slough  -MF      Wound Surface Area (cm^2) 10.5 cm^2  -MF      Wound Volume (cm^3) 5.25 cm^3  -MF      Drainage Characteristics/Odor serosanguineous  -MF      Drainage Amount small  -MF      Care, Wound cleansed with;wound cleanser;debrided  -MF      Dressing Care foam;low-adherent  therahoney with optifoam to cover  -MF      Periwound Care cleansed with pH balanced cleanser  -MF      Retired Wound - Properties Group Placement Date: 05/22/24  - Placement Time: 1045 -MF Side: Right  -MF Orientation: posterior  -MF Location: hip  -MF Primary Wound Type: Abcess  -MF    Retired Wound - Properties Group Date first assessed: 05/22/24  - Time first assessed: 1045  -MF Side: Right  -MF Location: hip  -MF Primary Wound Type: Abcess  -MF              User Key  (r) = Recorded By, (t) = Taken By, (c) = Cosigned By      Initials Name Provider Type    Lenard Iverson, PT Physical Therapist                      WOUND DEBRIDEMENT  Total area of Debridement: ~5cm2  Debridement Site 1  Location- Site 1: R buttock  Selective Debridement- Site 1: Wound Surface <20cmsq  Instruments- Site 1: tweezers, scapel, #15  Excised Tissue Description- Site 1: moderate, slough  Bleeding- Site 1: moderate, 1  minute, AgNitrate sticks              Therapy Education       Row Name 05/22/24 1047             Therapy Education    Education Details Pt / family to change dressing daily, cleaning wound with Vashe and periwound area with green wipes then applying therahoney to wound base with optifoam to cover.  Dressing to be changed if area becomes soiled.  -MF      Given Bandaging/dressing change  -MF      Program New  -MF      How Provided Verbal;Demonstration  -MF      Provided to Patient;Caregiver  -MF      Level of Understanding Teach back education performed;Verbalized  -MF                User Key  (r) = Recorded By, (t) = Taken By, (c) = Cosigned By      Initials Name Provider Type    MF Lenard Grijalva, PT Physical Therapist                    Recommendation and Plan   PT Assessment/Plan       Row Name 05/22/24 1043          PT Assessment    Functional Limitations Impaired gait;Impaired locomotion;Other (comment)  wound management  -     Impairments Integumentary integrity  -     Assessment Comments Pt presents with an open wound to R buttock with moderate induration to periwound area and total slough coverage.  PT was able to debride a moderate amount of slough with slight bleeding noted to wound base, but no pocket or fluid encountered with debridement. induration may just be residual inflammation from cellulitis, but pt and family educated on potential for fluid accumulation / abscess to deeper areas of the wound base. PT also educated pt and family on home dressing management and wound cleaning to allow for decreased freq of tx to allow for more autolytic debridement between treatments.  family able to verb understanding of home dressing management.  -MF     Rehab Potential Fair  -MF     Patient/caregiver participated in establishment of treatment plan and goals Yes  -MF     Patient would benefit from skilled therapy intervention Yes  -MF        PT Plan    PT Frequency 1x/week  -MF     Predicted Duration of  Therapy Intervention (PT) 8-10 weeks  -     Planned CPT's? PT EVAL MOD COMPLELITY: 92021;PT EDEL DEBRIDE OPEN WOUND UP TO 20 CM: 21200;PT SELF CARE/MGMT/TRAIN 15 MIN: 69982  -     Physical Therapy Interventions (Optional Details) wound care;patient/family education  -     PT Plan Comments Pt / family to change dressing daily with f/u in 1 week for continued debridement  -               User Key  (r) = Recorded By, (t) = Taken By, (c) = Cosigned By      Initials Name Provider Type    Lenard Iverson, PT Physical Therapist                      Goals   PT OP Goals       Row Name 05/22/24 1045          PT Short Term Goals    STG Date to Achieve 07/06/24  -     STG 1 Decrease R buttock wound size by 25% as evidence of wound healing.  -     STG 2 Decrease slough to wound base to less than 50% to improve healing potential  -     STG 3 Pt and family able to change dressing at home with no issues / questions.  -        Long Term Goals    LTG Date to Achieve 08/20/24  -     LTG 1 Decrease R buttock wound size by 75% as evidence of wound healing.  -     LTG 2 Decrease slough to wound base to less than 10% to improve healing potential  -        Time Calculation    PT Goal Re-Cert Due Date 08/20/24  -               User Key  (r) = Recorded By, (t) = Taken By, (c) = Cosigned By      Initials Name Provider Type    Lenard Iverson, PT Physical Therapist                    Time Calculation: Start Time: 1045  Untimed Charges  PT Eval/Re-eval Minutes: 35  Wound Care: 41138 Selective debridement  [id number] debridement: 25  Total Minutes  Untimed Charges Total Minutes: 60   Total Minutes: 60            Lenard Grijalva, PT  5/22/2024

## 2024-05-24 ENCOUNTER — TELEPHONE (OUTPATIENT)
Dept: FAMILY MEDICINE CLINIC | Facility: CLINIC | Age: 64
End: 2024-05-24

## 2024-05-24 NOTE — TELEPHONE ENCOUNTER
Caller: Krian Belcher    Relationship: Self    Best call back number: [phone]     WAS HE SUPPOSED TO CANCEL TODAY IF HE SAW WOUND CARE BEFORE TODAY'S VISIT?    HE SAW WOUND CARE ON WEDNESDAY.    PLEASE CALL AND ADVISE

## 2024-05-31 ENCOUNTER — LAB (OUTPATIENT)
Dept: LAB | Facility: HOSPITAL | Age: 64
End: 2024-05-31
Payer: MEDICARE

## 2024-05-31 ENCOUNTER — TELEPHONE (OUTPATIENT)
Dept: PHYSICAL THERAPY | Facility: HOSPITAL | Age: 64
End: 2024-05-31
Payer: MEDICARE

## 2024-05-31 ENCOUNTER — HOSPITAL ENCOUNTER (OUTPATIENT)
Dept: PHYSICAL THERAPY | Facility: HOSPITAL | Age: 64
Setting detail: THERAPIES SERIES
Discharge: HOME | End: 2024-05-31
Payer: MEDICARE

## 2024-05-31 DIAGNOSIS — L03.317 CELLULITIS AND ABSCESS OF BUTTOCK: Primary | ICD-10-CM

## 2024-05-31 DIAGNOSIS — S31.819D OPEN WOUND OF RIGHT BUTTOCK, SUBSEQUENT ENCOUNTER: ICD-10-CM

## 2024-05-31 DIAGNOSIS — L02.31 CELLULITIS AND ABSCESS OF BUTTOCK: Primary | ICD-10-CM

## 2024-05-31 PROCEDURE — 87205 SMEAR GRAM STAIN: CPT | Performed by: STUDENT IN AN ORGANIZED HEALTH CARE EDUCATION/TRAINING PROGRAM

## 2024-05-31 PROCEDURE — 87077 CULTURE AEROBIC IDENTIFY: CPT | Performed by: STUDENT IN AN ORGANIZED HEALTH CARE EDUCATION/TRAINING PROGRAM

## 2024-05-31 PROCEDURE — 97597 DBRDMT OPN WND 1ST 20 CM/<: CPT

## 2024-05-31 PROCEDURE — 87070 CULTURE OTHR SPECIMN AEROBIC: CPT | Performed by: STUDENT IN AN ORGANIZED HEALTH CARE EDUCATION/TRAINING PROGRAM

## 2024-05-31 PROCEDURE — 87186 SC STD MICRODIL/AGAR DIL: CPT | Performed by: STUDENT IN AN ORGANIZED HEALTH CARE EDUCATION/TRAINING PROGRAM

## 2024-05-31 NOTE — THERAPY WOUND CARE TREATMENT
Outpatient Rehabilitation - Wound/Debridement Treatment Note   [facility]     Patient Name: Kiran Belcher  : 1960  MRN: [id number]  Today's Date: 2024                 Admit Date: 2024    Visit Dx:    ICD-10-CM ICD-9-CM   1. Cellulitis and abscess of buttock  L02.31 682.5    L03.317    2. Open wound of right buttock, subsequent encounter  S31.819D V58.89     877.0       Patient Active Problem List   Diagnosis    Benign essential hypertension    Depression    Gastroesophageal reflux disease without esophagitis    Hyperlipidemia    NISA on CPAP    Type 2 diabetes mellitus without complications    Spasm    Other obesity due to excess calories    Hx of spinal cord injury    Kidney disorder    Lower extremity weakness    Myalgia    Moderate episode of recurrent major depressive disorder    Dysphagia    Toenail avulsion    H/O kidney removal    Renal cell carcinoma of right kidney    Quadriplegia    Muscle contracture    C5-C7 level spinal injury with complete lesion of spinal cord, without evidence of spinal bone injury    Inability to bear weight    Neurogenic bladder    Venous stasis    B-cell lymphoma    Cancer of kidney    Hypertension    Neutrophilic leukemoid reaction    Personal history of Methicillin resistant Staphylococcus aureus infection    Paraplegia    Other constipation        Past Medical History:   Diagnosis Date    B-cell lymphoma 2022    Undergoing work-up with [facility]    Cancer     hx spot on right kidney, for ~8 years, told cancer but no hx biopsy and no interventions and just watching it. Dr. vega used to watch this cyst, then started with  urology a year ago after he retired.    Decreased mobility     Depression     Diabetes mellitus     Esophageal reflux     Glaucoma     History of methicillin resistant Staphylococcus aureus infection     History of obstructive sleep apnea     History of quadriplegia     of unknown etiology - C5-C7, incomplete     History of spinal cord  "injury/quadriplegia 2008 09/14/2018    Hyperlipidemia     Hypertension     Kidney disorder 9/14/2018    Paraplegia         Past Surgical History:   Procedure Laterality Date    CERVICAL DISC SURGERY      around 5/2008, fall/injury tripped over a safety gate for their dogs, reported SCI \"semi quadraplegic\" since. [facility]. [facility] after.    SPINE SURGERY           EVALUATION   PT Ortho       Row Name 05/31/24 1400       Subjective    Subjective Comments Pt without complaints, changed dressing yesterday morning with wife's assistance.  -       Subjective Pain    Able to rate subjective pain? yes  -    Pre-Treatment Pain Level 0  -    Post-Treatment Pain Level 0  -       Transfers    Bed-Chair [place] (Transfers) set up;minimum assist (75% patient effort)  -    Chair-Bed [place] (Transfers) set up;minimum assist (75% patient effort)  -    Comment, (Transfers) LSL on hi/lo table  -              User Key  (r) = Recorded By, (t) = Taken By, (c) = Cosigned By      Initials Name Provider Type    Jhoana Hunter, PT Physical Therapist                     [facility] Wound       Row Name 05/31/24 1400             Wound 05/22/24 1045 Right posterior hip Abcess    Wound - Properties Group Placement Date: 05/22/24  -MF Placement Time: 1045  -MF Side: Right  -MF Orientation: posterior  -MF Location: hip  -MF Primary Wound Type: Abcess  -MF    Wound Image Images linked: 1  -      Dressing Appearance intact;moist drainage  -      Base moist;slough;yellow;necrotic;gray  central gray necrotic tissue  -      Periwound intact;dry;indurated  -      Periwound Temperature warm  -      Periwound Skin Turgor soft  -      Edges irregular  -      Wound Length (cm) 2.5 cm  -      Wound Width (cm) 3.2 cm  -      Wound Depth (cm) --  obscured  -      Wound Surface Area (cm^2) 8 cm^2  -      Drainage Characteristics/Odor malodorous;green;serosanguineous;other (see comments)  culture swab obtained  -   " "   Drainage Amount small  -      Care, Wound cleansed with;wound cleanser;debrided;honey applied  -      Dressing Care dressing applied;antimicrobial agent applied;foam;border dressing  honey, HFBt, 6\" optifoam  -      Periwound Care cleansed with pH balanced cleanser;dry periwound area maintained  -      Retired Wound - Properties Group Placement Date: 05/22/24  - Placement Time: 1045  -MF Side: Right  - Orientation: posterior  - Location: hip  -MF Primary Wound Type: Abcess  -MF    Retired Wound - Properties Group Date first assessed: 05/22/24  - Time first assessed: 1045  -MF Side: Right  -MF Location: hip  -MF Primary Wound Type: Abcess  -              User Key  (r) = Recorded By, (t) = Taken By, (c) = Cosigned By      Initials Name Provider Type    Lenard Iverson, PT Physical Therapist    Jhoana Hunter, PT Physical Therapist                      WOUND DEBRIDEMENT  Total area of Debridement: 4cmsq  Debridement Site 1  Location- Site 1: R buttock  Selective Debridement- Site 1: Wound Surface <20cmsq  Instruments- Site 1: tweezers, scapel, #15  Excised Tissue Description- Site 1: minimum, slough, necrotic  Bleeding- Site 1: scant              Therapy Education       Row Name 05/31/24 1400             Therapy Education    Education Details Continue dressings with addition of HFBt to wound after applying therahoney.  PT will contact PCP re: wound culture  -IVETH      Given Bandaging/dressing change  -IVETH      Program Reinforced;Modified  -IVETH      How Provided Verbal;Demonstration  -IVETH      Provided to Patient  -IVETH      Level of Understanding Teach back education performed;Verbalized  -                User Key  (r) = Recorded By, (t) = Taken By, (c) = Cosigned By      Initials Name Provider Type    Jhoana Hunter, PT Physical Therapist                    Recommendation and Plan   PT Assessment/Plan       Row Name 05/31/24 1400          PT Assessment    Functional Limitations " Impaired gait;Impaired locomotion;Other (comment)  wound management  -     Impairments Integumentary integrity  -     Assessment Comments Pt's wound with gray necrotic tissues centrally and foul odor greenish drainage.   Min induration of underlying tissues suspicious for deeper tissue involvement.  PT obtained wound culture and will contact PCP about S&S of infection.  PT added HFBt for additional bacteriostatic environment and to assist with debridement.  May consider MIST if not improving.  -        PT Plan    PT Frequency 1x/week  -     Physical Therapy Interventions (Optional Details) patient/family education;wound care  -     PT Plan Comments debridement, dressings, check cultures  -               User Key  (r) = Recorded By, (t) = Taken By, (c) = Cosigned By      Initials Name Provider Type    Jhoana Hunter, PT Physical Therapist                    Goals   PT OP Goals       Row Name 05/31/24 1426          Time Calculation    PT Goal Re-Cert Due Date 08/20/24  -               User Key  (r) = Recorded By, (t) = Taken By, (c) = Cosigned By      Initials Name Provider Type    Jhoana Hunter, PT Physical Therapist                    PT Goal Re-Cert Due Date: 08/20/24            Time Calculation: Start Time: 1350  Untimed Charges  49739-[id number] debridement: 25  Total Minutes  Untimed Charges Total Minutes: 25   Total Minutes: 25  Therapy Charges for Today       Code Description Service Date Service Provider Modifiers Qty    07068656277 HC EDEL DEBRIDE OPEN WOUND UP TO 20CM 5/31/2024 Jhoana Espinoza, PT GP 1                    Jhoana Espinoza, PT  5/31/2024

## 2024-05-31 NOTE — TELEPHONE ENCOUNTER
Patient was seen at PT wound care today and noted to have increased gray necrotic tissue to right buttock wound, also with foul odor and green drainage.  Wound culture was obtained during his treatment and sent to the lab.  Orders will come to you to cosign, should be resulted in 3 days.  Please contact us if you have any questions, we are continuing to see patient once/week for debridement and dressings management.  Thank you.  Jhoana Espinoza, PT 5/31/2024 14:29 EDT

## 2024-06-03 ENCOUNTER — TELEPHONE (OUTPATIENT)
Dept: FAMILY MEDICINE CLINIC | Facility: CLINIC | Age: 64
End: 2024-06-03
Payer: MEDICARE

## 2024-06-03 LAB
BACTERIA SPEC AEROBE CULT: ABNORMAL
BACTERIA SPEC AEROBE CULT: ABNORMAL
GRAM STN SPEC: ABNORMAL
GRAM STN SPEC: ABNORMAL

## 2024-06-03 RX ORDER — AMPICILLIN 500 MG/1
500 CAPSULE ORAL 4 TIMES DAILY
Qty: 40 CAPSULE | Refills: 0 | Status: SHIPPED | OUTPATIENT
Start: 2024-06-03

## 2024-06-03 NOTE — TELEPHONE ENCOUNTER
----- Message from Gaby Mendoza sent at 6/3/2024 12:44 PM EDT -----  Wound culture grew an infectious bacteria. Will treat with ampicillin per culture results.

## 2024-06-03 NOTE — TELEPHONE ENCOUNTER
HUB RELAY    Wound culture grew an infectious bacteria. Will treat with ampicillin per culture results.

## 2024-06-03 NOTE — TELEPHONE ENCOUNTER
Name: Kiran Belcher    Relationship: Self    Best Callback Number: [phone]     HUB PROVIDED THE RELAY MESSAGE FROM THE OFFICE   PATIENT VOICED UNDERSTANDING AND HAS NO FURTHER QUESTIONS AT THIS TIME    ADDITIONAL INFORMATION:

## 2024-06-06 ENCOUNTER — APPOINTMENT (OUTPATIENT)
Dept: PHYSICAL THERAPY | Facility: HOSPITAL | Age: 64
End: 2024-06-06
Payer: MEDICARE

## 2024-06-13 ENCOUNTER — TELEPHONE (OUTPATIENT)
Dept: FAMILY MEDICINE CLINIC | Facility: CLINIC | Age: 64
End: 2024-06-13
Payer: MEDICARE

## 2024-06-13 NOTE — TELEPHONE ENCOUNTER
HUB TO RELAY    LVM. Please remind patient to follow-up with wound care.If wound is worse he should follow-up sooner.He should keep appointment with office on 6/19/24.

## 2024-06-13 NOTE — TELEPHONE ENCOUNTER
Name: Kiran Belcher    Relationship: Self    HUB PROVIDED THE RELAY MESSAGE FROM THE OFFICE   PATIENT VOICED UNDERSTANDING AND HAS NO FURTHER QUESTIONS AT THIS TIME

## 2024-06-13 NOTE — TELEPHONE ENCOUNTER
Please call patient.  It appears he has not had recent follow-up with myself or wound care. Recommend follow-up with wound care. If wound worsening follow-up sooner.    Keep appointment with our office as well

## 2024-06-14 ENCOUNTER — HOSPITAL ENCOUNTER (OUTPATIENT)
Dept: PHYSICAL THERAPY | Facility: HOSPITAL | Age: 64
Setting detail: THERAPIES SERIES
Discharge: HOME | End: 2024-06-14
Payer: MEDICARE

## 2024-06-14 DIAGNOSIS — L03.317 CELLULITIS AND ABSCESS OF BUTTOCK: Primary | ICD-10-CM

## 2024-06-14 DIAGNOSIS — S31.819D OPEN WOUND OF RIGHT BUTTOCK, SUBSEQUENT ENCOUNTER: ICD-10-CM

## 2024-06-14 DIAGNOSIS — L02.31 CELLULITIS AND ABSCESS OF BUTTOCK: Primary | ICD-10-CM

## 2024-06-14 PROCEDURE — 97597 DBRDMT OPN WND 1ST 20 CM/<: CPT

## 2024-06-14 NOTE — THERAPY WOUND CARE TREATMENT
Outpatient Rehabilitation - Wound/Debridement Treatment Note   Brayan     Patient Name: Kiran Belcher  : 1960  MRN: [id number]  Today's Date: 2024                 Admit Date: 2024    Visit Dx:    ICD-10-CM ICD-9-CM   1. Cellulitis and abscess of buttock  L02.31 682.5    L03.317    2. Open wound of right buttock, subsequent encounter  S31.819D V58.89     877.0     R buttock (pre-debridement)      R buttock (post-debridement)      Patient Active Problem List   Diagnosis    Benign essential hypertension    Depression    Gastroesophageal reflux disease without esophagitis    Hyperlipidemia    NISA on CPAP    Type 2 diabetes mellitus without complications    Spasm    Other obesity due to excess calories    Hx of spinal cord injury    Kidney disorder    Lower extremity weakness    Myalgia    Moderate episode of recurrent major depressive disorder    Dysphagia    Toenail avulsion    H/O kidney removal    Renal cell carcinoma of right kidney    Quadriplegia    Muscle contracture    C5-C7 level spinal injury with complete lesion of spinal cord, without evidence of spinal bone injury    Inability to bear weight    Neurogenic bladder    Venous stasis    B-cell lymphoma    Cancer of kidney    Hypertension    Neutrophilic leukemoid reaction    Personal history of Methicillin resistant Staphylococcus aureus infection    Paraplegia    Other constipation        Past Medical History:   Diagnosis Date    B-cell lymphoma 2022    Undergoing work-up with [facility]    Cancer     hx spot on right kidney, for ~8 years, told cancer but no hx biopsy and no interventions and just watching it. Dr. vega used to watch this cyst, then started with [facility] urology a year ago after he retired.    Decreased mobility     Depression     Diabetes mellitus     Esophageal reflux     Glaucoma     History of methicillin resistant Staphylococcus aureus infection     History of obstructive sleep apnea     History of quadriplegia     of  "unknown etiology - C5-C7, incomplete     History of spinal cord injury/quadriplegia 2008 09/14/2018    Hyperlipidemia     Hypertension     Kidney disorder 9/14/2018    Paraplegia         Past Surgical History:   Procedure Laterality Date    CERVICAL DISC SURGERY      around 5/2008, fall/injury tripped over a safety gate for their dogs, reported SCI \"semi quadraplegic\" since. [facility]. [facility] after.    SPINE SURGERY           EVALUATION   PT Ortho       Row Name 06/14/24 1500       Subjective    Subjective Comments Pt reports he was placed on abx since his previous session and still has a few days worth of abx left. No other issues/complaints. Reports he cant see the wound so he doesnt know if its doing better or not.  -       Subjective Pain    Able to rate subjective pain? yes  -    Pre-Treatment Pain Level 0  -    Post-Treatment Pain Level 0  -       Transfers    Bed-Chair [place] (Transfers) minimum assist (75% patient effort);1 person assist  -    Chair-Bed [place] (Transfers) minimum assist (75% patient effort);1 person assist  -    Transfers, Bed-Chair-Bed, Assist Device rolling walker  -    Comment, (Transfers) LSL  -              User Key  (r) = Recorded By, (t) = Taken By, (c) = Cosigned By      Initials Name Provider Type     Norman Vasquez, PT Physical Therapist                     [facility] Wound       Row Name 06/14/24 1500             Wound 05/22/24 1045 Right posterior hip Abcess    Wound - Properties Group Placement Date: 05/22/24  -MF Placement Time: 1045  -MF Side: Right  - Orientation: posterior  - Location: hip  - Primary Wound Type: Abcess  -MF    Wound Image Images linked: 2  -LH      Dressing Appearance intact;moist drainage  -      Base moist;slough;yellow;necrotic;gray  central gray necrotic tissue prior to debridement. \  -LH      Periwound intact;dry;indurated  -      Periwound Temperature warm  -      Periwound Skin Turgor soft  -      Edges irregular " " -      Wound Length (cm) 2.3 cm  -      Wound Width (cm) 2.6 cm  -      Wound Depth (cm) --  obscured  -      Wound Surface Area (cm^2) 5.98 cm^2  -      Drainage Characteristics/Odor serosanguineous  -      Drainage Amount small  -      Care, Wound cleansed with;wound cleanser;debrided;honey applied  -      Dressing Care dressing applied;antimicrobial agent applied;foam;border dressing  honey, HFBt, 6\" optifoam  -      Periwound Care cleansed with pH balanced cleanser;dry periwound area maintained  nosting  -      Retired Wound - Properties Group Placement Date: 05/22/24  - Placement Time: 1045  - Side: Right  - Orientation: posterior  - Location: hip  -MF Primary Wound Type: Abcess  -    Retired Wound - Properties Group Date first assessed: 05/22/24  - Time first assessed: 1045  - Side: Right  - Location: hip  -MF Primary Wound Type: Abcess  -              User Key  (r) = Recorded By, (t) = Taken By, (c) = Cosigned By      Initials Name Provider Type     Lenard Grijalva, PT Physical Therapist     Norman Vasquez, PT Physical Therapist                      WOUND DEBRIDEMENT  Total area of Debridement: 4cm2  Debridement Site 1  Location- Site 1: R buttock  Selective Debridement- Site 1: Wound Surface <20cmsq  Instruments- Site 1: tweezers, scapel, #15  Excised Tissue Description- Site 1: moderate, slough  Bleeding- Site 1: scant              Therapy Education       Row Name 06/14/24 1500             Therapy Education    Education Details Continue current POC, reinforced importance of offloading wound area for optimal wound healing.  -      Given Bandaging/dressing change  -      Program Reinforced;Modified  -      How Provided Verbal;Demonstration  -      Provided to Patient  -      Level of Understanding Teach back education performed;Verbalized  -                User Key  (r) = Recorded By, (t) = Taken By, (c) = Cosigned By      Initials Name Provider Type "     Norman Vasquez, PT Physical Therapist                    Recommendation and Plan   PT Assessment/Plan       Row Name 06/14/24 1500          PT Assessment    Functional Limitations Impaired gait;Impaired locomotion;Other (comment)  wound management  -     Impairments Integumentary integrity  -     Assessment Comments Pt's R buttock wound demonstrating small improvements in wound dimensions this date. PT able to debride nearly all of the gray necrotic tissue from the central wound base and a moderate amount of yellow nerotic tissue, however pt still with minimal red, viable tissues noted at this time. Pt does not appear to have any remaining malodor or green draiange at this time. Pt would continue to benefit from further debridement and advanced wound dressing management to promote wound healing.  -     Rehab Potential Fair  -     Patient/caregiver participated in establishment of treatment plan and goals Yes  -     Patient would benefit from skilled therapy intervention Yes  -        PT Plan    PT Frequency 1x/week  -     Physical Therapy Interventions (Optional Details) patient/family education;wound care  -     PT Plan Comments debridement, dressings  -               User Key  (r) = Recorded By, (t) = Taken By, (c) = Cosigned By      Initials Name Provider Type     Norman Vasquez, PT Physical Therapist                    Goals   PT OP Goals       Row Name 06/14/24 1525          Time Calculation    PT Goal Re-Cert Due Date 08/20/24  -               User Key  (r) = Recorded By, (t) = Taken By, (c) = Cosigned By      Initials Name Provider Type     Norman Vasquez, PT Physical Therapist                    PT Goal Re-Cert Due Date: 08/20/24            Time Calculation: Start Time: 1300  Untimed Charges  [id number]-[id number] debridement: 25  Total Minutes  Untimed Charges Total Minutes: 25   Total Minutes: 25  Therapy Charges for Today       Code Description Service Date Service Provider  Modifiers Qty    16421226509 HC EDEL DEBRIDE OPEN WOUND UP TO 20CM 6/14/2024 Norman Vasquez, PT GP 1                    Norman Vasquez PT  6/14/2024

## 2024-06-19 ENCOUNTER — OFFICE VISIT (OUTPATIENT)
Dept: FAMILY MEDICINE CLINIC | Facility: CLINIC | Age: 64
End: 2024-06-19
Payer: MEDICARE

## 2024-06-19 VITALS
SYSTOLIC BLOOD PRESSURE: 118 MMHG | WEIGHT: 270 LBS | DIASTOLIC BLOOD PRESSURE: 60 MMHG | BODY MASS INDEX: 39.99 KG/M2 | TEMPERATURE: 98.4 F | HEIGHT: 69 IN | HEART RATE: 77 BPM | OXYGEN SATURATION: 97 %

## 2024-06-19 DIAGNOSIS — S31.819D OPEN WOUND OF RIGHT BUTTOCK, SUBSEQUENT ENCOUNTER: Primary | ICD-10-CM

## 2024-06-19 PROCEDURE — 3044F HG A1C LEVEL LT 7.0%: CPT | Performed by: FAMILY MEDICINE

## 2024-06-19 PROCEDURE — 3078F DIAST BP <80 MM HG: CPT | Performed by: FAMILY MEDICINE

## 2024-06-19 PROCEDURE — 1126F AMNT PAIN NOTED NONE PRSNT: CPT | Performed by: FAMILY MEDICINE

## 2024-06-19 PROCEDURE — 99213 OFFICE O/P EST LOW 20 MIN: CPT | Performed by: FAMILY MEDICINE

## 2024-06-19 PROCEDURE — 3074F SYST BP LT 130 MM HG: CPT | Performed by: FAMILY MEDICINE

## 2024-06-19 PROCEDURE — 1159F MED LIST DOCD IN RCRD: CPT | Performed by: FAMILY MEDICINE

## 2024-06-19 PROCEDURE — 1160F RVW MEDS BY RX/DR IN RCRD: CPT | Performed by: FAMILY MEDICINE

## 2024-06-19 NOTE — PATIENT INSTRUCTIONS
Continue antibiotic.  Continue Wound dressing as per wound care.  Keep appt with wound care tomorrow.

## 2024-06-19 NOTE — PROGRESS NOTES
"    Follow Up Office Visit      Date: 2024   Patient Name: Kiran Belcher  : 1960   MRN: [id number]     Chief Complaint:    Chief Complaint   Patient presents with    Cellitis on Buttock       History of Present Illness: Kiran Belcher is a 63 y.o. male who presents today scheduled for 2-month follow-up.  Sees Dr. Cameron for PCP.    With Anh in office today.    Was seen last on 2024.  Was referred to wound care per PCP.  Was to start Santyl for debridement of central area of wound.    Follows with wound care for cellulitis and abscess of buttock.  Open wound right buttock    Patient was seen and by wound care on 2024.  Had wound debridement 4 cm² area 1 site.    Reports was seen by wound care last week on Thursday.   Follows up tomorrow.  Reports started as a boil and ended up as cellulitis.    Using \"Honey\" for the wound.    Finishing up Ampicillin.  Taking about TID.  Finishing up the course this week.  Reports using Medihoney.    Subjective      Review of Systems:   Review of Systems   Constitutional:  Negative for chills and fever.   Gastrointestinal:  Negative for nausea and vomiting.   Neurological:  Negative for seizures and syncope.       I have reviewed the patients family history, social history, past medical history, past surgical history and have updated it as appropriate.     Medications:     Current Outpatient Medications:     amLODIPine-benazepril (LOTREL) 10-20 MG per capsule, Take 1 capsule by mouth Daily., Disp: 90 capsule, Rfl: 3    ammonium lactate (AMLACTIN) 12 % cream, Apply 1 g topically to the appropriate area as directed As Needed for Dry Skin. Apply as directed , prescribed by podiatrist, Disp: , Rfl:     ampicillin (PRINCIPEN) 500 MG capsule, Take 1 capsule by mouth 4 (Four) Times a Day., Disp: 40 capsule, Rfl: 0    aspirin 81 MG chewable tablet, Chew 1 tablet Daily., Disp: , Rfl:     atorvastatin (LIPITOR) 10 MG tablet, Take 1 tablet by mouth Daily., Disp: " 90 tablet, Rfl: 3    baclofen (LIORESAL) 20 MG tablet, Take 1 tablet by mouth 3 (Three) Times a Day., Disp: 90 tablet, Rfl: 11    carvedilol (COREG) 12.5 MG tablet, TAKE 1/2 TABLET BY MOUTH TWICE A DAY WITH MEALS., Disp: 90 tablet, Rfl: 3    citalopram (CeleXA) 20 MG tablet, Take 1 tablet by mouth Daily., Disp: 90 tablet, Rfl: 3    collagenase (Santyl) 250 UNIT/GM ointment, Apply 1 Application topically to the appropriate area as directed Daily., Disp: 30 g, Rfl: 1    dantrolene (DANTRIUM) 100 MG capsule, Take 1 capsule by mouth 2 (Two) Times a Day., Disp: 180 capsule, Rfl: 3    Diclofenac Sodium (VOLTAREN) 1 % gel gel, Apply 4 g topically to the appropriate area as directed 4 (Four) Times a Day As Needed (right 4th finger pain)., Disp: 100 g, Rfl: 2    esomeprazole (nexIUM) 40 MG capsule, TAKE ONE CAPSULE BY MOUTH DAILY, Disp: 90 capsule, Rfl: 3    FeroSul 325 (65 Fe) MG tablet, TAKE 1 TABLET BY MOUTH DAILY WITH BREAKFAST, Disp: 30 tablet, Rfl: 5    fesoterodine fumarate (TOVIAZ ER) 8 MG tablet sustained-release 24 hour tablet, Take 1 tablet by mouth Daily., Disp: , Rfl:     fexofenadine (ALLEGRA) 180 MG tablet, Take 1 tablet by mouth Daily., Disp: , Rfl:     fluocinonide (LIDEX) 0.05 % cream, fluocinonide 0.05 % topical cream, Disp: , Rfl:     fluticasone (FLONASE) 50 MCG/ACT nasal spray, 2 sprays into the nostril(s) as directed by provider Daily., Disp: 16 g, Rfl: 0    glucose blood (Accu-Chek Elke Plus) test strip, 1 each by Other route 2 (Two) Times a Day. Use as instructed, Disp: 200 each, Rfl: 3    glyburide (DIAbeta) 5 MG tablet, Take 1 tablet by mouth Daily With Breakfast., Disp: 90 tablet, Rfl: 3    lactobacillus acidophilus (RISAQUAD) capsule capsule, Take 1 capsule by mouth Daily., Disp: 90 capsule, Rfl: 1    metFORMIN ER (GLUCOPHAGE-XR) 500 MG 24 hr tablet, Take 2 tablets by mouth 2 (Two) Times a Day., Disp: 360 tablet, Rfl: 3    methenamine (HIPREX) 1 g tablet, Take 1 tablet by mouth 2 (Two) Times a  "Day With Meals., Disp: , Rfl:     Mirabegron ER (MYRBETRIQ) 50 MG tablet sustained-release 24 hour 24 hr tablet, Take 50 mg by mouth Daily., Disp: , Rfl:     Allergies:   Allergies   Allergen Reactions    Levofloxacin Unknown (See Comments)     tendinopathy    Cefuroxime Palpitations     Tachycardia, back pain, fatigue, and weakness.        Objective     Physical Exam: Please see above  Vital Signs:   Vitals:    06/19/24 1622   BP: 118/60   Pulse: 77   Temp: 98.4 °F (36.9 °C)   TempSrc: Temporal   SpO2: 97%   Weight: 122 kg (270 lb)  Comment: Reporterd by patient   Height: 175.3 cm (69.02\")   PainSc: 0-No pain     Body mass index is 39.85 kg/m².          Physical Exam  Vitals and nursing note reviewed.   Constitutional:       Appearance: Normal appearance.   HENT:      Head: Normocephalic and atraumatic.   Neck:      Vascular: No carotid bruit.   Cardiovascular:      Rate and Rhythm: Normal rate and regular rhythm.      Heart sounds: Normal heart sounds. No murmur heard.  Pulmonary:      Effort: Pulmonary effort is normal.      Breath sounds: Normal breath sounds.   Abdominal:      General: Bowel sounds are normal.      Palpations: Abdomen is soft. There is no mass.      Tenderness: There is no abdominal tenderness.   Musculoskeletal:      Right lower leg: No edema.      Left lower leg: No edema.   Skin:     Coloration: Skin is not jaundiced or pale.      Findings: No erythema.          Neurological:      Mental Status: He is alert. Mental status is at baseline.   Psychiatric:         Mood and Affect: Mood normal.         Behavior: Behavior normal.         Procedures    Results:   Labs:   Hemoglobin A1C   Date Value Ref Range Status   05/20/2024 6.9 (A) 4.5 - 5.7 % Final   09/14/2018 7.00 (H) 4.80 - 5.60 % Final     TSH   Date Value Ref Range Status   06/18/2021 2.250 0.450 - 4.500 uIU/mL Final   04/12/2019 1.470 0.270 - 4.200 mIU/mL Final        POCT Results (if applicable):   Results for orders placed or performed " during the hospital encounter of 05/31/24   Wound Culture - Wound, Buttock, Right    Specimen: Buttock, Right; Wound   Result Value Ref Range    Wound Culture Light growth (2+) Enterococcus faecalis (A)     Wound Culture Light growth (2+) Normal Skin Nancy     Gram Stain No WBCs or organisms seen     Gram Stain No epithelial cells seen        Susceptibility    Enterococcus faecalis - ANGELICA     Ampicillin  Susceptible ug/ml     Vancomycin  Susceptible ug/ml       Imaging:   No valid procedures specified.     Measures:   Advanced Care Planning:   Patient does not have an advance directive, declines further assistance.    Smoking Cessation:   Does not smoke      Assessment / Plan      Assessment/Plan:     1. Open wound of right buttock, subsequent encounter  Wound appears to be improving.  Patient to continue current antibiotic, and continue dressing as per wound care.  Encourage patient to keep appointment with wound care tomorrow.          Vaccine Counseling:      Follow Up:   Return in about 2 weeks (around 7/3/2024) for with pcp.      DO VIANEY Watson

## 2024-06-20 ENCOUNTER — HOSPITAL ENCOUNTER (OUTPATIENT)
Dept: PHYSICAL THERAPY | Facility: HOSPITAL | Age: 64
Setting detail: THERAPIES SERIES
Discharge: HOME | End: 2024-06-20
Payer: MEDICARE

## 2024-06-20 DIAGNOSIS — L03.317 CELLULITIS AND ABSCESS OF BUTTOCK: Primary | ICD-10-CM

## 2024-06-20 DIAGNOSIS — L02.31 CELLULITIS AND ABSCESS OF BUTTOCK: Primary | ICD-10-CM

## 2024-06-20 DIAGNOSIS — S31.819D OPEN WOUND OF RIGHT BUTTOCK, SUBSEQUENT ENCOUNTER: ICD-10-CM

## 2024-06-20 PROCEDURE — 97597 DBRDMT OPN WND 1ST 20 CM/<: CPT

## 2024-06-28 ENCOUNTER — HOSPITAL ENCOUNTER (OUTPATIENT)
Dept: PHYSICAL THERAPY | Facility: HOSPITAL | Age: 64
Setting detail: THERAPIES SERIES
Discharge: HOME | End: 2024-06-28
Payer: MEDICARE

## 2024-06-28 DIAGNOSIS — S31.819D OPEN WOUND OF RIGHT BUTTOCK, SUBSEQUENT ENCOUNTER: ICD-10-CM

## 2024-06-28 DIAGNOSIS — L02.31 CELLULITIS AND ABSCESS OF BUTTOCK: Primary | ICD-10-CM

## 2024-06-28 DIAGNOSIS — L03.317 CELLULITIS AND ABSCESS OF BUTTOCK: Primary | ICD-10-CM

## 2024-06-28 PROCEDURE — 97597 DBRDMT OPN WND 1ST 20 CM/<: CPT

## 2024-06-28 NOTE — THERAPY WOUND CARE TREATMENT
Outpatient Rehabilitation - Wound/Debridement Treatment Note   Brayan     Patient Name: Kiran Belcher  : 1960  MRN: [id number]  Today's Date: 2024                 Admit Date: 2024    Visit Dx:    ICD-10-CM ICD-9-CM   1. Cellulitis and abscess of buttock  L02.31 682.5    L03.317    2. Open wound of right buttock, subsequent encounter  S31.819D V58.89     877.0     R buttock        Patient Active Problem List   Diagnosis    Benign essential hypertension    Depression    Gastroesophageal reflux disease without esophagitis    Hyperlipidemia    NISA on CPAP    Type 2 diabetes mellitus without complications    Spasm    Other obesity due to excess calories    Hx of spinal cord injury    Kidney disorder    Lower extremity weakness    Myalgia    Moderate episode of recurrent major depressive disorder    Dysphagia    Toenail avulsion    H/O kidney removal    Renal cell carcinoma of right kidney    Quadriplegia    Muscle contracture    C5-C7 level spinal injury with complete lesion of spinal cord, without evidence of spinal bone injury    Inability to bear weight    Neurogenic bladder    Venous stasis    B-cell lymphoma    Cancer of kidney    Hypertension    Neutrophilic leukemoid reaction    Personal history of Methicillin resistant Staphylococcus aureus infection    Paraplegia    Other constipation        Past Medical History:   Diagnosis Date    B-cell lymphoma 2022    Undergoing work-up with [facility]    Cancer     hx spot on right kidney, for ~8 years, told cancer but no hx biopsy and no interventions and just watching it. Dr. vega used to watch this cyst, then started with  urology a year ago after he retired.    Decreased mobility     Depression     Diabetes mellitus     Esophageal reflux     Glaucoma     History of methicillin resistant Staphylococcus aureus infection     History of obstructive sleep apnea     History of quadriplegia     of unknown etiology - C5-C7, incomplete     History of  "spinal cord injury/quadriplegia 2008 09/14/2018    Hyperlipidemia     Hypertension     Kidney disorder 9/14/2018    Paraplegia         Past Surgical History:   Procedure Laterality Date    CERVICAL DISC SURGERY      around 5/2008, fall/injury tripped over a safety gate for their dogs, reported SCI \"semi quadraplegic\" since. [facility]. [facility] after.    SPINE SURGERY           EVALUATION   PT Ortho       Row Name 06/28/24 1300       Subjective    Subjective Comments Pt reports he suffered a bout of heat exaustion a few days ago. No new issues with the wound.  -       Subjective Pain    Able to rate subjective pain? yes  -    Pre-Treatment Pain Level 0  -    Post-Treatment Pain Level 0  -       Transfers    Bed-Chair Hamlin (Transfers) moderate assist (50% patient effort)  -    Chair-Bed Hamlin (Transfers) minimum assist (75% patient effort);1 person assist  -    Comment, (Transfers) LSL  -              User Key  (r) = Recorded By, (t) = Taken By, (c) = Cosigned By      Initials Name Provider Type     Norman Vasquez, PT Physical Therapist                     LDA Wound       Row Name 06/28/24 1300             Wound 05/22/24 1045 Right posterior hip Abcess    Wound - Properties Group Placement Date: 05/22/24  -MF Placement Time: 1045  -MF Side: Right  - Orientation: posterior  -MF Location: hip  -MF Primary Wound Type: Abcess  -MF    Wound Image Images linked: 1  -      Dressing Appearance intact;moist drainage  -      Base moist;slough;yellow;necrotic;gray  central deep area. Necrotic fibrous tissue present. A few small granulation buds  -      Periwound intact;dry;indurated  -      Periwound Temperature warm  -      Periwound Skin Turgor soft  -      Edges irregular  -      Wound Length (cm) 1.8 cm  -      Wound Width (cm) 2.5 cm  -      Wound Depth (cm) --  obscured  -      Wound Surface Area (cm^2) 4.5 cm^2  -      Drainage Characteristics/Odor serosanguineous  - " "     Drainage Amount small  -      Care, Wound cleansed with;wound cleanser;debrided;honey applied  -      Dressing Care dressing applied;antimicrobial agent applied;foam;low-adherent;border dressing  honey, HFBt doubled over, 6\" optifoam  -      Periwound Care cleansed with pH balanced cleanser;dry periwound area maintained  nosting  -      Retired Wound - Properties Group Placement Date: 05/22/24  - Placement Time: 1045  -MF Side: Right  - Orientation: posterior  -MF Location: hip  -MF Primary Wound Type: Abcess  -MF    Retired Wound - Properties Group Date first assessed: 05/22/24  - Time first assessed: 1045  -MF Side: Right  -MF Location: hip  -MF Primary Wound Type: Abcess  -MF              User Key  (r) = Recorded By, (t) = Taken By, (c) = Cosigned By      Initials Name Provider Type     Lenard Grijalva, PT Physical Therapist     Norman Vasquez, PT Physical Therapist                      WOUND DEBRIDEMENT  Total area of Debridement: 4cm2  Debridement Site 1  Location- Site 1: R buttock  Selective Debridement- Site 1: Wound Surface <20cmsq  Instruments- Site 1: tweezers, scapel, #15  Excised Tissue Description- Site 1: moderate, slough, necrotic  Bleeding- Site 1: scant, held pressure, 1 minute              Therapy Education       Row Name 06/28/24 1300             Therapy Education    Education Details Continue current POC.  -      Given Bandaging/dressing change  -      Program Reinforced;Progressed  -      How Provided Verbal;Demonstration  -      Provided to Patient  -      Level of Understanding Teach back education performed;Verbalized  -                User Key  (r) = Recorded By, (t) = Taken By, (c) = Cosigned By      Initials Name Provider Type    Norman Otero, PT Physical Therapist                    Recommendation and Plan   PT Assessment/Plan       Row Name 06/28/24 1300          PT Assessment    Functional Limitations Impaired gait;Impaired " locomotion;Other (comment)  wound management  -     Impairments Integumentary integrity  -     Assessment Comments Pt's R posterior hip wound demonstrating very small improvements in wound dimensions along with a few very small scattered areas of granulation budding. Pt would continue to benefit from further debridement and advanced wound dressing management to promote wound healing.  -     Rehab Potential Fair  -     Patient/caregiver participated in establishment of treatment plan and goals Yes  -     Patient would benefit from skilled therapy intervention Yes  -        PT Plan    PT Frequency 1x/week  -     Physical Therapy Interventions (Optional Details) wound care;patient/family education  -     PT Plan Comments debridement, dressings  -               User Key  (r) = Recorded By, (t) = Taken By, (c) = Cosigned By      Initials Name Provider Type     Norman Vasquez, PT Physical Therapist                    Goals   PT OP Goals       Row Name 06/28/24 1341          Time Calculation    PT Goal Re-Cert Due Date 08/20/24  -               User Key  (r) = Recorded By, (t) = Taken By, (c) = Cosigned By      Initials Name Provider Type     Norman Vasquez, PT Physical Therapist                    PT Goal Re-Cert Due Date: 08/20/24            Time Calculation: Start Time: 1300  Untimed Charges  66100-Kywkoyxhu debridement: 20  Total Minutes  Untimed Charges Total Minutes: 20   Total Minutes: 20  Therapy Charges for Today       Code Description Service Date Service Provider Modifiers Qty    47650493169 HC EDEL DEBRIDE OPEN WOUND UP TO 20CM 6/28/2024 Norman Vasquez, PT GP 1                    Norman Vasquez PT  6/28/2024

## 2024-07-05 ENCOUNTER — APPOINTMENT (OUTPATIENT)
Dept: PHYSICAL THERAPY | Facility: HOSPITAL | Age: 64
End: 2024-07-05
Payer: MEDICARE

## 2024-07-12 ENCOUNTER — HOSPITAL ENCOUNTER (OUTPATIENT)
Dept: PHYSICAL THERAPY | Facility: HOSPITAL | Age: 64
Setting detail: THERAPIES SERIES
Discharge: HOME | End: 2024-07-12
Payer: MEDICARE

## 2024-07-12 DIAGNOSIS — L03.317 CELLULITIS AND ABSCESS OF BUTTOCK: Primary | ICD-10-CM

## 2024-07-12 DIAGNOSIS — L02.31 CELLULITIS AND ABSCESS OF BUTTOCK: Primary | ICD-10-CM

## 2024-07-12 DIAGNOSIS — S31.819D OPEN WOUND OF RIGHT BUTTOCK, SUBSEQUENT ENCOUNTER: ICD-10-CM

## 2024-07-12 PROCEDURE — 97597 DBRDMT OPN WND 1ST 20 CM/<: CPT

## 2024-07-12 NOTE — THERAPY WOUND CARE TREATMENT
Outpatient Rehabilitation - Wound/Debridement Treatment Note   Brayan     Patient Name: Kiran Belcher  : 1960  MRN: [id number]  Today's Date: 2024                 Admit Date: 2024    Visit Dx:    ICD-10-CM ICD-9-CM   1. Cellulitis and abscess of buttock  L02.31 682.5    L03.317    2. Open wound of right buttock, subsequent encounter  S31.819D V58.89     877.0       Patient Active Problem List   Diagnosis    Benign essential hypertension    Depression    Gastroesophageal reflux disease without esophagitis    Hyperlipidemia    NISA on CPAP    Type 2 diabetes mellitus without complications    Spasm    Other obesity due to excess calories    Hx of spinal cord injury    Kidney disorder    Lower extremity weakness    Myalgia    Moderate episode of recurrent major depressive disorder    Dysphagia    Toenail avulsion    H/O kidney removal    Renal cell carcinoma of right kidney    Quadriplegia    Muscle contracture    C5-C7 level spinal injury with complete lesion of spinal cord, without evidence of spinal bone injury    Inability to bear weight    Neurogenic bladder    Venous stasis    B-cell lymphoma    Cancer of kidney    Hypertension    Neutrophilic leukemoid reaction    Personal history of Methicillin resistant Staphylococcus aureus infection    Paraplegia    Other constipation        Past Medical History:   Diagnosis Date    B-cell lymphoma 2022    Undergoing work-up with [facility]    Cancer     hx spot on right kidney, for ~8 years, told cancer but no hx biopsy and no interventions and just watching it. Dr. vega used to watch this cyst, then started with  urology a year ago after he retired.    Decreased mobility     Depression     Diabetes mellitus     Esophageal reflux     Glaucoma     History of methicillin resistant Staphylococcus aureus infection     History of obstructive sleep apnea     History of quadriplegia     of unknown etiology - C5-C7, incomplete     History of spinal cord  "injury/quadriplegia 2008 09/14/2018    Hyperlipidemia     Hypertension     Kidney disorder 9/14/2018    Paraplegia         Past Surgical History:   Procedure Laterality Date    CERVICAL DISC SURGERY      around 5/2008, fall/injury tripped over a safety gate for their dogs, reported SCI \"semi quadraplegic\" since. [facility]. [facility] after.    SPINE SURGERY           EVALUATION   PT Ortho       Row Name 07/12/24 1300       Subjective    Subjective Comments No changes or new complaints, spouse assisting with home dressing changes every 2 days without issues.  -       Subjective Pain    Able to rate subjective pain? yes  -    Pre-Treatment Pain Level 0  -    Post-Treatment Pain Level 0  -       Transfers    Comment, (Transfers) remained in w/c, leaned on stretcher with LUE support for wound access  -              User Key  (r) = Recorded By, (t) = Taken By, (c) = Cosigned By      Initials Name Provider Type    Jhoana Hunter, PT Physical Therapist                     [facility] Wound       Row Name 07/12/24 1300             Wound 05/22/24 1045 Right posterior hip Abcess    Wound - Properties Group Placement Date: 05/22/24  -MF Placement Time: 1045  -MF Side: Right  -MF Orientation: posterior  -MF Location: hip  -MF Primary Wound Type: Abcess  -MF    Wound Image Images linked: 1  -JM      Dressing Appearance intact;moist drainage  -JM      Base granulating;moist;red;epithelialization;yellow;slough  skin bridge present, beefy granulation under biofilm/slough layer  -      Periwound intact;dry;indurated  -      Periwound Temperature warm  -      Periwound Skin Turgor soft  -JM      Edges irregular  -JM      Wound Length (cm) 1.7 cm  -JM      Wound Width (cm) 2 cm  -JM      Wound Depth (cm) 0.4 cm  -JM      Wound Surface Area (cm^2) 3.4 cm^2  -JM      Wound Volume (cm^3) 1.36 cm^3  -JM      Drainage Characteristics/Odor serosanguineous  -JM      Drainage Amount small  -      Care, Wound cleansed with;wound " "cleanser;debrided;honey applied  -      Dressing Care dressing applied;antimicrobial agent applied;foam;border dressing  marquis, KEONBt, 6\" optifoam  -IVETH      Periwound Care barrier film applied;cleansed with pH balanced cleanser;dry periwound area maintained  nosting spray  -      Retired Wound - Properties Group Placement Date: 05/22/24  - Placement Time: 1045  -MF Side: Right  -MF Orientation: posterior  -MF Location: hip  -MF Primary Wound Type: Abcess  -MF    Retired Wound - Properties Group Date first assessed: 05/22/24  - Time first assessed: 1045  -MF Side: Right  -MF Location: hip  -MF Primary Wound Type: Abcess  -              User Key  (r) = Recorded By, (t) = Taken By, (c) = Cosigned By      Initials Name Provider Type    Lenard Iverson, PT Physical Therapist    Jhoana Hunter, PT Physical Therapist                      WOUND DEBRIDEMENT  Total area of Debridement: 3cmsq  Debridement Site 1  Location- Site 1: R buttock  Selective Debridement- Site 1: Wound Surface <20cmsq  Instruments- Site 1: tweezers  Excised Tissue Description- Site 1: moderate, slough, other (comment) (biofilm layer)  Bleeding- Site 1: scant              Therapy Education       Row Name 07/12/24 1300             Therapy Education    Education Details Continue with home dressing changes and offloading.  -IVETH      Given Bandaging/dressing change  -IVETH      Program Reinforced;Progressed  -IVETH      How Provided Verbal;Demonstration  -IVETH      Provided to Patient  -IVETH      Level of Understanding Teach back education performed;Verbalized  -IVETH                User Key  (r) = Recorded By, (t) = Taken By, (c) = Cosigned By      Initials Name Provider Type    Jhoana Hunter, PT Physical Therapist                    Recommendation and Plan   PT Assessment/Plan       Row Name 07/12/24 1300          PT Assessment    Functional Limitations Impaired gait;Impaired locomotion;Other (comment)  wound management  -IVETH     " Impairments Integumentary integrity  -     Assessment Comments Pt with improvement in wound dimensions, increased granulation, and less necrotic tissues.  Skin bridge present lateral wound bed, PT able to debride biofilm layer with beefy granulation in base.  Pt to continue with home dressing changes.  Pt will continue to benefit from debridement and dressings management every 1-2 weeks.  -     Rehab Potential Good  -     Patient/caregiver participated in establishment of treatment plan and goals Yes  -     Patient would benefit from skilled therapy intervention Yes  -        PT Plan    PT Frequency 1x/week  every 1-2 weeks  -     Physical Therapy Interventions (Optional Details) patient/family education;wound care  -     PT Plan Comments debridement, dressings  -               User Key  (r) = Recorded By, (t) = Taken By, (c) = Cosigned By      Initials Name Provider Type    Jhoana Hunter, PT Physical Therapist                    Goals   PT OP Goals       Row Name 07/12/24 1334          Time Calculation    PT Goal Re-Cert Due Date 08/20/24  -               User Key  (r) = Recorded By, (t) = Taken By, (c) = Cosigned By      Initials Name Provider Type    Jhoana Hunter, PT Physical Therapist                    PT Goal Re-Cert Due Date: 08/20/24            Time Calculation: Start Time: 1300  Untimed Charges  76699-Gykaqajql debridement: 20  Total Minutes  Untimed Charges Total Minutes: 20   Total Minutes: 20  Therapy Charges for Today       Code Description Service Date Service Provider Modifiers Qty    60801949223 HC EDEL DEBRIDE OPEN WOUND UP TO 20CM 7/12/2024 Jhoana Espinoza, PT GP 1                    Jhoana Espinoza, PT  7/12/2024

## 2024-07-18 ENCOUNTER — APPOINTMENT (OUTPATIENT)
Dept: PHYSICAL THERAPY | Facility: HOSPITAL | Age: 64
End: 2024-07-18
Payer: MEDICARE

## 2024-07-22 ENCOUNTER — OFFICE VISIT (OUTPATIENT)
Dept: FAMILY MEDICINE CLINIC | Facility: CLINIC | Age: 64
End: 2024-07-22
Payer: MEDICARE

## 2024-07-22 VITALS
BODY MASS INDEX: 39.85 KG/M2 | HEIGHT: 69 IN | TEMPERATURE: 98 F | OXYGEN SATURATION: 95 % | DIASTOLIC BLOOD PRESSURE: 64 MMHG | RESPIRATION RATE: 18 BRPM | HEART RATE: 88 BPM | SYSTOLIC BLOOD PRESSURE: 118 MMHG

## 2024-07-22 DIAGNOSIS — I10 BENIGN ESSENTIAL HYPERTENSION: Primary | Chronic | ICD-10-CM

## 2024-07-22 DIAGNOSIS — K59.09 OTHER CONSTIPATION: ICD-10-CM

## 2024-07-22 DIAGNOSIS — C85.10 B-CELL LYMPHOMA, UNSPECIFIED B-CELL LYMPHOMA TYPE, UNSPECIFIED BODY REGION: ICD-10-CM

## 2024-07-22 DIAGNOSIS — G82.50 QUADRIPLEGIA: ICD-10-CM

## 2024-07-22 PROCEDURE — 3078F DIAST BP <80 MM HG: CPT | Performed by: STUDENT IN AN ORGANIZED HEALTH CARE EDUCATION/TRAINING PROGRAM

## 2024-07-22 PROCEDURE — G2211 COMPLEX E/M VISIT ADD ON: HCPCS | Performed by: STUDENT IN AN ORGANIZED HEALTH CARE EDUCATION/TRAINING PROGRAM

## 2024-07-22 PROCEDURE — 3044F HG A1C LEVEL LT 7.0%: CPT | Performed by: STUDENT IN AN ORGANIZED HEALTH CARE EDUCATION/TRAINING PROGRAM

## 2024-07-22 PROCEDURE — 99214 OFFICE O/P EST MOD 30 MIN: CPT | Performed by: STUDENT IN AN ORGANIZED HEALTH CARE EDUCATION/TRAINING PROGRAM

## 2024-07-22 PROCEDURE — 1126F AMNT PAIN NOTED NONE PRSNT: CPT | Performed by: STUDENT IN AN ORGANIZED HEALTH CARE EDUCATION/TRAINING PROGRAM

## 2024-07-22 PROCEDURE — 3074F SYST BP LT 130 MM HG: CPT | Performed by: STUDENT IN AN ORGANIZED HEALTH CARE EDUCATION/TRAINING PROGRAM

## 2024-07-22 NOTE — LETTER
July 22, 2024     Patient: Kiran Belcher   YOB: 1960   Date of Visit: 7/22/2024       To Whom It May Concern:    It is my medical opinion that Kiran Belcher may return to physical therapy. He has small healing wound to the right upper buttocks but should tolerate PT well.        Sincerely,        Chay Cameron MD    CC: No Recipients

## 2024-07-22 NOTE — PROGRESS NOTES
Established Patient Office Visit        Subjective      Chief Complaint:  wound cellulitis      History of Present Illness: Kiran Belcher is a 63 y.o. male who presents for wound to the buttocks.  Patient developed cellulitis versus abscess on 5/13 was treated with Keflex.  Started with wound care for debridement.  5/31 culture grew enterococcus and was treated with ampicillin for 10 days     June 22nd patient was feeling weak. Found to have mild heat related illness as he was sitting in hot car for prolonged time. Improved after fluids.        Patient Active Problem List   Diagnosis    Benign essential hypertension    Depression    Gastroesophageal reflux disease without esophagitis    Hyperlipidemia    NISA on CPAP    Type 2 diabetes mellitus without complications    Spasm    Other obesity due to excess calories    Hx of spinal cord injury    Kidney disorder    Lower extremity weakness    Myalgia    Moderate episode of recurrent major depressive disorder    Dysphagia    Toenail avulsion    H/O kidney removal    Renal cell carcinoma of right kidney    Quadriplegia    Muscle contracture    C5-C7 level spinal injury with complete lesion of spinal cord, without evidence of spinal bone injury    Inability to bear weight    Neurogenic bladder    Venous stasis    B-cell lymphoma    Cancer of kidney    Hypertension    Neutrophilic leukemoid reaction    Personal history of Methicillin resistant Staphylococcus aureus infection    Paraplegia    Other constipation         Current Outpatient Medications:     amLODIPine-benazepril (LOTREL) 10-20 MG per capsule, Take 1 capsule by mouth Daily., Disp: 90 capsule, Rfl: 3    ammonium lactate (AMLACTIN) 12 % cream, Apply 1 g topically to the appropriate area as directed As Needed for Dry Skin. Apply as directed , prescribed by podiatrist, Disp: , Rfl:     ampicillin (PRINCIPEN) 500 MG capsule, Take 1 capsule by mouth 4 (Four) Times a Day., Disp: 40 capsule, Rfl: 0    aspirin 81  Hospital Discharge Documentation  Please phone to schedule a hospital follow up appointment.     From: [address]  Phone: [phone]    Patient discharged time/date: 7/17/2021  6:52 PM  Patient discharge disposition:  Home or Self AND HEMODYNAMIC STATUS, DVT PROPHYLAXIS, PT/OT. EBL 1800 ML, HAD 3 UNITS OF PRBC  -cbc stable now  -keflex po on discharge per NS     Acute blood loss anemia related to surgery  -stable       Hyperlipidemia  CONT HOME MEDS.         Hyperglycemia  CONT REFUGIO MG chewable tablet, Chew 1 tablet Daily., Disp: , Rfl:     atorvastatin (LIPITOR) 10 MG tablet, Take 1 tablet by mouth Daily., Disp: 90 tablet, Rfl: 3    baclofen (LIORESAL) 20 MG tablet, Take 1 tablet by mouth 3 (Three) Times a Day., Disp: 90 tablet, Rfl: 11    carvedilol (COREG) 12.5 MG tablet, TAKE 1/2 TABLET BY MOUTH TWICE A DAY WITH MEALS., Disp: 90 tablet, Rfl: 3    citalopram (CeleXA) 20 MG tablet, Take 1 tablet by mouth Daily., Disp: 90 tablet, Rfl: 3    collagenase (Santyl) 250 UNIT/GM ointment, Apply 1 Application topically to the appropriate area as directed Daily., Disp: 30 g, Rfl: 1    dantrolene (DANTRIUM) 100 MG capsule, Take 1 capsule by mouth 2 (Two) Times a Day., Disp: 180 capsule, Rfl: 3    Diclofenac Sodium (VOLTAREN) 1 % gel gel, Apply 4 g topically to the appropriate area as directed 4 (Four) Times a Day As Needed (right 4th finger pain)., Disp: 100 g, Rfl: 2    esomeprazole (nexIUM) 40 MG capsule, TAKE ONE CAPSULE BY MOUTH DAILY, Disp: 90 capsule, Rfl: 3    FeroSul 325 (65 Fe) MG tablet, TAKE 1 TABLET BY MOUTH DAILY WITH BREAKFAST, Disp: 30 tablet, Rfl: 5    fesoterodine fumarate (TOVIAZ ER) 8 MG tablet sustained-release 24 hour tablet, Take 1 tablet by mouth Daily., Disp: , Rfl:     fexofenadine (ALLEGRA) 180 MG tablet, Take 1 tablet by mouth Daily., Disp: , Rfl:     fluocinonide (LIDEX) 0.05 % cream, fluocinonide 0.05 % topical cream, Disp: , Rfl:     fluticasone (FLONASE) 50 MCG/ACT nasal spray, 2 sprays into the nostril(s) as directed by provider Daily., Disp: 16 g, Rfl: 0    glucose blood (Accu-Chek Elke Plus) test strip, 1 each by Other route 2 (Two) Times a Day. Use as instructed, Disp: 200 each, Rfl: 3    glyburide (DIAbeta) 5 MG tablet, Take 1 tablet by mouth Daily With Breakfast., Disp: 90 tablet, Rfl: 3    lactobacillus acidophilus (RISAQUAD) capsule capsule, Take 1 capsule by mouth Daily., Disp: 90 capsule, Rfl: 1    metFORMIN ER (GLUCOPHAGE-XR) 500 MG 24 hr tablet, Take 2 tablets  "by mouth 2 (Two) Times a Day., Disp: 360 tablet, Rfl: 3    methenamine (HIPREX) 1 g tablet, Take 1 tablet by mouth 2 (Two) Times a Day With Meals., Disp: , Rfl:     Mirabegron ER (MYRBETRIQ) 50 MG tablet sustained-release 24 hour 24 hr tablet, Take 50 mg by mouth Daily., Disp: , Rfl:        Objective     Physical Exam:   Vital Signs:   /64   Pulse 88   Temp 98 °F (36.7 °C) (Temporal)   Resp 18   Ht 175.3 cm (69.02\")   SpO2 95%   BMI 39.85 kg/m²      Physical Exam  Constitutional:       General: He is not in acute distress.     Appearance: He is not ill-appearing.   Cardiovascular:      Rate and Rhythm: Normal rate and regular rhythm.   Pulmonary:      Effort: Pulmonary effort is normal.      Breath sounds: Normal breath sounds.   Neurological:      Mental Status: He is alert.   Psychiatric:         Thought Content: Thought content normal.   Wound present to the right upper buttock .1.7 cm max width wound no surrounding erythema some white sloughing tissue to the inferior aspect of the wound         Assessment / Plan      Assessment/Plan:   Diagnoses and all orders for this visit:    1. Benign essential hypertension (Primary)  -At goal continue carvedilol, amlodipine benazepril    2. Quadriplegia  -     Ambulatory Referral to Physical Therapy    3. Other constipation  Assessment & Plan:  Add metamucil   Add senna as needed       4. B-cell lymphoma, unspecified B-cell lymphoma type, unspecified body region  Overview:  Stable no treatment. Follows with dr taylor             Follow Up:   Return in about 2 months (around 9/22/2024) for Follow-up DM II.    MDM:     Chay Cameron MD  Family Medicine - [facility]  " traMADol HCl 50 MG Tabs  Commonly known as: ULTRAM      Take 1 tablet (50 mg total) by mouth every 6 (six) hours as needed for Pain.    Quantity: 20 tablet  Refills: 0        CONTINUE taking these medications      Instructions Prescription details   TANISHA TABLET BY MOUTH EVERY DAY IN THE EVENING   Quantity: 90 tablet  Refills: 4     Tagrisso 80 MG Tabs  Generic drug: Osimertinib Mesylate      Take 2 tablets by mouth daily.  Take 2 tabs 160 mg total   Refills: 0     Vitamin B-12 1000 MCG Tabs  Commonly known

## 2024-07-23 ENCOUNTER — HOSPITAL ENCOUNTER (OUTPATIENT)
Dept: CT IMAGING | Facility: HOSPITAL | Age: 64
Discharge: HOME | End: 2024-07-23
Payer: MEDICARE

## 2024-07-23 ENCOUNTER — LAB (OUTPATIENT)
Facility: HOSPITAL | Age: 64
End: 2024-07-23
Payer: MEDICARE

## 2024-07-23 DIAGNOSIS — C85.10 B-CELL LYMPHOMA, UNSPECIFIED B-CELL LYMPHOMA TYPE, UNSPECIFIED BODY REGION: ICD-10-CM

## 2024-07-23 LAB
ALBUMIN SERPL-MCNC: 3.4 G/DL (ref 3.5–5.2)
ALBUMIN/GLOB SERPL: 0.9 G/DL
ALP SERPL-CCNC: 104 U/L (ref 39–117)
ALT SERPL W P-5'-P-CCNC: 13 U/L (ref 1–41)
ANION GAP SERPL CALCULATED.3IONS-SCNC: 11 MMOL/L (ref 5–15)
AST SERPL-CCNC: 15 U/L (ref 1–40)
BASOPHILS # BLD AUTO: 0.01 10*3/MM3 (ref 0–0.2)
BASOPHILS NFR BLD AUTO: 0.1 % (ref 0–1.5)
BILIRUB SERPL-MCNC: 0.2 MG/DL (ref 0–1.2)
BUN SERPL-MCNC: 10 MG/DL (ref 8–23)
BUN/CREAT SERPL: 9.7 (ref 7–25)
CALCIUM SPEC-SCNC: 8.9 MG/DL (ref 8.6–10.5)
CHLORIDE SERPL-SCNC: 100 MMOL/L (ref 98–107)
CO2 SERPL-SCNC: 27 MMOL/L (ref 22–29)
CREAT BLDA-MCNC: 1.1 MG/DL (ref 0.6–1.3)
CREAT SERPL-MCNC: 1.03 MG/DL (ref 0.76–1.27)
DEPRECATED RDW RBC AUTO: 47.9 FL (ref 37–54)
EGFRCR SERPLBLD CKD-EPI 2021: 82.6 ML/MIN/1.73
EOSINOPHIL # BLD AUTO: 0.54 10*3/MM3 (ref 0–0.4)
EOSINOPHIL NFR BLD AUTO: 5.4 % (ref 0.3–6.2)
ERYTHROCYTE [DISTWIDTH] IN BLOOD BY AUTOMATED COUNT: 14.8 % (ref 12.3–15.4)
FERRITIN SERPL-MCNC: 128.3 NG/ML (ref 30–400)
GLOBULIN UR ELPH-MCNC: 3.7 GM/DL
GLUCOSE SERPL-MCNC: 173 MG/DL (ref 65–99)
HCT VFR BLD AUTO: 33 % (ref 37.5–51)
HGB BLD-MCNC: 10.4 G/DL (ref 13–17.7)
IMM GRANULOCYTES # BLD AUTO: 0.02 10*3/MM3 (ref 0–0.05)
IMM GRANULOCYTES NFR BLD AUTO: 0.2 % (ref 0–0.5)
IRON 24H UR-MRATE: 31 MCG/DL (ref 59–158)
IRON SATN MFR SERPL: 13 % (ref 20–50)
LDH SERPL-CCNC: 108 U/L (ref 135–225)
LYMPHOCYTES # BLD AUTO: 1.21 10*3/MM3 (ref 0.7–3.1)
LYMPHOCYTES NFR BLD AUTO: 12.1 % (ref 19.6–45.3)
MCH RBC QN AUTO: 27.7 PG (ref 26.6–33)
MCHC RBC AUTO-ENTMCNC: 31.5 G/DL (ref 31.5–35.7)
MCV RBC AUTO: 87.8 FL (ref 79–97)
MONOCYTES # BLD AUTO: 0.61 10*3/MM3 (ref 0.1–0.9)
MONOCYTES NFR BLD AUTO: 6.1 % (ref 5–12)
NEUTROPHILS NFR BLD AUTO: 7.65 10*3/MM3 (ref 1.7–7)
NEUTROPHILS NFR BLD AUTO: 76.1 % (ref 42.7–76)
PLATELET # BLD AUTO: 361 10*3/MM3 (ref 140–450)
PMV BLD AUTO: 9.4 FL (ref 6–12)
POTASSIUM SERPL-SCNC: 3.8 MMOL/L (ref 3.5–5.2)
PROT SERPL-MCNC: 7.1 G/DL (ref 6–8.5)
RBC # BLD AUTO: 3.76 10*6/MM3 (ref 4.14–5.8)
SODIUM SERPL-SCNC: 138 MMOL/L (ref 136–145)
TIBC SERPL-MCNC: 246 MCG/DL (ref 298–536)
TRANSFERRIN SERPL-MCNC: 165 MG/DL (ref 200–360)
WBC NRBC COR # BLD AUTO: 10.04 10*3/MM3 (ref 3.4–10.8)

## 2024-07-23 PROCEDURE — 71260 CT THORAX DX C+: CPT

## 2024-07-23 PROCEDURE — 85025 COMPLETE CBC W/AUTO DIFF WBC: CPT

## 2024-07-23 PROCEDURE — 83615 LACTATE (LD) (LDH) ENZYME: CPT

## 2024-07-23 PROCEDURE — 25510000001 IOPAMIDOL 61 % SOLUTION: Performed by: INTERNAL MEDICINE

## 2024-07-23 PROCEDURE — 82728 ASSAY OF FERRITIN: CPT

## 2024-07-23 PROCEDURE — 80053 COMPREHEN METABOLIC PANEL: CPT

## 2024-07-23 PROCEDURE — 84466 ASSAY OF TRANSFERRIN: CPT

## 2024-07-23 PROCEDURE — 74177 CT ABD & PELVIS W/CONTRAST: CPT

## 2024-07-23 PROCEDURE — 83540 ASSAY OF IRON: CPT

## 2024-07-23 PROCEDURE — 36415 COLL VENOUS BLD VENIPUNCTURE: CPT

## 2024-07-23 PROCEDURE — 82565 ASSAY OF CREATININE: CPT

## 2024-07-23 RX ADMIN — IOPAMIDOL 85 ML: 612 INJECTION, SOLUTION INTRAVENOUS at 14:33

## 2024-07-24 ENCOUNTER — HOSPITAL ENCOUNTER (OUTPATIENT)
Dept: PHYSICAL THERAPY | Facility: HOSPITAL | Age: 64
Setting detail: THERAPIES SERIES
Discharge: HOME | End: 2024-07-24
Payer: MEDICARE

## 2024-07-24 DIAGNOSIS — L02.31 CELLULITIS AND ABSCESS OF BUTTOCK: Primary | ICD-10-CM

## 2024-07-24 DIAGNOSIS — L03.317 CELLULITIS AND ABSCESS OF BUTTOCK: Primary | ICD-10-CM

## 2024-07-24 DIAGNOSIS — S31.819D OPEN WOUND OF RIGHT BUTTOCK, SUBSEQUENT ENCOUNTER: ICD-10-CM

## 2024-07-24 PROCEDURE — 97597 DBRDMT OPN WND 1ST 20 CM/<: CPT

## 2024-07-24 NOTE — THERAPY PROGRESS REPORT/RE-CERT
Outpatient Rehabilitation - Wound/Debridement Progress Note   Brayan     Patient Name: Kiran Belcher  : 1960  MRN: [id number]  Today's Date: 2024                 Admit Date: 2024    Visit Dx:    ICD-10-CM ICD-9-CM   1. Cellulitis and abscess of buttock  L02.31 682.5    L03.317    2. Open wound of right buttock, subsequent encounter  S31.819D V58.89     877.0       Patient Active Problem List   Diagnosis    Benign essential hypertension    Depression    Gastroesophageal reflux disease without esophagitis    Hyperlipidemia    NISA on CPAP    Type 2 diabetes mellitus without complications    Spasm    Other obesity due to excess calories    Hx of spinal cord injury    Kidney disorder    Lower extremity weakness    Myalgia    Moderate episode of recurrent major depressive disorder    Dysphagia    Toenail avulsion    H/O kidney removal    Renal cell carcinoma of right kidney    Quadriplegia    Muscle contracture    C5-C7 level spinal injury with complete lesion of spinal cord, without evidence of spinal bone injury    Inability to bear weight    Neurogenic bladder    Venous stasis    B-cell lymphoma    Cancer of kidney    Hypertension    Neutrophilic leukemoid reaction    Personal history of Methicillin resistant Staphylococcus aureus infection    Paraplegia    Other constipation        Past Medical History:   Diagnosis Date    B-cell lymphoma 2022    Undergoing work-up with [facility]    Cancer     hx spot on right kidney, for ~8 years, told cancer but no hx biopsy and no interventions and just watching it. Dr. vega used to watch this cyst, then started with  urology a year ago after he retired.    Decreased mobility     Depression     Diabetes mellitus     Esophageal reflux     Glaucoma     History of methicillin resistant Staphylococcus aureus infection     History of obstructive sleep apnea     History of quadriplegia     of unknown etiology - C5-C7, incomplete     History of spinal cord  "injury/quadriplegia 2008 09/14/2018    Hyperlipidemia     Hypertension     Kidney disorder 9/14/2018    Paraplegia         Past Surgical History:   Procedure Laterality Date    CERVICAL DISC SURGERY      around 5/2008, fall/injury tripped over a safety gate for their dogs, reported SCI \"semi quadraplegic\" since. [facility]. [facility] after.    SPINE SURGERY           EVALUATION   PT Ortho       Row Name 07/24/24 1515       Subjective    Subjective Comments No new complaints.  Changed the dressing this AM but only used optifoam gentle due to coming for tx today.  -JM       Subjective Pain    Able to rate subjective pain? yes  -JM    Pre-Treatment Pain Level 0  -JM    Post-Treatment Pain Level 0  -JM       Transfers    Comment, (Transfers) seated in w/c for tx, able to lean to left with UE support on stretcher for wound access  -              User Key  (r) = Recorded By, (t) = Taken By, (c) = Cosigned By      Initials Name Provider Type    Jhoana Hunter, PT Physical Therapist                     [facility] Wound       Row Name 07/24/24 1515             Wound 05/22/24 1045 Right posterior hip Abcess    Wound - Properties Group Placement Date: 05/22/24  -MF Placement Time: 1045  -MF Side: Right  -MF Orientation: posterior  -MF Location: hip  -MF Primary Wound Type: Abcess  -MF    Wound Image Images linked: 1  -JM      Dressing Appearance intact;moist drainage  optifoam only, no HFB or honey  -JM      Base granulating;moist;red;yellow;slough  thick biofilm  -JM      Periwound intact;dry;indurated;macerated;pale white  -JM      Periwound Temperature warm  -JM      Periwound Skin Turgor soft  -JM      Edges irregular  -JM      Wound Length (cm) 1.4 cm  -JM      Wound Width (cm) 1.5 cm  -JM      Wound Depth (cm) 0.4 cm  obscured by slough  -JM      Wound Surface Area (cm^2) 2.1 cm^2  -JM      Wound Volume (cm^3) 0.84 cm^3  -JM      Drainage Characteristics/Odor serosanguineous;yellow  -JM      Drainage Amount small  -JM      " Care, Wound cleansed with;wound cleanser;debrided;honey applied  -      Dressing Care dressing applied;antimicrobial agent applied;foam;border dressing  -      Periwound Care cleansed with pH balanced cleanser;dry periwound area maintained;barrier ointment applied;barrier film applied  nosting spray, zguard  -IVETH      Retired Wound - Properties Group Placement Date: 05/22/24  - Placement Time: 1045  -MF Side: Right  -MF Orientation: posterior  -MF Location: hip  -MF Primary Wound Type: Abcess  -MF    Retired Wound - Properties Group Date first assessed: 05/22/24  - Time first assessed: 1045  -MF Side: Right  -MF Location: hip  -MF Primary Wound Type: Abcess  -MF              User Key  (r) = Recorded By, (t) = Taken By, (c) = Cosigned By      Initials Name Provider Type    Lenard Iverson, PT Physical Therapist    Jhoana Hunter, PT Physical Therapist                      WOUND DEBRIDEMENT  Total area of Debridement: 2cmsq  Debridement Site 1  Location- Site 1: R buttock  Selective Debridement- Site 1: Wound Surface <20cmsq  Instruments- Site 1: tweezers  Excised Tissue Description- Site 1: moderate, slough, other (comment) (biofilm)  Bleeding- Site 1: scant              Therapy Education       Row Name 07/24/24 3797             Therapy Education    Education Details Add zguard to periwound skin, use only small amount of therahoney, continue to use HFBt with optifoam gentle  -IVETH      Given Bandaging/dressing change  -IVETH      Program Reinforced;Progressed  -IVETH      How Provided Verbal;Demonstration  -IVETH      Provided to Patient  -JM      Level of Understanding Teach back education performed;Verbalized  -IVETH                User Key  (r) = Recorded By, (t) = Taken By, (c) = Cosigned By      Initials Name Provider Type    Jhoana Hunter, PT Physical Therapist                    Recommendation and Plan   PT Assessment/Plan       Row Name 07/24/24 1996          PT Assessment    Functional  Limitations Impaired gait;Impaired locomotion;Other (comment)  wound management  -     Impairments Integumentary integrity  -     Assessment Comments Pt has met STG for decreased wound dimensions.  Pt still with thick biofilm buildup between txs requiring debridement.  Ongoing education on home dressing change use.  Pt will continue to benefit from skilled PT for wound debridement and advanced dressings.  Continue with POC.  -     Rehab Potential Good  -     Patient/caregiver participated in establishment of treatment plan and goals Yes  -     Patient would benefit from skilled therapy intervention Yes  -        PT Plan    PT Frequency 1x/week  -     Predicted Duration of Therapy Intervention (PT) 6-8 weeks  -     Planned CPT's? PT EDEL DEBRIDE OPEN WOUND UP TO 20 CM: 41388;PT NONSELECT DEBRIDE 15 MIN: 69392;PT SELF CARE/MGMT/TRAIN 15 MIN: 01806  -     Physical Therapy Interventions (Optional Details) patient/family education;wound care  -     PT Plan Comments debridement, dressings management  -               User Key  (r) = Recorded By, (t) = Taken By, (c) = Cosigned By      Initials Name Provider Type    Jhoana Hunter, PT Physical Therapist                    Goals   PT OP Goals       Row Name 07/24/24 1515          PT Short Term Goals    STG Date to Achieve 07/06/24  -     STG 1 Decrease R buttock wound size by 25% as evidence of wound healing.  -     STG 1 Progress Met  -     STG 2 Decrease slough to wound base to less than 50% to improve healing potential  -     STG 2 Progress Ongoing  -     STG 3 Pt and family able to change dressing at home with no issues / questions.  -     STG 3 Progress Progressing  -        Long Term Goals    LTG Date to Achieve 08/20/24  -     LTG 1 Decrease R buttock wound size by 75% as evidence of wound healing.  -     LTG 1 Progress Ongoing  -     LTG 2 Decrease slough to wound base to less than 10% to improve healing potential  -      LTG 2 Progress Ongoing  -        Time Calculation    PT Goal Re-Cert Due Date 08/20/24  -               User Key  (r) = Recorded By, (t) = Taken By, (c) = Cosigned By      Initials Name Provider Type    Jhoana Hunter, PT Physical Therapist                    PT Goal Re-Cert Due Date: 08/20/24  PT Short Term Goals  STG Date to Achieve: 07/06/24  STG 1: Decrease R buttock wound size by 25% as evidence of wound healing.  STG 1 Progress: Met  STG 2: Decrease slough to wound base to less than 50% to improve healing potential  STG 2 Progress: Ongoing  STG 3: Pt and family able to change dressing at home with no issues / questions.  STG 3 Progress: Progressing  Long Term Goals  LTG Date to Achieve: 08/20/24  LTG 1: Decrease R buttock wound size by 75% as evidence of wound healing.  LTG 1 Progress: Ongoing  LTG 2: Decrease slough to wound base to less than 10% to improve healing potential  LTG 2 Progress: Ongoing      Time Calculation: Start Time: 1515  Untimed Charges  88391-[id number] debridement: 25  Total Minutes  Untimed Charges Total Minutes: 25   Total Minutes: 25  Therapy Charges for Today       Code Description Service Date Service Provider Modifiers Qty    [id number] HC EDEL DEBRIDE OPEN WOUND UP TO 20CM 7/24/2024 Jhoana Espinoza, PT GP 1                    Jhoana Espinoza, PT  7/24/2024

## 2024-07-25 RX ORDER — FERROUS SULFATE 325(65) MG
1 TABLET ORAL
Qty: 30 TABLET | Refills: 5 | Status: SHIPPED | OUTPATIENT
Start: 2024-07-25

## 2024-07-26 ENCOUNTER — OFFICE VISIT (OUTPATIENT)
Dept: ONCOLOGY | Facility: CLINIC | Age: 64
End: 2024-07-26
Payer: MEDICARE

## 2024-07-26 VITALS
SYSTOLIC BLOOD PRESSURE: 153 MMHG | HEART RATE: 89 BPM | DIASTOLIC BLOOD PRESSURE: 77 MMHG | HEIGHT: 69 IN | BODY MASS INDEX: 39.99 KG/M2 | OXYGEN SATURATION: 97 % | WEIGHT: 270 LBS | TEMPERATURE: 97.6 F

## 2024-07-26 DIAGNOSIS — D50.8 OTHER IRON DEFICIENCY ANEMIA: ICD-10-CM

## 2024-07-26 DIAGNOSIS — C85.10 B-CELL LYMPHOMA, UNSPECIFIED B-CELL LYMPHOMA TYPE, UNSPECIFIED BODY REGION: Primary | ICD-10-CM

## 2024-07-26 DIAGNOSIS — K90.9 MALABSORPTION OF IRON: ICD-10-CM

## 2024-07-26 PROBLEM — D50.9 IRON DEFICIENCY ANEMIA: Status: ACTIVE | Noted: 2024-07-26

## 2024-07-26 PROCEDURE — 3077F SYST BP >= 140 MM HG: CPT | Performed by: INTERNAL MEDICINE

## 2024-07-26 PROCEDURE — 3078F DIAST BP <80 MM HG: CPT | Performed by: INTERNAL MEDICINE

## 2024-07-26 PROCEDURE — 99214 OFFICE O/P EST MOD 30 MIN: CPT | Performed by: INTERNAL MEDICINE

## 2024-07-26 PROCEDURE — 1126F AMNT PAIN NOTED NONE PRSNT: CPT | Performed by: INTERNAL MEDICINE

## 2024-07-26 RX ORDER — SODIUM CHLORIDE 9 MG/ML
20 INJECTION, SOLUTION INTRAVENOUS ONCE
OUTPATIENT
Start: 2024-08-02

## 2024-07-26 RX ORDER — SODIUM CHLORIDE 9 MG/ML
20 INJECTION, SOLUTION INTRAVENOUS ONCE
OUTPATIENT
Start: 2024-08-23

## 2024-07-26 RX ORDER — SODIUM CHLORIDE 9 MG/ML
20 INJECTION, SOLUTION INTRAVENOUS ONCE
Status: CANCELLED | OUTPATIENT
Start: 2024-09-20

## 2024-07-26 RX ORDER — ACETAMINOPHEN 325 MG/1
650 TABLET ORAL ONCE
OUTPATIENT
Start: 2024-08-09

## 2024-07-26 RX ORDER — ACETAMINOPHEN 325 MG/1
650 TABLET ORAL ONCE
OUTPATIENT
Start: 2024-08-30

## 2024-07-26 RX ORDER — ACETAMINOPHEN 325 MG/1
650 TABLET ORAL ONCE
OUTPATIENT
Start: 2024-09-06

## 2024-07-26 RX ORDER — SODIUM CHLORIDE 9 MG/ML
20 INJECTION, SOLUTION INTRAVENOUS ONCE
OUTPATIENT
Start: 2024-08-30

## 2024-07-26 RX ORDER — ACETAMINOPHEN 325 MG/1
650 TABLET ORAL ONCE
OUTPATIENT
Start: 2024-08-23

## 2024-07-26 RX ORDER — ACETAMINOPHEN 325 MG/1
650 TABLET ORAL ONCE
OUTPATIENT
Start: 2024-08-16

## 2024-07-26 RX ORDER — SODIUM CHLORIDE 9 MG/ML
20 INJECTION, SOLUTION INTRAVENOUS ONCE
OUTPATIENT
Start: 2024-08-16

## 2024-07-26 RX ORDER — ACETAMINOPHEN 325 MG/1
650 TABLET ORAL ONCE
OUTPATIENT
Start: 2024-08-02

## 2024-07-26 RX ORDER — SODIUM CHLORIDE 9 MG/ML
20 INJECTION, SOLUTION INTRAVENOUS ONCE
OUTPATIENT
Start: 2024-08-09

## 2024-07-26 RX ORDER — ACETAMINOPHEN 325 MG/1
650 TABLET ORAL ONCE
Status: CANCELLED | OUTPATIENT
Start: 2024-09-20

## 2024-07-26 RX ORDER — ACETAMINOPHEN 325 MG/1
650 TABLET ORAL ONCE
Status: CANCELLED | OUTPATIENT
Start: 2024-09-13

## 2024-07-26 RX ORDER — SODIUM CHLORIDE 9 MG/ML
20 INJECTION, SOLUTION INTRAVENOUS ONCE
Status: CANCELLED | OUTPATIENT
Start: 2024-09-13

## 2024-07-26 RX ORDER — SODIUM CHLORIDE 9 MG/ML
20 INJECTION, SOLUTION INTRAVENOUS ONCE
OUTPATIENT
Start: 2024-09-06

## 2024-07-26 NOTE — PROGRESS NOTES
Follow Up Office Visit      Date: 2024     Patient Name: Kiran Belcher  MRN: [id number]  : 1960  Referring Physician: Chay Cameron     Chief Complaint:  Follow-up for concerns for lymphoma     History of Present Illness: Kiran Belcher is a pleasant 61 y.o. male past medical history of right RCC status post right nephrectomy, hypertension, hyperlipidemia, anxiety, type 2 diabetes who presents today for evaluation of concerns for lymphoma. The patient is accompanied by their wife who contributes to the history of their care.  Patient underwent a right nephrectomy on 2019 for renal cell carcinoma.  He has been followed by nephrology and urology since.  On his most recent scans there is concerns for enlarging retroperitoneal adenopathy.  He underwent an FNA which showed predominant small lymphocytes with a minor population of atypical B cells concerning for possible CLL/SLL.  PET/CT showed no abnormal hypermetabolic activity.  He overall feels well.  Denies any unexplained fevers, chills, night sweats, weight loss.  States that someone did not tell him he had an issue, he would feel completely normal at this time     Interval History:  Presents to clinic for follow-up.  No major issues today.  Denies any worsening weakness or fatigue.  Still has some increased cold sensitivity but otherwise denies any unexplained fevers, chills, night sweats, weight loss    Oncology History:    Oncology/Hematology History    No history exists.       Subjective      Review of Systems:   Constitutional: Negative for fevers, chills, or weight loss  Eyes: Negative for blurred vision or discharge         Ear/Nose/Throat: Negative for difficulty swallowing, sore throat, LAD                                                       Respiratory: Negative for cough, SOA, wheezing                                                                                        Cardiovascular: Negative for chest pain or  palpitations                                                                  Gastrointestinal: Negative for nausea, vomiting or diarrhea                                                                     Genitourinary: Negative for dysuria or hematuria                                                                                           Musculoskeletal: Negative for any joint pains or muscle aches                                                                        Neurologic: Negative for any weakness, headaches, dizziness                                                                         Hematologic: Negative for any easy bleeding or bruising                                                                                   Psychiatric: Negative for anxiety or depression                          Past Medical History/Past Surgical History/ Family History/ Social History: Reviewed by me and unchanged from my previous documentation done on January 2024.     Medications:     Current Outpatient Medications:     FeroSul 325 (65 Fe) MG tablet, TAKE 1 TABLET BY MOUTH DAILY WITH BREAKFAST, Disp: 30 tablet, Rfl: 5    amLODIPine-benazepril (LOTREL) 10-20 MG per capsule, Take 1 capsule by mouth Daily., Disp: 90 capsule, Rfl: 3    ammonium lactate (AMLACTIN) 12 % cream, Apply 1 g topically to the appropriate area as directed As Needed for Dry Skin. Apply as directed , prescribed by podiatrist, Disp: , Rfl:     ampicillin (PRINCIPEN) 500 MG capsule, Take 1 capsule by mouth 4 (Four) Times a Day., Disp: 40 capsule, Rfl: 0    aspirin 81 MG chewable tablet, Chew 1 tablet Daily., Disp: , Rfl:     atorvastatin (LIPITOR) 10 MG tablet, Take 1 tablet by mouth Daily., Disp: 90 tablet, Rfl: 3    baclofen (LIORESAL) 20 MG tablet, Take 1 tablet by mouth 3 (Three) Times a Day., Disp: 90 tablet, Rfl: 11    carvedilol (COREG) 12.5 MG tablet, TAKE 1/2 TABLET BY MOUTH TWICE A DAY WITH MEALS., Disp: 90 tablet, Rfl: 3    citalopram  (CeleXA) 20 MG tablet, Take 1 tablet by mouth Daily., Disp: 90 tablet, Rfl: 3    collagenase (Santyl) 250 UNIT/GM ointment, Apply 1 Application topically to the appropriate area as directed Daily., Disp: 30 g, Rfl: 1    dantrolene (DANTRIUM) 100 MG capsule, Take 1 capsule by mouth 2 (Two) Times a Day., Disp: 180 capsule, Rfl: 3    Diclofenac Sodium (VOLTAREN) 1 % gel gel, Apply 4 g topically to the appropriate area as directed 4 (Four) Times a Day As Needed (right 4th finger pain)., Disp: 100 g, Rfl: 2    esomeprazole (nexIUM) 40 MG capsule, TAKE ONE CAPSULE BY MOUTH DAILY, Disp: 90 capsule, Rfl: 3    fesoterodine fumarate (TOVIAZ ER) 8 MG tablet sustained-release 24 hour tablet, Take 1 tablet by mouth Daily., Disp: , Rfl:     fexofenadine (ALLEGRA) 180 MG tablet, Take 1 tablet by mouth Daily., Disp: , Rfl:     fluocinonide (LIDEX) 0.05 % cream, fluocinonide 0.05 % topical cream, Disp: , Rfl:     fluticasone (FLONASE) 50 MCG/ACT nasal spray, 2 sprays into the nostril(s) as directed by provider Daily., Disp: 16 g, Rfl: 0    glucose blood (Accu-Chek Elke Plus) test strip, 1 each by Other route 2 (Two) Times a Day. Use as instructed, Disp: 200 each, Rfl: 3    glyburide (DIAbeta) 5 MG tablet, Take 1 tablet by mouth Daily With Breakfast., Disp: 90 tablet, Rfl: 3    lactobacillus acidophilus (RISAQUAD) capsule capsule, Take 1 capsule by mouth Daily., Disp: 90 capsule, Rfl: 1    metFORMIN ER (GLUCOPHAGE-XR) 500 MG 24 hr tablet, Take 2 tablets by mouth 2 (Two) Times a Day., Disp: 360 tablet, Rfl: 3    methenamine (HIPREX) 1 g tablet, Take 1 tablet by mouth 2 (Two) Times a Day With Meals., Disp: , Rfl:     Mirabegron ER (MYRBETRIQ) 50 MG tablet sustained-release 24 hour 24 hr tablet, Take 50 mg by mouth Daily., Disp: , Rfl:     Allergies:   Allergies   Allergen Reactions    Levofloxacin Unknown (See Comments)     tendinopathy    Cefuroxime Palpitations     Tachycardia, back pain, fatigue, and weakness.        Objective  "    Physical Exam:  Vital Signs:   Vitals:    07/26/24 1345   BP: 153/77   Pulse: 89   Temp: 97.6 °F (36.4 °C)   TempSrc: Temporal   SpO2: 97%   Weight: 122 kg (270 lb)  Comment: per pt   Height: 175.3 cm (69.02\")   PainSc: 0-No pain     Pain Score    07/26/24 1345   PainSc: 0-No pain     ECOG Performance Status: 0 - Asymptomatic    Constitutional: NAD, ECOG 0  Eyes: PERRLA, scleral anicteric  ENT: No LAD, no thyromegaly  Respiratory: CTAB, no wheezing, rales, rhonchi  Cardiovascular: RRR, no murmurs, pulses 2+ bilaterally  Abdomen: soft, NT/ND, no HSM  Musculoskeletal: strength 5/5 bilaterally, no c/c/e  Neurologic: A&O x 3, CN II-XII intact grossly    Results Review:   Hospital Outpatient Visit on 07/23/2024   Component Date Value Ref Range Status    Creatinine 07/23/2024 1.10  0.60 - 1.30 mg/dL Final    Serial Number: [id number]:  [id number]   Lab on 07/23/2024   Component Date Value Ref Range Status    Ferritin 07/23/2024 128.30  30.00 - 400.00 ng/mL Final    Iron 07/23/2024 31 (L)  59 - 158 mcg/dL Final    Iron Saturation (TSAT) 07/23/2024 13 (L)  20 - 50 % Final    Transferrin 07/23/2024 165 (L)  200 - 360 mg/dL Final    TIBC 07/23/2024 246 (L)  298 - 536 mcg/dL Final    Glucose 07/23/2024 173 (H)  65 - 99 mg/dL Final    BUN 07/23/2024 10  8 - 23 mg/dL Final    Creatinine 07/23/2024 1.03  0.76 - 1.27 mg/dL Final    Sodium 07/23/2024 138  136 - 145 mmol/L Final    Potassium 07/23/2024 3.8  3.5 - 5.2 mmol/L Final    Chloride 07/23/2024 100  98 - 107 mmol/L Final    CO2 07/23/2024 27.0  22.0 - 29.0 mmol/L Final    Calcium 07/23/2024 8.9  8.6 - 10.5 mg/dL Final    Total Protein 07/23/2024 7.1  6.0 - 8.5 g/dL Final    Albumin 07/23/2024 3.4 (L)  3.5 - 5.2 g/dL Final    ALT (SGPT) 07/23/2024 13  1 - 41 U/L Final    AST (SGOT) 07/23/2024 15  1 - 40 U/L Final    Alkaline Phosphatase 07/23/2024 104  39 - 117 U/L Final    Total Bilirubin 07/23/2024 0.2  0.0 - 1.2 mg/dL Final    Globulin 07/23/2024 3.7  gm/dL Final    " Calculated Result    A/G Ratio 07/23/2024 0.9  g/dL Final    BUN/Creatinine Ratio 07/23/2024 9.7  7.0 - 25.0 Final    Anion Gap 07/23/2024 11.0  5.0 - 15.0 mmol/L Final    eGFR 07/23/2024 82.6  >60.0 mL/min/1.73 Final    LDH 07/23/2024 108 (L)  135 - 225 U/L Final    WBC 07/23/2024 10.04  3.40 - 10.80 10*3/mm3 Final    RBC 07/23/2024 3.76 (L)  4.14 - 5.80 10*6/mm3 Final    Hemoglobin 07/23/2024 10.4 (L)  13.0 - 17.7 g/dL Final    Hematocrit 07/23/2024 33.0 (L)  37.5 - 51.0 % Final    MCV 07/23/2024 87.8  79.0 - 97.0 fL Final    MCH 07/23/2024 27.7  26.6 - 33.0 pg Final    MCHC 07/23/2024 31.5  31.5 - 35.7 g/dL Final    RDW 07/23/2024 14.8  12.3 - 15.4 % Final    RDW-SD 07/23/2024 47.9  37.0 - 54.0 fl Final    MPV 07/23/2024 9.4  6.0 - 12.0 fL Final    Platelets 07/23/2024 361  140 - 450 10*3/mm3 Final    Neutrophil % 07/23/2024 76.1 (H)  42.7 - 76.0 % Final    Lymphocyte % 07/23/2024 12.1 (L)  19.6 - 45.3 % Final    Monocyte % 07/23/2024 6.1  5.0 - 12.0 % Final    Eosinophil % 07/23/2024 5.4  0.3 - 6.2 % Final    Basophil % 07/23/2024 0.1  0.0 - 1.5 % Final    Immature Grans % 07/23/2024 0.2  0.0 - 0.5 % Final    Neutrophils, Absolute 07/23/2024 7.65 (H)  1.70 - 7.00 10*3/mm3 Final    Lymphocytes, Absolute 07/23/2024 1.21  0.70 - 3.10 10*3/mm3 Final    Monocytes, Absolute 07/23/2024 0.61  0.10 - 0.90 10*3/mm3 Final    Eosinophils, Absolute 07/23/2024 0.54 (H)  0.00 - 0.40 10*3/mm3 Final    Basophils, Absolute 07/23/2024 0.01  0.00 - 0.20 10*3/mm3 Final    Immature Grans, Absolute 07/23/2024 0.02  0.00 - 0.05 10*3/mm3 Final       CT Abdomen Pelvis With Contrast    Result Date: 7/26/2024  Narrative: CT CHEST W CONTRAST DIAGNOSTIC, CT ABDOMEN PELVIS W CONTRAST Date of Exam: 7/23/2024 2:11 PM EDT Indication: b-cell lymphoma. Comparison: CT chest abdomen pelvis 1/23/2024 Technique: Axial CT images were obtained of the chest, abdomen and pelvis after the uneventful intravenous administration of 85 mL Isovue-300.   Reconstructed coronal and sagittal images were also obtained. Automated exposure control and iterative construction methods were used. Findings: Chest Stable small left thyroid nodule. No supraclavicular adenopathy. Negative for thoracic aortic aneurysm. Mild to moderate calcified coronary atherosclerotic disease. Heart size stable. No pericardial effusion. Thoracic esophagus unremarkable. Normal caliber main pulmonary artery without large central filling defect to suggest embolism. Calcified low right paratracheal lymph node stable from prior. Small mediastinal lymph nodes stable from prior. No enlarging thoracic adenopathy, including axillary adenopathy. Expiratory phase of imaging. Small low-density bilateral pleural effusions increasing on the right and new on the left since prior comparison. Adjacent homogeneous consolidation suggesting atelectasis. No infectious appearing airspace process or suspicious pulmonary nodule. No pneumothorax. Mild diffuse body wall edema. No drainable collection. Degenerative changes noted throughout the spine without acute displaced fracture or aggressive lesion. Partially imaged cervical fusion hardware. Abdomen/pelvis Stable right cardiophrenic lymph node measuring 1.2 cm short axis. Stable size and contour of liver, including suspected mild hepatomegaly.. Suspect mild diffuse hepatic steatosis. No discrete liver lesion. Portal vasculature patent. Spleen is normal in size. Multiple gallstones without evidence of acute cholecystitis or biliary obstruction. Atrophic pancreas without active inflammation or drainable collection. No suspicious adrenal nodule. Right nephrectomy. No suspicious nodularity in the surgical bed. Normal size, contour and enhancement of the left kidney. No suspicious mass or hydronephrosis. Urinary bladder wall is diffusely thickened with surrounding inflammatory changes. Trace intraluminal gas. Mild prostamegaly with presumed underlying BPH related changes.  Expected configuration stomach and duodenum. No evidence of bowel obstruction or active inflammation. No CT evidence of acute appendicitis. Intra-abdominal atherosclerotic disease without abdominal aortic aneurysm. No significant ascites, free air or drainable collection. Slightly worsening pelvic adenopathy. For example there is a left distal periaortic lymph node measuring 1.8 x 2.5 cm previously 1.8 x 2.5 cm, 1.7 cm short axis left common iliac chain lymph node previously 1.6 cm, 3.8 x 2.2 cm right obturator lymph node  previously measuring 2.6 x 1.9 cm, 2.2 cm short axis right inguinal lymph node previously 1.9 cm, 2.1 cm left inguinal lymph node previously 1.7 cm. Mild diffuse body wall edema. Symmetric gluteal predominant sarcopenia. Degenerative elated changes throughout the spine without acute displaced fracture or aggressive lesion.     Impression: Impression: 1. Mildly enlarging pelvic adenopathy which could reflect disease progression in patient with history of lymphoma versus possible reactive etiology. No splenomegaly. No thoracic adenopathy. 2. Findings suspicious for volume overload/third spacing including new small bilateral pleural effusions and slightly worsening anasarca. 3. CT findings suggesting infectious/inflammatory cystitis. Underlying chronic urinary bladder outlet obstruction is possible in the setting of prostamegaly and presumed underlying BPH related changes. Correlate with urinalysis. 4. Cholelithiasis without evidence of acute cholecystitis. 5. Hepatic steatosis and hepatomegaly similar to prior. Electronically Signed: Kiran Wynn MD  7/26/2024 8:21 AM EDT  Workstation ID: [id number]    CT Chest With Contrast Diagnostic    Result Date: 7/26/2024  Narrative: CT CHEST W CONTRAST DIAGNOSTIC, CT ABDOMEN PELVIS W CONTRAST Date of Exam: 7/23/2024 2:11 PM EDT Indication: b-cell lymphoma. Comparison: CT chest abdomen pelvis 1/23/2024 Technique: Axial CT images were obtained of the chest,  abdomen and pelvis after the uneventful intravenous administration of 85 mL Isovue-300.  Reconstructed coronal and sagittal images were also obtained. Automated exposure control and iterative construction methods were used. Findings: Chest Stable small left thyroid nodule. No supraclavicular adenopathy. Negative for thoracic aortic aneurysm. Mild to moderate calcified coronary atherosclerotic disease. Heart size stable. No pericardial effusion. Thoracic esophagus unremarkable. Normal caliber main pulmonary artery without large central filling defect to suggest embolism. Calcified low right paratracheal lymph node stable from prior. Small mediastinal lymph nodes stable from prior. No enlarging thoracic adenopathy, including axillary adenopathy. Expiratory phase of imaging. Small low-density bilateral pleural effusions increasing on the right and new on the left since prior comparison. Adjacent homogeneous consolidation suggesting atelectasis. No infectious appearing airspace process or suspicious pulmonary nodule. No pneumothorax. Mild diffuse body wall edema. No drainable collection. Degenerative changes noted throughout the spine without acute displaced fracture or aggressive lesion. Partially imaged cervical fusion hardware. Abdomen/pelvis Stable right cardiophrenic lymph node measuring 1.2 cm short axis. Stable size and contour of liver, including suspected mild hepatomegaly.. Suspect mild diffuse hepatic steatosis. No discrete liver lesion. Portal vasculature patent. Spleen is normal in size. Multiple gallstones without evidence of acute cholecystitis or biliary obstruction. Atrophic pancreas without active inflammation or drainable collection. No suspicious adrenal nodule. Right nephrectomy. No suspicious nodularity in the surgical bed. Normal size, contour and enhancement of the left kidney. No suspicious mass or hydronephrosis. Urinary bladder wall is diffusely thickened with surrounding inflammatory changes.  Trace intraluminal gas. Mild prostamegaly with presumed underlying BPH related changes. Expected configuration stomach and duodenum. No evidence of bowel obstruction or active inflammation. No CT evidence of acute appendicitis. Intra-abdominal atherosclerotic disease without abdominal aortic aneurysm. No significant ascites, free air or drainable collection. Slightly worsening pelvic adenopathy. For example there is a left distal periaortic lymph node measuring 1.8 x 2.5 cm previously 1.8 x 2.5 cm, 1.7 cm short axis left common iliac chain lymph node previously 1.6 cm, 3.8 x 2.2 cm right obturator lymph node  previously measuring 2.6 x 1.9 cm, 2.2 cm short axis right inguinal lymph node previously 1.9 cm, 2.1 cm left inguinal lymph node previously 1.7 cm. Mild diffuse body wall edema. Symmetric gluteal predominant sarcopenia. Degenerative elated changes throughout the spine without acute displaced fracture or aggressive lesion.     Impression: Impression: 1. Mildly enlarging pelvic adenopathy which could reflect disease progression in patient with history of lymphoma versus possible reactive etiology. No splenomegaly. No thoracic adenopathy. 2. Findings suspicious for volume overload/third spacing including new small bilateral pleural effusions and slightly worsening anasarca. 3. CT findings suggesting infectious/inflammatory cystitis. Underlying chronic urinary bladder outlet obstruction is possible in the setting of prostamegaly and presumed underlying BPH related changes. Correlate with urinalysis. 4. Cholelithiasis without evidence of acute cholecystitis. 5. Hepatic steatosis and hepatomegaly similar to prior. Electronically Signed: Kiran Wynn MD  7/26/2024 8:21 AM EDT  Workstation ID: [id number]     Assessment / Plan      Assessment/Plan:   1. B-cell lymphoma, unspecified B-cell lymphoma type, unspecified body region (HCC) (Primary)  -Unclear etiology at this time  -Enlarging retroperitoneal adenopathy noted  on recent CT scan  -FNA showing predominately small lymphocytes with a minor population of atypical B cells  -PET/CT showing no abnormal hypermetabolic activity at [facility]  -Peripheral flow without an immunophenotypic abnormality  -SPEP within normal limits  -LDH mildly decreased  -Kappa/lambda light chain ratio within normal limits, beta-2 microglobulin mildly elevated at 3.4  -CT C/A/P in July 2022 with some mild increase in his retroperitoneal adenopathy along with concerns for cystitis  -Labs in October 2022 reviewed and within normal limits with no significant lymphocytosis or leukocytosis  -Continues to remain asymptomatic with no significant constitutional symptoms  -CT C/A/P in December 2022 reviewed without evidence of recurrent or metastatic disease.  Labs overall stable  -CBC in July 2023 overall stable.  LDH within normal limits  - Labs in January 2024 reviewed and overall stable.  LDH within normal limits  -CT C/A/P in January 2024 with no disease progression and stable adenopathy  -CT C/A/P in July 2024 reviewed and showing some slight increase in multiple lymph nodes but no significant progression.  Labs stable  -We will plan for repeat CT scans and labs in 6 months.  Ordered today.      2.  Right clear-cell renal cell carcinoma  -Status post nephrectomy in May 2019  -Has been without disease recurrence since     3.  Iron deficiency anemia/4.  Malabsorption of iron  -Iron studies in July 2023 consistent with mild iron deficiency anemia  -Started on oral iron and tolerating well  -Repeat hemoglobin and iron studies in July 2024 continue to show iron deficiency anemia  -As such, he is intolerant of oral iron  -Plan to transition to IV Ferrlecit x 6 weekly doses.  Ordered today         Follow Up:   Follow-up in 6 months     Jose Perdomo MD  Hematology and Oncology     Please note that portions of this note may have been completed with a voice recognition program. Efforts were made to edit the  dictations, but occasionally words are mistranscribed.

## 2024-08-02 ENCOUNTER — HOSPITAL ENCOUNTER (OUTPATIENT)
Facility: HOSPITAL | Age: 64
Discharge: HOME | End: 2024-08-02
Payer: MEDICARE

## 2024-08-02 VITALS
DIASTOLIC BLOOD PRESSURE: 62 MMHG | HEART RATE: 83 BPM | HEIGHT: 69 IN | TEMPERATURE: 97.7 F | SYSTOLIC BLOOD PRESSURE: 128 MMHG | BODY MASS INDEX: 39.99 KG/M2 | RESPIRATION RATE: 18 BRPM | WEIGHT: 270 LBS

## 2024-08-02 DIAGNOSIS — K90.9 MALABSORPTION OF IRON: ICD-10-CM

## 2024-08-02 DIAGNOSIS — D50.8 OTHER IRON DEFICIENCY ANEMIA: Primary | ICD-10-CM

## 2024-08-02 PROCEDURE — 96375 TX/PRO/DX INJ NEW DRUG ADDON: CPT

## 2024-08-02 PROCEDURE — 25010000002 NA FERRIC GLUC CPLX PER 12.5 MG: Performed by: INTERNAL MEDICINE

## 2024-08-02 PROCEDURE — 96365 THER/PROPH/DIAG IV INF INIT: CPT

## 2024-08-02 PROCEDURE — 25810000003 SODIUM CHLORIDE 0.9 % SOLUTION: Performed by: INTERNAL MEDICINE

## 2024-08-02 PROCEDURE — 25010000002 DIPHENHYDRAMINE PER 50 MG: Performed by: INTERNAL MEDICINE

## 2024-08-02 RX ORDER — SODIUM CHLORIDE 9 MG/ML
20 INJECTION, SOLUTION INTRAVENOUS ONCE
Status: COMPLETED | OUTPATIENT
Start: 2024-08-02 | End: 2024-08-02

## 2024-08-02 RX ORDER — ACETAMINOPHEN 325 MG/1
650 TABLET ORAL ONCE
Status: COMPLETED | OUTPATIENT
Start: 2024-08-02 | End: 2024-08-02

## 2024-08-02 RX ADMIN — DIPHENHYDRAMINE HYDROCHLORIDE 25 MG: 50 INJECTION, SOLUTION INTRAMUSCULAR; INTRAVENOUS at 12:08

## 2024-08-02 RX ADMIN — ACETAMINOPHEN 650 MG: 325 TABLET ORAL at 12:06

## 2024-08-02 RX ADMIN — SODIUM CHLORIDE 125 MG: 9 INJECTION, SOLUTION INTRAVENOUS at 12:54

## 2024-08-02 RX ADMIN — SODIUM CHLORIDE 20 ML/HR: 9 INJECTION, SOLUTION INTRAVENOUS at 12:02

## 2024-08-07 ENCOUNTER — HOSPITAL ENCOUNTER (OUTPATIENT)
Dept: PHYSICAL THERAPY | Facility: HOSPITAL | Age: 64
Setting detail: THERAPIES SERIES
Discharge: HOME | End: 2024-08-07
Payer: MEDICARE

## 2024-08-07 DIAGNOSIS — S31.819D OPEN WOUND OF RIGHT BUTTOCK, SUBSEQUENT ENCOUNTER: Primary | ICD-10-CM

## 2024-08-07 DIAGNOSIS — L02.31 CELLULITIS AND ABSCESS OF BUTTOCK: ICD-10-CM

## 2024-08-07 DIAGNOSIS — L03.317 CELLULITIS AND ABSCESS OF BUTTOCK: ICD-10-CM

## 2024-08-07 PROCEDURE — 97597 DBRDMT OPN WND 1ST 20 CM/<: CPT

## 2024-08-07 NOTE — THERAPY WOUND CARE TREATMENT
Outpatient Rehabilitation - Wound/Debridement Treatment Note   Brayan     Patient Name: Kiran Belcher  : 1960  MRN: [id number]  Today's Date: 2024                 Admit Date: 2024    Visit Dx:    ICD-10-CM ICD-9-CM   1. Open wound of right buttock, subsequent encounter  S31.819D V58.89     877.0   2. Cellulitis and abscess of buttock  L02.31 682.5    L03.317    R buttock wound:        Patient Active Problem List   Diagnosis    Benign essential hypertension    Depression    Gastroesophageal reflux disease without esophagitis    Hyperlipidemia    NISA on CPAP    Type 2 diabetes mellitus without complications    Spasm    Other obesity due to excess calories    Hx of spinal cord injury    Kidney disorder    Lower extremity weakness    Myalgia    Moderate episode of recurrent major depressive disorder    Dysphagia    Toenail avulsion    H/O kidney removal    Renal cell carcinoma of right kidney    Quadriplegia    Muscle contracture    C5-C7 level spinal injury with complete lesion of spinal cord, without evidence of spinal bone injury    Inability to bear weight    Neurogenic bladder    Venous stasis    B-cell lymphoma    Cancer of kidney    Hypertension    Neutrophilic leukemoid reaction    Personal history of Methicillin resistant Staphylococcus aureus infection    Paraplegia    Other constipation    Iron deficiency anemia    Malabsorption of iron        Past Medical History:   Diagnosis Date    B-cell lymphoma 2022    Undergoing work-up with [facility]    Cancer     hx spot on right kidney, for ~8 years, told cancer but no hx biopsy and no interventions and just watching it. Dr. vega used to watch this cyst, then started with [facility] a year ago after he retired.    Decreased mobility     Depression     Diabetes mellitus     Esophageal reflux     Glaucoma     History of methicillin resistant Staphylococcus aureus infection     History of obstructive sleep apnea     History of quadriplegia      "of unknown etiology - C5-C7, incomplete     History of spinal cord injury/quadriplegia 2008 09/14/2018    Hyperlipidemia     Hypertension     Kidney disorder 9/14/2018    Paraplegia         Past Surgical History:   Procedure Laterality Date    CERVICAL DISC SURGERY      around 5/2008, fall/injury tripped over a safety gate for their dogs, reported SCI \"semi quadraplegic\" since. [facility]. [facility] after.    SPINE SURGERY           EVALUATION   PT Ortho       Row Name 08/07/24 1515       Subjective    Subjective Comments No complaints, no issues with home dressing changes.  -       Subjective Pain    Able to rate subjective pain? yes  -JM    Pre-Treatment Pain Level 0  -JM    Post-Treatment Pain Level 0  -    Subjective Pain Comment f  -       Transfers    Comment, (Transfers) remained seated in transport chair, leaned left with UE support for wound access.  -              User Key  (r) = Recorded By, (t) = Taken By, (c) = Cosigned By      Initials Name Provider Type    Jhoana Hunter, PT Physical Therapist                     [facility] Wound       Row Name 08/07/24 1515             Wound 05/22/24 1045 Right posterior hip Abcess    Wound - Properties Group Placement Date: 05/22/24  -MF Placement Time: 1045  -MF Side: Right  - Orientation: posterior  -MF Location: hip  -MF Primary Wound Type: Abcess  -MF    Wound Image Images linked: 2  -JM      Dressing Appearance intact;moist drainage  -      Base granulating;moist;red;yellow;slough  expanding skin bridge, min biofilm buildup  -      Periwound intact;dry;indurated;macerated;pale white  -      Periwound Temperature warm  -      Periwound Skin Turgor soft  -      Edges irregular  -      Wound Length (cm) 1 cm  -JM      Wound Width (cm) 1.2 cm  -JM      Wound Depth (cm) 0.3 cm  -JM      Wound Surface Area (cm^2) 1.2 cm^2  -JM      Wound Volume (cm^3) 0.36 cm^3  -JM      Drainage Characteristics/Odor serosanguineous;yellow  -JM      Drainage Amount " "small  -      Care, Wound cleansed with;wound cleanser;debrided;honey applied  vashe soak  -      Dressing Care dressing applied;antimicrobial agent applied;foam;border dressing  marquis, KEONBt, 6\" optifoam  -      Periwound Care barrier film applied;barrier ointment applied;cleansed with pH balanced cleanser;dry periwound area maintained  nosting, zguard  -      Retired Wound - Properties Group Placement Date: 05/22/24  - Placement Time: 1045  -MF Side: Right  -MF Orientation: posterior  -MF Location: hip  -MF Primary Wound Type: Abcess  -MF    Retired Wound - Properties Group Date first assessed: 05/22/24  - Time first assessed: 1045  -MF Side: Right  -MF Location: hip  -MF Primary Wound Type: Abcess  -MF              User Key  (r) = Recorded By, (t) = Taken By, (c) = Cosigned By      Initials Name Provider Type    Lenard Iverson, PT Physical Therapist    Jhoana Hunter, PT Physical Therapist                      WOUND DEBRIDEMENT  Total area of Debridement: 2cmsq  Debridement Site 1  Location- Site 1: R buttock  Selective Debridement- Site 1: Wound Surface <20cmsq  Instruments- Site 1: tweezers, #15, scapel  Excised Tissue Description- Site 1: moderate, slough, other (comment) (biofilm)  Bleeding- Site 1: scant, held pressure, 1 minute              Therapy Education       Row Name 08/07/24 9565             Therapy Education    Education Details Continue current home dressing changes as previously instructed.  -JM      Given Bandaging/dressing change  -JM      Program Reinforced;Progressed  -IVETH      How Provided Verbal;Demonstration  -JM      Provided to Patient  -JM      Level of Understanding Teach back education performed;Verbalized  -                User Key  (r) = Recorded By, (t) = Taken By, (c) = Cosigned By      Initials Name Provider Type    Jhoana Hunter, PT Physical Therapist                    Recommendation and Plan   PT Assessment/Plan       Row Name 08/07/24 " 1515          PT Assessment    Functional Limitations Impaired gait;Impaired locomotion;Other (comment)  wound management  -     Impairments Integumentary integrity  -     Assessment Comments Pt continuing to improve with decreased R buttock wound dimensions and expanding skin bridge formation.  Pt still having biofilm buildup requiring debridement.  Continue with current POC>  -     Rehab Potential Good  -     Patient/caregiver participated in establishment of treatment plan and goals Yes  -     Patient would benefit from skilled therapy intervention Yes  -        PT Plan    PT Frequency Other (comment)  every 2 weeks  -     Physical Therapy Interventions (Optional Details) patient/family education;wound care  -     PT Plan Comments debridement, dressings  -               User Key  (r) = Recorded By, (t) = Taken By, (c) = Cosigned By      Initials Name Provider Type    Jhoana Hunter, PT Physical Therapist                    Goals   PT OP Goals       Row Name 08/07/24 1515          Time Calculation    PT Goal Re-Cert Due Date 08/20/24  -               User Key  (r) = Recorded By, (t) = Taken By, (c) = Cosigned By      Initials Name Provider Type    Jhoana Hunter, PT Physical Therapist                    PT Goal Re-Cert Due Date: 08/20/24            Time Calculation: Start Time: 1515  Untimed Charges  78062-[id number] debridement: 20  Total Minutes  Untimed Charges Total Minutes: 20   Total Minutes: 20  Therapy Charges for Today       Code Description Service Date Service Provider Modifiers Qty    [id number] HC EDEL DEBRIDE OPEN WOUND UP TO 20CM 8/7/2024 Jhoana Espinoza, PT GP 1                    Jhoana Espinoza, PT  8/7/2024

## 2024-08-09 ENCOUNTER — HOSPITAL ENCOUNTER (OUTPATIENT)
Facility: HOSPITAL | Age: 64
Discharge: HOME | End: 2024-08-09
Payer: MEDICARE

## 2024-08-09 VITALS
SYSTOLIC BLOOD PRESSURE: 134 MMHG | HEIGHT: 69 IN | BODY MASS INDEX: 39.99 KG/M2 | DIASTOLIC BLOOD PRESSURE: 73 MMHG | RESPIRATION RATE: 16 BRPM | TEMPERATURE: 98 F | HEART RATE: 89 BPM | WEIGHT: 270 LBS

## 2024-08-09 DIAGNOSIS — D50.8 OTHER IRON DEFICIENCY ANEMIA: Primary | ICD-10-CM

## 2024-08-09 DIAGNOSIS — K90.9 MALABSORPTION OF IRON: ICD-10-CM

## 2024-08-09 PROCEDURE — 25810000003 SODIUM CHLORIDE 0.9 % SOLUTION: Performed by: INTERNAL MEDICINE

## 2024-08-09 PROCEDURE — 96375 TX/PRO/DX INJ NEW DRUG ADDON: CPT

## 2024-08-09 PROCEDURE — 96365 THER/PROPH/DIAG IV INF INIT: CPT

## 2024-08-09 PROCEDURE — 25010000002 DIPHENHYDRAMINE PER 50 MG: Performed by: INTERNAL MEDICINE

## 2024-08-09 PROCEDURE — 25010000002 NA FERRIC GLUC CPLX PER 12.5 MG: Performed by: INTERNAL MEDICINE

## 2024-08-09 RX ORDER — ACETAMINOPHEN 325 MG/1
650 TABLET ORAL ONCE
Status: COMPLETED | OUTPATIENT
Start: 2024-08-09 | End: 2024-08-09

## 2024-08-09 RX ORDER — SODIUM CHLORIDE 9 MG/ML
20 INJECTION, SOLUTION INTRAVENOUS ONCE
Status: COMPLETED | OUTPATIENT
Start: 2024-08-09 | End: 2024-08-09

## 2024-08-09 RX ADMIN — DIPHENHYDRAMINE HYDROCHLORIDE 25 MG: 50 INJECTION, SOLUTION INTRAMUSCULAR; INTRAVENOUS at 11:38

## 2024-08-09 RX ADMIN — SODIUM CHLORIDE 125 MG: 9 INJECTION, SOLUTION INTRAVENOUS at 12:30

## 2024-08-09 RX ADMIN — ACETAMINOPHEN 650 MG: 325 TABLET ORAL at 11:36

## 2024-08-09 RX ADMIN — SODIUM CHLORIDE 20 ML/HR: 9 INJECTION, SOLUTION INTRAVENOUS at 11:33

## 2024-08-16 ENCOUNTER — HOSPITAL ENCOUNTER (OUTPATIENT)
Facility: HOSPITAL | Age: 64
Discharge: HOME | End: 2024-08-16
Payer: MEDICARE

## 2024-08-16 VITALS
WEIGHT: 270 LBS | SYSTOLIC BLOOD PRESSURE: 133 MMHG | HEIGHT: 69 IN | BODY MASS INDEX: 39.99 KG/M2 | DIASTOLIC BLOOD PRESSURE: 65 MMHG | TEMPERATURE: 97.6 F | RESPIRATION RATE: 16 BRPM | HEART RATE: 91 BPM

## 2024-08-16 DIAGNOSIS — D50.8 OTHER IRON DEFICIENCY ANEMIA: Primary | ICD-10-CM

## 2024-08-16 DIAGNOSIS — K90.9 MALABSORPTION OF IRON: ICD-10-CM

## 2024-08-16 PROCEDURE — 96375 TX/PRO/DX INJ NEW DRUG ADDON: CPT

## 2024-08-16 PROCEDURE — 25010000002 NA FERRIC GLUC CPLX PER 12.5 MG: Performed by: INTERNAL MEDICINE

## 2024-08-16 PROCEDURE — 25010000002 DIPHENHYDRAMINE PER 50 MG: Performed by: INTERNAL MEDICINE

## 2024-08-16 PROCEDURE — 96365 THER/PROPH/DIAG IV INF INIT: CPT

## 2024-08-16 PROCEDURE — 25810000003 SODIUM CHLORIDE 0.9 % SOLUTION: Performed by: INTERNAL MEDICINE

## 2024-08-16 RX ORDER — SODIUM CHLORIDE 9 MG/ML
20 INJECTION, SOLUTION INTRAVENOUS ONCE
Status: COMPLETED | OUTPATIENT
Start: 2024-08-16 | End: 2024-08-16

## 2024-08-16 RX ORDER — ACETAMINOPHEN 325 MG/1
650 TABLET ORAL ONCE
Status: COMPLETED | OUTPATIENT
Start: 2024-08-16 | End: 2024-08-16

## 2024-08-16 RX ADMIN — SODIUM CHLORIDE 125 MG: 9 INJECTION, SOLUTION INTRAVENOUS at 12:44

## 2024-08-16 RX ADMIN — DIPHENHYDRAMINE HYDROCHLORIDE 25 MG: 50 INJECTION, SOLUTION INTRAMUSCULAR; INTRAVENOUS at 11:42

## 2024-08-16 RX ADMIN — SODIUM CHLORIDE 20 ML/HR: 9 INJECTION, SOLUTION INTRAVENOUS at 11:42

## 2024-08-16 RX ADMIN — ACETAMINOPHEN 650 MG: 325 TABLET ORAL at 11:38

## 2024-08-20 ENCOUNTER — OFFICE VISIT (OUTPATIENT)
Dept: FAMILY MEDICINE CLINIC | Facility: CLINIC | Age: 64
End: 2024-08-20
Payer: MEDICARE

## 2024-08-20 VITALS
DIASTOLIC BLOOD PRESSURE: 60 MMHG | TEMPERATURE: 97.5 F | SYSTOLIC BLOOD PRESSURE: 120 MMHG | HEART RATE: 86 BPM | HEIGHT: 69 IN | OXYGEN SATURATION: 98 % | BODY MASS INDEX: 39.85 KG/M2

## 2024-08-20 DIAGNOSIS — R05.9 COUGH, UNSPECIFIED TYPE: ICD-10-CM

## 2024-08-20 DIAGNOSIS — J40 BRONCHITIS: ICD-10-CM

## 2024-08-20 DIAGNOSIS — J32.0 MAXILLARY SINUSITIS, UNSPECIFIED CHRONICITY: Primary | ICD-10-CM

## 2024-08-20 LAB
EXPIRATION DATE: NORMAL
EXPIRATION DATE: NORMAL
FLUAV AG NPH QL: NEGATIVE
FLUBV AG NPH QL: NEGATIVE
INTERNAL CONTROL: NORMAL
INTERNAL CONTROL: NORMAL
Lab: NORMAL
Lab: NORMAL
SARS-COV-2 AG UPPER RESP QL IA.RAPID: NOT DETECTED

## 2024-08-20 PROCEDURE — 3074F SYST BP LT 130 MM HG: CPT | Performed by: FAMILY MEDICINE

## 2024-08-20 PROCEDURE — 87804 INFLUENZA ASSAY W/OPTIC: CPT | Performed by: FAMILY MEDICINE

## 2024-08-20 PROCEDURE — 87426 SARSCOV CORONAVIRUS AG IA: CPT | Performed by: FAMILY MEDICINE

## 2024-08-20 PROCEDURE — 1126F AMNT PAIN NOTED NONE PRSNT: CPT | Performed by: FAMILY MEDICINE

## 2024-08-20 PROCEDURE — 1159F MED LIST DOCD IN RCRD: CPT | Performed by: FAMILY MEDICINE

## 2024-08-20 PROCEDURE — 99214 OFFICE O/P EST MOD 30 MIN: CPT | Performed by: FAMILY MEDICINE

## 2024-08-20 PROCEDURE — 3078F DIAST BP <80 MM HG: CPT | Performed by: FAMILY MEDICINE

## 2024-08-20 PROCEDURE — 1160F RVW MEDS BY RX/DR IN RCRD: CPT | Performed by: FAMILY MEDICINE

## 2024-08-20 RX ORDER — BENZONATATE 100 MG/1
100 CAPSULE ORAL 3 TIMES DAILY PRN
Qty: 21 CAPSULE | Refills: 0 | Status: SHIPPED | OUTPATIENT
Start: 2024-08-20

## 2024-08-20 RX ORDER — AMOXICILLIN AND CLAVULANATE POTASSIUM 875; 125 MG/1; MG/1
1 TABLET, FILM COATED ORAL 2 TIMES DAILY
Qty: 20 TABLET | Refills: 0 | Status: SHIPPED | OUTPATIENT
Start: 2024-08-20 | End: 2024-08-30

## 2024-08-20 NOTE — PROGRESS NOTES
Follow Up Office Visit      Date: 2024   Patient Name: Kiran Belcher  : 1960   MRN: [id number]     Chief Complaint:    Chief Complaint   Patient presents with    Nasal Congestion    Cough     Night cough is worst when lying down. Has been going on 3 weeks.       History of Present Illness: Kiran Belcher is a 63 y.o. male who presents today for evaluation of cough and drainage.  Sees Dr. Cameron for PCP.    Reports nasal congestion and cough.  Cough is worse at night and when lying down.  Notes this is been going on for 3 weeks.    Has used 2 bottles of cough syrup.            Subjective      Review of Systems:   Review of Systems   HENT:  Positive for congestion and sinus pressure (maxillary).    Respiratory:  Positive for cough.    Gastrointestinal:  Negative for diarrhea, nausea and vomiting.   Neurological:  Negative for seizures and syncope.       I have reviewed the patients family history, social history, past medical history, past surgical history and have updated it as appropriate.     Medications:     Current Outpatient Medications:     amLODIPine-benazepril (LOTREL) 10-20 MG per capsule, Take 1 capsule by mouth Daily., Disp: 90 capsule, Rfl: 3    ammonium lactate (AMLACTIN) 12 % cream, Apply 1 g topically to the appropriate area as directed As Needed for Dry Skin. Apply as directed , prescribed by podiatrist, Disp: , Rfl:     aspirin 81 MG chewable tablet, Chew 1 tablet Daily., Disp: , Rfl:     atorvastatin (LIPITOR) 10 MG tablet, Take 1 tablet by mouth Daily., Disp: 90 tablet, Rfl: 3    baclofen (LIORESAL) 20 MG tablet, Take 1 tablet by mouth 3 (Three) Times a Day., Disp: 90 tablet, Rfl: 11    carvedilol (COREG) 12.5 MG tablet, TAKE 1/2 TABLET BY MOUTH TWICE A DAY WITH MEALS., Disp: 90 tablet, Rfl: 3    citalopram (CeleXA) 20 MG tablet, Take 1 tablet by mouth Daily., Disp: 90 tablet, Rfl: 3    collagenase (Santyl) 250 UNIT/GM ointment, Apply 1 Application topically to the  appropriate area as directed Daily., Disp: 30 g, Rfl: 1    dantrolene (DANTRIUM) 100 MG capsule, Take 1 capsule by mouth 2 (Two) Times a Day., Disp: 180 capsule, Rfl: 3    Diclofenac Sodium (VOLTAREN) 1 % gel gel, Apply 4 g topically to the appropriate area as directed 4 (Four) Times a Day As Needed (right 4th finger pain)., Disp: 100 g, Rfl: 2    esomeprazole (nexIUM) 40 MG capsule, TAKE ONE CAPSULE BY MOUTH DAILY, Disp: 90 capsule, Rfl: 3    FeroSul 325 (65 Fe) MG tablet, TAKE 1 TABLET BY MOUTH DAILY WITH BREAKFAST (Patient not taking: Reported on 8/20/2024), Disp: 30 tablet, Rfl: 5    fesoterodine fumarate (TOVIAZ ER) 8 MG tablet sustained-release 24 hour tablet, Take 1 tablet by mouth Daily., Disp: , Rfl:     fexofenadine (ALLEGRA) 180 MG tablet, Take 1 tablet by mouth Daily., Disp: , Rfl:     fluocinonide (LIDEX) 0.05 % cream, fluocinonide 0.05 % topical cream, Disp: , Rfl:     fluticasone (FLONASE) 50 MCG/ACT nasal spray, 2 sprays into the nostril(s) as directed by provider Daily., Disp: 16 g, Rfl: 0    glucose blood (Accu-Chek Elke Plus) test strip, 1 each by Other route 2 (Two) Times a Day. Use as instructed, Disp: 200 each, Rfl: 3    glyburide (DIAbeta) 5 MG tablet, Take 1 tablet by mouth Daily With Breakfast., Disp: 90 tablet, Rfl: 3    lactobacillus acidophilus (RISAQUAD) capsule capsule, Take 1 capsule by mouth Daily., Disp: 90 capsule, Rfl: 1    metFORMIN ER (GLUCOPHAGE-XR) 500 MG 24 hr tablet, Take 2 tablets by mouth 2 (Two) Times a Day., Disp: 360 tablet, Rfl: 3    methenamine (HIPREX) 1 g tablet, Take 1 tablet by mouth 2 (Two) Times a Day With Meals., Disp: , Rfl:     Mirabegron ER (MYRBETRIQ) 50 MG tablet sustained-release 24 hour 24 hr tablet, Take 50 mg by mouth Daily., Disp: , Rfl:     amoxicillin-clavulanate (AUGMENTIN) 875-125 MG per tablet, Take 1 tablet by mouth 2 (Two) Times a Day for 10 days., Disp: 20 tablet, Rfl: 0    benzonatate (Tessalon Perles) 100 MG capsule, Take 1 capsule by mouth  "3 (Three) Times a Day As Needed for Cough., Disp: 21 capsule, Rfl: 0    Allergies:   Allergies   Allergen Reactions    Levofloxacin Unknown (See Comments)     tendinopathy    Cefuroxime Palpitations     Tachycardia, back pain, fatigue, and weakness.        Objective     Physical Exam: Please see above  Vital Signs:   Vitals:    08/20/24 1316   BP: 120/60   Pulse: 86   Temp: 97.5 °F (36.4 °C)   TempSrc: Temporal   SpO2: 98%   Weight: Comment: Weight cannot stand for weight   Height: 175.3 cm (69.02\")   PainSc: 0-No pain     Body mass index is 39.85 kg/m².          Physical Exam  Vitals and nursing note reviewed.   Constitutional:       Appearance: Normal appearance.      Comments: In power wheelchair.   HENT:      Head: Normocephalic and atraumatic.   Neck:      Vascular: No carotid bruit.   Cardiovascular:      Rate and Rhythm: Normal rate and regular rhythm.      Heart sounds: Normal heart sounds. No murmur heard.  Pulmonary:      Effort: Pulmonary effort is normal.      Comments: coarse  Abdominal:      General: Bowel sounds are normal.      Palpations: Abdomen is soft. There is no mass.      Tenderness: There is no abdominal tenderness.   Musculoskeletal:      Right lower leg: No edema.      Left lower leg: No edema.   Skin:     Coloration: Skin is not jaundiced or pale.      Findings: No erythema.   Neurological:      Mental Status: He is alert. Mental status is at baseline.   Psychiatric:         Mood and Affect: Mood normal.         Behavior: Behavior normal.         Procedures    Results:   Labs:   Hemoglobin A1C   Date Value Ref Range Status   05/20/2024 6.9 (A) 4.5 - 5.7 % Final   09/14/2018 7.00 (H) 4.80 - 5.60 % Final     TSH   Date Value Ref Range Status   06/18/2021 2.250 0.450 - 4.500 uIU/mL Final   04/12/2019 1.470 0.270 - 4.200 mIU/mL Final        POCT Results (if applicable):   Results for orders placed or performed during the hospital encounter of 07/23/24   POC Creatinine    Specimen: Blood "   Result Value Ref Range    Creatinine 1.10 0.60 - 1.30 mg/dL       Imaging:   No valid procedures specified.     Measures:   Advanced Care Planning:   Patient does not have an advance directive, declines further assistance.    Smoking Cessation:   Does not smoke      Assessment / Plan      Assessment/Plan:     1. Cough, unspecified type  Influenza A/B testing, as well as COVID antigen testing all negative in office today.  Will prescribe Tessalon for cough.  Antibiotic as below.  - POC Influenza A / B  - POCT SARS-CoV-2 Antigen  - benzonatate (Tessalon Perles) 100 MG capsule; Take 1 capsule by mouth 3 (Three) Times a Day As Needed for Cough.  Dispense: 21 capsule; Refill: 0    2. Bronchitis  Augmentin course.      - amoxicillin-clavulanate (AUGMENTIN) 875-125 MG per tablet; Take 1 tablet by mouth 2 (Two) Times a Day for 10 days.  Dispense: 20 tablet; Refill: 0    3. Maxillary sinusitis, unspecified chronicity  Augmentin course.  Patient to continue Allegra daily.    - amoxicillin-clavulanate (AUGMENTIN) 875-125 MG per tablet; Take 1 tablet by mouth 2 (Two) Times a Day for 10 days.  Dispense: 20 tablet; Refill: 0          Part of this note may be an electronic transcription/translation of spoken language to printed text using the Dragon Dictation System.      Vaccine Counseling:      Follow Up:   Return if symptoms worsen or fail to improve, for or as scheduled with pcp..      DO VIANEY Watson

## 2024-08-20 NOTE — PATIENT INSTRUCTIONS
Antibiotic as ordered.  Tessalon as ordered for cough.  Continue Allegra.  Increase fluids.  Tylenol if needed for fever.

## 2024-08-21 ENCOUNTER — HOSPITAL ENCOUNTER (OUTPATIENT)
Dept: PHYSICAL THERAPY | Facility: HOSPITAL | Age: 64
Setting detail: THERAPIES SERIES
Discharge: HOME | End: 2024-08-21
Payer: MEDICARE

## 2024-08-21 DIAGNOSIS — S31.819D OPEN WOUND OF RIGHT BUTTOCK, SUBSEQUENT ENCOUNTER: Primary | ICD-10-CM

## 2024-08-21 DIAGNOSIS — L02.31 CELLULITIS AND ABSCESS OF BUTTOCK: ICD-10-CM

## 2024-08-21 DIAGNOSIS — L03.317 CELLULITIS AND ABSCESS OF BUTTOCK: ICD-10-CM

## 2024-08-21 PROCEDURE — 97597 DBRDMT OPN WND 1ST 20 CM/<: CPT

## 2024-08-21 NOTE — THERAPY PROGRESS REPORT/RE-CERT
Outpatient Rehabilitation - Wound/Debridement Progress Note   Brayan     Patient Name: Kiran Belcher  : 1960  MRN: [id number]  Today's Date: 2024                 Admit Date: 2024    Visit Dx:    ICD-10-CM ICD-9-CM   1. Open wound of right buttock, subsequent encounter  S31.819D V58.89     877.0   2. Cellulitis and abscess of buttock  L02.31 682.5    L03.317        Patient Active Problem List   Diagnosis    Benign essential hypertension    Depression    Gastroesophageal reflux disease without esophagitis    Hyperlipidemia    NISA on CPAP    Type 2 diabetes mellitus without complications    Spasm    Other obesity due to excess calories    Hx of spinal cord injury    Kidney disorder    Lower extremity weakness    Myalgia    Moderate episode of recurrent major depressive disorder    Dysphagia    Toenail avulsion    H/O kidney removal    Renal cell carcinoma of right kidney    Quadriplegia    Muscle contracture    C5-C7 level spinal injury with complete lesion of spinal cord, without evidence of spinal bone injury    Inability to bear weight    Neurogenic bladder    Venous stasis    B-cell lymphoma    Cancer of kidney    Hypertension    Neutrophilic leukemoid reaction    Personal history of Methicillin resistant Staphylococcus aureus infection    Paraplegia    Other constipation    Iron deficiency anemia    Malabsorption of iron        Past Medical History:   Diagnosis Date    B-cell lymphoma 2022    Undergoing work-up with [facility]    Cancer     hx spot on right kidney, for ~8 years, told cancer but no hx biopsy and no interventions and just watching it. Dr. vega used to watch this cyst, then started with  urology a year ago after he retired.    Decreased mobility     Depression     Diabetes mellitus     Esophageal reflux     Glaucoma     History of methicillin resistant Staphylococcus aureus infection     History of obstructive sleep apnea     History of quadriplegia     of unknown etiology  "- C5-C7, incomplete     History of spinal cord injury/quadriplegia 2008 09/14/2018    Hyperlipidemia     Hypertension     Kidney disorder 9/14/2018    Paraplegia         Past Surgical History:   Procedure Laterality Date    CERVICAL DISC SURGERY      around 5/2008, fall/injury tripped over a safety gate for their dogs, reported SCI \"semi quadraplegic\" since. [facility]. [facility] after.    SPINE SURGERY           EVALUATION   PT Ortho       Row Name 08/21/24 1500       Subjective    Subjective Comments No complaints or changes.  -MC       Subjective Pain    Able to rate subjective pain? yes  -MC    Pre-Treatment Pain Level 0  -MC    Post-Treatment Pain Level 0  -MC       Transfers    Comment, (Transfers) remained seated for tx, leaned over onto treatment table for access  -              User Key  (r) = Recorded By, (t) = Taken By, (c) = Cosigned By      Initials Name Provider Type    Mindy Barragan, PT Physical Therapist                     [facility] Wound       Row Name 08/21/24 1500             Wound 05/22/24 1045 Right posterior hip Abcess    Wound - Properties Group Placement Date: 05/22/24  -MF Placement Time: 1045  -MF Side: Right  -MF Orientation: posterior  -MF Location: hip  -MF Primary Wound Type: Abcess  -MF    Wound Image Images linked: 1  -      Dressing Appearance intact;moist drainage  -      Base granulating;moist;red;yellow;slough  small area of slough, mostly granulated with new epithelialization  -      Periwound intact;dry;indurated;macerated;pale white  -      Periwound Temperature warm  -      Periwound Skin Turgor soft  -      Edges irregular  -      Wound Length (cm) 1.1 cm  -      Wound Width (cm) 1.5 cm  includes all open areas  -      Wound Surface Area (cm^2) 1.65 cm^2  -      Drainage Characteristics/Odor serosanguineous;yellow  -      Drainage Amount small  -      Care, Wound cleansed with;wound cleanser;debrided;honey applied  -      Dressing Care dressing " "applied;antimicrobial agent applied;foam;low-adherent;border dressing  honey, HFBt, 6\" optifoam  -      Periwound Care cleansed with pH balanced cleanser;barrier film applied;barrier ointment applied  NoSting, ZGuard  [facility]      Retired Wound - Properties Group Placement Date: 05/22/24  - Placement Time: 1045  -MF Side: Right  -MF Orientation: posterior  -MF Location: hip  -MF Primary Wound Type: Abcess  -    Retired Wound - Properties Group Date first assessed: 05/22/24  - Time first assessed: 1045  -MF Side: Right  -MF Location: hip  -MF Primary Wound Type: Abcess  -MF              User Key  (r) = Recorded By, (t) = Taken By, (c) = Cosigned By      Initials Name Provider Type    Lenard Iverson, PT Physical Therapist    Mindy Barragan, PT Physical Therapist                      WOUND DEBRIDEMENT  Total area of Debridement: 1 cm2  Debridement Site 1  Location- Site 1: R buttock  Selective Debridement- Site 1: Wound Surface <20cmsq  Instruments- Site 1: tweezers  Excised Tissue Description- Site 1: maximum, slough  Bleeding- Site 1: none              Therapy Education       Row Name 08/21/24 1500             Therapy Education    Education Details Continue current POC  -      Given Bandaging/dressing change  -      Program Reinforced  -      How Provided Verbal;Demonstration  -MC      Provided to Patient  -      Level of Understanding Teach back education performed;Verbalized  -                User Key  (r) = Recorded By, (t) = Taken By, (c) = Cosigned By      Initials Name Provider Type    Mindy Barragan, PT Physical Therapist                    Recommendation and Plan   PT Assessment/Plan       Row Name 08/21/24 1500          PT Assessment    Functional Limitations Impaired gait;Impaired locomotion;Other (comment)  wound management  -     Impairments Integumentary integrity  -     Rehab Potential Good  -     Patient/caregiver participated in establishment of treatment plan " and goals Yes  -     Patient would benefit from skilled therapy intervention Yes  -        PT Plan    PT Frequency Other (comment)  every 2 weeks  -     Predicted Duration of Therapy Intervention (PT) 4 visits  -     Planned CPT's? PT SELF CARE/MGMT/TRAIN 15 MIN: 41442;PT NONSELECT DEBRIDE 15 MIN: 77775;PT EDEL DEBRIDE OPEN WOUND UP TO 20 CM: 47144  -     Physical Therapy Interventions (Optional Details) wound care;patient/family education  -     PT Plan Comments debridement, dressings  -               User Key  (r) = Recorded By, (t) = Taken By, (c) = Cosigned By      Initials Name Provider Type    Mindy Barragan, PT Physical Therapist                    Goals   PT OP Goals       Row Name 08/21/24 1500          PT Short Term Goals    STG Date to Achieve 07/06/24  -     STG 1 Decrease R buttock wound size by 25% as evidence of wound healing.  -     STG 1 Progress Met  -     STG 2 Decrease slough to wound base to less than 50% to improve healing potential  -     STG 2 Progress Met  -     STG 3 Pt and family able to change dressing at home with no issues / questions.  -     STG 3 Progress Met  -        Long Term Goals    LTG Date to Achieve 08/20/24  -     LTG 1 Decrease R buttock wound size by 75% as evidence of wound healing.  -     LTG 1 Progress Progressing  -     LTG 2 Decrease slough to wound base to less than 10% to improve healing potential  -     LTG 2 Progress Progressing  -        Time Calculation    PT Goal Re-Cert Due Date 11/19/24  -               User Key  (r) = Recorded By, (t) = Taken By, (c) = Cosigned By      Initials Name Provider Type    Mindy Barragan PT Physical Therapist                    PT Goal Re-Cert Due Date: 11/19/24  PT Short Term Goals  STG Date to Achieve: 07/06/24  STG 1: Decrease R buttock wound size by 25% as evidence of wound healing.  STG 1 Progress: Met  STG 2: Decrease slough to wound base to less than 50% to improve healing  potential  STG 2 Progress: Met  STG 3: Pt and family able to change dressing at home with no issues / questions.  STG 3 Progress: Met  Long Term Goals  LTG Date to Achieve: 08/20/24  LTG 1: Decrease R buttock wound size by 75% as evidence of wound healing.  LTG 1 Progress: Progressing  LTG 2: Decrease slough to wound base to less than 10% to improve healing potential  LTG 2 Progress: Progressing      Time Calculation: Start Time: 1430  Untimed Charges  90403-[id number] debridement: 20  Total Minutes  Untimed Charges Total Minutes: 20   Total Minutes: 20              Mindy Olivo, PT  8/21/2024

## 2024-08-23 ENCOUNTER — HOSPITAL ENCOUNTER (OUTPATIENT)
Facility: HOSPITAL | Age: 64
Discharge: HOME | End: 2024-08-23
Payer: MEDICARE

## 2024-08-23 VITALS
HEIGHT: 69 IN | DIASTOLIC BLOOD PRESSURE: 84 MMHG | SYSTOLIC BLOOD PRESSURE: 147 MMHG | RESPIRATION RATE: 16 BRPM | WEIGHT: 270 LBS | TEMPERATURE: 98.8 F | BODY MASS INDEX: 39.99 KG/M2 | HEART RATE: 80 BPM

## 2024-08-23 DIAGNOSIS — D50.8 OTHER IRON DEFICIENCY ANEMIA: Primary | ICD-10-CM

## 2024-08-23 DIAGNOSIS — K90.9 MALABSORPTION OF IRON: ICD-10-CM

## 2024-08-23 PROCEDURE — 25010000002 DIPHENHYDRAMINE PER 50 MG: Performed by: INTERNAL MEDICINE

## 2024-08-23 PROCEDURE — 25810000003 SODIUM CHLORIDE 0.9 % SOLUTION: Performed by: INTERNAL MEDICINE

## 2024-08-23 PROCEDURE — 96375 TX/PRO/DX INJ NEW DRUG ADDON: CPT

## 2024-08-23 PROCEDURE — 25010000002 NA FERRIC GLUC CPLX PER 12.5 MG: Performed by: INTERNAL MEDICINE

## 2024-08-23 PROCEDURE — 96365 THER/PROPH/DIAG IV INF INIT: CPT

## 2024-08-23 RX ORDER — SODIUM CHLORIDE 9 MG/ML
20 INJECTION, SOLUTION INTRAVENOUS ONCE
Status: COMPLETED | OUTPATIENT
Start: 2024-08-23 | End: 2024-08-23

## 2024-08-23 RX ORDER — ACETAMINOPHEN 325 MG/1
650 TABLET ORAL ONCE
Status: COMPLETED | OUTPATIENT
Start: 2024-08-23 | End: 2024-08-23

## 2024-08-23 RX ADMIN — ACETAMINOPHEN 650 MG: 325 TABLET ORAL at 11:30

## 2024-08-23 RX ADMIN — DIPHENHYDRAMINE HYDROCHLORIDE 25 MG: 50 INJECTION, SOLUTION INTRAMUSCULAR; INTRAVENOUS at 11:34

## 2024-08-23 RX ADMIN — SODIUM CHLORIDE 20 ML/HR: 9 INJECTION, SOLUTION INTRAVENOUS at 11:32

## 2024-08-23 RX ADMIN — SODIUM CHLORIDE 125 MG: 9 INJECTION, SOLUTION INTRAVENOUS at 12:27

## 2024-08-26 ENCOUNTER — OFFICE VISIT (OUTPATIENT)
Dept: FAMILY MEDICINE CLINIC | Facility: CLINIC | Age: 64
End: 2024-08-26
Payer: MEDICARE

## 2024-08-26 VITALS
HEART RATE: 85 BPM | RESPIRATION RATE: 22 BRPM | SYSTOLIC BLOOD PRESSURE: 126 MMHG | TEMPERATURE: 98.2 F | DIASTOLIC BLOOD PRESSURE: 70 MMHG | OXYGEN SATURATION: 95 %

## 2024-08-26 DIAGNOSIS — R05.1 ACUTE COUGH: Primary | ICD-10-CM

## 2024-08-26 PROCEDURE — 99213 OFFICE O/P EST LOW 20 MIN: CPT | Performed by: STUDENT IN AN ORGANIZED HEALTH CARE EDUCATION/TRAINING PROGRAM

## 2024-08-26 PROCEDURE — 87798 DETECT AGENT NOS DNA AMP: CPT | Performed by: STUDENT IN AN ORGANIZED HEALTH CARE EDUCATION/TRAINING PROGRAM

## 2024-08-26 PROCEDURE — 3074F SYST BP LT 130 MM HG: CPT | Performed by: STUDENT IN AN ORGANIZED HEALTH CARE EDUCATION/TRAINING PROGRAM

## 2024-08-26 PROCEDURE — 1126F AMNT PAIN NOTED NONE PRSNT: CPT | Performed by: STUDENT IN AN ORGANIZED HEALTH CARE EDUCATION/TRAINING PROGRAM

## 2024-08-26 PROCEDURE — 3044F HG A1C LEVEL LT 7.0%: CPT | Performed by: STUDENT IN AN ORGANIZED HEALTH CARE EDUCATION/TRAINING PROGRAM

## 2024-08-26 PROCEDURE — 3078F DIAST BP <80 MM HG: CPT | Performed by: STUDENT IN AN ORGANIZED HEALTH CARE EDUCATION/TRAINING PROGRAM

## 2024-08-26 RX ORDER — DEXTROMETHORPHAN HYDROBROMIDE AND PROMETHAZINE HYDROCHLORIDE 15; 6.25 MG/5ML; MG/5ML
5 SYRUP ORAL
Qty: 118 ML | Refills: 0 | Status: SHIPPED | OUTPATIENT
Start: 2024-08-26

## 2024-08-27 NOTE — PROGRESS NOTES
Established Patient Office Visit        Subjective      Chief Complaint:  Cough (Cough for three weeks, every now and then gets congested, tested neg for covid and neg for flu, tessalon pearls aren't helping cough, shortness of air with coughing episodes.)      History of Present Illness: Kiran Belcher is a 63 y.o. male who presents for 3 weeks of cough.  He tested negative for flu and COVID less than a week ago.  He is completing a course of Augmentin.  Has some pretty significant coughing spells with shortness of air with this but breathing okay in between no recent fevers      Patient Active Problem List   Diagnosis    Benign essential hypertension    Depression    Gastroesophageal reflux disease without esophagitis    Hyperlipidemia    NISA on CPAP    Type 2 diabetes mellitus without complications    Spasm    Other obesity due to excess calories    Hx of spinal cord injury    Kidney disorder    Lower extremity weakness    Myalgia    Moderate episode of recurrent major depressive disorder    Dysphagia    Toenail avulsion    H/O kidney removal    Renal cell carcinoma of right kidney    Quadriplegia    Muscle contracture    C5-C7 level spinal injury with complete lesion of spinal cord, without evidence of spinal bone injury    Inability to bear weight    Neurogenic bladder    Venous stasis    B-cell lymphoma    Cancer of kidney    Hypertension    Neutrophilic leukemoid reaction    Personal history of Methicillin resistant Staphylococcus aureus infection    Paraplegia    Other constipation    Iron deficiency anemia    Malabsorption of iron         Current Outpatient Medications:     amLODIPine-benazepril (LOTREL) 10-20 MG per capsule, Take 1 capsule by mouth Daily., Disp: 90 capsule, Rfl: 3    ammonium lactate (AMLACTIN) 12 % cream, Apply 1 g topically to the appropriate area as directed As Needed for Dry Skin. Apply as directed , prescribed by podiatrist, Disp: , Rfl:     amoxicillin-clavulanate (AUGMENTIN)  875-125 MG per tablet, Take 1 tablet by mouth 2 (Two) Times a Day for 10 days., Disp: 20 tablet, Rfl: 0    aspirin 81 MG chewable tablet, Chew 1 tablet Daily., Disp: , Rfl:     atorvastatin (LIPITOR) 10 MG tablet, Take 1 tablet by mouth Daily., Disp: 90 tablet, Rfl: 3    baclofen (LIORESAL) 20 MG tablet, Take 1 tablet by mouth 3 (Three) Times a Day., Disp: 90 tablet, Rfl: 11    benzonatate (Tessalon Perles) 100 MG capsule, Take 1 capsule by mouth 3 (Three) Times a Day As Needed for Cough., Disp: 21 capsule, Rfl: 0    carvedilol (COREG) 12.5 MG tablet, TAKE 1/2 TABLET BY MOUTH TWICE A DAY WITH MEALS., Disp: 90 tablet, Rfl: 3    citalopram (CeleXA) 20 MG tablet, Take 1 tablet by mouth Daily., Disp: 90 tablet, Rfl: 3    collagenase (Santyl) 250 UNIT/GM ointment, Apply 1 Application topically to the appropriate area as directed Daily., Disp: 30 g, Rfl: 1    dantrolene (DANTRIUM) 100 MG capsule, Take 1 capsule by mouth 2 (Two) Times a Day., Disp: 180 capsule, Rfl: 3    Diclofenac Sodium (VOLTAREN) 1 % gel gel, Apply 4 g topically to the appropriate area as directed 4 (Four) Times a Day As Needed (right 4th finger pain)., Disp: 100 g, Rfl: 2    esomeprazole (nexIUM) 40 MG capsule, TAKE ONE CAPSULE BY MOUTH DAILY, Disp: 90 capsule, Rfl: 3    FeroSul 325 (65 Fe) MG tablet, TAKE 1 TABLET BY MOUTH DAILY WITH BREAKFAST, Disp: 30 tablet, Rfl: 5    fesoterodine fumarate (TOVIAZ ER) 8 MG tablet sustained-release 24 hour tablet, Take 1 tablet by mouth Daily., Disp: , Rfl:     fexofenadine (ALLEGRA) 180 MG tablet, Take 1 tablet by mouth Daily., Disp: , Rfl:     fluocinonide (LIDEX) 0.05 % cream, fluocinonide 0.05 % topical cream, Disp: , Rfl:     fluticasone (FLONASE) 50 MCG/ACT nasal spray, 2 sprays into the nostril(s) as directed by provider Daily., Disp: 16 g, Rfl: 0    glucose blood (Accu-Chek Elke Plus) test strip, 1 each by Other route 2 (Two) Times a Day. Use as instructed, Disp: 200 each, Rfl: 3    glyburide (DIAbeta) 5 MG  tablet, Take 1 tablet by mouth Daily With Breakfast., Disp: 90 tablet, Rfl: 3    lactobacillus acidophilus (RISAQUAD) capsule capsule, Take 1 capsule by mouth Daily., Disp: 90 capsule, Rfl: 1    metFORMIN ER (GLUCOPHAGE-XR) 500 MG 24 hr tablet, Take 2 tablets by mouth 2 (Two) Times a Day., Disp: 360 tablet, Rfl: 3    methenamine (HIPREX) 1 g tablet, Take 1 tablet by mouth 2 (Two) Times a Day With Meals., Disp: , Rfl:     Mirabegron ER (MYRBETRIQ) 50 MG tablet sustained-release 24 hour 24 hr tablet, Take 50 mg by mouth Daily., Disp: , Rfl:     promethazine-dextromethorphan (PROMETHAZINE-DM) 6.25-15 MG/5ML syrup, Take 5 mL by mouth every night at bedtime., Disp: 118 mL, Rfl: 0       Objective     Physical Exam:   Vital Signs:   /70 (BP Location: Left arm, Patient Position: Sitting, Cuff Size: Adult)   Pulse 85   Temp 98.2 °F (36.8 °C) (Temporal)   Resp 22   SpO2 95%      Physical Exam  Constitutional:       General: He is not in acute distress.     Appearance: He is not ill-appearing.   Cardiovascular:      Rate and Rhythm: Normal rate and regular rhythm.   Pulmonary:      Effort: Pulmonary effort is normal.      Breath sounds: Normal breath sounds.   Neurological:      Mental Status: He is alert.   Psychiatric:         Thought Content: Thought content normal.   Weakness to upper and lower extremities  Patient in wheelchair         Assessment / Plan      Assessment/Plan:   Diagnoses and all orders for this visit:    1. Acute cough (Primary)  -     Bordetella Pertussis PCR - Swab, Nasopharynx; Future  -     promethazine-dextromethorphan (PROMETHAZINE-DM) 6.25-15 MG/5ML syrup; Take 5 mL by mouth every night at bedtime.  Dispense: 118 mL; Refill: 0  -     Bordetella Pertussis PCR - Swab, Nasopharynx       Continue oral Augmentin, test for Bordetella although could be postviral cough.  Follow-up for worsening or lack of improvement    Follow Up:   No follow-ups on file.    MDM:     Chay Cameron MD  [facility] [place]

## 2024-08-30 ENCOUNTER — HOSPITAL ENCOUNTER (OUTPATIENT)
Facility: HOSPITAL | Age: 64
Discharge: HOME | End: 2024-08-30
Payer: MEDICARE

## 2024-08-30 VITALS
HEIGHT: 69 IN | WEIGHT: 270 LBS | RESPIRATION RATE: 16 BRPM | HEART RATE: 96 BPM | SYSTOLIC BLOOD PRESSURE: 165 MMHG | TEMPERATURE: 98.8 F | BODY MASS INDEX: 39.99 KG/M2 | DIASTOLIC BLOOD PRESSURE: 81 MMHG

## 2024-08-30 DIAGNOSIS — K90.9 MALABSORPTION OF IRON: ICD-10-CM

## 2024-08-30 DIAGNOSIS — D50.8 OTHER IRON DEFICIENCY ANEMIA: Primary | ICD-10-CM

## 2024-08-30 DIAGNOSIS — E11.9 TYPE 2 DIABETES MELLITUS WITHOUT COMPLICATION, WITHOUT LONG-TERM CURRENT USE OF INSULIN: ICD-10-CM

## 2024-08-30 LAB
B PARAPERT DNA UPPER RESP QL NAA+PROBE: NEGATIVE
B PERT DNA UPPER RESP QL NAA+PROBE: NEGATIVE

## 2024-08-30 PROCEDURE — 25010000002 NA FERRIC GLUC CPLX PER 12.5 MG: Performed by: INTERNAL MEDICINE

## 2024-08-30 PROCEDURE — 96375 TX/PRO/DX INJ NEW DRUG ADDON: CPT

## 2024-08-30 PROCEDURE — 25010000002 DIPHENHYDRAMINE PER 50 MG: Performed by: INTERNAL MEDICINE

## 2024-08-30 PROCEDURE — 25810000003 SODIUM CHLORIDE 0.9 % SOLUTION: Performed by: INTERNAL MEDICINE

## 2024-08-30 PROCEDURE — 96365 THER/PROPH/DIAG IV INF INIT: CPT

## 2024-08-30 RX ORDER — ACETAMINOPHEN 325 MG/1
650 TABLET ORAL ONCE
Status: COMPLETED | OUTPATIENT
Start: 2024-08-30 | End: 2024-08-30

## 2024-08-30 RX ORDER — METFORMIN HCL 500 MG
1000 TABLET, EXTENDED RELEASE 24 HR ORAL 2 TIMES DAILY
Qty: 360 TABLET | Refills: 3 | Status: SHIPPED | OUTPATIENT
Start: 2024-08-30

## 2024-08-30 RX ORDER — SODIUM CHLORIDE 9 MG/ML
20 INJECTION, SOLUTION INTRAVENOUS ONCE
Status: COMPLETED | OUTPATIENT
Start: 2024-08-30 | End: 2024-08-30

## 2024-08-30 RX ADMIN — DIPHENHYDRAMINE HYDROCHLORIDE 25 MG: 50 INJECTION, SOLUTION INTRAMUSCULAR; INTRAVENOUS at 11:38

## 2024-08-30 RX ADMIN — ACETAMINOPHEN 650 MG: 325 TABLET ORAL at 11:36

## 2024-08-30 RX ADMIN — SODIUM CHLORIDE 20 ML/HR: 9 INJECTION, SOLUTION INTRAVENOUS at 11:38

## 2024-08-30 RX ADMIN — SODIUM CHLORIDE 125 MG: 9 INJECTION, SOLUTION INTRAVENOUS at 12:31

## 2024-09-04 ENCOUNTER — HOSPITAL ENCOUNTER (OUTPATIENT)
Dept: PHYSICAL THERAPY | Facility: HOSPITAL | Age: 64
Setting detail: THERAPIES SERIES
Discharge: HOME | End: 2024-09-04
Payer: MEDICARE

## 2024-09-04 DIAGNOSIS — S31.819D OPEN WOUND OF RIGHT BUTTOCK, SUBSEQUENT ENCOUNTER: Primary | ICD-10-CM

## 2024-09-04 DIAGNOSIS — L03.317 CELLULITIS AND ABSCESS OF BUTTOCK: ICD-10-CM

## 2024-09-04 DIAGNOSIS — L02.31 CELLULITIS AND ABSCESS OF BUTTOCK: ICD-10-CM

## 2024-09-04 PROCEDURE — 97597 DBRDMT OPN WND 1ST 20 CM/<: CPT

## 2024-09-04 NOTE — THERAPY WOUND CARE TREATMENT
Outpatient Rehabilitation - Wound/Debridement Treatment Note   [facility]     Patient Name: Kiran Belcher  : 1960  MRN: [id number]  Today's Date: 2024                 Admit Date: 2024    Visit Dx:    ICD-10-CM ICD-9-CM   1. Open wound of right buttock, subsequent encounter  S31.819D V58.89     877.0   2. Cellulitis and abscess of buttock  L02.31 682.5    L03.317      R glute      Patient Active Problem List   Diagnosis    Benign essential hypertension    Depression    Gastroesophageal reflux disease without esophagitis    Hyperlipidemia    NISA on CPAP    Type 2 diabetes mellitus without complications    Spasm    Other obesity due to excess calories    Hx of spinal cord injury    Kidney disorder    Lower extremity weakness    Myalgia    Moderate episode of recurrent major depressive disorder    Dysphagia    Toenail avulsion    H/O kidney removal    Renal cell carcinoma of right kidney    Quadriplegia    Muscle contracture    C5-C7 level spinal injury with complete lesion of spinal cord, without evidence of spinal bone injury    Inability to bear weight    Neurogenic bladder    Venous stasis    B-cell lymphoma    Cancer of kidney    Hypertension    Neutrophilic leukemoid reaction    Personal history of Methicillin resistant Staphylococcus aureus infection    Paraplegia    Other constipation    Iron deficiency anemia    Malabsorption of iron        Past Medical History:   Diagnosis Date    B-cell lymphoma 2022    Undergoing work-up with [facility]    Cancer     hx spot on right kidney, for ~8 years, told cancer but no hx biopsy and no interventions and just watching it. Dr. vega used to watch this cyst, then started with  urology a year ago after he retired.    Decreased mobility     Depression     Diabetes mellitus     Esophageal reflux     Glaucoma     History of methicillin resistant Staphylococcus aureus infection     History of obstructive sleep apnea     History of quadriplegia     of  "unknown etiology - C5-C7, incomplete     History of spinal cord injury/quadriplegia 2008 09/14/2018    Hyperlipidemia     Hypertension     Kidney disorder 9/14/2018    Paraplegia         Past Surgical History:   Procedure Laterality Date    CERVICAL DISC SURGERY      around 5/2008, fall/injury tripped over a safety gate for their dogs, reported SCI \"semi quadraplegic\" since. [facility]. [facility] after.    SPINE SURGERY           EVALUATION   PT Ortho       Row Name 09/04/24 1400       Subjective    Subjective Comments Reports he thinks he might be beginning to get a UTI, however has not been to the doctor yet. No new issues with the R glute wound.  -       Subjective Pain    Able to rate subjective pain? yes  -    Pre-Treatment Pain Level 0  -    Post-Treatment Pain Level 0  -       Transfers    Comment, (Transfers) remained seated for tx, leaned over onto treatment table for access  -              User Key  (r) = Recorded By, (t) = Taken By, (c) = Cosigned By      Initials Name Provider Type     Norman Vasquez, PT Physical Therapist                     LDA Wound       Row Name 09/04/24 1400             Wound 05/22/24 1045 Right posterior hip Abcess    Wound - Properties Group Placement Date: 05/22/24  -MF Placement Time: 1045  -MF Side: Right  - Orientation: posterior  -MF Location: hip  -MF Primary Wound Type: Abcess  -MF    Wound Image Images linked: 1  -      Dressing Appearance intact;no drainage  6\" optifoam only  -      Base granulating;moist;red;yellow;slough  thick slough initially prior to debridement, mixed slough/granulation after.  -      Periwound intact;dry;indurated;macerated;pale white  -      Periwound Temperature warm  -      Periwound Skin Turgor soft  -      Edges irregular  -      Wound Length (cm) 1.6 cm  -      Wound Width (cm) 0.8 cm  -      Wound Depth (cm) --  partially obscured  -      Wound Surface Area (cm^2) 1.28 cm^2  -      Drainage " "Characteristics/Odor serosanguineous;yellow  -      Drainage Amount small  -      Care, Wound cleansed with;soap and water;wound cleanser;debrided;honey applied  -      Dressing Care dressing applied;antimicrobial agent applied;foam;low-adherent;border dressing  [place], 6\" optifoam  -      Periwound Care cleansed with pH balanced cleanser;dry periwound area maintained;barrier ointment applied;other (see comments)  zguard  -      Retired Wound - Properties Group Placement Date: 05/22/24  - Placement Time: 1045 -MF Side: Right  - Orientation: posterior  - Location: hip  -MF Primary Wound Type: Abcess  -    Retired Wound - Properties Group Date first assessed: 05/22/24  - Time first assessed: 1045 -MF Side: Right  - Location: hip  -MF Primary Wound Type: Abcess  -              User Key  (r) = Recorded By, (t) = Taken By, (c) = Cosigned By      Initials Name Provider Type     Lenard Grijalva, PT Physical Therapist     Norman Vasquez, PT Physical Therapist                      WOUND DEBRIDEMENT  Total area of Debridement: 1cm2  Debridement Site 1  Location- Site 1: R buttock  Selective Debridement- Site 1: Wound Surface <20cmsq  Instruments- Site 1: tweezers  Excised Tissue Description- Site 1: moderate, slough  Bleeding- Site 1: scant, held pressure, 1 minute              Therapy Education       Row Name 09/04/24 1500             Therapy Education    Education Details Continue current POC.  -      Given Bandaging/dressing change  -      Program Reinforced  -      How Provided Verbal;Demonstration  -      Provided to Patient  -      Level of Understanding Teach back education performed;Verbalized  -                User Key  (r) = Recorded By, (t) = Taken By, (c) = Cosigned By      Initials Name Provider Type     Norman Vasquez, PT Physical Therapist                    Recommendation and Plan   PT Assessment/Plan       Row Name 09/04/24 1400          PT " "Assessment    Functional Limitations Impaired gait;Impaired locomotion;Other (comment)  wound management  -     Impairments Integumentary integrity  -     Assessment Comments Pt initially presenting with thick, yellow slough buildup at wound base, which is likely related to pt with no therahoney or HFBt in place this date as pt with only 6\" optifoam. Following debridement of necrotic tissue, pt with a majority granulation tissue at wound base and a small amount of adherent slough at the depth. Pt would continue to benefit from further debridement and advanced wound dressing management to promote wound healing.  -     Rehab Potential Good  -     Patient/caregiver participated in establishment of treatment plan and goals Yes  -     Patient would benefit from skilled therapy intervention Yes  -        PT Plan    PT Frequency Other (comment)  every other week.  -     Physical Therapy Interventions (Optional Details) wound care;patient/family education  -     PT Plan Comments debridement, dressings  -               User Key  (r) = Recorded By, (t) = Taken By, (c) = Cosigned By      Initials Name Provider Type     Norman Vasquez, PT Physical Therapist                    Goals   PT OP Goals       Row Name 09/04/24 1501          Time Calculation    PT Goal Re-Cert Due Date 11/19/24  -               User Key  (r) = Recorded By, (t) = Taken By, (c) = Cosigned By      Initials Name Provider Type     Norman Vasquez, PT Physical Therapist                    PT Goal Re-Cert Due Date: 11/19/24            Time Calculation: Start Time: 1430  Untimed Charges  35438-[id number] debridement: 20  Total Minutes  Untimed Charges Total Minutes: 20   Total Minutes: 20  Therapy Charges for Today       Code Description Service Date Service Provider Modifiers Qty    60315826637  EDEL DEBRIDE OPEN WOUND UP TO 20CM 9/4/2024 Norman Vasquez, PT GP 1                    Norman Vasquez PT  9/4/2024     "

## 2024-09-06 ENCOUNTER — HOSPITAL ENCOUNTER (OUTPATIENT)
Facility: HOSPITAL | Age: 64
Discharge: HOME | End: 2024-09-06
Payer: MEDICARE

## 2024-09-06 VITALS
TEMPERATURE: 98.1 F | SYSTOLIC BLOOD PRESSURE: 149 MMHG | HEART RATE: 91 BPM | WEIGHT: 270 LBS | DIASTOLIC BLOOD PRESSURE: 68 MMHG | HEIGHT: 69 IN | BODY MASS INDEX: 39.99 KG/M2 | RESPIRATION RATE: 18 BRPM

## 2024-09-06 DIAGNOSIS — K90.9 MALABSORPTION OF IRON: ICD-10-CM

## 2024-09-06 DIAGNOSIS — D50.8 OTHER IRON DEFICIENCY ANEMIA: Primary | ICD-10-CM

## 2024-09-06 PROCEDURE — 96365 THER/PROPH/DIAG IV INF INIT: CPT

## 2024-09-06 PROCEDURE — 96375 TX/PRO/DX INJ NEW DRUG ADDON: CPT

## 2024-09-06 PROCEDURE — 25010000002 NA FERRIC GLUC CPLX PER 12.5 MG: Performed by: INTERNAL MEDICINE

## 2024-09-06 PROCEDURE — 25810000003 SODIUM CHLORIDE 0.9 % SOLUTION: Performed by: INTERNAL MEDICINE

## 2024-09-06 PROCEDURE — 25010000002 DIPHENHYDRAMINE PER 50 MG: Performed by: INTERNAL MEDICINE

## 2024-09-06 RX ORDER — ACETAMINOPHEN 325 MG/1
650 TABLET ORAL ONCE
Status: COMPLETED | OUTPATIENT
Start: 2024-09-06 | End: 2024-09-06

## 2024-09-06 RX ORDER — SODIUM CHLORIDE 9 MG/ML
20 INJECTION, SOLUTION INTRAVENOUS ONCE
Status: COMPLETED | OUTPATIENT
Start: 2024-09-06 | End: 2024-09-06

## 2024-09-06 RX ADMIN — SODIUM CHLORIDE 20 ML/HR: 9 INJECTION, SOLUTION INTRAVENOUS at 11:52

## 2024-09-06 RX ADMIN — DIPHENHYDRAMINE HYDROCHLORIDE 25 MG: 50 INJECTION, SOLUTION INTRAMUSCULAR; INTRAVENOUS at 11:53

## 2024-09-06 RX ADMIN — ACETAMINOPHEN 650 MG: 325 TABLET ORAL at 11:52

## 2024-09-06 RX ADMIN — SODIUM CHLORIDE 125 MG: 9 INJECTION, SOLUTION INTRAVENOUS at 12:44

## 2024-09-09 ENCOUNTER — TELEPHONE (OUTPATIENT)
Dept: FAMILY MEDICINE CLINIC | Facility: CLINIC | Age: 64
End: 2024-09-09
Payer: MEDICARE

## 2024-09-10 ENCOUNTER — TELEPHONE (OUTPATIENT)
Dept: CASE MANAGEMENT | Facility: OTHER | Age: 64
End: 2024-09-10
Payer: MEDICARE

## 2024-09-17 ENCOUNTER — TELEPHONE (OUTPATIENT)
Dept: CASE MANAGEMENT | Facility: OTHER | Age: 64
End: 2024-09-17
Payer: MEDICARE

## 2024-09-18 ENCOUNTER — HOSPITAL ENCOUNTER (OUTPATIENT)
Dept: PHYSICAL THERAPY | Facility: HOSPITAL | Age: 64
Setting detail: THERAPIES SERIES
Discharge: HOME | End: 2024-09-18
Payer: MEDICARE

## 2024-09-18 DIAGNOSIS — L02.31 CELLULITIS AND ABSCESS OF BUTTOCK: ICD-10-CM

## 2024-09-18 DIAGNOSIS — L03.317 CELLULITIS AND ABSCESS OF BUTTOCK: ICD-10-CM

## 2024-09-18 DIAGNOSIS — S31.819D OPEN WOUND OF RIGHT BUTTOCK, SUBSEQUENT ENCOUNTER: Primary | ICD-10-CM

## 2024-09-18 PROCEDURE — 97597 DBRDMT OPN WND 1ST 20 CM/<: CPT

## 2024-09-23 ENCOUNTER — HOSPITAL ENCOUNTER (OUTPATIENT)
Facility: HOSPITAL | Age: 64
Discharge: HOME | End: 2024-09-23
Admitting: STUDENT IN AN ORGANIZED HEALTH CARE EDUCATION/TRAINING PROGRAM
Payer: MEDICARE

## 2024-09-23 ENCOUNTER — OFFICE VISIT (OUTPATIENT)
Dept: FAMILY MEDICINE CLINIC | Facility: CLINIC | Age: 64
End: 2024-09-23
Payer: MEDICARE

## 2024-09-23 VITALS
TEMPERATURE: 98.6 F | HEART RATE: 85 BPM | HEIGHT: 69 IN | BODY MASS INDEX: 39.99 KG/M2 | SYSTOLIC BLOOD PRESSURE: 128 MMHG | OXYGEN SATURATION: 95 % | RESPIRATION RATE: 20 BRPM | WEIGHT: 270 LBS | DIASTOLIC BLOOD PRESSURE: 62 MMHG

## 2024-09-23 DIAGNOSIS — I10 BENIGN ESSENTIAL HYPERTENSION: Chronic | ICD-10-CM

## 2024-09-23 DIAGNOSIS — E11.9 TYPE 2 DIABETES MELLITUS WITHOUT COMPLICATION, WITHOUT LONG-TERM CURRENT USE OF INSULIN: ICD-10-CM

## 2024-09-23 DIAGNOSIS — H73.91 DISORDER OF TYMPANIC MEMBRANE OF RIGHT EAR: ICD-10-CM

## 2024-09-23 DIAGNOSIS — R05.2 SUBACUTE COUGH: Primary | ICD-10-CM

## 2024-09-23 DIAGNOSIS — R05.2 SUBACUTE COUGH: ICD-10-CM

## 2024-09-23 DIAGNOSIS — R05.1 ACUTE COUGH: ICD-10-CM

## 2024-09-23 LAB
EXPIRATION DATE: ABNORMAL
HBA1C MFR BLD: 6.4 % (ref 4.5–5.7)
Lab: ABNORMAL

## 2024-09-23 PROCEDURE — 99214 OFFICE O/P EST MOD 30 MIN: CPT | Performed by: STUDENT IN AN ORGANIZED HEALTH CARE EDUCATION/TRAINING PROGRAM

## 2024-09-23 PROCEDURE — 71046 X-RAY EXAM CHEST 2 VIEWS: CPT

## 2024-09-23 PROCEDURE — 83036 HEMOGLOBIN GLYCOSYLATED A1C: CPT | Performed by: STUDENT IN AN ORGANIZED HEALTH CARE EDUCATION/TRAINING PROGRAM

## 2024-09-23 PROCEDURE — G2211 COMPLEX E/M VISIT ADD ON: HCPCS | Performed by: STUDENT IN AN ORGANIZED HEALTH CARE EDUCATION/TRAINING PROGRAM

## 2024-09-23 PROCEDURE — 3074F SYST BP LT 130 MM HG: CPT | Performed by: STUDENT IN AN ORGANIZED HEALTH CARE EDUCATION/TRAINING PROGRAM

## 2024-09-23 PROCEDURE — 1126F AMNT PAIN NOTED NONE PRSNT: CPT | Performed by: STUDENT IN AN ORGANIZED HEALTH CARE EDUCATION/TRAINING PROGRAM

## 2024-09-23 PROCEDURE — 3044F HG A1C LEVEL LT 7.0%: CPT | Performed by: STUDENT IN AN ORGANIZED HEALTH CARE EDUCATION/TRAINING PROGRAM

## 2024-09-23 PROCEDURE — 3078F DIAST BP <80 MM HG: CPT | Performed by: STUDENT IN AN ORGANIZED HEALTH CARE EDUCATION/TRAINING PROGRAM

## 2024-09-23 RX ORDER — DEXTROMETHORPHAN HYDROBROMIDE AND PROMETHAZINE HYDROCHLORIDE 15; 6.25 MG/5ML; MG/5ML
5 SYRUP ORAL
Qty: 118 ML | Refills: 0 | Status: SHIPPED | OUTPATIENT
Start: 2024-09-23

## 2024-09-25 DIAGNOSIS — J90 PLEURAL EFFUSION: ICD-10-CM

## 2024-09-25 DIAGNOSIS — R06.00 DYSPNEA, UNSPECIFIED TYPE: Primary | ICD-10-CM

## 2024-09-25 DIAGNOSIS — R60.0 LEG EDEMA: ICD-10-CM

## 2024-09-25 RX ORDER — FUROSEMIDE 20 MG
20 TABLET ORAL DAILY
Qty: 30 TABLET | Refills: 2 | Status: SHIPPED | OUTPATIENT
Start: 2024-09-25

## 2024-10-02 ENCOUNTER — HOSPITAL ENCOUNTER (OUTPATIENT)
Dept: PHYSICAL THERAPY | Facility: HOSPITAL | Age: 64
Setting detail: THERAPIES SERIES
Discharge: HOME | End: 2024-10-02
Payer: MEDICARE

## 2024-10-02 DIAGNOSIS — L02.31 CELLULITIS AND ABSCESS OF BUTTOCK: ICD-10-CM

## 2024-10-02 DIAGNOSIS — L03.317 CELLULITIS AND ABSCESS OF BUTTOCK: ICD-10-CM

## 2024-10-02 DIAGNOSIS — S31.819D OPEN WOUND OF RIGHT BUTTOCK, SUBSEQUENT ENCOUNTER: Primary | ICD-10-CM

## 2024-10-02 PROCEDURE — 97597 DBRDMT OPN WND 1ST 20 CM/<: CPT

## 2024-10-02 NOTE — THERAPY WOUND CARE TREATMENT
Outpatient Rehabilitation - Wound/Debridement Treatment Note   [facility]     Patient Name: Kiran Belcher  : 1960  MRN: [id number]  Today's Date: 10/2/2024                 Admit Date: 10/2/2024    Visit Dx:    ICD-10-CM ICD-9-CM   1. Open wound of right buttock, subsequent encounter  S31.819D V58.89     877.0   2. Cellulitis and abscess of buttock  L02.31 682.5    L03.317        Patient Active Problem List   Diagnosis    Benign essential hypertension    Depression    Gastroesophageal reflux disease without esophagitis    Hyperlipidemia    NISA on CPAP    Type 2 diabetes mellitus without complications    Spasm    Other obesity due to excess calories    Hx of spinal cord injury    Kidney disorder    Lower extremity weakness    Myalgia    Moderate episode of recurrent major depressive disorder    Dysphagia    Toenail avulsion    H/O kidney removal    Renal cell carcinoma of right kidney    Quadriplegia    Muscle contracture    C5-C7 level spinal injury with complete lesion of spinal cord, without evidence of spinal bone injury    Inability to bear weight    Neurogenic bladder    Venous stasis    B-cell lymphoma    Cancer of kidney    Hypertension    Neutrophilic leukemoid reaction    Personal history of Methicillin resistant Staphylococcus aureus infection    Paraplegia    Other constipation    Iron deficiency anemia    Malabsorption of iron        Past Medical History:   Diagnosis Date    B-cell lymphoma 2022    Undergoing work-up with [facility]    Cancer     hx spot on right kidney, for ~8 years, told cancer but no hx biopsy and no interventions and just watching it. Dr. vega used to watch this cyst, then started with  urology a year ago after he retired.    Decreased mobility     Depression     Diabetes mellitus     Esophageal reflux     Glaucoma     History of methicillin resistant Staphylococcus aureus infection     History of obstructive sleep apnea     History of quadriplegia     of unknown  "etiology - C5-C7, incomplete     History of spinal cord injury/quadriplegia 2008 09/14/2018    Hyperlipidemia     Hypertension     Kidney disorder 9/14/2018    Paraplegia         Past Surgical History:   Procedure Laterality Date    CERVICAL DISC SURGERY      around 5/2008, fall/injury tripped over a safety gate for their dogs, reported SCI \"semi quadraplegic\" since. [facility]. [facility] after.    SPINE SURGERY           EVALUATION   PT Ortho       Row Name 10/02/24 1600       Subjective    Subjective Comments Pt with no new issues/complaints.  -       Subjective Pain    Able to rate subjective pain? yes  -    Pre-Treatment Pain Level 0  -LH    Post-Treatment Pain Level 0  -       Transfers    Comment, (Transfers) remained seated for tx, leaned over to allow access to wound  -              User Key  (r) = Recorded By, (t) = Taken By, (c) = Cosigned By      Initials Name Provider Type     Norman Vasquez, PT Physical Therapist                     LDA Wound       Row Name 10/02/24 1600             Wound 05/22/24 1045 Right posterior hip Abcess    Wound - Properties Group Placement Date: 05/22/24  -MF Placement Time: 1045  -MF Side: Right  - Orientation: posterior  -MF Location: hip  -MF Primary Wound Type: Abcess  -MF    Wound Image Images linked: 1  -LH      Dressing Appearance intact;moist drainage  -      Base granulating;moist;red;yellow;slough;epithelialization  thick slough prior to debridement, new epithelialization inferiorly  -      Periwound intact;dry;indurated;macerated;pale white  -      Periwound Temperature warm  -      Periwound Skin Turgor soft  -      Edges irregular  -      Wound Length (cm) 0.6 cm  -      Wound Width (cm) 1.6 cm  -      Wound Depth (cm) 0.2 cm  -LH      Wound Surface Area (cm^2) 0.96 cm^2  -LH      Wound Volume (cm^3) 0.192 cm^3  -LH      Drainage Characteristics/Odor serosanguineous;yellow  -LH      Drainage Amount small  -      Care, Wound cleansed " "with;wound cleanser;debrided;honey applied  -      Dressing Care dressing applied;antimicrobial agent applied;foam;low-adherent;border dressing  honey, HFBt, 4\" optifoam  -      Periwound Care cleansed with pH balanced cleanser;barrier ointment applied  zguard  -      Retired Wound - Properties Group Placement Date: 05/22/24  - Placement Time: 1045  -MF Side: Right  -MF Orientation: posterior  -MF Location: hip  -MF Primary Wound Type: Abcess  -MF    Retired Wound - Properties Group Date first assessed: 05/22/24  - Time first assessed: 1045  -MF Side: Right  -MF Location: hip  -MF Primary Wound Type: Abcess  -              User Key  (r) = Recorded By, (t) = Taken By, (c) = Cosigned By      Initials Name Provider Type     Lenard Grijalva, PT Physical Therapist     Norman Vasquez, PT Physical Therapist                      WOUND DEBRIDEMENT  Total area of Debridement: 1cm2  Debridement Site 1  Location- Site 1: R buttock  Selective Debridement- Site 1: Wound Surface <20cmsq  Instruments- Site 1: tweezers, #15, scapel  Excised Tissue Description- Site 1: moderate, slough  Bleeding- Site 1: minimum, held pressure, 1 minute              Therapy Education       Row Name 10/02/24 1600             Therapy Education    Education Details Reinforced importance of frequent weight shifts and offloading wound area for optimal wound healing.  -      Given Bandaging/dressing change  -      Program Reinforced;Progressed  -      How Provided Verbal;Demonstration  -      Provided to Patient  -      Level of Understanding Teach back education performed;Verbalized  -                User Key  (r) = Recorded By, (t) = Taken By, (c) = Cosigned By      Initials Name Provider Type    Norman Otero, PT Physical Therapist                    Recommendation and Plan   PT Assessment/Plan       Row Name 10/02/24 1600          PT Assessment    Functional Limitations Impaired gait;Impaired locomotion;Other " (comment)  wound management  -     Impairments Integumentary integrity  -     Assessment Comments Pt demonstrating small improvements in wound healing this date as pt with improved epithelialization of wound edges that are not accurately depicted in measurements. Pt continuing to require debridement of thick layer of slough from wound base, good granulation noted following debridement. Pt would continue to benefit from current POC to promote wound healing.  -     Rehab Potential Good  -     Patient/caregiver participated in establishment of treatment plan and goals Yes  -     Patient would benefit from skilled therapy intervention Yes  -        PT Plan    PT Frequency Other (comment)  every other week  -     Physical Therapy Interventions (Optional Details) wound care;patient/family education  -     PT Plan Comments debridement, dressings  -               User Key  (r) = Recorded By, (t) = Taken By, (c) = Cosigned By      Initials Name Provider Type     Norman Vasquez, PT Physical Therapist                    Goals   PT OP Goals       Row Name 10/02/24 1645          Time Calculation    PT Goal Re-Cert Due Date 11/19/24  -               User Key  (r) = Recorded By, (t) = Taken By, (c) = Cosigned By      Initials Name Provider Type     Norman Vasquez, PT Physical Therapist                    PT Goal Re-Cert Due Date: 11/19/24            Time Calculation: Start Time: 1515  Untimed Charges  [id number] debridement: 20  Total Minutes  Untimed Charges Total Minutes: 20   Total Minutes: 20  Therapy Charges for Today       Code Description Service Date Service Provider Modifiers Qty    [id number] HC EDEL DEBRIDE OPEN WOUND UP TO 20CM 10/2/2024 Norman Vasquez, PT GP 1                    Norman Vasquez PT  10/2/2024

## 2024-10-08 ENCOUNTER — FLU SHOT (OUTPATIENT)
Dept: FAMILY MEDICINE CLINIC | Facility: CLINIC | Age: 64
End: 2024-10-08
Payer: MEDICARE

## 2024-10-08 DIAGNOSIS — S14.115A SPINAL CORD INJURY AT C5-C7 LEVEL WITH COMPLETE SPINAL CORD LESION: ICD-10-CM

## 2024-10-08 DIAGNOSIS — N31.9 NEUROGENIC BLADDER: ICD-10-CM

## 2024-10-08 DIAGNOSIS — Z23 IMMUNIZATION DUE: Primary | ICD-10-CM

## 2024-10-08 PROCEDURE — G0008 ADMIN INFLUENZA VIRUS VAC: HCPCS | Performed by: STUDENT IN AN ORGANIZED HEALTH CARE EDUCATION/TRAINING PROGRAM

## 2024-10-08 PROCEDURE — 90656 IIV3 VACC NO PRSV 0.5 ML IM: CPT | Performed by: STUDENT IN AN ORGANIZED HEALTH CARE EDUCATION/TRAINING PROGRAM

## 2024-10-08 RX ORDER — DANTROLENE SODIUM 100 MG/1
100 CAPSULE ORAL 2 TIMES DAILY
Qty: 180 CAPSULE | Refills: 3 | Status: SHIPPED | OUTPATIENT
Start: 2024-10-08

## 2024-10-11 ENCOUNTER — HOSPITAL ENCOUNTER (OUTPATIENT)
Facility: HOSPITAL | Age: 64
Discharge: HOME | End: 2024-10-11
Payer: MEDICARE

## 2024-10-11 VITALS — HEIGHT: 69 IN | BODY MASS INDEX: 39.84 KG/M2 | WEIGHT: 268.96 LBS

## 2024-10-11 DIAGNOSIS — J90 PLEURAL EFFUSION: ICD-10-CM

## 2024-10-11 DIAGNOSIS — R06.00 DYSPNEA, UNSPECIFIED TYPE: ICD-10-CM

## 2024-10-11 DIAGNOSIS — R60.0 LEG EDEMA: ICD-10-CM

## 2024-10-11 LAB
BH CV ECHO MEAS - AO MAX PG: 13.4 MMHG
BH CV ECHO MEAS - AO MEAN PG: 7 MMHG
BH CV ECHO MEAS - AO ROOT DIAM: 3.1 CM
BH CV ECHO MEAS - AO V2 MAX: 183 CM/SEC
BH CV ECHO MEAS - AO V2 VTI: 33.7 CM
BH CV ECHO MEAS - AVA(I,D): 1.8 CM2
BH CV ECHO MEAS - EDV(CUBED): 140.6 ML
BH CV ECHO MEAS - EDV(MOD-SP2): 107 ML
BH CV ECHO MEAS - EDV(MOD-SP4): 88.8 ML
BH CV ECHO MEAS - EF(MOD-BP): 58.4 %
BH CV ECHO MEAS - EF(MOD-SP2): 56.7 %
BH CV ECHO MEAS - EF(MOD-SP4): 58 %
BH CV ECHO MEAS - ESV(CUBED): 64 ML
BH CV ECHO MEAS - ESV(MOD-SP2): 46.3 ML
BH CV ECHO MEAS - ESV(MOD-SP4): 37.3 ML
BH CV ECHO MEAS - FS: 23.1 %
BH CV ECHO MEAS - IVS/LVPW: 1 CM
BH CV ECHO MEAS - IVSD: 1.1 CM
BH CV ECHO MEAS - LA DIMENSION: 3.8 CM
BH CV ECHO MEAS - LAT PEAK E' VEL: 11.1 CM/SEC
BH CV ECHO MEAS - LV DIASTOLIC VOL/BSA (35-75): 37.8 CM2
BH CV ECHO MEAS - LV MASS(C)D: 220.8 GRAMS
BH CV ECHO MEAS - LV MAX PG: 4.3 MMHG
BH CV ECHO MEAS - LV MEAN PG: 2 MMHG
BH CV ECHO MEAS - LV SYSTOLIC VOL/BSA (12-30): 15.9 CM2
BH CV ECHO MEAS - LV V1 MAX: 103.5 CM/SEC
BH CV ECHO MEAS - LV V1 VTI: 19.3 CM
BH CV ECHO MEAS - LVIDD: 5.2 CM
BH CV ECHO MEAS - LVIDS: 4 CM
BH CV ECHO MEAS - LVOT AREA: 3.1 CM2
BH CV ECHO MEAS - LVOT DIAM: 2 CM
BH CV ECHO MEAS - LVPWD: 1.1 CM
BH CV ECHO MEAS - MED PEAK E' VEL: 9.8 CM/SEC
BH CV ECHO MEAS - MV A MAX VEL: 113 CM/SEC
BH CV ECHO MEAS - MV DEC SLOPE: 623 CM/SEC2
BH CV ECHO MEAS - MV DEC TIME: 0.19 SEC
BH CV ECHO MEAS - MV E MAX VEL: 122 CM/SEC
BH CV ECHO MEAS - MV E/A: 1.08
BH CV ECHO MEAS - MV MAX PG: 5.3 MMHG
BH CV ECHO MEAS - MV MEAN PG: 3 MMHG
BH CV ECHO MEAS - MV P1/2T: 58.3 MSEC
BH CV ECHO MEAS - MV V2 VTI: 32.2 CM
BH CV ECHO MEAS - MVA(P1/2T): 3.8 CM2
BH CV ECHO MEAS - MVA(VTI): 1.88 CM2
BH CV ECHO MEAS - PA ACC TIME: 0.09 SEC
BH CV ECHO MEAS - SV(LVOT): 60.6 ML
BH CV ECHO MEAS - SV(MOD-SP2): 60.7 ML
BH CV ECHO MEAS - SV(MOD-SP4): 51.5 ML
BH CV ECHO MEAS - SVI(LVOT): 25.8 ML/M2
BH CV ECHO MEAS - SVI(MOD-SP2): 25.9 ML/M2
BH CV ECHO MEAS - SVI(MOD-SP4): 21.9 ML/M2
BH CV ECHO MEAS - TAPSE (>1.6): 2.32 CM
BH CV ECHO MEASUREMENTS AVERAGE E/E' RATIO: 11.67
BH CV VAS BP RIGHT ARM: NORMAL MMHG
BH CV XLRA - RV BASE: 3.5 CM
BH CV XLRA - RV LENGTH: 8.9 CM
BH CV XLRA - RV MID: 2.8 CM
BH CV XLRA - TDI S': 13.1 CM/SEC
LEFT ATRIUM VOLUME INDEX: 19.4 ML/M2

## 2024-10-11 PROCEDURE — 93306 TTE W/DOPPLER COMPLETE: CPT

## 2024-10-11 PROCEDURE — 93306 TTE W/DOPPLER COMPLETE: CPT | Performed by: INTERNAL MEDICINE

## 2024-10-16 ENCOUNTER — HOSPITAL ENCOUNTER (OUTPATIENT)
Dept: PHYSICAL THERAPY | Facility: HOSPITAL | Age: 64
Setting detail: THERAPIES SERIES
Discharge: HOME | End: 2024-10-16
Payer: MEDICARE

## 2024-10-16 DIAGNOSIS — L03.317 CELLULITIS AND ABSCESS OF BUTTOCK: ICD-10-CM

## 2024-10-16 DIAGNOSIS — L02.31 CELLULITIS AND ABSCESS OF BUTTOCK: ICD-10-CM

## 2024-10-16 DIAGNOSIS — S31.819D OPEN WOUND OF RIGHT BUTTOCK, SUBSEQUENT ENCOUNTER: Primary | ICD-10-CM

## 2024-10-16 PROCEDURE — 97597 DBRDMT OPN WND 1ST 20 CM/<: CPT

## 2024-10-16 NOTE — THERAPY PROGRESS REPORT/RE-CERT
Outpatient Rehabilitation - Wound/Debridement Progress Note   Brayan     Patient Name: Kiran Belcher  : 1960  MRN: [id number]  Today's Date: 10/16/2024                 Admit Date: 10/16/2024    Visit Dx:    ICD-10-CM ICD-9-CM   1. Open wound of right buttock, subsequent encounter  S31.819D V58.89     877.0   2. Cellulitis and abscess of buttock  L02.31 682.5    L03.317      R glute        Patient Active Problem List   Diagnosis    Benign essential hypertension    Depression    Gastroesophageal reflux disease without esophagitis    Hyperlipidemia    NISA on CPAP    Type 2 diabetes mellitus without complications    Spasm    Other obesity due to excess calories    Hx of spinal cord injury    Kidney disorder    Lower extremity weakness    Myalgia    Moderate episode of recurrent major depressive disorder    Dysphagia    Toenail avulsion    H/O kidney removal    Renal cell carcinoma of right kidney    Quadriplegia    Muscle contracture    C5-C7 level spinal injury with complete lesion of spinal cord, without evidence of spinal bone injury    Inability to bear weight    Neurogenic bladder    Venous stasis    B-cell lymphoma    Cancer of kidney    Hypertension    Neutrophilic leukemoid reaction    Personal history of Methicillin resistant Staphylococcus aureus infection    Paraplegia    Other constipation    Iron deficiency anemia    Malabsorption of iron        Past Medical History:   Diagnosis Date    B-cell lymphoma 2022    Undergoing work-up with [facility]    Cancer     hx spot on right kidney, for ~8 years, told cancer but no hx biopsy and no interventions and just watching it. Dr. vega used to watch this cyst, then started with  urology a year ago after he retired.    Decreased mobility     Depression     Diabetes mellitus     Esophageal reflux     Glaucoma     History of methicillin resistant Staphylococcus aureus infection     History of obstructive sleep apnea     History of quadriplegia     of  "unknown etiology - C5-C7, incomplete     History of spinal cord injury/quadriplegia 2008 09/14/2018    Hyperlipidemia     Hypertension     Kidney disorder 9/14/2018    Paraplegia         Past Surgical History:   Procedure Laterality Date    CERVICAL DISC SURGERY      around 5/2008, fall/injury tripped over a safety gate for their dogs, reported SCI \"semi quadraplegic\" since. [facility] after.    SPINE SURGERY           EVALUATION   PT Ortho       Row Name 10/16/24 1500       Subjective    Subjective Comments Reports he cant tell if the wound looks better because he cant see it and his spouse dosnt know what shes looking at, but thinks he can feel new skin in the area  -       Subjective Pain    Able to rate subjective pain? yes  -    Pre-Treatment Pain Level 0  -LH    Post-Treatment Pain Level 0  -       Transfers    Comment, (Transfers) remained seated for tx, leaned over to allow access to wound  -              User Key  (r) = Recorded By, (t) = Taken By, (c) = Cosigned By      Initials Name Provider Type     Norman Vasquez, PT Physical Therapist                     [facility] Wound       Row Name 10/16/24 1500             Wound 05/22/24 1045 Right posterior hip Abcess    Wound - Properties Group Placement Date: 05/22/24  -MF Placement Time: 1045  -MF Side: Right  - Orientation: posterior  -MF Location: hip  -MF Primary Wound Type: Abcess  -MF    Wound Image Images linked: 1  -      Dressing Appearance intact;moist drainage  -      Base granulating;moist;red;yellow;slough;epithelialization  thick slough prior to debridement, considerable new epithelialization  -      Periwound intact;dry;indurated  -      Periwound Temperature warm  -      Periwound Skin Turgor soft  -      Edges irregular  -      Wound Length (cm) 0.2 cm  -      Wound Width (cm) 0.4 cm  -      Wound Depth (cm) 0.2 cm  -LH      Wound Surface Area (cm^2) 0.08 cm^2  -LH      Wound Volume (cm^3) 0.016 cm^3  -      " "Drainage Characteristics/Odor serosanguineous;yellow  -      Drainage Amount scant  -      Care, Wound cleansed with;wound cleanser;debrided;honey applied  -      Dressing Care dressing applied;antimicrobial agent applied;foam;low-adherent;border dressing;silver impregnated  honey, Mepilex Ag, 4\" optifoam  -      Periwound Care cleansed with pH balanced cleanser;barrier ointment applied  zguard  -      Retired Wound - Properties Group Placement Date: 05/22/24  - Placement Time: 1045  -MF Side: Right  -MF Orientation: posterior  -MF Location: hip  -MF Primary Wound Type: Abcess  -MF    Retired Wound - Properties Group Placement Date: 05/22/24  - Placement Time: 1045  -MF Side: Right  -MF Orientation: posterior  -MF Location: hip  -MF Primary Wound Type: Abcess  -MF    Retired Wound - Properties Group Date first assessed: 05/22/24  - Time first assessed: 1045  -MF Side: Right  -MF Location: hip  -MF Primary Wound Type: Abcess  -MF              User Key  (r) = Recorded By, (t) = Taken By, (c) = Cosigned By      Initials Name Provider Type     Lenard Grijalva, PT Physical Therapist     Norman Vasquez, PT Physical Therapist                      WOUND DEBRIDEMENT  Total area of Debridement: <1cm2  Debridement Site 1  Location- Site 1: R buttock  Selective Debridement- Site 1: Wound Surface <20cmsq  Instruments- Site 1: tweezers  Excised Tissue Description- Site 1: moderate, slough  Bleeding- Site 1: scant, held pressure, 1 minute              Therapy Education       Row Name 10/16/24 1500             Therapy Education    Education Details Reviewed benefits of transition from HFBt to Mepilex Ag. Expect full closure of wound in the next 1-2 weeks with current tx. Recommended continuation of optifoam dressings for 1.5-2 weeks after full closure for protection of fragile new skin. May call and cancel remaining appointments if no open areas remain.  -      Given Bandaging/dressing change  -      " Program Reinforced;Progressed  -      How Provided Verbal;Demonstration  -      Provided to Patient  -      Level of Understanding Teach back education performed;Verbalized  -                User Key  (r) = Recorded By, (t) = Taken By, (c) = Cosigned By      Initials Name Provider Type     Norman Vasquez, PT Physical Therapist                    Recommendation and Plan   PT Assessment/Plan       Row Name 10/16/24 1500          PT Assessment    Functional Limitations Impaired gait;Impaired locomotion;Other (comment)  wound management  -     Impairments Integumentary integrity  -     Assessment Comments Pt has met an additional wound care LTG this date with only one LTG remaining. Pt with notable improvements in epithelialization of wound edges with only small open area remaining which is well granulated following debridement of thick layer of slough. PT expecting full resurfacing of wound in the next 1-2 weeks with current dressings and continued debridement PRN.  -     Rehab Potential Good  -     Patient/caregiver participated in establishment of treatment plan and goals Yes  -     Patient would benefit from skilled therapy intervention Yes  -        PT Plan    PT Frequency Other (comment)  every other weeks  -     Predicted Duration of Therapy Intervention (PT) 4 visits  -     Planned CPT's? PT SELF CARE/MGMT/TRAIN 15 MIN: 46260;PT NONSELECT DEBRIDE 15 MIN: 86800;PT EDEL DEBRIDE OPEN WOUND UP TO 20 CM: 58051  -     Physical Therapy Interventions (Optional Details) wound care;patient/family education  -     PT Plan Comments debridement, dressings  -               User Key  (r) = Recorded By, (t) = Taken By, (c) = Cosigned By      Initials Name Provider Type     Norman Vasquez, PT Physical Therapist                    Goals   PT OP Goals       Row Name 10/16/24 1558 10/16/24 1500       PT Short Term Goals    STG 1 -- Decrease R buttock wound size by 25% as evidence of wound  healing.  -    STG 1 Progress -- Met  -    STG 2 -- Decrease slough to wound base to less than 50% to improve healing potential  -    STG 2 Progress -- Met  -    STG 3 -- Pt and family able to change dressing at home with no issues / questions.  -    STG 3 Progress -- Met  -       Long Term Goals    LTG Date to Achieve -- 11/19/24  -    LTG 1 -- Decrease R buttock wound size by 75% as evidence of wound healing.  -    LTG 1 Progress -- Met  -    LTG 2 -- Decrease slough to wound base to less than 10% to improve healing potential  -    LTG 2 Progress -- Progressing  -       Time Calculation    PT Goal Re-Cert Due Date 11/19/24  - --              User Key  (r) = Recorded By, (t) = Taken By, (c) = Cosigned By      Initials Name Provider Type     Norman Vasquez, PT Physical Therapist                    PT Goal Re-Cert Due Date: 11/19/24  PT Short Term Goals  STG 1: Decrease R buttock wound size by 25% as evidence of wound healing.  STG 1 Progress: Met  STG 2: Decrease slough to wound base to less than 50% to improve healing potential  STG 2 Progress: Met  STG 3: Pt and family able to change dressing at home with no issues / questions.  STG 3 Progress: Met  Long Term Goals  LTG Date to Achieve: 11/19/24  LTG 1: Decrease R buttock wound size by 75% as evidence of wound healing.  LTG 1 Progress: Met  LTG 2: Decrease slough to wound base to less than 10% to improve healing potential  LTG 2 Progress: Progressing      Time Calculation: Start Time: 1515  Untimed Charges  64584-Orztmvopk debridement: 15  Total Minutes  Untimed Charges Total Minutes: 15   Total Minutes: 15  Therapy Charges for Today       Code Description Service Date Service Provider Modifiers Qty    49115922950 HC EDEL DEBRIDE OPEN WOUND UP TO 20CM 10/16/2024 Norman Vasquez, PT GP 1                    Norman Vasquez, PT  10/16/2024

## 2024-10-29 ENCOUNTER — OFFICE VISIT (OUTPATIENT)
Dept: SLEEP MEDICINE | Facility: CLINIC | Age: 64
End: 2024-10-29
Payer: MEDICARE

## 2024-10-29 VITALS
TEMPERATURE: 98.2 F | DIASTOLIC BLOOD PRESSURE: 62 MMHG | BODY MASS INDEX: 39.7 KG/M2 | HEART RATE: 91 BPM | SYSTOLIC BLOOD PRESSURE: 130 MMHG | HEIGHT: 69 IN | OXYGEN SATURATION: 96 %

## 2024-10-29 DIAGNOSIS — G47.33 OSA (OBSTRUCTIVE SLEEP APNEA): Primary | ICD-10-CM

## 2024-10-29 PROCEDURE — 3075F SYST BP GE 130 - 139MM HG: CPT | Performed by: NURSE PRACTITIONER

## 2024-10-29 PROCEDURE — 1160F RVW MEDS BY RX/DR IN RCRD: CPT | Performed by: NURSE PRACTITIONER

## 2024-10-29 PROCEDURE — 99213 OFFICE O/P EST LOW 20 MIN: CPT | Performed by: NURSE PRACTITIONER

## 2024-10-29 PROCEDURE — 3078F DIAST BP <80 MM HG: CPT | Performed by: NURSE PRACTITIONER

## 2024-10-29 PROCEDURE — 1159F MED LIST DOCD IN RCRD: CPT | Performed by: NURSE PRACTITIONER

## 2024-10-29 NOTE — PROGRESS NOTES
Chief Complaint:   Chief Complaint   Patient presents with    Follow-up    Sleep Apnea       HPI:    Kiran Belcher is a 64 y.o. male here for follow-up of sleep apnea.  Patient was last seen 11/27/2023.  We did an order on his last visit for a new machine.  Patient states he is doing well.  He does sleep did well over the hours a day late and feels rested upon awakening.  Patient goes to sleep immediately and does not get up during the night.  Patient has an Biola score of 6/24.  Patient does well with nasal mask and standard tubing.  Patient has no concern or complaint and will continue therapy.        Current medications are:   Current Outpatient Medications:     amLODIPine-benazepril (LOTREL) 10-20 MG per capsule, Take 1 capsule by mouth Daily., Disp: 90 capsule, Rfl: 3    ammonium lactate (AMLACTIN) 12 % cream, Apply 1 g topically to the appropriate area as directed As Needed for Dry Skin. Apply as directed , prescribed by podiatrist, Disp: , Rfl:     aspirin 81 MG chewable tablet, Chew 1 tablet Daily., Disp: , Rfl:     atorvastatin (LIPITOR) 10 MG tablet, Take 1 tablet by mouth Daily., Disp: 90 tablet, Rfl: 3    baclofen (LIORESAL) 20 MG tablet, Take 1 tablet by mouth 3 (Three) Times a Day., Disp: 90 tablet, Rfl: 11    benzonatate (Tessalon Perles) 100 MG capsule, Take 1 capsule by mouth 3 (Three) Times a Day As Needed for Cough., Disp: 21 capsule, Rfl: 0    carvedilol (COREG) 12.5 MG tablet, TAKE 1/2 TABLET BY MOUTH TWICE A DAY WITH MEALS., Disp: 90 tablet, Rfl: 3    citalopram (CeleXA) 20 MG tablet, Take 1 tablet by mouth Daily., Disp: 90 tablet, Rfl: 3    dantrolene (DANTRIUM) 100 MG capsule, TAKE 1 CAPSULE BY MOUTH TWICE A DAY, Disp: 180 capsule, Rfl: 3    Diclofenac Sodium (VOLTAREN) 1 % gel gel, Apply 4 g topically to the appropriate area as directed 4 (Four) Times a Day As Needed (right 4th finger pain)., Disp: 100 g, Rfl: 2    esomeprazole (nexIUM) 40 MG capsule, TAKE ONE CAPSULE BY MOUTH DAILY,  Disp: 90 capsule, Rfl: 3    FeroSul 325 (65 Fe) MG tablet, TAKE 1 TABLET BY MOUTH DAILY WITH BREAKFAST, Disp: 30 tablet, Rfl: 5    fesoterodine fumarate (TOVIAZ ER) 8 MG tablet sustained-release 24 hour tablet, Take 1 tablet by mouth Daily., Disp: , Rfl:     fexofenadine (ALLEGRA) 180 MG tablet, Take 1 tablet by mouth Daily., Disp: , Rfl:     fluocinonide (LIDEX) 0.05 % cream, fluocinonide 0.05 % topical cream, Disp: , Rfl:     fluticasone (FLONASE) 50 MCG/ACT nasal spray, 2 sprays into the nostril(s) as directed by provider Daily., Disp: 16 g, Rfl: 0    furosemide (Lasix) 20 MG tablet, Take 1 tablet by mouth Daily., Disp: 30 tablet, Rfl: 2    glucose blood (Accu-Chek Elke Plus) test strip, 1 each by Other route 2 (Two) Times a Day. Use as instructed, Disp: 200 each, Rfl: 3    glyburide (DIAbeta) 5 MG tablet, Take 1 tablet by mouth Daily With Breakfast., Disp: 90 tablet, Rfl: 3    lactobacillus acidophilus (RISAQUAD) capsule capsule, Take 1 capsule by mouth Daily., Disp: 90 capsule, Rfl: 1    metFORMIN ER (GLUCOPHAGE-XR) 500 MG 24 hr tablet, TAKE 2 TABLETS BY MOUTH TWICE A DAY, Disp: 360 tablet, Rfl: 3    methenamine (HIPREX) 1 g tablet, Take 1 tablet by mouth 2 (Two) Times a Day With Meals., Disp: , Rfl:     Mirabegron ER (MYRBETRIQ) 50 MG tablet sustained-release 24 hour 24 hr tablet, Take 50 mg by mouth Daily., Disp: , Rfl:     promethazine-dextromethorphan (PROMETHAZINE-DM) 6.25-15 MG/5ML syrup, Take 5 mL by mouth every night at bedtime., Disp: 118 mL, Rfl: 0.      The patient's relevant past medical, surgical, family and social history were reviewed and updated in Epic as appropriate.       Review of Systems   HENT:  Positive for congestion.    Eyes:  Positive for visual disturbance.   Respiratory:  Positive for apnea.    Gastrointestinal:         Heartburn   Musculoskeletal:  Positive for arthralgias, back pain, gait problem, myalgias and neck pain.   Allergic/Immunologic: Positive for environmental  "allergies.   Psychiatric/Behavioral:  Positive for dysphoric mood and sleep disturbance. The patient is nervous/anxious.    All other systems reviewed and are negative.        Objective:    Physical Exam  Constitutional:       Appearance: Normal appearance.   HENT:      Head: Normocephalic and atraumatic.      Mouth/Throat:      Comments: Mallampati 4 anatomy  Cardiovascular:      Rate and Rhythm: Normal rate.   Pulmonary:      Effort: Pulmonary effort is normal. No respiratory distress.   Skin:     General: Skin is warm and dry.   Neurological:      Mental Status: He is alert and oriented to person, place, and time.   Psychiatric:         Mood and Affect: Mood normal.         Behavior: Behavior normal.         Thought Content: Thought content normal.         Judgment: Judgment normal.     /62   Pulse 91   Temp 98.2 °F (36.8 °C)   Ht 175.3 cm (69.02\")   SpO2 96%   BMI 39.70 kg/m²       CPAP Report  90/90 days of use  Greater than 4-hour use 99%  Setting 12 cm H2O  AHI of 2.5    The patient continues to use and benefit from CPAP therapy.    ASSESSMENT/PLAN    Diagnoses and all orders for this visit:    1. NISA (obstructive sleep apnea) (Primary)  -     PAP Therapy      Refill supplies x 1 year.  Return to clinic 1 year or sooner if symptoms warrant.    Signed by  Janna Krmaer, APRN    October 29, 2024      CC: Chay Cameron MD         No ref. provider found      "

## 2024-10-30 ENCOUNTER — HOSPITAL ENCOUNTER (OUTPATIENT)
Dept: PHYSICAL THERAPY | Facility: HOSPITAL | Age: 64
Setting detail: THERAPIES SERIES
Discharge: HOME | End: 2024-10-30
Payer: MEDICARE

## 2024-10-30 DIAGNOSIS — L02.31 CELLULITIS AND ABSCESS OF BUTTOCK: ICD-10-CM

## 2024-10-30 DIAGNOSIS — S31.819D OPEN WOUND OF RIGHT BUTTOCK, SUBSEQUENT ENCOUNTER: Primary | ICD-10-CM

## 2024-10-30 DIAGNOSIS — L03.317 CELLULITIS AND ABSCESS OF BUTTOCK: ICD-10-CM

## 2024-10-30 PROCEDURE — 97597 DBRDMT OPN WND 1ST 20 CM/<: CPT

## 2024-10-30 NOTE — THERAPY WOUND CARE TREATMENT
Outpatient Rehabilitation - Wound/Debridement Treatment Note   Brayan     Patient Name: Kiran Belcher  : 1960  MRN: [id number]  Today's Date: 10/30/2024                 Admit Date: 10/30/2024    Visit Dx:    ICD-10-CM ICD-9-CM   1. Open wound of right buttock, subsequent encounter  S31.819D V58.89     877.0   2. Cellulitis and abscess of buttock  L02.31 682.5    L03.317        Patient Active Problem List   Diagnosis    Benign essential hypertension    Depression    Gastroesophageal reflux disease without esophagitis    Hyperlipidemia    NISA on CPAP    Type 2 diabetes mellitus without complications    Spasm    Other obesity due to excess calories    Hx of spinal cord injury    Kidney disorder    Lower extremity weakness    Myalgia    Moderate episode of recurrent major depressive disorder    Dysphagia    Toenail avulsion    H/O kidney removal    Renal cell carcinoma of right kidney    Quadriplegia    Muscle contracture    C5-C7 level spinal injury with complete lesion of spinal cord, without evidence of spinal bone injury    Inability to bear weight    Neurogenic bladder    Venous stasis    B-cell lymphoma    Cancer of kidney    Hypertension    Neutrophilic leukemoid reaction    Personal history of Methicillin resistant Staphylococcus aureus infection    Paraplegia    Other constipation    Iron deficiency anemia    Malabsorption of iron        Past Medical History:   Diagnosis Date    B-cell lymphoma 2022    Undergoing work-up with [facility]    Cancer     hx spot on right kidney, for ~8 years, told cancer but no hx biopsy and no interventions and just watching it. Dr. vega used to watch this cyst, then started with  urology a year ago after he retired.    Decreased mobility     Depression     Diabetes mellitus     Esophageal reflux     Glaucoma     History of methicillin resistant Staphylococcus aureus infection     History of obstructive sleep apnea     History of quadriplegia     of unknown  "etiology - C5-C7, incomplete     History of spinal cord injury/quadriplegia 2008 09/14/2018    Hyperlipidemia     Hypertension     Kidney disorder 9/14/2018    Paraplegia         Past Surgical History:   Procedure Laterality Date    CERVICAL DISC SURGERY      around 5/2008, fall/injury tripped over a safety gate for their dogs, reported SCI \"semi quadraplegic\" since. [facility]. [facility] after.    SPINE SURGERY           EVALUATION   PT Ortho       Row Name 10/30/24 1520       Subjective    Subjective Comments No new complaints, only put optifoam over wound this morning due to coming for tx.  -JM       Subjective Pain    Able to rate subjective pain? yes  -JM    Pre-Treatment Pain Level 0  -JM    Post-Treatment Pain Level 0  -       Transfers    Comment, (Transfers) seated in w/c for tx, able to lean forward to access wound for tx  -              User Key  (r) = Recorded By, (t) = Taken By, (c) = Cosigned By      Initials Name Provider Type    Jhoana Hunter, PT Physical Therapist                     [facility] Wound       Row Name 10/30/24 1520             Wound 05/22/24 1045 Right posterior hip Abcess    Wound - Properties Group Placement Date: 05/22/24  -MF Placement Time: 1045  -MF Side: Right  -MF Orientation: posterior  -MF Location: hip  -MF Primary Wound Type: Abcess  -MF    Wound Image Images linked: 1  -JM      Dressing Appearance intact;moist drainage  optifoam only  -      Base granulating;moist;red;yellow;slough;epithelialization  thick slough prior to debridement, medial aspect re-opened slightly due to maceration  -      Periwound intact;dry;indurated;macerated;pale white  -      Periwound Temperature warm  -      Periwound Skin Turgor soft  -      Edges irregular  -      Wound Length (cm) 0.3 cm  -JM      Wound Width (cm) 1.2 cm  -      Wound Depth (cm) 0.2 cm  -JM      Wound Surface Area (cm^2) 0.36 cm^2  -JM      Wound Volume (cm^3) 0.072 cm^3  -JM      Drainage Characteristics/Odor " "serosanguineous;yellow  -JM      Drainage Amount small  -JM      Care, Wound cleansed with;wound cleanser;debrided  -      Dressing Care dressing applied;silver impregnated;foam;silicone border foam  mepilex ag, 4\" optifoam  -      Periwound Care cleansed with pH balanced cleanser;dry periwound area maintained  -JM      Retired Wound - Properties Group Placement Date: 05/22/24  - Placement Time: 1045  -MF Side: Right  -MF Orientation: posterior  -MF Location: hip  -MF Primary Wound Type: Abcess  -MF    Retired Wound - Properties Group Placement Date: 05/22/24  - Placement Time: 1045  -MF Side: Right  -MF Orientation: posterior  -MF Location: hip  -MF Primary Wound Type: Abcess  -MF    Retired Wound - Properties Group Date first assessed: 05/22/24  - Time first assessed: 1045  -MF Side: Right  -MF Location: hip  -MF Primary Wound Type: Abcess  -MF              User Key  (r) = Recorded By, (t) = Taken By, (c) = Cosigned By      Initials Name Provider Type    Lenard Iverson, PT Physical Therapist    Jhoana Hunter, PT Physical Therapist                      WOUND DEBRIDEMENT  Total area of Debridement: 1cmsq  Debridement Site 1  Location- Site 1: R buttock  Selective Debridement- Site 1: Wound Surface <20cmsq  Instruments- Site 1: tweezers  Excised Tissue Description- Site 1: moderate, slough  Bleeding- Site 1: none              Therapy Education       Row Name 10/30/24 7270             Therapy Education    Education Details Continue dressing changes and offloading  -IVETH      Given Bandaging/dressing change  -      Program Reinforced;Progressed  -IVETH      How Provided Verbal;Demonstration  -JM      Provided to Patient  -JM      Level of Understanding Teach back education performed;Verbalized  -                User Key  (r) = Recorded By, (t) = Taken By, (c) = Cosigned By      Initials Name Provider Type    Jhoana Hunter, PT Physical Therapist                    Recommendation and " Plan   PT Assessment/Plan       Row Name 10/30/24 1520          PT Assessment    Functional Limitations Impaired gait;Impaired locomotion;Other (comment)  wound management  -     Impairments Integumentary integrity  -     Assessment Comments Small increase in wound dimensions where medial aspect now more open, possibly from increased maceration.  PT held therahoney this tx, instructed pt to continue with ag foam dressings with optifoam and offloading of wound.  Continue with POC.  -        PT Plan    PT Frequency Other (comment)  every 2 weeks  -     Physical Therapy Interventions (Optional Details) patient/family education;wound care  -     PT Plan Comments debridement, dressings  -               User Key  (r) = Recorded By, (t) = Taken By, (c) = Cosigned By      Initials Name Provider Type    Jhoana Hunter, PT Physical Therapist                    Goals   PT OP Goals       Row Name 10/30/24 1542          Time Calculation    PT Goal Re-Cert Due Date 11/19/24  -               User Key  (r) = Recorded By, (t) = Taken By, (c) = Cosigned By      Initials Name Provider Type    Jhoana Hunter, PT Physical Therapist                    PT Goal Re-Cert Due Date: 11/19/24            Time Calculation: Start Time: 1520  Untimed Charges  08558-Jppvrbcut debridement: 15  Total Minutes  Untimed Charges Total Minutes: 15   Total Minutes: 15  Therapy Charges for Today       Code Description Service Date Service Provider Modifiers Qty    11823480719 HC EDEL DEBRIDE OPEN WOUND UP TO 20CM 10/30/2024 Jhoana Espinoza, PT GP, KX 1                    Jhoana Espinoza, PT  10/30/2024

## 2024-11-05 ENCOUNTER — OFFICE VISIT (OUTPATIENT)
Dept: FAMILY MEDICINE CLINIC | Facility: CLINIC | Age: 64
End: 2024-11-05
Payer: MEDICARE

## 2024-11-05 ENCOUNTER — LAB (OUTPATIENT)
Dept: LAB | Facility: HOSPITAL | Age: 64
End: 2024-11-05
Payer: MEDICARE

## 2024-11-05 VITALS
HEIGHT: 69 IN | OXYGEN SATURATION: 95 % | BODY MASS INDEX: 39.7 KG/M2 | SYSTOLIC BLOOD PRESSURE: 118 MMHG | HEART RATE: 96 BPM | RESPIRATION RATE: 18 BRPM | TEMPERATURE: 98 F | DIASTOLIC BLOOD PRESSURE: 66 MMHG

## 2024-11-05 DIAGNOSIS — Z00.00 ROUTINE GENERAL MEDICAL EXAMINATION AT A HEALTH CARE FACILITY: ICD-10-CM

## 2024-11-05 DIAGNOSIS — E87.70 HYPERVOLEMIA, UNSPECIFIED HYPERVOLEMIA TYPE: ICD-10-CM

## 2024-11-05 DIAGNOSIS — Z12.5 SCREENING FOR MALIGNANT NEOPLASM OF PROSTATE: ICD-10-CM

## 2024-11-05 DIAGNOSIS — I10 PRIMARY HYPERTENSION: ICD-10-CM

## 2024-11-05 DIAGNOSIS — E11.9 TYPE 2 DIABETES MELLITUS WITHOUT COMPLICATION, UNSPECIFIED WHETHER LONG TERM INSULIN USE: ICD-10-CM

## 2024-11-05 DIAGNOSIS — H69.91 ETD (EUSTACHIAN TUBE DYSFUNCTION), RIGHT: Primary | ICD-10-CM

## 2024-11-05 LAB
ALBUMIN SERPL-MCNC: 3.7 G/DL (ref 3.5–5.2)
ALBUMIN/GLOB SERPL: 1.1 G/DL
ALP SERPL-CCNC: 111 U/L (ref 39–117)
ALT SERPL W P-5'-P-CCNC: 16 U/L (ref 1–41)
ANION GAP SERPL CALCULATED.3IONS-SCNC: 11.8 MMOL/L (ref 5–15)
AST SERPL-CCNC: 21 U/L (ref 1–40)
BILIRUB SERPL-MCNC: 0.2 MG/DL (ref 0–1.2)
BUN SERPL-MCNC: 10 MG/DL (ref 8–23)
BUN/CREAT SERPL: 8.8 (ref 7–25)
CALCIUM SPEC-SCNC: 9 MG/DL (ref 8.6–10.5)
CHLORIDE SERPL-SCNC: 99 MMOL/L (ref 98–107)
CHOLEST SERPL-MCNC: 122 MG/DL (ref 0–200)
CO2 SERPL-SCNC: 26.2 MMOL/L (ref 22–29)
CREAT SERPL-MCNC: 1.13 MG/DL (ref 0.76–1.27)
DEPRECATED RDW RBC AUTO: 48.8 FL (ref 37–54)
EGFRCR SERPLBLD CKD-EPI 2021: 73.5 ML/MIN/1.73
ERYTHROCYTE [DISTWIDTH] IN BLOOD BY AUTOMATED COUNT: 14.8 % (ref 12.3–15.4)
GLOBULIN UR ELPH-MCNC: 3.5 GM/DL
GLUCOSE SERPL-MCNC: 168 MG/DL (ref 65–99)
HCT VFR BLD AUTO: 33.8 % (ref 37.5–51)
HDLC SERPL-MCNC: 26 MG/DL (ref 40–60)
HGB BLD-MCNC: 10.7 G/DL (ref 13–17.7)
LDLC SERPL CALC-MCNC: 60 MG/DL (ref 0–100)
LDLC/HDLC SERPL: 2 {RATIO}
MCH RBC QN AUTO: 28.8 PG (ref 26.6–33)
MCHC RBC AUTO-ENTMCNC: 31.7 G/DL (ref 31.5–35.7)
MCV RBC AUTO: 90.9 FL (ref 79–97)
PLATELET # BLD AUTO: 344 10*3/MM3 (ref 140–450)
PMV BLD AUTO: 10 FL (ref 6–12)
POTASSIUM SERPL-SCNC: 3.7 MMOL/L (ref 3.5–5.2)
PROT SERPL-MCNC: 7.2 G/DL (ref 6–8.5)
PSA SERPL-MCNC: 0.21 NG/ML (ref 0–4)
RBC # BLD AUTO: 3.72 10*6/MM3 (ref 4.14–5.8)
SODIUM SERPL-SCNC: 137 MMOL/L (ref 136–145)
TRIGL SERPL-MCNC: 220 MG/DL (ref 0–150)
VLDLC SERPL-MCNC: 36 MG/DL (ref 5–40)
WBC NRBC COR # BLD AUTO: 10.5 10*3/MM3 (ref 3.4–10.8)

## 2024-11-05 PROCEDURE — 99214 OFFICE O/P EST MOD 30 MIN: CPT | Performed by: STUDENT IN AN ORGANIZED HEALTH CARE EDUCATION/TRAINING PROGRAM

## 2024-11-05 PROCEDURE — G0103 PSA SCREENING: HCPCS

## 2024-11-05 PROCEDURE — 1126F AMNT PAIN NOTED NONE PRSNT: CPT | Performed by: STUDENT IN AN ORGANIZED HEALTH CARE EDUCATION/TRAINING PROGRAM

## 2024-11-05 PROCEDURE — 3078F DIAST BP <80 MM HG: CPT | Performed by: STUDENT IN AN ORGANIZED HEALTH CARE EDUCATION/TRAINING PROGRAM

## 2024-11-05 PROCEDURE — 80061 LIPID PANEL: CPT

## 2024-11-05 PROCEDURE — 85027 COMPLETE CBC AUTOMATED: CPT

## 2024-11-05 PROCEDURE — 3044F HG A1C LEVEL LT 7.0%: CPT | Performed by: STUDENT IN AN ORGANIZED HEALTH CARE EDUCATION/TRAINING PROGRAM

## 2024-11-05 PROCEDURE — G2211 COMPLEX E/M VISIT ADD ON: HCPCS | Performed by: STUDENT IN AN ORGANIZED HEALTH CARE EDUCATION/TRAINING PROGRAM

## 2024-11-05 PROCEDURE — 3074F SYST BP LT 130 MM HG: CPT | Performed by: STUDENT IN AN ORGANIZED HEALTH CARE EDUCATION/TRAINING PROGRAM

## 2024-11-05 PROCEDURE — 80053 COMPREHEN METABOLIC PANEL: CPT

## 2024-11-05 NOTE — ASSESSMENT & PLAN NOTE
Mild pulm edema dn pleural effusion and anasarca with normal ECHO  Cont lasix  Cont f/u with hem/onc

## 2024-11-05 NOTE — PROGRESS NOTES
Established Patient Office Visit        Subjective      Chief Complaint:  Ear Fullness (Right ear fullness )      History of Present Illness: Kiran Belcher is a 64 y.o. male who presents for follow-up of right ear fullness does not improve      Patient Active Problem List   Diagnosis    Benign essential hypertension    Depression    Gastroesophageal reflux disease without esophagitis    Hyperlipidemia    NISA on CPAP    Type 2 diabetes mellitus without complications    Spasm    Other obesity due to excess calories    Hx of spinal cord injury    Kidney disorder    Lower extremity weakness    Myalgia    Moderate episode of recurrent major depressive disorder    Dysphagia    Toenail avulsion    H/O kidney removal    Renal cell carcinoma of right kidney    Quadriplegia    Muscle contracture    C5-C7 level spinal injury with complete lesion of spinal cord, without evidence of spinal bone injury    Inability to bear weight    Neurogenic bladder    Venous stasis    B-cell lymphoma    Cancer of kidney    Hypertension    Neutrophilic leukemoid reaction    Personal history of Methicillin resistant Staphylococcus aureus infection    Paraplegia    Other constipation    Iron deficiency anemia    Malabsorption of iron    Hypervolemia         Current Outpatient Medications:     amLODIPine-benazepril (LOTREL) 10-20 MG per capsule, Take 1 capsule by mouth Daily., Disp: 90 capsule, Rfl: 3    ammonium lactate (AMLACTIN) 12 % cream, Apply 1 g topically to the appropriate area as directed As Needed for Dry Skin. Apply as directed , prescribed by podiatrist, Disp: , Rfl:     aspirin 81 MG chewable tablet, Chew 1 tablet Daily., Disp: , Rfl:     atorvastatin (LIPITOR) 10 MG tablet, Take 1 tablet by mouth Daily., Disp: 90 tablet, Rfl: 3    baclofen (LIORESAL) 20 MG tablet, Take 1 tablet by mouth 3 (Three) Times a Day., Disp: 90 tablet, Rfl: 11    benzonatate (Tessalon Perles) 100 MG capsule, Take 1 capsule by mouth 3 (Three) Times a Day  As Needed for Cough., Disp: 21 capsule, Rfl: 0    carvedilol (COREG) 12.5 MG tablet, TAKE 1/2 TABLET BY MOUTH TWICE A DAY WITH MEALS., Disp: 90 tablet, Rfl: 3    citalopram (CeleXA) 20 MG tablet, Take 1 tablet by mouth Daily., Disp: 90 tablet, Rfl: 3    dantrolene (DANTRIUM) 100 MG capsule, TAKE 1 CAPSULE BY MOUTH TWICE A DAY, Disp: 180 capsule, Rfl: 3    Diclofenac Sodium (VOLTAREN) 1 % gel gel, Apply 4 g topically to the appropriate area as directed 4 (Four) Times a Day As Needed (right 4th finger pain)., Disp: 100 g, Rfl: 2    esomeprazole (nexIUM) 40 MG capsule, TAKE ONE CAPSULE BY MOUTH DAILY, Disp: 90 capsule, Rfl: 3    FeroSul 325 (65 Fe) MG tablet, TAKE 1 TABLET BY MOUTH DAILY WITH BREAKFAST, Disp: 30 tablet, Rfl: 5    fesoterodine fumarate (TOVIAZ ER) 8 MG tablet sustained-release 24 hour tablet, Take 1 tablet by mouth Daily., Disp: , Rfl:     fexofenadine (ALLEGRA) 180 MG tablet, Take 1 tablet by mouth Daily., Disp: , Rfl:     fluocinonide (LIDEX) 0.05 % cream, fluocinonide 0.05 % topical cream, Disp: , Rfl:     fluticasone (FLONASE) 50 MCG/ACT nasal spray, 2 sprays into the nostril(s) as directed by provider Daily., Disp: 16 g, Rfl: 0    furosemide (Lasix) 20 MG tablet, Take 1 tablet by mouth Daily., Disp: 30 tablet, Rfl: 2    glucose blood (Accu-Chek Elke Plus) test strip, 1 each by Other route 2 (Two) Times a Day. Use as instructed, Disp: 200 each, Rfl: 3    glyburide (DIAbeta) 5 MG tablet, Take 1 tablet by mouth Daily With Breakfast., Disp: 90 tablet, Rfl: 3    lactobacillus acidophilus (RISAQUAD) capsule capsule, Take 1 capsule by mouth Daily., Disp: 90 capsule, Rfl: 1    metFORMIN ER (GLUCOPHAGE-XR) 500 MG 24 hr tablet, TAKE 2 TABLETS BY MOUTH TWICE A DAY, Disp: 360 tablet, Rfl: 3    methenamine (HIPREX) 1 g tablet, Take 1 tablet by mouth 2 (Two) Times a Day With Meals., Disp: , Rfl:     Mirabegron ER (MYRBETRIQ) 50 MG tablet sustained-release 24 hour 24 hr tablet, Take 50 mg by mouth Daily., Disp:  ", Rfl:     promethazine-dextromethorphan (PROMETHAZINE-DM) 6.25-15 MG/5ML syrup, Take 5 mL by mouth every night at bedtime., Disp: 118 mL, Rfl: 0       Objective     Physical Exam:   Vital Signs:   /66   Pulse 96   Temp 98 °F (36.7 °C) (Temporal)   Resp 18   Ht 175.3 cm (69.02\")   SpO2 95%   BMI 39.70 kg/m²      Physical Exam  Constitutional:       General: He is not in acute distress.     Appearance: He is not ill-appearing.   Cardiovascular:      Rate and Rhythm: Normal rate and regular rhythm.   Pulmonary:      Effort: Pulmonary effort is normal.      Breath sounds: Normal breath sounds.   Neurological:      Mental Status: He is alert.   Psychiatric:         Thought Content: Thought content normal.   Left TM within normal limits  Right TM with prominent ossicles. No erythema            Assessment / Plan      Assessment/Plan:   Diagnoses and all orders for this visit:    1. ETD (Eustachian tube dysfunction), right (Primary)  -     Ambulatory Referral to ENT (Otolaryngology)  -     Patient still having some fullness after viral illness and acute otitis media there is some concern for potential perforation during this time I do not see this perforation today.  I like to get him into ENT for further evaluation  -No significant improvement with Flonase    2. Hypervolemia, unspecified hypervolemia type  Assessment & Plan:  Mild pulm edema dn pleural effusion and anasarca with normal ECHO  Cont lasix  Cont f/u with hem/onc     3.  Hypertension   at goal continue carvedilol, amlodipine, benazepril         Follow Up:   Return in about 4 weeks (around 12/3/2024) for Wellness visit.    MDM:     Chay Cameron MD  Family Medicine - [facility]  "

## 2024-11-11 ENCOUNTER — HOSPITAL ENCOUNTER (EMERGENCY)
Facility: HOSPITAL | Age: 64
Discharge: HOME | End: 2024-11-11
Attending: EMERGENCY MEDICINE | Admitting: EMERGENCY MEDICINE
Payer: MEDICARE

## 2024-11-11 VITALS
RESPIRATION RATE: 18 BRPM | HEART RATE: 85 BPM | OXYGEN SATURATION: 99 % | TEMPERATURE: 97.6 F | SYSTOLIC BLOOD PRESSURE: 138 MMHG | HEIGHT: 70 IN | BODY MASS INDEX: 38.65 KG/M2 | DIASTOLIC BLOOD PRESSURE: 73 MMHG | WEIGHT: 270 LBS

## 2024-11-11 DIAGNOSIS — R42 DIZZINESS: ICD-10-CM

## 2024-11-11 DIAGNOSIS — H61.21 IMPACTED CERUMEN OF RIGHT EAR: Primary | ICD-10-CM

## 2024-11-11 PROCEDURE — 69209 REMOVE IMPACTED EAR WAX UNI: CPT

## 2024-11-11 PROCEDURE — 99282 EMERGENCY DEPT VISIT SF MDM: CPT

## 2024-11-11 RX ORDER — MECLIZINE HYDROCHLORIDE 25 MG/1
25 TABLET ORAL 3 TIMES DAILY PRN
Qty: 12 TABLET | Refills: 0 | Status: SHIPPED | OUTPATIENT
Start: 2024-11-11

## 2024-11-12 ENCOUNTER — TELEPHONE (OUTPATIENT)
Dept: FAMILY MEDICINE CLINIC | Facility: CLINIC | Age: 64
End: 2024-11-12

## 2024-11-12 NOTE — FSED PROVIDER NOTE
"Subjective   History of Present Illness  Patient presents to the emergency department for dizziness.  Says he feels like the room spins sometimes and has been going on for the past month.  He is scheduled to see ENT this week but wanted to be seen today as the dizziness has not gotten any better.  He is wheelchair-bound from a previous spinal cord injury.  He denies any chest pain lightheadedness nausea vomiting diarrhea or other symptoms.    History provided by:  Parent and relative   used: No        Review of Systems   Neurological:  Positive for dizziness.   All other systems reviewed and are negative.      Past Medical History:   Diagnosis Date    B-cell lymphoma 02/2022    Undergoing work-up with [facility]    Cancer     hx spot on right kidney, for ~8 years, told cancer but no hx biopsy and no interventions and just watching it. Dr. vega used to watch this cyst, then started with [facility] a year ago after he retired.    Decreased mobility     Depression     Diabetes mellitus     Esophageal reflux     Glaucoma     History of methicillin resistant Staphylococcus aureus infection     History of obstructive sleep apnea     History of quadriplegia     of unknown etiology - C5-C7, incomplete     History of spinal cord injury/quadriplegia 2008 09/14/2018    Hyperlipidemia     Hypertension     Kidney disorder 9/14/2018    Paraplegia        Allergies   Allergen Reactions    Levofloxacin Unknown (See Comments)     tendinopathy    Cefuroxime Palpitations     Tachycardia, back pain, fatigue, and weakness.        Past Surgical History:   Procedure Laterality Date    CERVICAL DISC SURGERY      around 5/2008, fall/injury tripped over a safety gate for their dogs, reported SCI \"semi quadraplegic\" since. [facility]. [facility] after.    SPINE SURGERY         Family History   Problem Relation Age of Onset    Hypertension Mother     Heart disease Father     Coronary artery disease Father     Diabetes Other     " Hypertension Other        Social History     Socioeconomic History    Marital status:    Tobacco Use    Smoking status: Never    Smokeless tobacco: Never   Vaping Use    Vaping status: Never Used   Substance and Sexual Activity    Alcohol use: No    Drug use: No    Sexual activity: Defer           Objective   Physical Exam  Vitals and nursing note reviewed.   Constitutional:       Appearance: Normal appearance. He is obese.   HENT:      Head: Normocephalic and atraumatic.      Comments: Cerumen impaction right ear canal.  After removal tympanic membrane intact     Left Ear: Tympanic membrane and ear canal normal.      Nose: Nose normal.      Mouth/Throat:      Mouth: Mucous membranes are moist. Mucous membranes are dry.   Eyes:      Extraocular Movements: Extraocular movements intact.      Pupils: Pupils are equal, round, and reactive to light.   Cardiovascular:      Rate and Rhythm: Normal rate and regular rhythm.      Heart sounds: Normal heart sounds.   Pulmonary:      Effort: Pulmonary effort is normal. No respiratory distress.      Breath sounds: Normal breath sounds. No wheezing.   Abdominal:      General: There is no distension.      Palpations: Abdomen is soft.      Tenderness: There is no abdominal tenderness. There is no guarding.   Musculoskeletal:         General: No tenderness or deformity.      Cervical back: Normal range of motion and neck supple.      Right lower leg: No edema.      Left lower leg: No edema.   Skin:     General: Skin is warm and dry.      Capillary Refill: Capillary refill takes less than 2 seconds.   Neurological:      Mental Status: He is alert and oriented to person, place, and time. Mental status is at baseline.   Psychiatric:         Mood and Affect: Mood normal.         Behavior: Behavior normal.         Procedures           ED Course                                           Medical Decision Making  Hemodynamically stable and afebrile.  Patient has a cerumen impaction  this was cleaned by nursing staff.  He has no focal neurologic deficits at this time above his baseline.  He is wheelchair-bound.  Will give meclizine.  He has scheduled follow-up with ENT this week which I encouraged him to keep.  Given return precautions care instructions and discharged    Problems Addressed:  Dizziness: acute illness or injury  Impacted cerumen of right ear: acute illness or injury        Final diagnoses:   Impacted cerumen of right ear   Dizziness       ED Disposition  ED Disposition       ED Disposition   Discharge    Condition   Stable    Comment   --               Chay Cameron MD  [address]  [address]  [place] [id number]  [phone]    Schedule an appointment as soon as possible for a visit   As needed    [facility] EMERGENCY DEPARTMENT [place]  [address]  [facility] [address] [phone]  Go to   As needed         Medication List        New Prescriptions      meclizine 25 MG tablet  Commonly known as: ANTIVERT  Take 1 tablet by mouth 3 (Three) Times a Day As Needed for Dizziness.               Where to Get Your Medications        These medications were sent to [facility] [id number] - [place], [place] - [address] - [phone]  - [phone]   [address], [facility] [address]      Phone: [phone]   meclizine 25 MG tablet

## 2024-11-12 NOTE — TELEPHONE ENCOUNTER
Caller: Anh Belcher    Relationship: Emergency Contact    Best call back number: [phone]    What is the best time to reach you: AS SOON AS    Who are you requesting to speak with (clinical staff, provider,  specific staff member): PROVIDER    What was the call regarding: OUR PATIENT HAS BEEN EXPERIENCING VERTIGO PRETTY BADLY. SHE TOOK HIM TO ER IN [place]. THEY CLEANED OUT HIS EARS AND STATED THEY WERE PRETTY IMPACTED. HE ALSO HAS BEEN VERY LETHARGIC WHICH IS NOT LIKE HIM. WIFE IS CONCERNED AND WOULD LIKE TO DISCUSS WITH DR BARNEY

## 2024-11-13 NOTE — TELEPHONE ENCOUNTER
Called and spoke with patient about dizziness. He said he cancelled appointment because he is too dizzy to come. I recommended ER and patient said he was just there and nothing improved. Patient did say he moved up ENT appointment to tomorrow. Advised patient to call office tomorrow if no improvement after seeing ENT. Advised patient if dizziness worsens or he is unable to go to ENT he needs to go to ER. Patient verbalized understanding.

## 2024-11-16 ENCOUNTER — APPOINTMENT (OUTPATIENT)
Facility: HOSPITAL | Age: 64
End: 2024-11-16
Payer: MEDICARE

## 2024-11-16 ENCOUNTER — HOSPITAL ENCOUNTER (EMERGENCY)
Facility: HOSPITAL | Age: 64
Discharge: HOME | End: 2024-11-16
Attending: EMERGENCY MEDICINE
Payer: MEDICARE

## 2024-11-16 VITALS
HEART RATE: 82 BPM | HEIGHT: 70 IN | DIASTOLIC BLOOD PRESSURE: 67 MMHG | OXYGEN SATURATION: 98 % | RESPIRATION RATE: 22 BRPM | SYSTOLIC BLOOD PRESSURE: 129 MMHG | BODY MASS INDEX: 38.65 KG/M2 | WEIGHT: 270 LBS | TEMPERATURE: 98 F

## 2024-11-16 DIAGNOSIS — M25.512 ACUTE PAIN OF LEFT SHOULDER: ICD-10-CM

## 2024-11-16 DIAGNOSIS — N39.0 URINARY TRACT INFECTION ASSOCIATED WITH CATHETERIZATION OF URINARY TRACT, UNSPECIFIED INDWELLING URINARY CATHETER TYPE, INITIAL ENCOUNTER: Primary | ICD-10-CM

## 2024-11-16 DIAGNOSIS — Z20.822 EXPOSURE TO COVID-19 VIRUS: ICD-10-CM

## 2024-11-16 DIAGNOSIS — T83.511A URINARY TRACT INFECTION ASSOCIATED WITH CATHETERIZATION OF URINARY TRACT, UNSPECIFIED INDWELLING URINARY CATHETER TYPE, INITIAL ENCOUNTER: Primary | ICD-10-CM

## 2024-11-16 LAB
BACTERIA UR QL AUTO: ABNORMAL /HPF
BILIRUB UR QL STRIP: NEGATIVE
CLARITY UR: ABNORMAL
COLOR UR: YELLOW
FLUAV RNA RESP QL NAA+PROBE: NOT DETECTED
FLUBV RNA RESP QL NAA+PROBE: NOT DETECTED
GLUCOSE UR STRIP-MCNC: NEGATIVE MG/DL
HGB UR QL STRIP.AUTO: ABNORMAL
HYALINE CASTS UR QL AUTO: ABNORMAL /LPF
KETONES UR QL STRIP: NEGATIVE
LEUKOCYTE ESTERASE UR QL STRIP.AUTO: ABNORMAL
NITRITE UR QL STRIP: POSITIVE
PH UR STRIP.AUTO: >=9 [PH] (ref 5–8)
PROT UR QL STRIP: ABNORMAL
RBC # UR STRIP: ABNORMAL /HPF
REF LAB TEST METHOD: ABNORMAL
RSV RNA RESP QL NAA+PROBE: NOT DETECTED
SARS-COV-2 RNA RESP QL NAA+PROBE: NOT DETECTED
SP GR UR STRIP: 1.01 (ref 1–1.03)
SQUAMOUS #/AREA URNS HPF: ABNORMAL /HPF
TRI-PHOS CRY URNS QL MICRO: ABNORMAL /HPF
UROBILINOGEN UR QL STRIP: ABNORMAL
WBC # UR STRIP: ABNORMAL /HPF

## 2024-11-16 PROCEDURE — 73030 X-RAY EXAM OF SHOULDER: CPT

## 2024-11-16 PROCEDURE — 99283 EMERGENCY DEPT VISIT LOW MDM: CPT

## 2024-11-16 PROCEDURE — 87637 SARSCOV2&INF A&B&RSV AMP PRB: CPT | Performed by: PHYSICIAN ASSISTANT

## 2024-11-16 PROCEDURE — 81001 URINALYSIS AUTO W/SCOPE: CPT | Performed by: EMERGENCY MEDICINE

## 2024-11-16 RX ORDER — CEFDINIR 300 MG/1
300 CAPSULE ORAL 2 TIMES DAILY
Qty: 10 CAPSULE | Refills: 0 | Status: SHIPPED | OUTPATIENT
Start: 2024-11-16 | End: 2024-11-25

## 2024-11-16 RX ORDER — CEFDINIR 300 MG/1
300 CAPSULE ORAL ONCE
Status: COMPLETED | OUTPATIENT
Start: 2024-11-16 | End: 2024-11-16

## 2024-11-16 RX ADMIN — CEFDINIR 300 MG: 300 CAPSULE ORAL at 13:36

## 2024-11-16 NOTE — FSED PROVIDER NOTE
Subjective  History of Present Illness:    This is a 64-year-old male with past medical history of B-cell lymphoma, paraplegia, neurogenic bladder who self caths, type 2 diabetes, hypertension, hyperlipidemia who presents with complaints of 2 days of foul-smelling urine and cloudy urine.  Patient has had urinary tract infections in the past and has presented similarly.  Patient denies any fevers.  Has not had any issues self cathing.  He is also complaining of left shoulder pain that has been going on for 4 to 5 weeks, denies any known injury.  He is able to move the shoulder but is requesting an x-ray.  Also was complaining of possible exposure to COVID-19.  He has had congestion for weeks but denies any worsening cough, fevers, shortness of breath but would like a COVID-19 test.  Last urinary tract infection was 2 to 3 months ago.  Denies any hematuria      Nurses Notes reviewed and agree, including vitals, allergies, social history and prior medical history.     REVIEW OF SYSTEMS: All systems reviewed and not pertinent unless noted.  Review of Systems    Past Medical History:   Diagnosis Date    B-cell lymphoma 02/2022    Undergoing work-up with [facility]    Cancer     hx spot on right kidney, for ~8 years, told cancer but no hx biopsy and no interventions and just watching it. Dr. vega used to watch this cyst, then started with [facility] u[facility] a year ago after he retired.    Decreased mobility     Depression     Diabetes mellitus     Esophageal reflux     Glaucoma     History of methicillin resistant Staphylococcus aureus infection     History of obstructive sleep apnea     History of quadriplegia     of unknown etiology - C5-C7, incomplete     History of spinal cord injury/quadriplegia 2008 09/14/2018    Hyperlipidemia     Hypertension     Kidney disorder 9/14/2018    Paraplegia        Allergies:    Levofloxacin and Cefuroxime      Past Surgical History:   Procedure Laterality Date    CERVICAL DISC SURGERY      around  "5/2008, fall/injury tripped over a safety gate for their dogs, reported SCI \"semi quadraplegic\" since. [facility]. [facility] after.    SPINE SURGERY           Social History     Socioeconomic History    Marital status:    Tobacco Use    Smoking status: Never    Smokeless tobacco: Never   Vaping Use    Vaping status: Never Used   Substance and Sexual Activity    Alcohol use: No    Drug use: No    Sexual activity: Defer         Family History   Problem Relation Age of Onset    Hypertension Mother     Heart disease Father     Coronary artery disease Father     Diabetes Other     Hypertension Other        Objective  Physical Exam:  /67   Pulse 82   Temp 98 °F (36.7 °C) (Oral)   Resp 22   Ht 177.8 cm (70\")   Wt 122 kg (270 lb)   SpO2 98%   BMI 38.74 kg/m²      Physical Exam  Vitals and nursing note reviewed.   Constitutional:       Appearance: Normal appearance.   Cardiovascular:      Rate and Rhythm: Normal rate and regular rhythm.   Pulmonary:      Effort: Pulmonary effort is normal.      Breath sounds: Normal breath sounds.   Abdominal:      General: Abdomen is flat.      Palpations: Abdomen is soft.   Musculoskeletal:      Comments: Paraplegia of the lower extremities   Neurological:      Mental Status: He is alert.         Procedures    ED Course:         Lab Results (last 24 hours)       Procedure Component Value Units Date/Time    Urinalysis With Microscopic If Indicated (No Culture) - Urine, Clean Catch [[id number]]  (Abnormal) Collected: 11/16/24 1216    Specimen: Urine, Clean Catch Updated: 11/16/24 1236     Color, UA Yellow     Appearance, UA Cloudy     pH, UA >=9.0     Specific Gravity, UA 1.015     Glucose, UA Negative     Ketones, UA Negative     Bilirubin, UA Negative     Blood, UA Trace     Protein,  mg/dL (2+)     Leuk Esterase, UA Large (3+)     Nitrite, UA Positive     Urobilinogen, UA 0.2 E.U./dL    Urinalysis, Microscopic Only - Urine, Clean Catch [[id number]]  (Abnormal) " Collected: 11/16/24 1216    Specimen: Urine, Clean Catch Updated: 11/16/24 1241     RBC, UA 6-10 /HPF      WBC, UA 21-50 /HPF      Bacteria, UA 4+ /HPF      Squamous Epithelial Cells, UA 0-2 /HPF      Hyaline Casts, UA None Seen /LPF      Triple Phosphate Crystals, UA Large/3+ /HPF      Methodology Manual Light Microscopy    COVID-19, FLU A/B, RSV PCR 1 HR TAT - Swab, Nasopharynx [[id number]]  (Normal) Collected: 11/16/24 1219    Specimen: Swab from Nasopharynx Updated: 11/16/24 1309     COVID19 Not Detected     Influenza A PCR Not Detected     Influenza B PCR Not Detected     RSV, PCR Not Detected    Narrative:      Fact sheet for providers: https://www.fda.gov/media/052231/download    Fact sheet for patients: https://www.fda.gov/media/709858/download    Test performed by PCR.             XR Shoulder 2+ View Left    Result Date: 11/16/2024  XR SHOULDER 2+ VW LEFT Date of Exam: 11/16/2024 12:40 PM EST Indication: Left shoulder pain Comparison: None available. Findings: There are some degenerative changes involving the greater tuberosity. There is no acute fracture or dislocation. There degenerative changes involving the AC joint.     Impression: Impression: 1.Degenerative changes are noted involving the shoulder area. Electronically Signed: Raman Thompson MD  11/16/2024 1:15 PM EST  Workstation ID: [id number]        MDM      Initial impression of presenting illness: This is a 64-year-old male who presents with complaints of foul-smelling urine.  Patient found have a urinary tract infection.  Reviewed cultures from April which showed susceptibility to cefdinir.  This urine will also be cultured.  Patient had x-rays of his shoulder did not reveal any acute findings.  Patient was COVID-negative.  Discussed all these findings and plan of care with the patient.  He was given return precautions.  He is afebrile nontoxic-appearing at this time.  Discussed the need for follow-up with his primary care provider and urologist.   Discussed findings and plan of care with the patient is agreeable to discharge    DDX: includes but is not limited to: Urinary tract infection, pyelonephritis, rotator cuff injury, covid 19      Medications   cefdinir (OMNICEF) capsule 300 mg (300 mg Oral Given 11/16/24 1336)         Data interpreted: Nursing notes reviewed, vital signs reviewed.  Labs independently interpreted by me (CBC, CMP, lipase, UA, troponin, ABG, lactic acid, procalcitonin).  Imaging independently interpreted by me (x-ray, CT scan).  EKG independently interpreted by me.  O2 saturation:    Counseling: Discussed the results above with the patient regarding need for admission or discharge.  Patient understands and agrees plan of care.      -----  ED Disposition       ED Disposition   Discharge    Condition   Stable    Comment   --             Final diagnoses:   Urinary tract infection associated with catheterization of urinary tract, unspecified indwelling urinary catheter type, initial encounter   Acute pain of left shoulder   Exposure to COVID-19 virus      Your Follow-Up Providers       Go to  [facility] EMERGENCY DEPARTMENT [place].    Specialty: Emergency Medicine  Follow up details: As needed, If symptoms worsen  [address]  [facility] [address] [phone]             Chay Cameron MD In 1 week.    Specialty: Family Medicine  Follow up details: As needed, If symptoms worsen  [address]  [facility] [address] [phone]                       Contact information for after-discharge care    Follow-up information has not been specified.                    Your medication list        START taking these medications        Instructions Last Dose Given Next Dose Due   cefdinir 300 MG capsule  Commonly known as: OMNICEF      Take 1 capsule by mouth 2 (Two) Times a Day.              CONTINUE taking these medications        Instructions Last Dose Given Next Dose Due    amLODIPine-benazepril 10-20 MG per capsule  Commonly known as: LOTREL      Take 1 capsule by mouth Daily.       ammonium lactate 12 % cream  Commonly known as: AMLACTIN      Apply 1 g topically to the appropriate area as directed As Needed for Dry Skin. Apply as directed , prescribed by podiatrist       aspirin 81 MG chewable tablet      Chew 1 tablet Daily.       atorvastatin 10 MG tablet  Commonly known as: LIPITOR      Take 1 tablet by mouth Daily.       baclofen 20 MG tablet  Commonly known as: LIORESAL      Take 1 tablet by mouth 3 (Three) Times a Day.       benzonatate 100 MG capsule  Commonly known as: Tessalon Perles      Take 1 capsule by mouth 3 (Three) Times a Day As Needed for Cough.       carvedilol 12.5 MG tablet  Commonly known as: COREG      TAKE 1/2 TABLET BY MOUTH TWICE A DAY WITH MEALS.       citalopram 20 MG tablet  Commonly known as: CeleXA      Take 1 tablet by mouth Daily.       dantrolene 100 MG capsule  Commonly known as: DANTRIUM      TAKE 1 CAPSULE BY MOUTH TWICE A DAY       Diclofenac Sodium 1 % gel gel  Commonly known as: VOLTAREN      Apply 4 g topically to the appropriate area as directed 4 (Four) Times a Day As Needed (right 4th finger pain).       esomeprazole 40 MG capsule  Commonly known as: nexIUM      TAKE ONE CAPSULE BY MOUTH DAILY       FeroSul 325 (65 Fe) MG tablet  Generic drug: ferrous sulfate      TAKE 1 TABLET BY MOUTH DAILY WITH BREAKFAST       fesoterodine fumarate 8 MG tablet sustained-release 24 hour tablet  Commonly known as: TOVIAZ ER      Take 1 tablet by mouth Daily.       fexofenadine 180 MG tablet  Commonly known as: ALLEGRA      Take 1 tablet by mouth Daily.       fluocinonide 0.05 % cream  Commonly known as: LIDEX      fluocinonide 0.05 % topical cream       fluticasone 50 MCG/ACT nasal spray  Commonly known as: FLONASE      2 sprays into the nostril(s) as directed by provider Daily.       furosemide 20 MG tablet  Commonly known as: Lasix      Take 1 tablet  by mouth Daily.       glucose blood test strip  Commonly known as: Accu-Chek Elke Plus      1 each by Other route 2 (Two) Times a Day. Use as instructed       glyburide 5 MG tablet  Commonly known as: DIAbeta      Take 1 tablet by mouth Daily With Breakfast.       lactobacillus acidophilus capsule capsule      Take 1 capsule by mouth Daily.       meclizine 25 MG tablet  Commonly known as: ANTIVERT      Take 1 tablet by mouth 3 (Three) Times a Day As Needed for Dizziness.       metFORMIN  MG 24 hr tablet  Commonly known as: GLUCOPHAGE-XR      TAKE 2 TABLETS BY MOUTH TWICE A DAY       methenamine 1 g tablet  Commonly known as: HIPREX      Take 1 tablet by mouth 2 (Two) Times a Day With Meals.       Mirabegron ER 50 MG tablet sustained-release 24 hour 24 hr tablet  Commonly known as: MYRBETRIQ      Take 50 mg by mouth Daily.       promethazine-dextromethorphan 6.25-15 MG/5ML syrup  Commonly known as: PROMETHAZINE-DM      Take 5 mL by mouth every night at bedtime.                 Where to Get Your Medications        These medications were sent to [facility] [id number] - [place], [place] - [address] [place] [address] [phone]  - [phone]   [address] [place] [address]      Phone: [phone]   cefdinir 300 MG capsule

## 2024-11-25 ENCOUNTER — OFFICE VISIT (OUTPATIENT)
Dept: FAMILY MEDICINE CLINIC | Facility: CLINIC | Age: 64
End: 2024-11-25
Payer: MEDICARE

## 2024-11-25 VITALS
OXYGEN SATURATION: 96 % | DIASTOLIC BLOOD PRESSURE: 84 MMHG | TEMPERATURE: 98.6 F | HEART RATE: 74 BPM | SYSTOLIC BLOOD PRESSURE: 156 MMHG | HEIGHT: 70 IN | BODY MASS INDEX: 38.74 KG/M2

## 2024-11-25 DIAGNOSIS — N39.0 URINARY TRACT INFECTION WITHOUT HEMATURIA, SITE UNSPECIFIED: ICD-10-CM

## 2024-11-25 DIAGNOSIS — S14.115A SPINAL CORD INJURY AT C5-C7 LEVEL WITH COMPLETE SPINAL CORD LESION: ICD-10-CM

## 2024-11-25 DIAGNOSIS — I10 PRIMARY HYPERTENSION: Primary | ICD-10-CM

## 2024-11-25 DIAGNOSIS — M25.511 ACUTE PAIN OF RIGHT SHOULDER: ICD-10-CM

## 2024-11-25 DIAGNOSIS — H69.91 DYSFUNCTION OF RIGHT EUSTACHIAN TUBE: ICD-10-CM

## 2024-11-25 LAB
BILIRUB BLD-MCNC: NEGATIVE MG/DL
CLARITY, POC: ABNORMAL
COLOR UR: YELLOW
EXPIRATION DATE: ABNORMAL
GLUCOSE UR STRIP-MCNC: NEGATIVE MG/DL
KETONES UR QL: NEGATIVE
LEUKOCYTE EST, POC: ABNORMAL
Lab: ABNORMAL
NITRITE UR-MCNC: NEGATIVE MG/ML
PH UR: 6 [PH] (ref 5–8)
PROT UR STRIP-MCNC: ABNORMAL MG/DL
RBC # UR STRIP: NEGATIVE /UL
SP GR UR: 1.01 (ref 1–1.03)
UROBILINOGEN UR QL: NORMAL

## 2024-11-25 PROCEDURE — 1126F AMNT PAIN NOTED NONE PRSNT: CPT | Performed by: STUDENT IN AN ORGANIZED HEALTH CARE EDUCATION/TRAINING PROGRAM

## 2024-11-25 PROCEDURE — 3044F HG A1C LEVEL LT 7.0%: CPT | Performed by: STUDENT IN AN ORGANIZED HEALTH CARE EDUCATION/TRAINING PROGRAM

## 2024-11-25 PROCEDURE — 81003 URINALYSIS AUTO W/O SCOPE: CPT | Performed by: STUDENT IN AN ORGANIZED HEALTH CARE EDUCATION/TRAINING PROGRAM

## 2024-11-25 PROCEDURE — 3079F DIAST BP 80-89 MM HG: CPT | Performed by: STUDENT IN AN ORGANIZED HEALTH CARE EDUCATION/TRAINING PROGRAM

## 2024-11-25 PROCEDURE — 3077F SYST BP >= 140 MM HG: CPT | Performed by: STUDENT IN AN ORGANIZED HEALTH CARE EDUCATION/TRAINING PROGRAM

## 2024-11-25 PROCEDURE — 99214 OFFICE O/P EST MOD 30 MIN: CPT | Performed by: STUDENT IN AN ORGANIZED HEALTH CARE EDUCATION/TRAINING PROGRAM

## 2024-11-25 RX ORDER — TRIAMCINOLONE ACETONIDE 55 UG/1
2 SPRAY, METERED NASAL DAILY
COMMUNITY
Start: 2024-11-14

## 2024-11-25 NOTE — LETTER
November 25, 2024     Patient: Kiran Belcher   YOB: 1960   Date of Visit: 11/25/2024       To Whom It May Concern:    It is my medical opinion that Kiran Belcher has a disability that limits his ability to move the garbage to the street. Please accommodate this.     Sincerely,        Chay Cameron MD    CC: No Recipients

## 2024-11-25 NOTE — PROGRESS NOTES
Established Patient Office Visit        Subjective      Chief Complaint:  ER follow up (Per patient he went to [facility] ER about a week and a half ago; diagnosed with a UTI and advised to follow up with PCP. )      History of Present Illness: Kiran Belcher is a 64 y.o. male who presents for f/u of UTI. Dizziness resolved. No burning. Urine normal color. No odor.     Some pain to posterior left shoulder/trapezius     Urinalysis and urine microscopy and COVID test and flu test reviewed from the emergency room he was diagnosed with UTI in 11/16 and treated with cefdinir.  Patient Active Problem List   Diagnosis    Benign essential hypertension    Depression    Gastroesophageal reflux disease without esophagitis    Hyperlipidemia    NISA on CPAP    Type 2 diabetes mellitus without complications    Spasm    Other obesity due to excess calories    Hx of spinal cord injury    Kidney disorder    Lower extremity weakness    Myalgia    Moderate episode of recurrent major depressive disorder    Dysphagia    Toenail avulsion    H/O kidney removal    Renal cell carcinoma of right kidney    Quadriplegia    Muscle contracture    C5-C7 level spinal injury with complete lesion of spinal cord, without evidence of spinal bone injury    Inability to bear weight    Neurogenic bladder    Venous stasis    B-cell lymphoma    Cancer of kidney    Hypertension    Neutrophilic leukemoid reaction    Personal history of Methicillin resistant Staphylococcus aureus infection    Paraplegia    Other constipation    Iron deficiency anemia    Malabsorption of iron    Hypervolemia         Current Outpatient Medications:     amLODIPine-benazepril (LOTREL) 10-20 MG per capsule, Take 1 capsule by mouth Daily., Disp: 90 capsule, Rfl: 3    ammonium lactate (AMLACTIN) 12 % cream, Apply 1 g topically to the appropriate area as directed As Needed for Dry Skin. Apply as directed , prescribed by podiatrist, Disp: , Rfl:     aspirin 81 MG chewable  tablet, Chew 1 tablet Daily., Disp: , Rfl:     atorvastatin (LIPITOR) 10 MG tablet, Take 1 tablet by mouth Daily., Disp: 90 tablet, Rfl: 3    baclofen (LIORESAL) 20 MG tablet, Take 1 tablet by mouth 3 (Three) Times a Day., Disp: 90 tablet, Rfl: 11    carvedilol (COREG) 12.5 MG tablet, TAKE 1/2 TABLET BY MOUTH TWICE A DAY WITH MEALS., Disp: 90 tablet, Rfl: 3    citalopram (CeleXA) 20 MG tablet, Take 1 tablet by mouth Daily., Disp: 90 tablet, Rfl: 3    dantrolene (DANTRIUM) 100 MG capsule, TAKE 1 CAPSULE BY MOUTH TWICE A DAY, Disp: 180 capsule, Rfl: 3    Diclofenac Sodium (VOLTAREN) 1 % gel gel, Apply 4 g topically to the appropriate area as directed 4 (Four) Times a Day As Needed (right 4th finger pain)., Disp: 100 g, Rfl: 2    esomeprazole (nexIUM) 40 MG capsule, TAKE ONE CAPSULE BY MOUTH DAILY, Disp: 90 capsule, Rfl: 3    FeroSul 325 (65 Fe) MG tablet, TAKE 1 TABLET BY MOUTH DAILY WITH BREAKFAST, Disp: 30 tablet, Rfl: 5    fesoterodine fumarate (TOVIAZ ER) 8 MG tablet sustained-release 24 hour tablet, Take 1 tablet by mouth Daily., Disp: , Rfl:     fexofenadine (ALLEGRA) 180 MG tablet, Take 1 tablet by mouth Daily., Disp: , Rfl:     fluocinonide (LIDEX) 0.05 % cream, fluocinonide 0.05 % topical cream, Disp: , Rfl:     fluticasone (FLONASE) 50 MCG/ACT nasal spray, 2 sprays into the nostril(s) as directed by provider Daily., Disp: 16 g, Rfl: 0    furosemide (Lasix) 20 MG tablet, Take 1 tablet by mouth Daily., Disp: 30 tablet, Rfl: 2    glucose blood (Accu-Chek Elke Plus) test strip, 1 each by Other route 2 (Two) Times a Day. Use as instructed, Disp: 200 each, Rfl: 3    glyburide (DIAbeta) 5 MG tablet, Take 1 tablet by mouth Daily With Breakfast., Disp: 90 tablet, Rfl: 3    lactobacillus acidophilus (RISAQUAD) capsule capsule, Take 1 capsule by mouth Daily., Disp: 90 capsule, Rfl: 1    meclizine (ANTIVERT) 25 MG tablet, Take 1 tablet by mouth 3 (Three) Times a Day As Needed for Dizziness., Disp: 12 tablet, Rfl: 0     "metFORMIN ER (GLUCOPHAGE-XR) 500 MG 24 hr tablet, TAKE 2 TABLETS BY MOUTH TWICE A DAY, Disp: 360 tablet, Rfl: 3    methenamine (HIPREX) 1 g tablet, Take 1 tablet by mouth 2 (Two) Times a Day With Meals., Disp: , Rfl:     Mirabegron ER (MYRBETRIQ) 50 MG tablet sustained-release 24 hour 24 hr tablet, Take 50 mg by mouth Daily., Disp: , Rfl:     Triamcinolone Acetonide (Nasacort Allergy 24HR) 55 MCG/ACT nasal inhaler, Administer 2 sprays into the nostril(s) as directed by provider Daily., Disp: , Rfl:        Objective     Physical Exam:   Vital Signs:   /84 Comment: x 2  Pulse 74   Temp 98.6 °F (37 °C) (Infrared)   Ht 177.8 cm (70\")   SpO2 96%   BMI 38.74 kg/m²      Physical Exam  Constitutional:       General: He is not in acute distress.     Appearance: He is not ill-appearing.   Cardiovascular:      Rate and Rhythm: Normal rate and regular rhythm.   Pulmonary:      Effort: Pulmonary effort is normal.      Breath sounds: Normal breath sounds.   Neurological:      Mental Status: He is alert.   Psychiatric:         Thought Content: Thought content normal.   Mild weakness to the left shoulder abduction. Negative lopez   Mild tenderness to the trapezius left   Effusion right ear            Assessment / Plan      Assessment/Plan:   Diagnoses and all orders for this visit:    1. Primary hypertension (Primary)    2. Urinary tract infection without hematuria, site unspecified  -     POC Urinalysis Dipstick, Automated    3. Spinal cord injury at C5-C7 level with complete spinal cord lesion  -     Ambulatory Referral to Physical Therapy for Evaluation & Treatment  -     Ambulatory Referral to Physical Therapy for Evaluation & Treatment    4. Dysfunction of right eustachian tube    5. Acute pain of right shoulder  -     Ambulatory Referral to Physical Therapy for Evaluation & Treatment       S/p cefdinir UTI appears resolved, pyuria is chronic.  Keep follow-up with urology.  Does intermittent straight " cathing.    F/u with ENT for unresolved right ear symptoms. Failed flonase.     HTN above goal monitor will follow-up in December.   For now continue amlodipine-benazepril, carvedilol    Refer to PT for left posterior shoulder/trapezius pain and chronic weakness to the extremities due to C5-C7 injury    Follow Up:   No follow-ups on file.    MDM:     Chay Cameron MD  [facility]

## 2024-11-27 ENCOUNTER — HOSPITAL ENCOUNTER (OUTPATIENT)
Dept: PHYSICAL THERAPY | Facility: HOSPITAL | Age: 64
Setting detail: THERAPIES SERIES
Discharge: HOME | End: 2024-11-27
Payer: MEDICARE

## 2024-11-27 DIAGNOSIS — L02.31 CELLULITIS AND ABSCESS OF BUTTOCK: ICD-10-CM

## 2024-11-27 DIAGNOSIS — S31.819D OPEN WOUND OF RIGHT BUTTOCK, SUBSEQUENT ENCOUNTER: Primary | ICD-10-CM

## 2024-11-27 DIAGNOSIS — L03.317 CELLULITIS AND ABSCESS OF BUTTOCK: ICD-10-CM

## 2024-11-27 PROCEDURE — 97597 DBRDMT OPN WND 1ST 20 CM/<: CPT

## 2024-11-27 NOTE — THERAPY DISCHARGE NOTE
Outpatient Rehabilitation - Wound/Debridement Progress Note &  Discharge Summary   [place]     Patient Name: Kiran Belcher  : 1960  MRN: [id number]  Today's Date: 2024                  Admit Date: 2024    Visit Dx:    ICD-10-CM ICD-9-CM   1. Open wound of right buttock, subsequent encounter  S31.819D V58.89     877.0   2. Cellulitis and abscess of buttock  L02.31 682.5    L03.317      R glute      Patient Active Problem List   Diagnosis    Benign essential hypertension    Depression    Gastroesophageal reflux disease without esophagitis    Hyperlipidemia    NISA on CPAP    Type 2 diabetes mellitus without complications    Spasm    Other obesity due to excess calories    Hx of spinal cord injury    Kidney disorder    Lower extremity weakness    Myalgia    Moderate episode of recurrent major depressive disorder    Dysphagia    Toenail avulsion    H/O kidney removal    Renal cell carcinoma of right kidney    Quadriplegia    Muscle contracture    C5-C7 level spinal injury with complete lesion of spinal cord, without evidence of spinal bone injury    Inability to bear weight    Neurogenic bladder    Venous stasis    B-cell lymphoma    Cancer of kidney    Hypertension    Neutrophilic leukemoid reaction    Personal history of Methicillin resistant Staphylococcus aureus infection    Paraplegia    Other constipation    Iron deficiency anemia    Malabsorption of iron    Hypervolemia        Past Medical History:   Diagnosis Date    B-cell lymphoma 2022    Undergoing work-up with [facility]    Cancer     hx spot on right kidney, for ~8 years, told cancer but no hx biopsy and no interventions and just watching it. Dr. vega used to watch this cyst, then started with [facility] a year ago after he retired.    Decreased mobility     Depression     Diabetes mellitus     Esophageal reflux     Glaucoma     History of methicillin resistant Staphylococcus aureus infection     History of obstructive sleep apnea  "    History of quadriplegia     of unknown etiology - C5-C7, incomplete     History of spinal cord injury/quadriplegia 2008 09/14/2018    Hyperlipidemia     Hypertension     Kidney disorder 9/14/2018    Paraplegia         Past Surgical History:   Procedure Laterality Date    CERVICAL DISC SURGERY      around 5/2008, fall/injury tripped over a safety gate for their dogs, reported SCI \"semi quadraplegic\" since. [facility]. [facility] after.    SPINE SURGERY           EVALUATION   PT Ortho       Row Name 11/27/24 1500       Subjective    Subjective Comments Reports he has been to the ER a couple time since previous appt d/t UTI and balance/dizziness issues, however reports those issues are now resolved. Reports he ran out of supplies and was unable to come  more so he just used large bandaids to cover the wound.  -       Subjective Pain    Able to rate subjective pain? yes  -    Pre-Treatment Pain Level 0  -    Post-Treatment Pain Level 0  -       Transfers    Comment, (Transfers) seated in w/c for tx, able to lean forward to access wound for tx  -              User Key  (r) = Recorded By, (t) = Taken By, (c) = Cosigned By      Initials Name Provider Type     Norman Vasquez, PT Physical Therapist                         LDA Wound       Row Name 11/27/24 1500             Wound 05/22/24 1045 Right posterior hip Abcess    Wound - Properties Group Placement Date: 05/22/24  - Placement Time: 1045  -MF Side: Right  - Orientation: posterior  -MF Location: hip  -MF Primary Wound Type: Abcess  -MF    Wound Image Images linked: 1  -      Dressing Appearance dry  Bandaid not covering wound.  -      Base dry;closed/resurfaced;epithelialization  fully resurfaced  -      Periwound intact;dry;pink  -      Periwound Temperature warm  -      Periwound Skin Turgor soft  -      Wound Length (cm) 0 cm  -      Wound Width (cm) 0 cm  -      Wound Depth (cm) 0 cm  -      Wound Surface Area (cm^2) 0 cm^2  " "-      Wound Volume (cm^3) 0 cm^3  -      Care, Wound cleansed with;soap and water  -      Dressing Care dressing applied;low-adherent;silicone border foam  4\" optifoam only  -      Periwound Care cleansed with pH balanced cleanser;dry periwound area maintained  -      Retired Wound - Properties Group Placement Date: 05/22/24  - Placement Time: 1045  -MF Side: Right  -MF Orientation: posterior  -MF Location: hip  -MF Primary Wound Type: Abcess  -MF    Retired Wound - Properties Group Placement Date: 05/22/24  - Placement Time: 1045  -MF Side: Right  -MF Orientation: posterior  -MF Location: hip  -MF Primary Wound Type: Abcess  -MF    Retired Wound - Properties Group Date first assessed: 05/22/24  - Time first assessed: 1045  -MF Side: Right  -MF Location: hip  -MF Primary Wound Type: Abcess  -MF              User Key  (r) = Recorded By, (t) = Taken By, (c) = Cosigned By      Initials Name Provider Type     Lenard Grijalva, PT Physical Therapist     Norman Vasquez, PT Physical Therapist                      WOUND DEBRIDEMENT  Total area of Debridement: 1cm2  Debridement Site 1  Location- Site 1: R buttock  Selective Debridement- Site 1: Wound Surface <20cmsq  Instruments- Site 1: tweezers  Excised Tissue Description- Site 1: minimum, other (comment) (crust)  Bleeding- Site 1: none             Therapy Education  Education Details: Continue with optifoam dressing for ~ 1 more week, then leave area open to air. Monitor area for signs of redness.  Given: Bandaging/dressing change  Program: Reinforced, Progressed  How Provided: Verbal, Demonstration  Provided to: Patient  Level of Understanding: Teach back education performed, Verbalized  86848 - PT Self Care/Mgmt Minutes: 5     Therapy Education       Row Name 11/27/24 1500             Therapy Education    Education Details Continue with optifoam dressing for ~ 1 more week, then leave area open to air. Monitor area for signs of redness.  -      " Given Bandaging/dressing change  -      Program Reinforced;Progressed  -      How Provided Verbal;Demonstration  -      Provided to Patient  -      Level of Understanding Teach back education performed;Verbalized  -                User Key  (r) = Recorded By, (t) = Taken By, (c) = Cosigned By      Initials Name Provider Type    Norman Otero, PT Physical Therapist                    Recommendation and Plan   PT Assessment/Plan       Row Name 11/27/24 1500          PT Assessment    Functional Limitations Impaired gait;Impaired locomotion;Other (comment)  wound management  -     Impairments Integumentary integrity  -     Assessment Comments Pt has achieved all wound care goals this date. Pt presenting with full resurfacing of wound base following debridement of small amount of overlying crusts. PT educated pt to continue with optifoam dressings fro ~ 1 more week to cushion/protect fragile new skin, otherwise may leave area TREVOR. Pt with no further skilled wound care needs at this time.  -        PT Plan    PT Frequency Other (comment)  DC  -     PT Plan Comments DC  -               User Key  (r) = Recorded By, (t) = Taken By, (c) = Cosigned By      Initials Name Provider Type     Norman Vasquez, PT Physical Therapist                    Goals   PT OP Goals       Row Name 11/27/24 1500          PT Short Term Goals    STG 1 Decrease R buttock wound size by 25% as evidence of wound healing.  -     STG 1 Progress Met  -     STG 2 Decrease slough to wound base to less than 50% to improve healing potential  -     STG 2 Progress Met  -     STG 3 Pt and family able to change dressing at home with no issues / questions.  -     STG 3 Progress Met  -        Long Term Goals    LTG Date to Achieve 11/19/24  -     LTG 1 Decrease R buttock wound size by 75% as evidence of wound healing.  -     LTG 1 Progress Met  -     LTG 2 Decrease slough to wound base to less than 10% to improve  healing potential  -     LTG 2 Progress Met  -               User Key  (r) = Recorded By, (t) = Taken By, (c) = Cosigned By      Initials Name Provider Type     Norman Vasquez, PT Physical Therapist                    Time Calculation: Start Time: 1515  Timed Charges  44307 - PT Self Care/Mgmt Minutes: 5  Untimed Charges  88975-Ciqmdranw debridement: 5  Total Minutes  Timed Charges Total Minutes: 5  Untimed Charges Total Minutes: 5   Total Minutes: 10    Therapy Charges for Today       Code Description Service Date Service Provider Modifiers Qty    98094375785 HC EDEL DEBRIDE OPEN WOUND UP TO 20CM 11/27/2024 Norman Vasquez, PT GP 1                 OP Discharge Summary       Row Name 11/27/24 1522             OP PT Discharge Summary    Date of Discharge 11/27/24  -      Reason for Discharge All goals achieved  -      Discharge Instructions/Additional Comments No open wounds remaining, no problems requiring skilled wound care services.  -                User Key  (r) = Recorded By, (t) = Taken By, (c) = Cosigned By      Initials Name Provider Type     Norman Vasquez, PT Physical Therapist                    Norman Vasquez PT  11/27/2024

## 2024-12-17 ENCOUNTER — TELEPHONE (OUTPATIENT)
Dept: FAMILY MEDICINE CLINIC | Facility: CLINIC | Age: 64
End: 2024-12-17
Payer: MEDICARE

## 2024-12-17 NOTE — TELEPHONE ENCOUNTER
Hub to relay I returned call to patient no answer, there were two physical therapy referrals placed 11/25/24  they were both sent to [facility] on 11/25/24

## 2024-12-17 NOTE — TELEPHONE ENCOUNTER
Name: Kiran Belcher    Relationship: Self    Best Callback Number: [phone]    HUB PROVIDED THE RELAY MESSAGE FROM THE OFFICE   PATIENT VOICED UNDERSTANDING AND HAS NO FURTHER QUESTIONS AT THIS TIME    ADDITIONAL INFORMATION:

## 2024-12-17 NOTE — TELEPHONE ENCOUNTER
Caller: Kiran Belcher    Relationship: Self    Best call back number:        What specialty or service is being requested: PHYSICAL THERAPY      What is the office location: [facility]      Any additional details: PATIENT ADVISED THEY HAVENT RECEIVED  THE ORDER FROM DR BARNEY    PLEASE CALL TO ADVISE OF REFERRAL OF STATUS

## 2024-12-18 DIAGNOSIS — J30.9 ALLERGIC RHINITIS, UNSPECIFIED SEASONALITY, UNSPECIFIED TRIGGER: ICD-10-CM

## 2024-12-18 NOTE — TELEPHONE ENCOUNTER
PT STATES [facility] HAS NOT RECVD THIS INFO AND TO PLEASE FAX [phone] ASAP SO THEY CAN GET THIS STARTED FOR HIM

## 2024-12-19 RX ORDER — FLUTICASONE PROPIONATE 50 MCG
SPRAY, SUSPENSION (ML) NASAL
Qty: 16 G | Refills: 11 | Status: SHIPPED | OUTPATIENT
Start: 2024-12-19

## 2024-12-25 DIAGNOSIS — R06.00 DYSPNEA, UNSPECIFIED TYPE: ICD-10-CM

## 2024-12-25 DIAGNOSIS — J90 PLEURAL EFFUSION: ICD-10-CM

## 2024-12-25 DIAGNOSIS — R60.0 LEG EDEMA: ICD-10-CM

## 2024-12-26 RX ORDER — FUROSEMIDE 20 MG/1
20 TABLET ORAL DAILY
Qty: 30 TABLET | Refills: 2 | Status: SHIPPED | OUTPATIENT
Start: 2024-12-26

## 2024-12-27 ENCOUNTER — OFFICE VISIT (OUTPATIENT)
Dept: FAMILY MEDICINE CLINIC | Facility: CLINIC | Age: 64
End: 2024-12-27
Payer: MEDICARE

## 2024-12-27 VITALS
BODY MASS INDEX: 38.74 KG/M2 | HEART RATE: 78 BPM | TEMPERATURE: 98.2 F | DIASTOLIC BLOOD PRESSURE: 84 MMHG | HEIGHT: 70 IN | SYSTOLIC BLOOD PRESSURE: 144 MMHG | OXYGEN SATURATION: 95 %

## 2024-12-27 DIAGNOSIS — E66.812 CLASS 2 SEVERE OBESITY DUE TO EXCESS CALORIES WITH SERIOUS COMORBIDITY AND BODY MASS INDEX (BMI) OF 38.0 TO 38.9 IN ADULT: ICD-10-CM

## 2024-12-27 DIAGNOSIS — I10 BENIGN ESSENTIAL HYPERTENSION: Chronic | ICD-10-CM

## 2024-12-27 DIAGNOSIS — F33.1 MODERATE EPISODE OF RECURRENT MAJOR DEPRESSIVE DISORDER: ICD-10-CM

## 2024-12-27 DIAGNOSIS — C85.10 B-CELL LYMPHOMA, UNSPECIFIED B-CELL LYMPHOMA TYPE, UNSPECIFIED BODY REGION: Primary | ICD-10-CM

## 2024-12-27 DIAGNOSIS — Z00.00 ENCOUNTER FOR ROUTINE ADULT HEALTH EXAMINATION WITHOUT ABNORMAL FINDINGS: ICD-10-CM

## 2024-12-27 DIAGNOSIS — E66.01 CLASS 2 SEVERE OBESITY DUE TO EXCESS CALORIES WITH SERIOUS COMORBIDITY AND BODY MASS INDEX (BMI) OF 38.0 TO 38.9 IN ADULT: ICD-10-CM

## 2024-12-27 DIAGNOSIS — C64.1 RENAL CELL CARCINOMA OF RIGHT KIDNEY: ICD-10-CM

## 2024-12-27 NOTE — PROGRESS NOTES
Subjective   The ABCs of the Annual Wellness Visit  Medicare Wellness Visit      Kiran Belcher is a 64 y.o. patient who presents for a Medicare Wellness Visit.    The following portions of the patient's history were reviewed and   updated as appropriate: allergies, current medications, past family history, past medical history, past social history, past surgical history, and problem list.    Compared to one year ago, the patient's physical   health is the same.  Compared to one year ago, the patient's mental   health is the same.    Recent Hospitalizations:  This patient has had a [facility] by [facility] admission record on file within the last 365 days.  Current Medical Providers:  Patient Care Team:  Chya Cameron MD as PCP - General (Family Medicine)  Bam Alfaro MD (Urology)  Jose Perdomo MD as Consulting Physician (Hematology and Oncology)  Janna Kramer APRN as Nurse Practitioner (Nurse Practitioner)    Outpatient Medications Prior to Visit   Medication Sig Dispense Refill    amLODIPine-benazepril (LOTREL) 10-20 MG per capsule Take 1 capsule by mouth Daily. 90 capsule 3    ammonium lactate (AMLACTIN) 12 % cream Apply 1 g topically to the appropriate area as directed As Needed for Dry Skin. Apply as directed , prescribed by podiatrist      aspirin 81 MG chewable tablet Chew 1 tablet Daily.      atorvastatin (LIPITOR) 10 MG tablet Take 1 tablet by mouth Daily. 90 tablet 3    baclofen (LIORESAL) 20 MG tablet Take 1 tablet by mouth 3 (Three) Times a Day. 90 tablet 11    carvedilol (COREG) 12.5 MG tablet TAKE 1/2 TABLET BY MOUTH TWICE A DAY WITH MEALS. 90 tablet 3    citalopram (CeleXA) 20 MG tablet Take 1 tablet by mouth Daily. 90 tablet 3    dantrolene (DANTRIUM) 100 MG capsule TAKE 1 CAPSULE BY MOUTH TWICE A  capsule 3    Diclofenac Sodium (VOLTAREN) 1 % gel gel Apply 4 g topically to the appropriate area as directed 4 (Four) Times a Day As Needed (right 4th finger pain). 100 g 2     esomeprazole (nexIUM) 40 MG capsule TAKE ONE CAPSULE BY MOUTH DAILY 90 capsule 3    FeroSul 325 (65 Fe) MG tablet TAKE 1 TABLET BY MOUTH DAILY WITH BREAKFAST 30 tablet 5    fesoterodine fumarate (TOVIAZ ER) 8 MG tablet sustained-release 24 hour tablet Take 1 tablet by mouth Daily.      fexofenadine (ALLEGRA) 180 MG tablet Take 1 tablet by mouth Daily.      fluocinonide (LIDEX) 0.05 % cream fluocinonide 0.05 % topical cream      fluticasone (FLONASE) 50 MCG/ACT nasal spray SPRAY 2 SPRAYS IN EACH NOSTRIL ONCE DAILY AS DIRECTED BY PROVIDER 16 g 11    furosemide (LASIX) 20 MG tablet TAKE 1 TABLET BY MOUTH DAILY 30 tablet 2    glucose blood (Accu-Chek Elke Plus) test strip 1 each by Other route 2 (Two) Times a Day. Use as instructed 200 each 3    glyburide (DIAbeta) 5 MG tablet Take 1 tablet by mouth Daily With Breakfast. 90 tablet 3    lactobacillus acidophilus (RISAQUAD) capsule capsule Take 1 capsule by mouth Daily. 90 capsule 1    meclizine (ANTIVERT) 25 MG tablet Take 1 tablet by mouth 3 (Three) Times a Day As Needed for Dizziness. 12 tablet 0    metFORMIN ER (GLUCOPHAGE-XR) 500 MG 24 hr tablet TAKE 2 TABLETS BY MOUTH TWICE A  tablet 3    methenamine (HIPREX) 1 g tablet Take 1 tablet by mouth 2 (Two) Times a Day With Meals.      Mirabegron ER (MYRBETRIQ) 50 MG tablet sustained-release 24 hour 24 hr tablet Take 50 mg by mouth Daily.      Triamcinolone Acetonide (Nasacort Allergy 24HR) 55 MCG/ACT nasal inhaler Administer 2 sprays into the nostril(s) as directed by provider Daily.       No facility-administered medications prior to visit.     No opioid medication identified on active medication list. I have reviewed chart for other potential  high risk medication/s and harmful drug interactions in the elderly.      Aspirin is on active medication list. Aspirin use is indicated based on review of current medical condition/s. Pros and cons of this therapy have been discussed today. Benefits of this medication  "outweigh potential harm.  Patient has been encouraged to continue taking this medication.  .      Patient Active Problem List   Diagnosis    Benign essential hypertension    Depression    Gastroesophageal reflux disease without esophagitis    Hyperlipidemia    NISA on CPAP    Type 2 diabetes mellitus without complications    Spasm    Other obesity due to excess calories    Hx of spinal cord injury    Kidney disorder    Lower extremity weakness    Myalgia    Moderate episode of recurrent major depressive disorder    Dysphagia    Toenail avulsion    H/O kidney removal    Renal cell carcinoma of right kidney    Quadriplegia    Muscle contracture    C5-C7 level spinal injury with complete lesion of spinal cord, without evidence of spinal bone injury    Inability to bear weight    Neurogenic bladder    Venous stasis    B-cell lymphoma    Cancer of kidney    Hypertension    Neutrophilic leukemoid reaction    Personal history of Methicillin resistant Staphylococcus aureus infection    Paraplegia    Other constipation    Iron deficiency anemia    Malabsorption of iron    Hypervolemia     Advance Care Planning Advance Directive is not on file.  ACP discussion was held with the patient during this visit. Patient does not have an advance directive, declines further assistance.        Objective   Vitals:    12/27/24 1344   BP: 144/84   Pulse: 78   Temp: 98.2 °F (36.8 °C)   TempSrc: Infrared   SpO2: 95%   Weight: Comment: unable to obtain   Height: 177.8 cm (70\")   PainSc: 0-No pain       Estimated body mass index is 38.74 kg/m² as calculated from the following:    Height as of this encounter: 177.8 cm (70\").    Weight as of 11/16/24: 122 kg (270 lb).                Does the patient have evidence of cognitive impairment? No  Lab Results   Component Value Date    TRIG 220 (H) 11/05/2024    HDL 26 (L) 11/05/2024    LDL 60 11/05/2024    VLDL 36 11/05/2024                                                                                "                Health  Risk Assessment    Smoking Status:  Social History     Tobacco Use   Smoking Status Never   Smokeless Tobacco Never     Alcohol Consumption:  Social History     Substance and Sexual Activity   Alcohol Use No       Fall Risk Screen  SHAMARADI Fall Risk Assessment was completed, and patient is at LOW risk for falls.Assessment completed on:2024    Depression Screening   Little interest or pleasure in doing things? Not at all   Feeling down, depressed, or hopeless? Not at all   PHQ-2 Total Score 0      Health Habits and Functional and Cognitive Screenin/27/2024     1:41 PM   Functional & Cognitive Status   Do you have difficulty preparing food and eating? No   Do you have difficulty bathing yourself, getting dressed or grooming yourself? No   Do you have difficulty using the toilet? No   Do you have difficulty moving around from place to place? No   Do you have trouble with steps or getting out of a bed or a chair? Yes   Current Diet Well Balanced Diet   Dental Exam Not up to date   Eye Exam Not up to date   Exercise (times per week) 7 times per week   Current Exercises Include Light Weights   Do you need help using the phone?  No   Are you deaf or do you have serious difficulty hearing?  No   Do you need help to go to places out of walking distance? No   Do you need help shopping? No   Do you need help preparing meals?  No   Do you need help with housework?  No   Do you need help with laundry? No   Do you need help taking your medications? No   Do you need help managing money? No   Do you ever drive or ride in a car without wearing a seat belt? No   Have you felt unusual stress, anger or loneliness in the last month? No   Who do you live with? Spouse   If you need help, do you have trouble finding someone available to you? No   Have you been bothered in the last four weeks by sexual problems? No   Do you have difficulty concentrating, remembering or making decisions? No            Age-appropriate Screening Schedule:  Refer to the list below for future screening recommendations based on patient's age, sex and/or medical conditions. Orders for these recommended tests are listed in the plan section. The patient has been provided with a written plan.    Health Maintenance List  Health Maintenance   Topic Date Due    HEMOGLOBIN A1C  03/23/2025    COVID-19 Vaccine (7 - 2024-25 season) 03/18/2025 (Originally 1/1/2025)    LIPID PANEL  11/05/2025    BMI FOLLOWUP  11/25/2025    ANNUAL WELLNESS VISIT  12/27/2025    TDAP/TD VACCINES (2 - Td or Tdap) 07/01/2026    COLORECTAL CANCER SCREENING  12/08/2026    Pneumococcal Vaccine 0-64  Completed    INFLUENZA VACCINE  Completed    HEPATITIS C SCREENING  Discontinued    DIABETIC EYE EXAM  Discontinued    URINE MICROALBUMIN  Discontinued    ZOSTER VACCINE  Discontinued                                                                                                                                              Physical exam  RRR  Mild rales to the left base  Weakness to the legs b/l   Brawny edema b/l with stasis changes     CMS Preventative Services Quick Reference  Risk Factors Identified During Encounter  Polypharmacy: Medication List reviewed    The above risks/problems have been discussed with the patient.  Pertinent information has been shared with the patient in the After Visit Summary.  An After Visit Summary and PPPS were made available to the patient.    Follow Up:   Next Medicare Wellness visit to be scheduled in 1 year.     Assessment & Plan  Encounter for routine adult health examination without abnormal findings         Benign essential hypertension  Slightly above goal today will monitor continue amlodipine, benazepril, carvedilol.       Moderate episode of recurrent major depressive disorder  Chronic stable cont celexa          Class 2 severe obesity due to excess calories with serious comorbidity and body mass index (BMI) of 38.0 to 38.9 in  adult  Discussed healthy diet         B-cell lymphoma, unspecified B-cell lymphoma type, unspecified body region  No treatment at this time continue follow-up with oncology           Renal cell carcinoma of right kidney  Status post nephrectomy continue follow-up with oncology and urology            Annual physical exam was performed in addition to the Medicare wellness visit today we specifically reviewed healthy diet and physical activity, vaccines, screening test.  Wellness handout given.    Consider shingles vaccine at pharmacy  Consider RSV vaccine at pharmacy      Follow Up:   Return in about 3 months (around 3/27/2025) for Follow-up DM II.    Chay Cameron MD  Family Medicine - [facility]

## 2024-12-27 NOTE — PATIENT INSTRUCTIONS
Consider shingles vaccine   Consider RSV vaccine     Medicare Wellness  Personal Prevention Plan of Service     Date of Office Visit:    Encounter Provider:  Chay Cameron MD  Place of Service:  [facility]  Patient Name: Kiran Belcher  :  1960    As part of the Medicare Wellness portion of your visit today, we are providing you with this personalized preventive plan of services (PPPS). This plan is based upon recommendations of the [place] Preventive Services Task Force (USPSTF) and the Advisory Committee on Immunization Practices (ACIP).    This lists the preventive care services that should be considered, and provides dates of when you are due. Items listed as completed are up-to-date and do not require any further intervention.    Health Maintenance   Topic Date Due    HEMOGLOBIN A1C  2025    COVID-19 Vaccine ( season) 2025 (Originally 2025)    LIPID PANEL  2025    BMI FOLLOWUP  2025    ANNUAL WELLNESS VISIT  2025    TDAP/TD VACCINES (2 - Td or Tdap) 2026    COLORECTAL CANCER SCREENING  2026    Pneumococcal Vaccine 0-64  Completed    INFLUENZA VACCINE  Completed    HEPATITIS C SCREENING  Discontinued    DIABETIC EYE EXAM  Discontinued    URINE MICROALBUMIN  Discontinued    ZOSTER VACCINE  Discontinued       No orders of the defined types were placed in this encounter.      Return in about 3 months (around 3/27/2025) for Follow-up DM II.

## 2025-01-02 ENCOUNTER — OFFICE VISIT (OUTPATIENT)
Dept: FAMILY MEDICINE CLINIC | Facility: CLINIC | Age: 65
End: 2025-01-02
Payer: MEDICARE

## 2025-01-02 VITALS
DIASTOLIC BLOOD PRESSURE: 68 MMHG | WEIGHT: 272 LBS | HEART RATE: 90 BPM | HEIGHT: 70 IN | TEMPERATURE: 97.7 F | SYSTOLIC BLOOD PRESSURE: 126 MMHG | OXYGEN SATURATION: 98 % | BODY MASS INDEX: 38.94 KG/M2

## 2025-01-02 DIAGNOSIS — R05.1 ACUTE COUGH: Primary | ICD-10-CM

## 2025-01-02 PROCEDURE — 3078F DIAST BP <80 MM HG: CPT | Performed by: FAMILY MEDICINE

## 2025-01-02 PROCEDURE — 99213 OFFICE O/P EST LOW 20 MIN: CPT | Performed by: FAMILY MEDICINE

## 2025-01-02 PROCEDURE — 1126F AMNT PAIN NOTED NONE PRSNT: CPT | Performed by: FAMILY MEDICINE

## 2025-01-02 PROCEDURE — 3074F SYST BP LT 130 MM HG: CPT | Performed by: FAMILY MEDICINE

## 2025-01-02 NOTE — PROGRESS NOTES
Follow Up Office Visit      Patient Name: Kiran Belcher  : 1960   MRN: [id number]     Chief Complaint:    Chief Complaint   Patient presents with    Cough     Cough and congestion  Started about 4 days ago  No fever. No body aches, no headaches, no sore throat, no nasal drainage  Just coughing and congestion       History of Present Illness: Kiran Belcher is a 64 y.o. male who is here today to follow up with cough this nonproductive for 4 days.  No body aches no fever.  No sick contacts.  Having some nasal congestion and drainage.  Using promethazine dextromethorphan.      Physical exam: Lungs CTA bilateral.  No lymphadenopathy in the cervical region.  Bilateral ear shows normal TM.  Some mild lymphadenopathy in the submandibular region      Subjective        I have reviewed and the following portions of the patient's history were updated as appropriate: past family history, past medical history, past social history, past surgical history and problem list.    Medications:     Current Outpatient Medications:     amLODIPine-benazepril (LOTREL) 10-20 MG per capsule, Take 1 capsule by mouth Daily., Disp: 90 capsule, Rfl: 3    ammonium lactate (AMLACTIN) 12 % cream, Apply 1 g topically to the appropriate area as directed As Needed for Dry Skin. Apply as directed , prescribed by podiatrist, Disp: , Rfl:     aspirin 81 MG chewable tablet, Chew 1 tablet Daily., Disp: , Rfl:     atorvastatin (LIPITOR) 10 MG tablet, Take 1 tablet by mouth Daily., Disp: 90 tablet, Rfl: 3    baclofen (LIORESAL) 20 MG tablet, Take 1 tablet by mouth 3 (Three) Times a Day., Disp: 90 tablet, Rfl: 11    carvedilol (COREG) 12.5 MG tablet, TAKE 1/2 TABLET BY MOUTH TWICE A DAY WITH MEALS., Disp: 90 tablet, Rfl: 3    citalopram (CeleXA) 20 MG tablet, Take 1 tablet by mouth Daily., Disp: 90 tablet, Rfl: 3    dantrolene (DANTRIUM) 100 MG capsule, TAKE 1 CAPSULE BY MOUTH TWICE A DAY, Disp: 180 capsule, Rfl: 3    Diclofenac Sodium  (VOLTAREN) 1 % gel gel, Apply 4 g topically to the appropriate area as directed 4 (Four) Times a Day As Needed (right 4th finger pain)., Disp: 100 g, Rfl: 2    esomeprazole (nexIUM) 40 MG capsule, TAKE ONE CAPSULE BY MOUTH DAILY, Disp: 90 capsule, Rfl: 3    FeroSul 325 (65 Fe) MG tablet, TAKE 1 TABLET BY MOUTH DAILY WITH BREAKFAST, Disp: 30 tablet, Rfl: 5    fesoterodine fumarate (TOVIAZ ER) 8 MG tablet sustained-release 24 hour tablet, Take 1 tablet by mouth Daily., Disp: , Rfl:     fexofenadine (ALLEGRA) 180 MG tablet, Take 1 tablet by mouth Daily., Disp: , Rfl:     fluocinonide (LIDEX) 0.05 % cream, fluocinonide 0.05 % topical cream, Disp: , Rfl:     fluticasone (FLONASE) 50 MCG/ACT nasal spray, SPRAY 2 SPRAYS IN EACH NOSTRIL ONCE DAILY AS DIRECTED BY PROVIDER, Disp: 16 g, Rfl: 11    furosemide (LASIX) 20 MG tablet, TAKE 1 TABLET BY MOUTH DAILY, Disp: 30 tablet, Rfl: 2    glucose blood (Accu-Chek Elke Plus) test strip, 1 each by Other route 2 (Two) Times a Day. Use as instructed, Disp: 200 each, Rfl: 3    glyburide (DIAbeta) 5 MG tablet, Take 1 tablet by mouth Daily With Breakfast., Disp: 90 tablet, Rfl: 3    lactobacillus acidophilus (RISAQUAD) capsule capsule, Take 1 capsule by mouth Daily., Disp: 90 capsule, Rfl: 1    meclizine (ANTIVERT) 25 MG tablet, Take 1 tablet by mouth 3 (Three) Times a Day As Needed for Dizziness., Disp: 12 tablet, Rfl: 0    metFORMIN ER (GLUCOPHAGE-XR) 500 MG 24 hr tablet, TAKE 2 TABLETS BY MOUTH TWICE A DAY, Disp: 360 tablet, Rfl: 3    methenamine (HIPREX) 1 g tablet, Take 1 tablet by mouth 2 (Two) Times a Day With Meals., Disp: , Rfl:     Mirabegron ER (MYRBETRIQ) 50 MG tablet sustained-release 24 hour 24 hr tablet, Take 50 mg by mouth Daily., Disp: , Rfl:     Triamcinolone Acetonide (Nasacort Allergy 24HR) 55 MCG/ACT nasal inhaler, Administer 2 sprays into the nostril(s) as directed by provider Daily., Disp: , Rfl:     Allergies:   Allergies   Allergen Reactions    Levofloxacin  "Unknown (See Comments)     tendinopathy    Cefuroxime Palpitations     Tachycardia, back pain, fatigue, and weakness.        Objective     Physical Exam: Please see above  Vital Signs:   Vitals:    01/02/25 1335   BP: 126/68   Pulse: 90   Temp: 97.7 °F (36.5 °C)   TempSrc: Infrared   SpO2: 98%   Weight: 123 kg (272 lb)   Height: 177.8 cm (70\")   PainSc: 0-No pain     Body mass index is 39.03 kg/m².          Assessment / Plan      Assessment/Plan:   Diagnoses and all orders for this visit:    1. Acute cough (Primary)    Presents with acute cough.  Likely a viral infection at this time.  No body aches, does not feel sick, and not having a fever.  Off on antibiotics for at least an additional few days to see if this can get better.  If not getting better would prescribe amoxicillin to treat possible secondary infection.  Until then he should use promethazine, dextromethorphan, and cough drops.  Call for refills and cough medicine as needed-looks like it was last prescribed within the past 6 months    Follow Up:   Return if symptoms worsen or fail to improve.    Tani Carrizales, DO  [facility] [place]   "

## 2025-01-03 ENCOUNTER — TELEPHONE (OUTPATIENT)
Dept: FAMILY MEDICINE CLINIC | Facility: CLINIC | Age: 65
End: 2025-01-03
Payer: MEDICARE

## 2025-01-03 DIAGNOSIS — R05.1 ACUTE COUGH: Primary | ICD-10-CM

## 2025-01-03 RX ORDER — DEXTROMETHORPHAN HYDROBROMIDE AND PROMETHAZINE HYDROCHLORIDE 15; 6.25 MG/5ML; MG/5ML
5 SYRUP ORAL 4 TIMES DAILY PRN
Qty: 120 ML | Refills: 0 | Status: SHIPPED | OUTPATIENT
Start: 2025-01-03

## 2025-01-03 NOTE — TELEPHONE ENCOUNTER
HUB TO RELAY    LVM. Per Dr. Carrizales:  I sent in a refill of the medication that we had discussed.  Should be at his pharmacy to  today and he can use it throughout the day for cough

## 2025-01-03 NOTE — TELEPHONE ENCOUNTER
Name: Kiran Belcher      Relationship: Self      Best Callback Number: [phone]       HUB PROVIDED THE RELAY MESSAGE FROM THE OFFICE      PATIENT: VOICED UNDERSTANDING AND HAS NO FURTHER QUESTIONS AT THIS TIME

## 2025-01-05 DIAGNOSIS — J40 BRONCHITIS: Primary | ICD-10-CM

## 2025-01-05 RX ORDER — METHYLPREDNISOLONE 4 MG/1
TABLET ORAL
Qty: 21 TABLET | Refills: 0 | Status: SHIPPED | OUTPATIENT
Start: 2025-01-05

## 2025-01-05 RX ORDER — AZITHROMYCIN 250 MG/1
TABLET, FILM COATED ORAL
Qty: 6 TABLET | Refills: 0 | Status: SHIPPED | OUTPATIENT
Start: 2025-01-05

## 2025-01-05 RX ORDER — ALBUTEROL SULFATE 90 UG/1
2 INHALANT RESPIRATORY (INHALATION) EVERY 4 HOURS PRN
Qty: 51 G | Refills: 0 | Status: SHIPPED | OUTPATIENT
Start: 2025-01-05

## 2025-01-15 ENCOUNTER — HOSPITAL ENCOUNTER (EMERGENCY)
Facility: HOSPITAL | Age: 65
Discharge: HOME | End: 2025-01-15
Attending: STUDENT IN AN ORGANIZED HEALTH CARE EDUCATION/TRAINING PROGRAM
Payer: MEDICARE

## 2025-01-15 VITALS
WEIGHT: 271.17 LBS | OXYGEN SATURATION: 99 % | HEIGHT: 70 IN | HEART RATE: 82 BPM | TEMPERATURE: 98.3 F | DIASTOLIC BLOOD PRESSURE: 83 MMHG | SYSTOLIC BLOOD PRESSURE: 101 MMHG | BODY MASS INDEX: 38.82 KG/M2 | RESPIRATION RATE: 14 BRPM

## 2025-01-15 DIAGNOSIS — Z98.890 HISTORY OF DRAINAGE OF ABSCESS: Primary | ICD-10-CM

## 2025-01-15 PROCEDURE — 87077 CULTURE AEROBIC IDENTIFY: CPT

## 2025-01-15 PROCEDURE — 87070 CULTURE OTHR SPECIMN AEROBIC: CPT

## 2025-01-15 PROCEDURE — 99283 EMERGENCY DEPT VISIT LOW MDM: CPT

## 2025-01-15 PROCEDURE — 87205 SMEAR GRAM STAIN: CPT

## 2025-01-16 ENCOUNTER — NURSE TRIAGE (OUTPATIENT)
Dept: CALL CENTER | Facility: HOSPITAL | Age: 65
End: 2025-01-16
Payer: MEDICARE

## 2025-01-16 ENCOUNTER — TELEPHONE (OUTPATIENT)
Dept: FAMILY MEDICINE CLINIC | Facility: CLINIC | Age: 65
End: 2025-01-16
Payer: MEDICARE

## 2025-01-16 NOTE — FSED PROVIDER NOTE
"Subjective  History of Present Illness:    Patient presents the ED with complaints of drainage in his groin from cyst on testicles.  Patient is poor historian.  Says that he has a cyst and believes it is on his testicle.  States that it \"popped\" yesterday.  Has had similar episode in the past in May.  Was started on antibiotic at that time.  Patient denies any testicular or penile pain, dysuria, fever, chills.  Says that he feels drainage in his groin and states that it has a smell.  Denies any other symptoms or concerns at this time.    Nurses Notes reviewed and agree, including vitals, allergies, social history and prior medical history.     REVIEW OF SYSTEMS: All systems reviewed and not pertinent unless noted.  Review of Systems   Skin:         Cyst on testicle; drainage   All other systems reviewed and are negative.      Past Medical History:   Diagnosis Date    B-cell lymphoma 02/2022    Undergoing work-up with [facility]    Cancer     hx spot on right kidney, for ~8 years, told cancer but no hx biopsy and no interventions and just watching it. Dr. vega used to watch this cyst, then started with  urology a year ago after he retired.    Decreased mobility     Depression     Diabetes mellitus     Esophageal reflux     Glaucoma     History of methicillin resistant Staphylococcus aureus infection     History of obstructive sleep apnea     History of quadriplegia     of unknown etiology - C5-C7, incomplete     History of spinal cord injury/quadriplegia 2008 09/14/2018    Hyperlipidemia     Hypertension     Kidney disorder 9/14/2018    Paraplegia        Allergies:    Levofloxacin and Cefuroxime      Past Surgical History:   Procedure Laterality Date    CERVICAL DISC SURGERY      around 5/2008, fall/injury tripped over a safety gate for their dogs, reported SCI \"semi quadraplegic\" since. [facility]. [facility] after.    SPINE SURGERY           Social History     Socioeconomic History    Marital status:    Tobacco Use    " "Smoking status: Never    Smokeless tobacco: Never   Vaping Use    Vaping status: Never Used   Substance and Sexual Activity    Alcohol use: No    Drug use: No    Sexual activity: Defer         Family History   Problem Relation Age of Onset    Hypertension Mother     Heart disease Father     Coronary artery disease Father     Diabetes Other     Hypertension Other        Objective  Physical Exam:  /83   Pulse 82   Temp 98.3 °F (36.8 °C) (Oral)   Resp 14   Ht 177.8 cm (70\")   Wt 123 kg (271 lb 2.7 oz)   SpO2 99%   BMI 38.91 kg/m²      Physical Exam  Constitutional:       General: He is not in acute distress.     Appearance: He is obese. He is not ill-appearing.   HENT:      Head: Normocephalic and atraumatic.   Cardiovascular:      Rate and Rhythm: Normal rate and regular rhythm.      Pulses: Normal pulses.   Pulmonary:      Effort: Pulmonary effort is normal. No respiratory distress.      Breath sounds: Normal breath sounds.   Abdominal:      General: Abdomen is flat. Bowel sounds are normal.      Palpations: Abdomen is soft.      Tenderness: There is no abdominal tenderness.   Genitourinary:     Testes:         Right: Swelling present.         Left: Swelling present.          Comments: Bilateral moderately enlarged testicles with multiple raised cyst-like structures. Mild drainage in fold, no drainage from testicles. No tenderness. Unable to identify penis due to body habitus  Musculoskeletal:         General: Normal range of motion.      Cervical back: Normal range of motion and neck supple.   Neurological:      General: No focal deficit present.      Mental Status: He is alert and oriented to person, place, and time.   Psychiatric:         Mood and Affect: Mood normal.         Behavior: Behavior normal.       Procedures    ED Course:         Lab Results (last 24 hours)       Procedure Component Value Units Date/Time    Wound Culture - Drainage, Groin [[id number]] Collected: 01/15/25 2003    Specimen: " Drainage from Groin Updated: 01/15/25 2103             No radiology results from the last 24 hrs     MDM  Patient presented to the ED with complaints of drainage and cyst on testicle.  On examination, patient had enlarged testicles with multiple raised cystlike structures.  There was mild drainage in fold, however no drainage from testicles.  Ordered labs and imaging and patient refused stating that he was receiving labs and imaging from provider next week.  We cultured the drainage and I advised patient that we would call him with results.  Advised patient to return to ED if symptoms worsened.  Patient was agreeable to plan and discharge.    Medications - No data to display  -----  ED Disposition       ED Disposition   Discharge    Condition   Stable    Comment   --             Final diagnoses:   History of drainage of abscess      Your Follow-Up Providers       Schedule an appointment as soon as possible for a visit  with Chay Cameron MD.    Specialty: Family Medicine  Follow up details: As needed, If symptoms worsen  [address] [place], [place] [id number] [phone]                       Contact information for after-discharge care    Follow-up information has not been specified.                    Your medication list        ASK your doctor about these medications        Instructions Last Dose Given Next Dose Due   albuterol sulfate  (90 Base) MCG/ACT inhaler  Commonly known as: PROVENTIL HFA;VENTOLIN HFA;PROAIR HFA      Inhale 2 puffs Every 4 (Four) Hours As Needed for Wheezing.       amLODIPine-benazepril 10-20 MG per capsule  Commonly known as: LOTREL      Take 1 capsule by mouth Daily.       ammonium lactate 12 % cream  Commonly known as: AMLACTIN      Apply 1 g topically to the appropriate area as directed As Needed for Dry Skin. Apply as directed , prescribed by podiatrist       aspirin 81 MG chewable tablet      Chew 1 tablet Daily.       atorvastatin 10 MG tablet  Commonly known as:  LIPITOR      Take 1 tablet by mouth Daily.       azithromycin 250 MG tablet  Commonly known as: ZITHROMAX      Take 2 tablets the first day, then 1 tablet daily for 4 days.       baclofen 20 MG tablet  Commonly known as: LIORESAL      Take 1 tablet by mouth 3 (Three) Times a Day.       carvedilol 12.5 MG tablet  Commonly known as: COREG      TAKE 1/2 TABLET BY MOUTH TWICE A DAY WITH MEALS.       citalopram 20 MG tablet  Commonly known as: CeleXA      Take 1 tablet by mouth Daily.       dantrolene 100 MG capsule  Commonly known as: DANTRIUM      TAKE 1 CAPSULE BY MOUTH TWICE A DAY       Diclofenac Sodium 1 % gel gel  Commonly known as: VOLTAREN      Apply 4 g topically to the appropriate area as directed 4 (Four) Times a Day As Needed (right 4th finger pain).       esomeprazole 40 MG capsule  Commonly known as: nexIUM      TAKE ONE CAPSULE BY MOUTH DAILY       FeroSul 325 (65 Fe) MG tablet  Generic drug: ferrous sulfate      TAKE 1 TABLET BY MOUTH DAILY WITH BREAKFAST       fesoterodine fumarate 8 MG tablet sustained-release 24 hour tablet  Commonly known as: TOVIAZ ER      Take 1 tablet by mouth Daily.       fexofenadine 180 MG tablet  Commonly known as: ALLEGRA      Take 1 tablet by mouth Daily.       fluocinonide 0.05 % cream  Commonly known as: LIDEX      fluocinonide 0.05 % topical cream       fluticasone 50 MCG/ACT nasal spray  Commonly known as: FLONASE      SPRAY 2 SPRAYS IN EACH NOSTRIL ONCE DAILY AS DIRECTED BY PROVIDER       furosemide 20 MG tablet  Commonly known as: LASIX      TAKE 1 TABLET BY MOUTH DAILY       glucose blood test strip  Commonly known as: Accu-Chek Elke Plus      1 each by Other route 2 (Two) Times a Day. Use as instructed       glyburide 5 MG tablet  Commonly known as: DIAbeta      Take 1 tablet by mouth Daily With Breakfast.       lactobacillus acidophilus capsule capsule      Take 1 capsule by mouth Daily.       meclizine 25 MG tablet  Commonly known as: ANTIVERT      Take 1 tablet  by mouth 3 (Three) Times a Day As Needed for Dizziness.       metFORMIN  MG 24 hr tablet  Commonly known as: GLUCOPHAGE-XR      TAKE 2 TABLETS BY MOUTH TWICE A DAY       methenamine 1 g tablet  Commonly known as: HIPREX      Take 1 tablet by mouth 2 (Two) Times a Day With Meals.       methylPREDNISolone 4 MG dose pack  Commonly known as: MEDROL      Take as directed on package instructions.       Mirabegron ER 50 MG tablet sustained-release 24 hour 24 hr tablet  Commonly known as: MYRBETRIQ      Take 50 mg by mouth Daily.       Nasacort Allergy 24HR 55 MCG/ACT nasal inhaler  Generic drug: Triamcinolone Acetonide      Administer 2 sprays into the nostril(s) as directed by provider Daily.       promethazine-dextromethorphan 6.25-15 MG/5ML syrup  Commonly known as: PROMETHAZINE-DM      Take 5 mL by mouth 4 (Four) Times a Day As Needed for Cough.

## 2025-01-16 NOTE — TELEPHONE ENCOUNTER
HUB    Caller is wanting to ask Dr. Cartwright if the steroids he was taking (and completed last weekend) could cause his heart rate to go up and down with activity.  He denies symptoms at this time.  Was not wanting an appointment and he did not wish to be triaged.    Transferred call to office to speak with staff

## 2025-01-16 NOTE — TELEPHONE ENCOUNTER
Hub staff attempted to follow warm transfer process and was unsuccessful     Caller: Kiran Belcher    Relationship to patient: Self    Best call back number:     PATIENT CALLED INTO [facility] WITH CONCERNS OF HIS PULSE BEATING HIGH AND LOW;  HE ADVISED THAT HE HAD FINISHED HIS PREDISONE SATURDAY WHICH WAS GIVEN TO HIM BY A DR ON CALL OVER A WEEK AGO; WARM TRANSFERRED TO [facility]

## 2025-01-19 LAB
BACTERIA SPEC AEROBE CULT: NORMAL
GRAM STN SPEC: NORMAL

## 2025-01-21 ENCOUNTER — TELEPHONE (OUTPATIENT)
Dept: FAMILY MEDICINE CLINIC | Facility: CLINIC | Age: 65
End: 2025-01-21
Payer: MEDICARE

## 2025-01-21 NOTE — TELEPHONE ENCOUNTER
HUB TO READ      Per Dr Carrizales, pt called in sick over the weekend and Dr. Carrizales wanted to make sure he was feeling better. If not any better, he needs to schedule follow up with first available provider in office.    LM for pt to call back and let us know.

## 2025-01-22 ENCOUNTER — TELEPHONE (OUTPATIENT)
Dept: ONCOLOGY | Facility: CLINIC | Age: 65
End: 2025-01-22
Payer: MEDICARE

## 2025-01-22 NOTE — TELEPHONE ENCOUNTER
"Caller: Kiran Belcher \"JERRY\"    Relationship to patient: Self    Best call back number: [phone]    Chief complaint: CANC. - R/S DUE TO R/S SCANS    Type of visit: F/U 2    Requested date: AFTER 2/4/25     If rescheduling, when is the original appointment: 1/24/25           "

## 2025-02-03 ENCOUNTER — OFFICE VISIT (OUTPATIENT)
Dept: FAMILY MEDICINE CLINIC | Facility: CLINIC | Age: 65
End: 2025-02-03
Payer: MEDICARE

## 2025-02-03 VITALS — DIASTOLIC BLOOD PRESSURE: 78 MMHG | TEMPERATURE: 98.2 F | SYSTOLIC BLOOD PRESSURE: 126 MMHG | HEART RATE: 88 BPM

## 2025-02-03 DIAGNOSIS — L02.415 ABSCESS OF RIGHT THIGH: Primary | ICD-10-CM

## 2025-02-03 PROCEDURE — 1160F RVW MEDS BY RX/DR IN RCRD: CPT | Performed by: HOSPITALIST

## 2025-02-03 PROCEDURE — 1159F MED LIST DOCD IN RCRD: CPT | Performed by: HOSPITALIST

## 2025-02-03 PROCEDURE — 1126F AMNT PAIN NOTED NONE PRSNT: CPT | Performed by: HOSPITALIST

## 2025-02-03 PROCEDURE — 87205 SMEAR GRAM STAIN: CPT | Performed by: HOSPITALIST

## 2025-02-03 PROCEDURE — 87077 CULTURE AEROBIC IDENTIFY: CPT | Performed by: HOSPITALIST

## 2025-02-03 PROCEDURE — 10060 I&D ABSCESS SIMPLE/SINGLE: CPT | Performed by: HOSPITALIST

## 2025-02-03 PROCEDURE — 87186 SC STD MICRODIL/AGAR DIL: CPT | Performed by: HOSPITALIST

## 2025-02-03 PROCEDURE — 3074F SYST BP LT 130 MM HG: CPT | Performed by: HOSPITALIST

## 2025-02-03 PROCEDURE — 99213 OFFICE O/P EST LOW 20 MIN: CPT | Performed by: HOSPITALIST

## 2025-02-03 PROCEDURE — 87070 CULTURE OTHR SPECIMN AEROBIC: CPT | Performed by: HOSPITALIST

## 2025-02-03 PROCEDURE — 3078F DIAST BP <80 MM HG: CPT | Performed by: HOSPITALIST

## 2025-02-03 RX ORDER — LINEZOLID 600 MG/1
600 TABLET, FILM COATED ORAL 2 TIMES DAILY
Qty: 14 TABLET | Refills: 0 | Status: SHIPPED | OUTPATIENT
Start: 2025-02-03

## 2025-02-03 NOTE — PROGRESS NOTES
Follow Up Office Visit      Patient Name: Kiran Belcher  : 1960   MRN: [id number]     Chief Complaint:  Abscess (Outside of R thigh)     History of Present Illness: Kiran Belcher is a 64 y.o. male who is here today for acute complaint of abscess of the posterior right thigh.  The patient states that he developed a boil on the back of his right thigh that has been increasing in size over the past 1 to 2 weeks.  He states that it is painful to touch.  The patient states he had a similar wound that had to be drained on 2024 and wound culture grew Enterococcus.  The patient was treated with ampicillin for 10 days following that culture and he states that it healed well.  The patient states he has not had a fever.        Subjective     Subjective          The following portions of the patient's history were reviewed and updated as appropriate: allergies, current medications, past family history, past medical history, past social history, past surgical history and problem list.    Allergy:   Allergies   Allergen Reactions    Levofloxacin Unknown (See Comments)     tendinopathy    Cefuroxime Palpitations     Tachycardia, back pain, fatigue, and weakness.         Current Medications:   Current Outpatient Medications   Medication Sig Dispense Refill    albuterol sulfate  (90 Base) MCG/ACT inhaler Inhale 2 puffs Every 4 (Four) Hours As Needed for Wheezing. 51 g 0    amLODIPine-benazepril (LOTREL) 10-20 MG per capsule Take 1 capsule by mouth Daily. 90 capsule 3    ammonium lactate (AMLACTIN) 12 % cream Apply 1 g topically to the appropriate area as directed As Needed for Dry Skin. Apply as directed , prescribed by podiatrist      aspirin 81 MG chewable tablet Chew 1 tablet Daily.      atorvastatin (LIPITOR) 10 MG tablet Take 1 tablet by mouth Daily. 90 tablet 3    baclofen (LIORESAL) 20 MG tablet Take 1 tablet by mouth 3 (Three) Times a Day. 90 tablet 11    carvedilol (COREG) 12.5 MG tablet  TAKE 1/2 TABLET BY MOUTH TWICE A DAY WITH MEALS. 90 tablet 3    citalopram (CeleXA) 20 MG tablet Take 1 tablet by mouth Daily. 90 tablet 3    dantrolene (DANTRIUM) 100 MG capsule TAKE 1 CAPSULE BY MOUTH TWICE A  capsule 3    Diclofenac Sodium (VOLTAREN) 1 % gel gel Apply 4 g topically to the appropriate area as directed 4 (Four) Times a Day As Needed (right 4th finger pain). 100 g 2    esomeprazole (nexIUM) 40 MG capsule TAKE ONE CAPSULE BY MOUTH DAILY 90 capsule 3    FeroSul 325 (65 Fe) MG tablet TAKE 1 TABLET BY MOUTH DAILY WITH BREAKFAST 30 tablet 5    fesoterodine fumarate (TOVIAZ ER) 8 MG tablet sustained-release 24 hour tablet Take 1 tablet by mouth Daily.      fexofenadine (ALLEGRA) 180 MG tablet Take 1 tablet by mouth Daily.      fluocinonide (LIDEX) 0.05 % cream fluocinonide 0.05 % topical cream      fluticasone (FLONASE) 50 MCG/ACT nasal spray SPRAY 2 SPRAYS IN EACH NOSTRIL ONCE DAILY AS DIRECTED BY PROVIDER 16 g 11    furosemide (LASIX) 20 MG tablet TAKE 1 TABLET BY MOUTH DAILY 30 tablet 2    glucose blood (Accu-Chek Elke Plus) test strip 1 each by Other route 2 (Two) Times a Day. Use as instructed 200 each 3    glyburide (DIAbeta) 5 MG tablet Take 1 tablet by mouth Daily With Breakfast. 90 tablet 3    lactobacillus acidophilus (RISAQUAD) capsule capsule Take 1 capsule by mouth Daily. 90 capsule 1    meclizine (ANTIVERT) 25 MG tablet Take 1 tablet by mouth 3 (Three) Times a Day As Needed for Dizziness. 12 tablet 0    metFORMIN ER (GLUCOPHAGE-XR) 500 MG 24 hr tablet TAKE 2 TABLETS BY MOUTH TWICE A  tablet 3    methenamine (HIPREX) 1 g tablet Take 1 tablet by mouth 2 (Two) Times a Day With Meals.      methylPREDNISolone (MEDROL) 4 MG dose pack Take as directed on package instructions. 21 tablet 0    Mirabegron ER (MYRBETRIQ) 50 MG tablet sustained-release 24 hour 24 hr tablet Take 50 mg by mouth Daily.      promethazine-dextromethorphan (PROMETHAZINE-DM) 6.25-15 MG/5ML syrup Take 5 mL by  mouth 4 (Four) Times a Day As Needed for Cough. 120 mL 0    Triamcinolone Acetonide (Nasacort Allergy 24HR) 55 MCG/ACT nasal inhaler Administer 2 sprays into the nostril(s) as directed by provider Daily.      linezolid (ZYVOX) 600 MG tablet Take 1 tablet by mouth 2 (Two) Times a Day. 14 tablet 0     No current facility-administered medications for this visit.       Objective     Objective     Physical Exam:  Physical Exam  Vitals and nursing note reviewed.   Constitutional:       Appearance: Normal appearance.   HENT:      Head: Normocephalic and atraumatic.      Mouth/Throat:      Mouth: Mucous membranes are moist.   Eyes:      Pupils: Pupils are equal, round, and reactive to light.   Cardiovascular:      Rate and Rhythm: Normal rate and regular rhythm.      Heart sounds: Normal heart sounds.   Pulmonary:      Effort: Pulmonary effort is normal.      Breath sounds: Normal breath sounds.   Abdominal:      General: Bowel sounds are normal.      Palpations: Abdomen is soft.   Musculoskeletal:         General: Normal range of motion.      Cervical back: Normal range of motion.   Skin:     General: Skin is warm and dry.      Comments: Large erythematous boil on posterior thigh with no drainage, fluctuant at base and indurated at edges, painful to palpation, 1 in x 1 in   Neurological:      General: No focal deficit present.      Mental Status: He is alert and oriented to person, place, and time.   Psychiatric:         Mood and Affect: Mood normal.         Behavior: Behavior normal.         Vital Signs:   /78   Pulse 88   Temp 98.2 °F (36.8 °C) (Infrared)      Incision & Drainage    Date/Time: 2/3/2025 3:01 PM    Performed by: Mary Kay Rios DO  Authorized by: Mary Kay Rios DO  Consent: Verbal consent obtained. Written consent not obtained.  Risks and benefits: risks, benefits and alternatives were discussed  Consent given by: patient  Patient understanding: patient states understanding of the  "procedure being performed  Patient consent: the patient's understanding of the procedure matches consent given  Procedure consent: procedure consent matches procedure scheduled  Relevant documents: relevant documents present and verified  Test results: test results available and properly labeled  Site marked: the operative site was marked  Imaging studies: imaging studies available  Patient identity confirmed: verbally with patient  Time out: Immediately prior to procedure a \"time out\" was called to verify the correct patient, procedure, equipment, support staff and site/side marked as required.  Type: abscess  Body area: lower extremity  Location details: right leg    Sedation:  Patient sedated: no    Scalpel size: 10  Incision type: single straight  Drainage: purulent  Drainage amount: copious  Wound treatment: wound left open  Patient tolerance: patient tolerated the procedure well with no immediate complications           PHQ-9 Score  PHQ-9 Total Score:      Lab Review  Admission on 01/15/2025, Discharged on 01/15/2025   Component Date Value Ref Range Status    Wound Culture 01/15/2025 Light growth (2+) Normal Skin Nancy   Final    Gram Stain 01/15/2025 No WBCs per low power field   Final    Gram Stain 01/15/2025 No Epithelial cells per low power field   Final    Gram Stain 01/15/2025 Moderate (3+) Gram negative bacilli   Final    Gram Stain 01/15/2025 Few (2+) Gram positive cocci   Final   Office Visit on 11/25/2024   Component Date Value Ref Range Status    Color 11/25/2024 Yellow  Yellow, Straw, Dark Yellow, Norma Final    Clarity, UA 11/25/2024 Cloudy (A)  Clear Final    Specific Gravity  11/25/2024 1.015  1.005 - 1.030 Final    pH, Urine 11/25/2024 6.0  5.0 - 8.0 Final    Leukocytes 11/25/2024 Large (3+) (A)  Negative Final    Nitrite, UA 11/25/2024 Negative  Negative Final    Protein, POC 11/25/2024 Trace (A)  Negative mg/dL Final    Glucose, UA 11/25/2024 Negative  Negative mg/dL Final    Ketones, UA " 11/25/2024 Negative  Negative Final    Urobilinogen, UA 11/25/2024 Normal  Normal, 0.2 E.U./dL Final    Bilirubin 11/25/2024 Negative  Negative Final    Blood, UA 11/25/2024 Negative  Negative Final    Lot Number 11/25/2024 [id number]   Final    Expiration Date 11/25/2024 12/12/2024   Final   Admission on 11/16/2024, Discharged on 11/16/2024   Component Date Value Ref Range Status    Color, UA 11/16/2024 Yellow  Yellow, Straw Final    Appearance, UA 11/16/2024 Cloudy (A)  Clear Final    pH, UA 11/16/2024 >=9.0 (H)  5.0 - 8.0 Final    Specific [place], UA 11/16/2024 1.015  1.005 - 1.030 Final    Glucose, UA 11/16/2024 Negative  Negative Final    Ketones, UA 11/16/2024 Negative  Negative Final    Bilirubin, UA 11/16/2024 Negative  Negative Final    Blood, UA 11/16/2024 Trace (A)  Negative Final    Protein, UA 11/16/2024 100 mg/dL (2+) (A)  Negative Final    Leuk Esterase, UA 11/16/2024 Large (3+) (A)  Negative Final    Nitrite, UA 11/16/2024 Positive (A)  Negative Final    Urobilinogen, UA 11/16/2024 0.2 E.U./dL  0.2 - 1.0 E.U./dL Final    COVID19 11/16/2024 Not Detected  Not Detected - Ref. Range Final    Influenza A PCR 11/16/2024 Not Detected  Not Detected Final    Influenza B PCR 11/16/2024 Not Detected  Not Detected Final    RSV, PCR 11/16/2024 Not Detected  Not Detected Final    RBC, UA 11/16/2024 6-10 (A)  None Seen, 0-2 /HPF Final    WBC, UA 11/16/2024 21-50 (A)  None Seen, 0-2 /HPF Final    Bacteria, UA 11/16/2024 4+ (A)  None Seen /HPF Final    Squamous Epithelial Cells, UA 11/16/2024 0-2  None Seen, 0-2 /HPF Final    Hyaline Casts, UA 11/16/2024 None Seen  None Seen /LPF Final    Triple Phosphate Crystals, UA 11/16/2024 Large/3+  None Seen /HPF Final    Methodology 11/16/2024 Manual Light Microscopy   Final   Lab on 11/05/2024   Component Date Value Ref Range Status    Glucose 11/05/2024 168 (H)  65 - 99 mg/dL Final    BUN 11/05/2024 10  8 - 23 mg/dL Final    Creatinine 11/05/2024 1.13  0.76 - 1.27 mg/dL  Final    Sodium 11/05/2024 137  136 - 145 mmol/L Final    Potassium 11/05/2024 3.7  3.5 - 5.2 mmol/L Final    Chloride 11/05/2024 99  98 - 107 mmol/L Final    CO2 11/05/2024 26.2  22.0 - 29.0 mmol/L Final    Calcium 11/05/2024 9.0  8.6 - 10.5 mg/dL Final    Total Protein 11/05/2024 7.2  6.0 - 8.5 g/dL Final    Albumin 11/05/2024 3.7  3.5 - 5.2 g/dL Final    ALT (SGPT) 11/05/2024 16  1 - 41 U/L Final    AST (SGOT) 11/05/2024 21  1 - 40 U/L Final    Alkaline Phosphatase 11/05/2024 111  39 - 117 U/L Final    Total Bilirubin 11/05/2024 0.2  0.0 - 1.2 mg/dL Final    Globulin 11/05/2024 3.5  gm/dL Final    A/G Ratio 11/05/2024 1.1  g/dL Final    BUN/Creatinine Ratio 11/05/2024 8.8  7.0 - 25.0 Final    Anion Gap 11/05/2024 11.8  5.0 - 15.0 mmol/L Final    eGFR 11/05/2024 73.5  >60.0 mL/min/1.73 Final    Total Cholesterol 11/05/2024 122  0 - 200 mg/dL Final    Triglycerides 11/05/2024 220 (H)  0 - 150 mg/dL Final    HDL Cholesterol 11/05/2024 26 (L)  40 - 60 mg/dL Final    LDL Cholesterol  11/05/2024 60  0 - 100 mg/dL Final    VLDL Cholesterol 11/05/2024 36  5 - 40 mg/dL Final    LDL/HDL Ratio 11/05/2024 2.00   Final    WBC 11/05/2024 10.50  3.40 - 10.80 10*3/mm3 Final    RBC 11/05/2024 3.72 (L)  4.14 - 5.80 10*6/mm3 Final    Hemoglobin 11/05/2024 10.7 (L)  13.0 - 17.7 g/dL Final    Hematocrit 11/05/2024 33.8 (L)  37.5 - 51.0 % Final    MCV 11/05/2024 90.9  79.0 - 97.0 fL Final    MCH 11/05/2024 28.8  26.6 - 33.0 pg Final    MCHC 11/05/2024 31.7  31.5 - 35.7 g/dL Final    RDW 11/05/2024 14.8  12.3 - 15.4 % Final    RDW-SD 11/05/2024 48.8  37.0 - 54.0 fl Final    MPV 11/05/2024 10.0  6.0 - 12.0 fL Final    Platelets 11/05/2024 344  140 - 450 10*3/mm3 Final    PSA 11/05/2024 0.213  0.000 - 4.000 ng/mL Final        Radiology Results        Assessment / Plan         Assessment and Plan   Diagnoses and all orders for this visit:    1. Abscess of right thigh (Primary)  Assessment & Plan:  Incision and drainage performed in the  office today and the patient tolerated the procedure well.  Significant amount of pustular drainage expressed from the area.  Wound culture obtained.  Prescribe linezolid 600 mg p.o. twice daily x 7 days.  Will follow the wound culture to ensure proper antibiotic coverage.  Area was bandaged and patient sent with a few extra bandages and told to change daily.  Patient told to watch for and return to the clinic or go to the ED for fever, increased erythema and significant pustular drainage.    Orders:  -     linezolid (ZYVOX) 600 MG tablet; Take 1 tablet by mouth 2 (Two) Times a Day.  Dispense: 14 tablet; Refill: 0  -     Incision & Drainage  -     Wound Culture - Wound, Thigh, Right; Future    Other orders  -     Cancel: Incision & Drainage                Health Maintenance  Health Maintenance:   Health Maintenance Due   Topic Date Due    HEMOGLOBIN A1C  03/23/2025        Meds ordered during this visit  New Medications Ordered This Visit   Medications    linezolid (ZYVOX) 600 MG tablet     Sig: Take 1 tablet by mouth 2 (Two) Times a Day.     Dispense:  14 tablet     Refill:  0       Meds stopped during this visit:  Medications Discontinued During This Encounter   Medication Reason    azithromycin (ZITHROMAX) 250 MG tablet *Therapy completed        Patient was given instructions and counseling regarding his condition or for health maintenance advice. Please see specific information pulled into the AVS if appropriate.     Follow Up   Return in about 1 week (around 2/10/2025) for sherron f/u.    Mary Kay Rios DO  [facility]     Dictated Utilizing Dragon Dictation: Part of this note may be an electronic transcription/translation of spoken language to printed text using the Dragon Dictation System.    This document has been electronically signed by Mary Kay Rios DO   February 3, 2025 15:25 EST

## 2025-02-03 NOTE — ASSESSMENT & PLAN NOTE
Incision and drainage performed in the office today and the patient tolerated the procedure well.  Significant amount of pustular drainage expressed from the area.  Wound culture obtained.  Prescribe linezolid 600 mg p.o. twice daily x 7 days.  Will follow the wound culture to ensure proper antibiotic coverage.  Area was bandaged and patient sent with a few extra bandages and told to change daily.  Patient told to watch for and return to the clinic or go to the ED for fever, increased erythema and significant pustular drainage.

## 2025-02-04 ENCOUNTER — LAB (OUTPATIENT)
Facility: HOSPITAL | Age: 65
End: 2025-02-04
Payer: MEDICARE

## 2025-02-04 ENCOUNTER — HOSPITAL ENCOUNTER (OUTPATIENT)
Facility: HOSPITAL | Age: 65
Discharge: HOME | End: 2025-02-04
Payer: MEDICARE

## 2025-02-04 DIAGNOSIS — C85.10 B-CELL LYMPHOMA, UNSPECIFIED B-CELL LYMPHOMA TYPE, UNSPECIFIED BODY REGION: ICD-10-CM

## 2025-02-04 DIAGNOSIS — D50.8 OTHER IRON DEFICIENCY ANEMIA: ICD-10-CM

## 2025-02-04 LAB
ALBUMIN SERPL-MCNC: 3.6 G/DL (ref 3.5–5.2)
ALBUMIN/GLOB SERPL: 0.9 G/DL
ALP SERPL-CCNC: 116 U/L (ref 39–117)
ALT SERPL W P-5'-P-CCNC: 14 U/L (ref 1–41)
ANION GAP SERPL CALCULATED.3IONS-SCNC: 12.6 MMOL/L (ref 5–15)
AST SERPL-CCNC: 19 U/L (ref 1–40)
BASOPHILS # BLD AUTO: 0.02 10*3/MM3 (ref 0–0.2)
BASOPHILS NFR BLD AUTO: 0.2 % (ref 0–1.5)
BILIRUB SERPL-MCNC: 0.2 MG/DL (ref 0–1.2)
BUN SERPL-MCNC: 15 MG/DL (ref 8–23)
BUN/CREAT SERPL: 10.3 (ref 7–25)
CALCIUM SPEC-SCNC: 8.8 MG/DL (ref 8.6–10.5)
CHLORIDE SERPL-SCNC: 98 MMOL/L (ref 98–107)
CO2 SERPL-SCNC: 26.4 MMOL/L (ref 22–29)
CREAT SERPL-MCNC: 1.45 MG/DL (ref 0.76–1.27)
DEPRECATED RDW RBC AUTO: 48.9 FL (ref 37–54)
EGFRCR SERPLBLD CKD-EPI 2021: 54.5 ML/MIN/1.73
EOSINOPHIL # BLD AUTO: 0.75 10*3/MM3 (ref 0–0.4)
EOSINOPHIL NFR BLD AUTO: 6.8 % (ref 0.3–6.2)
ERYTHROCYTE [DISTWIDTH] IN BLOOD BY AUTOMATED COUNT: 14 % (ref 12.3–15.4)
FERRITIN SERPL-MCNC: 312.7 NG/ML (ref 30–400)
GLOBULIN UR ELPH-MCNC: 3.8 GM/DL
GLUCOSE SERPL-MCNC: 230 MG/DL (ref 65–99)
HCT VFR BLD AUTO: 32.3 % (ref 37.5–51)
HGB BLD-MCNC: 10.1 G/DL (ref 13–17.7)
IMM GRANULOCYTES # BLD AUTO: 0.03 10*3/MM3 (ref 0–0.05)
IMM GRANULOCYTES NFR BLD AUTO: 0.3 % (ref 0–0.5)
IRON 24H UR-MRATE: 34 MCG/DL (ref 59–158)
IRON SATN MFR SERPL: 14 % (ref 20–50)
LDH SERPL-CCNC: 128 U/L (ref 135–225)
LYMPHOCYTES # BLD AUTO: 1.33 10*3/MM3 (ref 0.7–3.1)
LYMPHOCYTES NFR BLD AUTO: 12.1 % (ref 19.6–45.3)
MCH RBC QN AUTO: 29.5 PG (ref 26.6–33)
MCHC RBC AUTO-ENTMCNC: 31.3 G/DL (ref 31.5–35.7)
MCV RBC AUTO: 94.4 FL (ref 79–97)
MONOCYTES # BLD AUTO: 0.83 10*3/MM3 (ref 0.1–0.9)
MONOCYTES NFR BLD AUTO: 7.6 % (ref 5–12)
NEUTROPHILS NFR BLD AUTO: 73 % (ref 42.7–76)
NEUTROPHILS NFR BLD AUTO: 8.01 10*3/MM3 (ref 1.7–7)
PLATELET # BLD AUTO: 388 10*3/MM3 (ref 140–450)
PMV BLD AUTO: 9.9 FL (ref 6–12)
POTASSIUM SERPL-SCNC: 3.9 MMOL/L (ref 3.5–5.2)
PROT SERPL-MCNC: 7.4 G/DL (ref 6–8.5)
RBC # BLD AUTO: 3.42 10*6/MM3 (ref 4.14–5.8)
SODIUM SERPL-SCNC: 137 MMOL/L (ref 136–145)
TIBC SERPL-MCNC: 247 MCG/DL (ref 298–536)
TRANSFERRIN SERPL-MCNC: 166 MG/DL (ref 200–360)
WBC NRBC COR # BLD AUTO: 10.97 10*3/MM3 (ref 3.4–10.8)

## 2025-02-04 PROCEDURE — 80053 COMPREHEN METABOLIC PANEL: CPT

## 2025-02-04 PROCEDURE — 83615 LACTATE (LD) (LDH) ENZYME: CPT

## 2025-02-04 PROCEDURE — 25510000001 IOPAMIDOL 61 % SOLUTION: Performed by: INTERNAL MEDICINE

## 2025-02-04 PROCEDURE — 83540 ASSAY OF IRON: CPT

## 2025-02-04 PROCEDURE — 85025 COMPLETE CBC W/AUTO DIFF WBC: CPT

## 2025-02-04 PROCEDURE — 82728 ASSAY OF FERRITIN: CPT

## 2025-02-04 PROCEDURE — 84466 ASSAY OF TRANSFERRIN: CPT

## 2025-02-04 PROCEDURE — 36415 COLL VENOUS BLD VENIPUNCTURE: CPT

## 2025-02-04 PROCEDURE — 71260 CT THORAX DX C+: CPT

## 2025-02-04 PROCEDURE — 74177 CT ABD & PELVIS W/CONTRAST: CPT

## 2025-02-04 RX ORDER — IOPAMIDOL 612 MG/ML
100 INJECTION, SOLUTION INTRAVASCULAR
Status: COMPLETED | OUTPATIENT
Start: 2025-02-04 | End: 2025-02-04

## 2025-02-04 RX ADMIN — IOPAMIDOL 86 ML: 612 INJECTION, SOLUTION INTRAVENOUS at 14:30

## 2025-02-07 ENCOUNTER — OFFICE VISIT (OUTPATIENT)
Dept: ONCOLOGY | Facility: CLINIC | Age: 65
End: 2025-02-07
Payer: MEDICARE

## 2025-02-07 VITALS
WEIGHT: 270 LBS | HEART RATE: 91 BPM | TEMPERATURE: 97.1 F | BODY MASS INDEX: 38.65 KG/M2 | SYSTOLIC BLOOD PRESSURE: 124 MMHG | HEIGHT: 70 IN | DIASTOLIC BLOOD PRESSURE: 74 MMHG | OXYGEN SATURATION: 96 %

## 2025-02-07 DIAGNOSIS — D50.8 OTHER IRON DEFICIENCY ANEMIA: ICD-10-CM

## 2025-02-07 DIAGNOSIS — C85.10 B-CELL LYMPHOMA, UNSPECIFIED B-CELL LYMPHOMA TYPE, UNSPECIFIED BODY REGION: Primary | ICD-10-CM

## 2025-02-07 DIAGNOSIS — K90.9 MALABSORPTION OF IRON: ICD-10-CM

## 2025-02-07 RX ORDER — FAMOTIDINE 10 MG/ML
20 INJECTION, SOLUTION INTRAVENOUS AS NEEDED
OUTPATIENT
Start: 2025-03-07

## 2025-02-07 RX ORDER — FAMOTIDINE 10 MG/ML
20 INJECTION, SOLUTION INTRAVENOUS AS NEEDED
OUTPATIENT
Start: 2025-02-28

## 2025-02-07 RX ORDER — SODIUM CHLORIDE 9 MG/ML
20 INJECTION, SOLUTION INTRAVENOUS ONCE
OUTPATIENT
Start: 2025-03-14

## 2025-02-07 RX ORDER — SODIUM CHLORIDE 9 MG/ML
20 INJECTION, SOLUTION INTRAVENOUS ONCE
OUTPATIENT
Start: 2025-02-28

## 2025-02-07 RX ORDER — DIPHENHYDRAMINE HYDROCHLORIDE 50 MG/ML
50 INJECTION INTRAMUSCULAR; INTRAVENOUS AS NEEDED
OUTPATIENT
Start: 2025-02-14

## 2025-02-07 RX ORDER — HYDROCORTISONE SODIUM SUCCINATE 100 MG/2ML
100 INJECTION INTRAMUSCULAR; INTRAVENOUS AS NEEDED
OUTPATIENT
Start: 2025-02-21

## 2025-02-07 RX ORDER — SODIUM CHLORIDE 9 MG/ML
20 INJECTION, SOLUTION INTRAVENOUS ONCE
OUTPATIENT
Start: 2025-02-21

## 2025-02-07 RX ORDER — FAMOTIDINE 10 MG/ML
20 INJECTION, SOLUTION INTRAVENOUS AS NEEDED
OUTPATIENT
Start: 2025-03-14

## 2025-02-07 RX ORDER — HYDROCORTISONE SODIUM SUCCINATE 100 MG/2ML
100 INJECTION INTRAMUSCULAR; INTRAVENOUS AS NEEDED
OUTPATIENT
Start: 2025-03-14

## 2025-02-07 RX ORDER — SODIUM CHLORIDE 9 MG/ML
20 INJECTION, SOLUTION INTRAVENOUS ONCE
OUTPATIENT
Start: 2025-02-14

## 2025-02-07 RX ORDER — DIPHENHYDRAMINE HYDROCHLORIDE 50 MG/ML
50 INJECTION INTRAMUSCULAR; INTRAVENOUS AS NEEDED
OUTPATIENT
Start: 2025-03-14

## 2025-02-07 RX ORDER — SODIUM CHLORIDE 9 MG/ML
20 INJECTION, SOLUTION INTRAVENOUS ONCE
OUTPATIENT
Start: 2025-03-07

## 2025-02-07 RX ORDER — DIPHENHYDRAMINE HYDROCHLORIDE 50 MG/ML
50 INJECTION INTRAMUSCULAR; INTRAVENOUS AS NEEDED
OUTPATIENT
Start: 2025-02-28

## 2025-02-07 RX ORDER — HYDROCORTISONE SODIUM SUCCINATE 100 MG/2ML
100 INJECTION INTRAMUSCULAR; INTRAVENOUS AS NEEDED
OUTPATIENT
Start: 2025-03-07

## 2025-02-07 RX ORDER — DIPHENHYDRAMINE HYDROCHLORIDE 50 MG/ML
50 INJECTION INTRAMUSCULAR; INTRAVENOUS AS NEEDED
OUTPATIENT
Start: 2025-02-21

## 2025-02-07 RX ORDER — FAMOTIDINE 10 MG/ML
20 INJECTION, SOLUTION INTRAVENOUS AS NEEDED
OUTPATIENT
Start: 2025-02-14

## 2025-02-07 RX ORDER — FAMOTIDINE 10 MG/ML
20 INJECTION, SOLUTION INTRAVENOUS AS NEEDED
OUTPATIENT
Start: 2025-02-21

## 2025-02-07 RX ORDER — HYDROCORTISONE SODIUM SUCCINATE 100 MG/2ML
100 INJECTION INTRAMUSCULAR; INTRAVENOUS AS NEEDED
OUTPATIENT
Start: 2025-02-28

## 2025-02-07 RX ORDER — HYDROCORTISONE SODIUM SUCCINATE 100 MG/2ML
100 INJECTION INTRAMUSCULAR; INTRAVENOUS AS NEEDED
OUTPATIENT
Start: 2025-02-14

## 2025-02-07 RX ORDER — DIPHENHYDRAMINE HYDROCHLORIDE 50 MG/ML
50 INJECTION INTRAMUSCULAR; INTRAVENOUS AS NEEDED
OUTPATIENT
Start: 2025-03-07

## 2025-02-07 NOTE — PROGRESS NOTES
Follow Up Office Visit      Date: 2025     Patient Name: Kiran Belcher  MRN: [id number]  : 1960  Referring Physician: Chay Cameron     Chief Complaint:  Follow-up for concerns for lymphoma     History of Present Illness: Kiran Belcher is a pleasant 61 y.o. male past medical history of right RCC status post right nephrectomy, hypertension, hyperlipidemia, anxiety, type 2 diabetes who presents today for evaluation of concerns for lymphoma. The patient is accompanied by their wife who contributes to the history of their care.  Patient underwent a right nephrectomy on 2019 for renal cell carcinoma.  He has been followed by nephrology and urology since.  On his most recent scans there is concerns for enlarging retroperitoneal adenopathy.  He underwent an FNA which showed predominant small lymphocytes with a minor population of atypical B cells concerning for possible CLL/SLL.  PET/CT showed no abnormal hypermetabolic activity.  He overall feels well.  Denies any unexplained fevers, chills, night sweats, weight loss.  States that someone did not tell him he had an issue, he would feel completely normal at this time     Interval History:  Presents to clinic for follow-up.  Continued cold sensitivity.  Denies any bleeding or bruising episodes.  Denies any shortness of breath.  Denies any unexplained fevers, chills, night sweats, weight loss    Oncology History:    Oncology/Hematology History    No history exists.       Subjective      Review of Systems:   Constitutional: Negative for fevers, chills, or weight loss  Eyes: Negative for blurred vision or discharge         Ear/Nose/Throat: Negative for difficulty swallowing, sore throat, LAD                                                       Respiratory: Negative for cough, SOA, wheezing                                                                                        Cardiovascular: Negative for chest pain or palpitations                                                                   Gastrointestinal: Negative for nausea, vomiting or diarrhea                                                                     Genitourinary: Negative for dysuria or hematuria                                                                                           Musculoskeletal: Negative for any joint pains or muscle aches                                                                        Neurologic: Negative for any weakness, headaches, dizziness                                                                         Hematologic: Negative for any easy bleeding or bruising                                                                                   Psychiatric: Negative for anxiety or depression                          Past Medical History/Past Surgical History/ Family History/ Social History: Reviewed by me and unchanged from my previous documentation done on August 2024.     Medications:     Current Outpatient Medications:     albuterol sulfate  (90 Base) MCG/ACT inhaler, Inhale 2 puffs Every 4 (Four) Hours As Needed for Wheezing., Disp: 51 g, Rfl: 0    amLODIPine-benazepril (LOTREL) 10-20 MG per capsule, Take 1 capsule by mouth Daily., Disp: 90 capsule, Rfl: 3    ammonium lactate (AMLACTIN) 12 % cream, Apply 1 g topically to the appropriate area as directed As Needed for Dry Skin. Apply as directed , prescribed by podiatrist, Disp: , Rfl:     aspirin 81 MG chewable tablet, Chew 1 tablet Daily., Disp: , Rfl:     atorvastatin (LIPITOR) 10 MG tablet, Take 1 tablet by mouth Daily., Disp: 90 tablet, Rfl: 3    baclofen (LIORESAL) 20 MG tablet, Take 1 tablet by mouth 3 (Three) Times a Day., Disp: 90 tablet, Rfl: 11    carvedilol (COREG) 12.5 MG tablet, TAKE 1/2 TABLET BY MOUTH TWICE A DAY WITH MEALS., Disp: 90 tablet, Rfl: 3    citalopram (CeleXA) 20 MG tablet, Take 1 tablet by mouth Daily., Disp: 90 tablet, Rfl: 3    dantrolene (DANTRIUM) 100 MG  capsule, TAKE 1 CAPSULE BY MOUTH TWICE A DAY, Disp: 180 capsule, Rfl: 3    Diclofenac Sodium (VOLTAREN) 1 % gel gel, Apply 4 g topically to the appropriate area as directed 4 (Four) Times a Day As Needed (right 4th finger pain)., Disp: 100 g, Rfl: 2    esomeprazole (nexIUM) 40 MG capsule, TAKE ONE CAPSULE BY MOUTH DAILY, Disp: 90 capsule, Rfl: 3    FeroSul 325 (65 Fe) MG tablet, TAKE 1 TABLET BY MOUTH DAILY WITH BREAKFAST, Disp: 30 tablet, Rfl: 5    fesoterodine fumarate (TOVIAZ ER) 8 MG tablet sustained-release 24 hour tablet, Take 1 tablet by mouth Daily., Disp: , Rfl:     fexofenadine (ALLEGRA) 180 MG tablet, Take 1 tablet by mouth Daily., Disp: , Rfl:     fluocinonide (LIDEX) 0.05 % cream, fluocinonide 0.05 % topical cream, Disp: , Rfl:     fluticasone (FLONASE) 50 MCG/ACT nasal spray, SPRAY 2 SPRAYS IN EACH NOSTRIL ONCE DAILY AS DIRECTED BY PROVIDER, Disp: 16 g, Rfl: 11    furosemide (LASIX) 20 MG tablet, TAKE 1 TABLET BY MOUTH DAILY, Disp: 30 tablet, Rfl: 2    glucose blood (Accu-Chek Elke Plus) test strip, 1 each by Other route 2 (Two) Times a Day. Use as instructed, Disp: 200 each, Rfl: 3    glyburide (DIAbeta) 5 MG tablet, Take 1 tablet by mouth Daily With Breakfast., Disp: 90 tablet, Rfl: 3    lactobacillus acidophilus (RISAQUAD) capsule capsule, Take 1 capsule by mouth Daily., Disp: 90 capsule, Rfl: 1    linezolid (ZYVOX) 600 MG tablet, Take 1 tablet by mouth 2 (Two) Times a Day., Disp: 14 tablet, Rfl: 0    meclizine (ANTIVERT) 25 MG tablet, Take 1 tablet by mouth 3 (Three) Times a Day As Needed for Dizziness., Disp: 12 tablet, Rfl: 0    metFORMIN ER (GLUCOPHAGE-XR) 500 MG 24 hr tablet, TAKE 2 TABLETS BY MOUTH TWICE A DAY, Disp: 360 tablet, Rfl: 3    methenamine (HIPREX) 1 g tablet, Take 1 tablet by mouth 2 (Two) Times a Day With Meals., Disp: , Rfl:     methylPREDNISolone (MEDROL) 4 MG dose pack, Take as directed on package instructions., Disp: 21 tablet, Rfl: 0    Mirabegron ER (MYRBETRIQ) 50 MG tablet  "sustained-release 24 hour 24 hr tablet, Take 50 mg by mouth Daily., Disp: , Rfl:     promethazine-dextromethorphan (PROMETHAZINE-DM) 6.25-15 MG/5ML syrup, Take 5 mL by mouth 4 (Four) Times a Day As Needed for Cough., Disp: 120 mL, Rfl: 0    Triamcinolone Acetonide (Nasacort Allergy 24HR) 55 MCG/ACT nasal inhaler, Administer 2 sprays into the nostril(s) as directed by provider Daily., Disp: , Rfl:     Allergies:   Allergies   Allergen Reactions    Levofloxacin Unknown (See Comments)     tendinopathy    Cefuroxime Palpitations     Tachycardia, back pain, fatigue, and weakness.        Objective     Physical Exam:  Vital Signs:   Vitals:    02/07/25 1420   BP: 124/74   Pulse: 91   Temp: 97.1 °F (36.2 °C)   TempSrc: Infrared   SpO2: 96%   Weight: 122 kg (270 lb)  Comment: per pt   Height: 177.8 cm (70\")  Comment: per pt   PainSc: 0-No pain     Pain Score    02/07/25 1420   PainSc: 0-No pain     ECOG Performance Status: 2 - Symptomatic, <50% confined to bed    Constitutional: NAD, ECOG 2  Eyes: PERRLA, scleral anicteric  ENT: No LAD, no thyromegaly  Respiratory: CTAB, no wheezing, rales, rhonchi  Cardiovascular: RRR, no murmurs, pulses 2+ bilaterally  Abdomen: soft, NT/ND, no HSM  Musculoskeletal: strength 5/5 bilaterally, no c/c/e  Neurologic: A&O x 3, CN II-XII intact grossly    Results Review:   Lab on 02/04/2025   Component Date Value Ref Range Status    Ferritin 02/04/2025 312.70  30.00 - 400.00 ng/mL Final    Iron 02/04/2025 34 (L)  59 - 158 mcg/dL Final    Iron Saturation (TSAT) 02/04/2025 14 (L)  20 - 50 % Final    Transferrin 02/04/2025 166 (L)  200 - 360 mg/dL Final    TIBC 02/04/2025 247 (L)  298 - 536 mcg/dL Final    LDH 02/04/2025 128 (L)  135 - 225 U/L Final    Glucose 02/04/2025 230 (H)  65 - 99 mg/dL Final    BUN 02/04/2025 15  8 - 23 mg/dL Final    Creatinine 02/04/2025 1.45 (H)  0.76 - 1.27 mg/dL Final    Sodium 02/04/2025 137  136 - 145 mmol/L Final    Potassium 02/04/2025 3.9  3.5 - 5.2 mmol/L Final    " Chloride 02/04/2025 98  98 - 107 mmol/L Final    CO2 02/04/2025 26.4  22.0 - 29.0 mmol/L Final    Calcium 02/04/2025 8.8  8.6 - 10.5 mg/dL Final    Total Protein 02/04/2025 7.4  6.0 - 8.5 g/dL Final    Albumin 02/04/2025 3.6  3.5 - 5.2 g/dL Final    ALT (SGPT) 02/04/2025 14  1 - 41 U/L Final    AST (SGOT) 02/04/2025 19  1 - 40 U/L Final    Alkaline Phosphatase 02/04/2025 116  39 - 117 U/L Final    Total Bilirubin 02/04/2025 0.2  0.0 - 1.2 mg/dL Final    Globulin 02/04/2025 3.8  gm/dL Final    A/G Ratio 02/04/2025 0.9  g/dL Final    BUN/Creatinine Ratio 02/04/2025 10.3  7.0 - 25.0 Final    Anion Gap 02/04/2025 12.6  5.0 - 15.0 mmol/L Final    eGFR 02/04/2025 54.5 (L)  >60.0 mL/min/1.73 Final    WBC 02/04/2025 10.97 (H)  3.40 - 10.80 10*3/mm3 Final    RBC 02/04/2025 3.42 (L)  4.14 - 5.80 10*6/mm3 Final    Hemoglobin 02/04/2025 10.1 (L)  13.0 - 17.7 g/dL Final    Hematocrit 02/04/2025 32.3 (L)  37.5 - 51.0 % Final    MCV 02/04/2025 94.4  79.0 - 97.0 fL Final    MCH 02/04/2025 29.5  26.6 - 33.0 pg Final    MCHC 02/04/2025 31.3 (L)  31.5 - 35.7 g/dL Final    RDW 02/04/2025 14.0  12.3 - 15.4 % Final    RDW-SD 02/04/2025 48.9  37.0 - 54.0 fl Final    MPV 02/04/2025 9.9  6.0 - 12.0 fL Final    Platelets 02/04/2025 388  140 - 450 10*3/mm3 Final    Neutrophil % 02/04/2025 73.0  42.7 - 76.0 % Final    Lymphocyte % 02/04/2025 12.1 (L)  19.6 - 45.3 % Final    Monocyte % 02/04/2025 7.6  5.0 - 12.0 % Final    Eosinophil % 02/04/2025 6.8 (H)  0.3 - 6.2 % Final    Basophil % 02/04/2025 0.2  0.0 - 1.5 % Final    Immature Grans % 02/04/2025 0.3  0.0 - 0.5 % Final    Neutrophils, Absolute 02/04/2025 8.01 (H)  1.70 - 7.00 10*3/mm3 Final    Lymphocytes, Absolute 02/04/2025 1.33  0.70 - 3.10 10*3/mm3 Final    Monocytes, Absolute 02/04/2025 0.83  0.10 - 0.90 10*3/mm3 Final    Eosinophils, Absolute 02/04/2025 0.75 (H)  0.00 - 0.40 10*3/mm3 Final    Basophils, Absolute 02/04/2025 0.02  0.00 - 0.20 10*3/mm3 Final    Immature Grans, Absolute  02/04/2025 0.03  0.00 - 0.05 10*3/mm3 Final   Office Visit on 02/03/2025   Component Date Value Ref Range Status    Wound Culture 02/03/2025 Scant growth (1+) Normal Skin Nancy   Final    Wound Culture 02/03/2025 Scant growth (1+) Staphylococcus lugdunensis (A)   Final    Gram Stain 02/03/2025 Rare (1+) WBCs seen   Final    Gram Stain 02/03/2025 Rare (1+) Gram positive cocci in pairs   Final       CT Abdomen Pelvis With Contrast    Result Date: 2/4/2025  Narrative: CT ABDOMEN PELVIS W CONTRAST, CT CHEST W CONTRAST DIAGNOSTIC Date of Exam: 2/4/2025 2:18 PM EST Indication: b-cell lymphoma. Comparison: None available. Technique: Axial CT images were obtained of the abdomen and pelvis following the uneventful intravenous administration of intravenous contrast. Reconstructed coronal and sagittal images were also obtained. Automated exposure control and iterative construction methods were used. Findings: CT chest: The central tracheobronchial tree is patent. Small left thyroid nodule. No definite pathologic lymphadenopathy by size criteria. The heart is stable in size. There is no pericardial effusion. Stable coronary artery calcifications. No evidence of thoracic aortic aneurysm. The central pulmonary arteries are patent. Calcified mediastinal and right hilar lymph nodes Mild interval increase in size of a mild to moderate size loculated right pleural effusion. There is also trace left pleural effusion. There are bilateral lower lobe dependent atelectasis more exenterated on the right. No suspicious pulmonary nodules. No acute fractures or destructive bone lesions. ACDF changes CT abdomen pelvis: The liver shows homogeneous density and mildly enlarged with no focal lesions. Multiple small calcified gallstones Multiple calcified granulomas in the spleen. The pancreas and adrenal glands are unremarkable. Right nephrectomy changes again seen. The left kidney is unremarkable. The aorta shows a normal diameter. Multiple  subcentimeter shotty mesenteric lymph nodes. The small and large bowel loops are normal in caliber. There is persistent diffuse bladder wall thickening with surrounding fat stranding suspicious for chronic cystitis versus chronic bladder outlet obstruction. There is no free fluid or free air. Stable enlarged left para-aortic lymph node immediately proximal to the aortic bifurcation measuring 2.4 x 1.5 cm. Stable left common iliac chain lymph node measures 1.9 x 1.7 cm (1.7 cm short axis previous exam). Additional multiple stable enlarged lymph nodes along the bilateral common iliac and external iliac chains. There is also stable enlarged bilateral inguinal lymphadenopathy measuring up to 1.9 cm short axis on the right and 2.3 cm on the left. No acute fractures or destructive bone lesions     Impression: Impression: 1.Stable pathologic retroperitoneal, pelvic and inguinal lymphadenopathy. 2.Mild interval increase in size of a mild to moderate size loculated right pleural effusion. There is also trace left pleural effusion. 3.Stable diffuse bladder wall thickening with surrounding fat stranding suspicious for chronic cystitis versus bladder outlet obstruction. 4.Cholelithiasis and mild hepatomegaly. 5.Right nephrectomy. Electronically Signed: Vamsi Monterroso MD  2/4/2025 3:52 PM EST  Workstation ID: [id number]    CT Chest With Contrast Diagnostic    Result Date: 2/4/2025  Narrative: CT ABDOMEN PELVIS W CONTRAST, CT CHEST W CONTRAST DIAGNOSTIC Date of Exam: 2/4/2025 2:18 PM EST Indication: b-cell lymphoma. Comparison: None available. Technique: Axial CT images were obtained of the abdomen and pelvis following the uneventful intravenous administration of intravenous contrast. Reconstructed coronal and sagittal images were also obtained. Automated exposure control and iterative construction methods were used. Findings: CT chest: The central tracheobronchial tree is patent. Small left thyroid nodule. No definite pathologic  lymphadenopathy by size criteria. The heart is stable in size. There is no pericardial effusion. Stable coronary artery calcifications. No evidence of thoracic aortic aneurysm. The central pulmonary arteries are patent. Calcified mediastinal and right hilar lymph nodes Mild interval increase in size of a mild to moderate size loculated right pleural effusion. There is also trace left pleural effusion. There are bilateral lower lobe dependent atelectasis more exenterated on the right. No suspicious pulmonary nodules. No acute fractures or destructive bone lesions. ACDF changes CT abdomen pelvis: The liver shows homogeneous density and mildly enlarged with no focal lesions. Multiple small calcified gallstones Multiple calcified granulomas in the spleen. The pancreas and adrenal glands are unremarkable. Right nephrectomy changes again seen. The left kidney is unremarkable. The aorta shows a normal diameter. Multiple subcentimeter shotty mesenteric lymph nodes. The small and large bowel loops are normal in caliber. There is persistent diffuse bladder wall thickening with surrounding fat stranding suspicious for chronic cystitis versus chronic bladder outlet obstruction. There is no free fluid or free air. Stable enlarged left para-aortic lymph node immediately proximal to the aortic bifurcation measuring 2.4 x 1.5 cm. Stable left common iliac chain lymph node measures 1.9 x 1.7 cm (1.7 cm short axis previous exam). Additional multiple stable enlarged lymph nodes along the bilateral common iliac and external iliac chains. There is also stable enlarged bilateral inguinal lymphadenopathy measuring up to 1.9 cm short axis on the right and 2.3 cm on the left. No acute fractures or destructive bone lesions     Impression: Impression: 1.Stable pathologic retroperitoneal, pelvic and inguinal lymphadenopathy. 2.Mild interval increase in size of a mild to moderate size loculated right pleural effusion. There is also trace left  pleural effusion. 3.Stable diffuse bladder wall thickening with surrounding fat stranding suspicious for chronic cystitis versus bladder outlet obstruction. 4.Cholelithiasis and mild hepatomegaly. 5.Right nephrectomy. Electronically Signed: Vamsi Monterroso MD  2/4/2025 3:52 PM EST  Workstation ID: [id number]     Assessment / Plan      Assessment/Plan:   1. B-cell lymphoma, unspecified B-cell lymphoma type, unspecified body region (HCC) (Primary)  -Unclear etiology at this time  -Enlarging retroperitoneal adenopathy noted on recent CT scan  -FNA showing predominately small lymphocytes with a minor population of atypical B cells  -PET/CT showing no abnormal hypermetabolic activity at [facility]  -Peripheral flow without an immunophenotypic abnormality  -SPEP within normal limits  -LDH mildly decreased  -Kappa/lambda light chain ratio within normal limits, beta-2 microglobulin mildly elevated at 3.4  -CT C/A/P in July 2022 with some mild increase in his retroperitoneal adenopathy along with concerns for cystitis  -Labs in October 2022 reviewed and within normal limits with no significant lymphocytosis or leukocytosis  -Continues to remain asymptomatic with no significant constitutional symptoms  -CT C/A/P in December 2022 reviewed without evidence of recurrent or metastatic disease.  Labs overall stable  -CBC in July 2023 overall stable.  LDH within normal limits  - Labs in January 2024 reviewed and overall stable.  LDH within normal limits  -CT C/A/P in January 2024 with no disease progression and stable adenopathy  -CT C/A/P in July 2024 reviewed and showing some slight increase in multiple lymph nodes but no significant progression.  Labs stable  -CT C/A/P in February 2025 reviewed and without evidence of progression of his lymphoma.  Slight right pleural effusion noted but unlikely to have enough fluid for thoracentesis and he is currently asymptomatic   -We will plan for repeat CT scans and labs in 6 months.  Ordered today.       2.  Right clear-cell renal cell carcinoma  -Status post nephrectomy in May 2019  -Has been without disease recurrence since     3.  Iron deficiency anemia/4.  Malabsorption of iron  -Iron studies in July 2023 consistent with mild iron deficiency anemia  -Started on oral iron and tolerating well  -Repeat hemoglobin and iron studies in July 2024 continue to show iron deficiency anemia  -As such, he is intolerant of oral iron  -Status post IV Ferrlecit completed in September 2024.  -Iron studies in February 2025 reviewed and still with consistent iron deficiency  -Will plan to transition to IV Venofer x 5 weekly doses.  Ordered today         Follow Up:   Follow-up in 6 months     Jose Perdomo MD  Hematology and Oncology     Please note that portions of this note may have been completed with a voice recognition program. Efforts were made to edit the dictations, but occasionally words are mistranscribed.

## 2025-02-09 ENCOUNTER — APPOINTMENT (OUTPATIENT)
Facility: HOSPITAL | Age: 65
End: 2025-02-09
Payer: MEDICARE

## 2025-02-09 ENCOUNTER — HOSPITAL ENCOUNTER (EMERGENCY)
Facility: HOSPITAL | Age: 65
Discharge: HOME | End: 2025-02-09
Attending: EMERGENCY MEDICINE | Admitting: EMERGENCY MEDICINE
Payer: MEDICARE

## 2025-02-09 VITALS
DIASTOLIC BLOOD PRESSURE: 69 MMHG | WEIGHT: 270 LBS | OXYGEN SATURATION: 98 % | RESPIRATION RATE: 20 BRPM | TEMPERATURE: 98.3 F | HEART RATE: 69 BPM | SYSTOLIC BLOOD PRESSURE: 133 MMHG | BODY MASS INDEX: 38.65 KG/M2 | HEIGHT: 70 IN

## 2025-02-09 DIAGNOSIS — R05.3 CHRONIC COUGH: Primary | ICD-10-CM

## 2025-02-09 DIAGNOSIS — J90 PLEURAL EFFUSION ON RIGHT: ICD-10-CM

## 2025-02-09 DIAGNOSIS — R09.81 CHRONIC NASAL CONGESTION: ICD-10-CM

## 2025-02-09 PROCEDURE — 71046 X-RAY EXAM CHEST 2 VIEWS: CPT

## 2025-02-09 PROCEDURE — 99283 EMERGENCY DEPT VISIT LOW MDM: CPT

## 2025-02-09 RX ORDER — OXYMETAZOLINE HYDROCHLORIDE 0.05 G/100ML
1 SPRAY NASAL 2 TIMES DAILY
Qty: 30 ML | Refills: 0 | Status: SHIPPED | OUTPATIENT
Start: 2025-02-09

## 2025-02-09 RX ORDER — FLUTICASONE PROPIONATE 50 MCG
2 SPRAY, SUSPENSION (ML) NASAL DAILY
Qty: 16 G | Refills: 0 | Status: SHIPPED | OUTPATIENT
Start: 2025-02-09 | End: 2025-02-14

## 2025-02-09 NOTE — [facility] PROVIDER NOTE
"Subjective  History of Present Illness:    Patient states she has had a cough and nasal congestion for 4 weeks.  No fever.  No pain.  He has been given cough syrup which she states has not helped his cough.  Patient is currently being worked up for concerns for lymphoma.  He has a prior history of renal cell carcinoma status post right nephrectomy.  Also has hypertension, hyperlipidemia, type 2 diabetes.  He had a fine-needle aspiration recently which showed predominantly small lymphocytes concerning for CLL/SLL.  PET scan was normal.    Currently taking linezolid for a abscess which was drained and grew out staph.    Nurses Notes reviewed and agree, including vitals, allergies, social history and prior medical history.     REVIEW OF SYSTEMS: All systems reviewed and not pertinent unless noted.    Past Medical History:   Diagnosis Date    B-cell lymphoma 02/2022    Undergoing work-up with [facility]    Cancer     hx spot on right kidney, for ~8 years, told cancer but no hx biopsy and no interventions and just watching it. Dr. vega used to watch this cyst, then started with  urology a year ago after he retired.    Decreased mobility     Depression     Diabetes mellitus     Esophageal reflux     Glaucoma     History of methicillin resistant Staphylococcus aureus infection     History of obstructive sleep apnea     History of quadriplegia     of unknown etiology - C5-C7, incomplete     History of spinal cord injury/quadriplegia 2008 09/14/2018    Hyperlipidemia     Hypertension     Kidney disorder 9/14/2018    Paraplegia        Allergies:    Levofloxacin and Cefuroxime      Past Surgical History:   Procedure Laterality Date    CERVICAL DISC SURGERY      around 5/2008, fall/injury tripped over a safety gate for their dogs, reported SCI \"semi quadraplegic\" since. [facility]. [facility] after.    SPINE SURGERY           Social History     Socioeconomic History    Marital status:    Tobacco Use    Smoking status: Never     " "Passive exposure: Never    Smokeless tobacco: Never   Vaping Use    Vaping status: Never Used   Substance and Sexual Activity    Alcohol use: No    Drug use: No    Sexual activity: Defer         Family History   Problem Relation Age of Onset    Hypertension Mother     Heart disease Father     Coronary artery disease Father     Diabetes Other     Hypertension Other        Objective  Physical Exam:  /69   Pulse 70   Temp 98.3 °F (36.8 °C) (Oral)   Resp 20   Ht 177.8 cm (70\")   Wt 122 kg (270 lb)   SpO2 99%   BMI 38.74 kg/m²      Physical Exam    [Primary Survey    Airway: Patent and protected  Breathing: Symmetric bilaterally  Circulation: Mentating well, responsive        Constitutional: Nontoxic appearance.  Psychological: No abnormalities of mood affect.  Head: Atraumatic  Eyes: Conjunctiva are non-injected. no scleral icterus.  ENT: Nasal congestion present no sinus tenderness,  Neck: No obvious deformity.  ROM appears preserved  Chest: No deformity noted.  No paradoxical breathing noted  Respiratory: Respiratory effort was normal - no use of accessory respiratory muscles noted.  There is no stridor.  Lungs clear bilaterally  Cardiovascular: Perfusion appears preserved - mentating well RRR no murmurs  Gastrointestinal: Abdomen nondistended.  Genitourinary: Not examined  Lymphatic: Not examined  Back: Not examined  Musculoskeletal: Musculoskeletal system is grossly intact.  There is no obvious deformity.  Neurological: Face: No Asymmetry.  Gross motor movement is intact in all 4 extremities.  Walks and ambulates without difficulty.  Patient exhibits normal speech.  Skin: No Pallor no obvious bruising.  No obvious rash.]      ED Course:      Lab Results (last 24 hours)       ** No results found for the last 24 hours. **             XR Chest 2 View    Result Date: 2/9/2025  XR CHEST 2 VW Date of Exam: 2/9/2025 5:03 PM EST Indication: persistent cough, concern PNA Comparison: 2/4/2025 chest CT scan " Findings: Recent CT scan by report showed mild to moderate loculated right pleural effusion and trace left effusion. Today's study shows persistent right effusion, and patchy interstitial disease of the right lung base, whether atelectasis or pneumonia. Small area  of discoid atelectasis or scarring in the left midlung is stable and no new disease is seen of the left lung, or right upper lung. Heart is enlarged. Vasculature appears normal.     Impression: Impression: 1. Moderate right pleural effusion and patchy opacity the right lung base, whether atelectasis or pneumonia. Appearance is similar, however, to the atelectasis seen on here 2/4/2025 chest CT scan. 2. Small area of left midlung discoid atelectasis or scarring unchanged. Electronically Signed: Hayden Yi MD  2/9/2025 5:22 PM EST  Workstation ID: [id number]      No orders to display       Procedures    MDM    Adult male with chronic cough and congestion. Vss. No resuscitation required. Obtaining cxr to evaluate for pna,ptx, effusion. Without infectious symptoms I do not think he has sinusitis or pna    Chest x-ray shows moderate pleural effusion unchanged from the CT obtained on 2//25.  He has had prior CT with same effusion though he had a mild interval increase from July 2024.  This could be causing some of his cough.  He does not need a thoracentesis at this time.  I do not think he is having a bacterial sinusitis.  He has no fever, severe pain or significant tenderness over the sinuses.  I think would benefit from a decongestant.  Will recommend oxymetazoline and Flonase. follow-up primary care.            Medications - No data to display    HEART SCORE   No data recorded           -----  ED Disposition       ED Disposition   Discharge    Condition   Stable    Comment   --             Final diagnoses:   Chronic cough   Pleural effusion on right   Chronic nasal congestion      Your Follow-Up Providers       Chay Cameron MD.    Specialty: Family  Medicine  [address] [place] [id number]  [phone]                       Contact information for after-discharge care    Follow-up information has not been specified.                    Your medication list        CONTINUE taking these medications        Instructions Last Dose Given Next Dose Due   albuterol sulfate  (90 Base) MCG/ACT inhaler  Commonly known as: PROVENTIL HFA;VENTOLIN HFA;PROAIR HFA      Inhale 2 puffs Every 4 (Four) Hours As Needed for Wheezing.       amLODIPine-benazepril 10-20 MG per capsule  Commonly known as: LOTREL      Take 1 capsule by mouth Daily.       ammonium lactate 12 % cream  Commonly known as: AMLACTIN      Apply 1 g topically to the appropriate area as directed As Needed for Dry Skin. Apply as directed , prescribed by podiatrist       aspirin 81 MG chewable tablet      Chew 1 tablet Daily.       atorvastatin 10 MG tablet  Commonly known as: LIPITOR      Take 1 tablet by mouth Daily.       baclofen 20 MG tablet  Commonly known as: LIORESAL      Take 1 tablet by mouth 3 (Three) Times a Day.       carvedilol 12.5 MG tablet  Commonly known as: COREG      TAKE 1/2 TABLET BY MOUTH TWICE A DAY WITH MEALS.       citalopram 20 MG tablet  Commonly known as: CeleXA      Take 1 tablet by mouth Daily.       dantrolene 100 MG capsule  Commonly known as: DANTRIUM      TAKE 1 CAPSULE BY MOUTH TWICE A DAY       Diclofenac Sodium 1 % gel gel  Commonly known as: VOLTAREN      Apply 4 g topically to the appropriate area as directed 4 (Four) Times a Day As Needed (right 4th finger pain).       esomeprazole 40 MG capsule  Commonly known as: nexIUM      TAKE ONE CAPSULE BY MOUTH DAILY       FeroSul 325 (65 Fe) MG tablet  Generic drug: ferrous sulfate      TAKE 1 TABLET BY MOUTH DAILY WITH BREAKFAST       fesoterodine fumarate 8 MG tablet sustained-release 24 hour tablet  Commonly known as: TOVIAZ ER      Take 1 tablet by mouth Daily.       fexofenadine 180 MG tablet  Commonly  known as: ALLEGRA      Take 1 tablet by mouth Daily.       fluocinonide 0.05 % cream  Commonly known as: LIDEX      fluocinonide 0.05 % topical cream       fluticasone 50 MCG/ACT nasal spray  Commonly known as: FLONASE      SPRAY 2 SPRAYS IN EACH NOSTRIL ONCE DAILY AS DIRECTED BY PROVIDER       furosemide 20 MG tablet  Commonly known as: LASIX      TAKE 1 TABLET BY MOUTH DAILY       glucose blood test strip  Commonly known as: Accu-Chek Elke Plus      1 each by Other route 2 (Two) Times a Day. Use as instructed       glyburide 5 MG tablet  Commonly known as: DIAbeta      Take 1 tablet by mouth Daily With Breakfast.       lactobacillus acidophilus capsule capsule      Take 1 capsule by mouth Daily.       linezolid 600 MG tablet  Commonly known as: ZYVOX      Take 1 tablet by mouth 2 (Two) Times a Day.       meclizine 25 MG tablet  Commonly known as: ANTIVERT      Take 1 tablet by mouth 3 (Three) Times a Day As Needed for Dizziness.       metFORMIN  MG 24 hr tablet  Commonly known as: GLUCOPHAGE-XR      TAKE 2 TABLETS BY MOUTH TWICE A DAY       methenamine 1 g tablet  Commonly known as: HIPREX      Take 1 tablet by mouth 2 (Two) Times a Day With Meals.       methylPREDNISolone 4 MG dose pack  Commonly known as: MEDROL      Take as directed on package instructions.       Mirabegron ER 50 MG tablet sustained-release 24 hour 24 hr tablet  Commonly known as: MYRBETRIQ      Take 50 mg by mouth Daily.       Nasacort Allergy 24HR 55 MCG/ACT nasal inhaler  Generic drug: Triamcinolone Acetonide      Administer 2 sprays into the nostril(s) as directed by provider Daily.       promethazine-dextromethorphan 6.25-15 MG/5ML syrup  Commonly known as: PROMETHAZINE-DM      Take 5 mL by mouth 4 (Four) Times a Day As Needed for Cough.

## 2025-02-10 ENCOUNTER — OFFICE VISIT (OUTPATIENT)
Dept: FAMILY MEDICINE CLINIC | Facility: CLINIC | Age: 65
End: 2025-02-10
Payer: MEDICARE

## 2025-02-10 ENCOUNTER — TELEPHONE (OUTPATIENT)
Dept: ONCOLOGY | Facility: CLINIC | Age: 65
End: 2025-02-10
Payer: MEDICARE

## 2025-02-10 VITALS
DIASTOLIC BLOOD PRESSURE: 54 MMHG | HEART RATE: 76 BPM | RESPIRATION RATE: 20 BRPM | OXYGEN SATURATION: 93 % | TEMPERATURE: 99.8 F | BODY MASS INDEX: 38.65 KG/M2 | HEIGHT: 70 IN | WEIGHT: 270 LBS | SYSTOLIC BLOOD PRESSURE: 108 MMHG

## 2025-02-10 DIAGNOSIS — L02.415 ABSCESS OF RIGHT THIGH: ICD-10-CM

## 2025-02-10 DIAGNOSIS — J40 BRONCHITIS: Primary | ICD-10-CM

## 2025-02-10 DIAGNOSIS — R05.1 ACUTE COUGH: ICD-10-CM

## 2025-02-10 DIAGNOSIS — E11.9 TYPE 2 DIABETES MELLITUS WITHOUT COMPLICATION, WITHOUT LONG-TERM CURRENT USE OF INSULIN: ICD-10-CM

## 2025-02-10 LAB
EXPIRATION DATE: ABNORMAL
EXPIRATION DATE: NORMAL
EXPIRATION DATE: NORMAL
FLUAV AG NPH QL: NEGATIVE
FLUBV AG NPH QL: NEGATIVE
HBA1C MFR BLD: 6.5 % (ref 4.5–5.7)
INTERNAL CONTROL: NORMAL
INTERNAL CONTROL: NORMAL
Lab: ABNORMAL
Lab: NORMAL
Lab: NORMAL
SARS-COV-2 AG UPPER RESP QL IA.RAPID: NOT DETECTED

## 2025-02-10 PROCEDURE — 3078F DIAST BP <80 MM HG: CPT | Performed by: HOSPITALIST

## 2025-02-10 PROCEDURE — 83036 HEMOGLOBIN GLYCOSYLATED A1C: CPT | Performed by: HOSPITALIST

## 2025-02-10 PROCEDURE — 3074F SYST BP LT 130 MM HG: CPT | Performed by: HOSPITALIST

## 2025-02-10 PROCEDURE — 1159F MED LIST DOCD IN RCRD: CPT | Performed by: HOSPITALIST

## 2025-02-10 PROCEDURE — 1160F RVW MEDS BY RX/DR IN RCRD: CPT | Performed by: HOSPITALIST

## 2025-02-10 PROCEDURE — 87804 INFLUENZA ASSAY W/OPTIC: CPT | Performed by: HOSPITALIST

## 2025-02-10 PROCEDURE — 87426 SARSCOV CORONAVIRUS AG IA: CPT | Performed by: HOSPITALIST

## 2025-02-10 PROCEDURE — 3044F HG A1C LEVEL LT 7.0%: CPT | Performed by: HOSPITALIST

## 2025-02-10 PROCEDURE — 99214 OFFICE O/P EST MOD 30 MIN: CPT | Performed by: HOSPITALIST

## 2025-02-10 PROCEDURE — 1126F AMNT PAIN NOTED NONE PRSNT: CPT | Performed by: HOSPITALIST

## 2025-02-10 RX ORDER — DOXYCYCLINE 100 MG/1
100 CAPSULE ORAL 2 TIMES DAILY
Qty: 10 CAPSULE | Refills: 0 | Status: SHIPPED | OUTPATIENT
Start: 2025-02-10

## 2025-02-10 NOTE — PROGRESS NOTES
Follow Up Office Visit      Patient Name: Kiran Belcher  : 1960   MRN: [id number]     Chief Complaint:  Abscess and Cough (X 1 month)     History of Present Illness: Kiran Belcher is a 64 y.o. male who is here today for follow up on right thigh abscess. The abscess was drained on 2/3/25 and wound culture grew staph lugdunensis. The patient was prescribed Linezolid 600mg PO BID x 7 days. Patient is feeling much better and states the area is healing.  Patient presents complaining of a cough for the past month. He states he was seen in the ED and told his lungs sounded good and given cough syrup. He states that the cough gets so bad that it makes him short of breath. Patient denies fever, nausea/vomiting. The patient states the cough seems to have settled in his chest.        Subjective     Subjective          The following portions of the patient's history were reviewed and updated as appropriate: allergies, current medications, past family history, past medical history, past social history, past surgical history and problem list.    Allergy:   Allergies   Allergen Reactions    Levofloxacin Unknown (See Comments)     tendinopathy    Cefuroxime Palpitations     Tachycardia, back pain, fatigue, and weakness.         Current Medications:   Current Outpatient Medications   Medication Sig Dispense Refill    albuterol sulfate  (90 Base) MCG/ACT inhaler Inhale 2 puffs Every 4 (Four) Hours As Needed for Wheezing. 51 g 0    amLODIPine-benazepril (LOTREL) 10-20 MG per capsule Take 1 capsule by mouth Daily. 90 capsule 3    ammonium lactate (AMLACTIN) 12 % cream Apply 1 g topically to the appropriate area as directed As Needed for Dry Skin. Apply as directed , prescribed by podiatrist      aspirin 81 MG chewable tablet Chew 1 tablet Daily.      atorvastatin (LIPITOR) 10 MG tablet Take 1 tablet by mouth Daily. 90 tablet 3    baclofen (LIORESAL) 20 MG tablet Take 1 tablet by mouth 3 (Three) Times a Day.  90 tablet 11    carvedilol (COREG) 12.5 MG tablet TAKE 1/2 TABLET BY MOUTH TWICE A DAY WITH MEALS. 90 tablet 3    citalopram (CeleXA) 20 MG tablet Take 1 tablet by mouth Daily. 90 tablet 3    dantrolene (DANTRIUM) 100 MG capsule TAKE 1 CAPSULE BY MOUTH TWICE A  capsule 3    Diclofenac Sodium (VOLTAREN) 1 % gel gel Apply 4 g topically to the appropriate area as directed 4 (Four) Times a Day As Needed (right 4th finger pain). 100 g 2    esomeprazole (nexIUM) 40 MG capsule TAKE ONE CAPSULE BY MOUTH DAILY 90 capsule 3    fesoterodine fumarate (TOVIAZ ER) 8 MG tablet sustained-release 24 hour tablet Take 1 tablet by mouth Daily.      fexofenadine (ALLEGRA) 180 MG tablet Take 1 tablet by mouth Daily.      fluocinonide (LIDEX) 0.05 % cream fluocinonide 0.05 % topical cream      fluticasone (FLONASE) 50 MCG/ACT nasal spray SPRAY 2 SPRAYS IN EACH NOSTRIL ONCE DAILY AS DIRECTED BY PROVIDER 16 g 11    furosemide (LASIX) 20 MG tablet TAKE 1 TABLET BY MOUTH DAILY 30 tablet 2    glucose blood (Accu-Chek [place] Plus) test strip 1 each by Other route 2 (Two) Times a Day. Use as instructed 200 each 3    glyburide (DIAbeta) 5 MG tablet Take 1 tablet by mouth Daily With Breakfast. 90 tablet 3    lactobacillus acidophilus (RISAQUAD) capsule capsule Take 1 capsule by mouth Daily. 90 capsule 1    linezolid (ZYVOX) 600 MG tablet Take 1 tablet by mouth 2 (Two) Times a Day. 14 tablet 0    meclizine (ANTIVERT) 25 MG tablet Take 1 tablet by mouth 3 (Three) Times a Day As Needed for Dizziness. 12 tablet 0    metFORMIN ER (GLUCOPHAGE-XR) 500 MG 24 hr tablet TAKE 2 TABLETS BY MOUTH TWICE A  tablet 3    methenamine (HIPREX) 1 g tablet Take 1 tablet by mouth 2 (Two) Times a Day With Meals.      methylPREDNISolone (MEDROL) 4 MG dose pack Take as directed on package instructions. 21 tablet 0    Mirabegron ER (MYRBETRIQ) 50 MG tablet sustained-release 24 hour 24 hr tablet Take 50 mg by mouth Daily.      oxymetazoline (AFRIN) 0.05 % nasal  spray Administer 1 spray into the nostril(s) as directed by provider 2 (Two) Times a Day. 30 mL 0    promethazine-dextromethorphan (PROMETHAZINE-DM) 6.25-15 MG/5ML syrup Take 5 mL by mouth 4 (Four) Times a Day As Needed for Cough. 120 mL 0    Triamcinolone Acetonide (Nasacort Allergy 24HR) 55 MCG/ACT nasal inhaler Administer 2 sprays into the nostril(s) as directed by provider Daily.      doxycycline (VIBRAMYCIN) 100 MG capsule Take 1 capsule by mouth 2 (Two) Times a Day. 10 capsule 0    FeroSul 325 (65 Fe) MG tablet TAKE 1 TABLET BY MOUTH DAILY WITH BREAKFAST (Patient not taking: Reported on 2/10/2025) 30 tablet 5    fluticasone (FLONASE) 50 MCG/ACT nasal spray Administer 2 sprays into the nostril(s) as directed by provider Daily. (Patient not taking: Reported on 2/10/2025) 16 g 0     No current facility-administered medications for this visit.       Objective     Objective     Physical Exam:  Physical Exam  Vitals and nursing note reviewed.   Constitutional:       Appearance: Normal appearance.   HENT:      Head: Normocephalic and atraumatic.      Right Ear: Ear canal normal.      Left Ear: Ear canal normal.      Ears:      Comments: Clear fluid bulging behind eardrums b/l       Nose: Congestion present.      Mouth/Throat:      Mouth: Mucous membranes are moist.      Pharynx: Postnasal drip present.   Eyes:      Pupils: Pupils are equal, round, and reactive to light.   Cardiovascular:      Rate and Rhythm: Normal rate and regular rhythm.      Heart sounds: Normal heart sounds.   Pulmonary:      Effort: Pulmonary effort is normal.      Breath sounds: Normal breath sounds.   Abdominal:      General: Bowel sounds are normal.      Palpations: Abdomen is soft.   Musculoskeletal:         General: Normal range of motion.      Cervical back: Normal range of motion.   Skin:     General: Skin is warm and dry.      Comments: Right thigh abscess covered with no surrounding erythema   Neurological:      General: No focal  "deficit present.      Mental Status: He is alert and oriented to person, place, and time.   Psychiatric:         Mood and Affect: Mood normal.         Behavior: Behavior normal.         Vital Signs:   /54 (BP Location: Left arm, Patient Position: Sitting, Cuff Size: Large Adult)   Pulse 76   Temp 99.8 °F (37.7 °C) (Temporal)   Resp 20   Ht 177.8 cm (70\")   Wt 122 kg (270 lb)   SpO2 93%   BMI 38.74 kg/m²            PHQ-9 Score  PHQ-9 Total Score:      Lab Review  Office Visit on 02/10/2025   Component Date Value Ref Range Status    Rapid Influenza A Ag 02/10/2025 Negative  Negative Final    Rapid Influenza B Ag 02/10/2025 Negative  Negative Final    Internal Control 02/10/2025 Passed  Passed Final    Lot Number 02/10/2025 [id number]   Final    Expiration Date 02/10/2025 10/111/2026   Final    SARS Antigen 02/10/2025 Not Detected  Not Detected, Presumptive Negative Final    Internal Control 02/10/2025 Passed  Passed Final    Lot Number 02/10/2025 [id number]   Final    Expiration Date 02/10/2025 05/06/2025   Final    Hemoglobin A1C 02/10/2025 6.5 (A)  4.5 - 5.7 % Final    Lot Number 02/10/2025 [id number]   Final    Expiration Date 02/10/2025 10/16/2026   Final   Lab on 02/04/2025   Component Date Value Ref Range Status    Ferritin 02/04/2025 312.70  30.00 - 400.00 ng/mL Final    Iron 02/04/2025 34 (L)  59 - 158 mcg/dL Final    Iron Saturation (TSAT) 02/04/2025 14 (L)  20 - 50 % Final    Transferrin 02/04/2025 166 (L)  200 - 360 mg/dL Final    TIBC 02/04/2025 247 (L)  298 - 536 mcg/dL Final    LDH 02/04/2025 128 (L)  135 - 225 U/L Final    Glucose 02/04/2025 230 (H)  65 - 99 mg/dL Final    BUN 02/04/2025 15  8 - 23 mg/dL Final    Creatinine 02/04/2025 1.45 (H)  0.76 - 1.27 mg/dL Final    Sodium 02/04/2025 137  136 - 145 mmol/L Final    Potassium 02/04/2025 3.9  3.5 - 5.2 mmol/L Final    Chloride 02/04/2025 98  98 - 107 mmol/L Final    CO2 02/04/2025 26.4  22.0 - 29.0 mmol/L Final    Calcium 02/04/2025 8.8  " 8.6 - 10.5 mg/dL Final    Total Protein 02/04/2025 7.4  6.0 - 8.5 g/dL Final    Albumin 02/04/2025 3.6  3.5 - 5.2 g/dL Final    ALT (SGPT) 02/04/2025 14  1 - 41 U/L Final    AST (SGOT) 02/04/2025 19  1 - 40 U/L Final    Alkaline Phosphatase 02/04/2025 116  39 - 117 U/L Final    Total Bilirubin 02/04/2025 0.2  0.0 - 1.2 mg/dL Final    Globulin 02/04/2025 3.8  gm/dL Final    A/G Ratio 02/04/2025 0.9  g/dL Final    BUN/Creatinine Ratio 02/04/2025 10.3  7.0 - 25.0 Final    Anion Gap 02/04/2025 12.6  5.0 - 15.0 mmol/L Final    eGFR 02/04/2025 54.5 (L)  >60.0 mL/min/1.73 Final    WBC 02/04/2025 10.97 (H)  3.40 - 10.80 10*3/mm3 Final    RBC 02/04/2025 3.42 (L)  4.14 - 5.80 10*6/mm3 Final    Hemoglobin 02/04/2025 10.1 (L)  13.0 - 17.7 g/dL Final    Hematocrit 02/04/2025 32.3 (L)  37.5 - 51.0 % Final    MCV 02/04/2025 94.4  79.0 - 97.0 fL Final    MCH 02/04/2025 29.5  26.6 - 33.0 pg Final    MCHC 02/04/2025 31.3 (L)  31.5 - 35.7 g/dL Final    RDW 02/04/2025 14.0  12.3 - 15.4 % Final    RDW-SD 02/04/2025 48.9  37.0 - 54.0 fl Final    MPV 02/04/2025 9.9  6.0 - 12.0 fL Final    Platelets 02/04/2025 388  140 - 450 10*3/mm3 Final    Neutrophil % 02/04/2025 73.0  42.7 - 76.0 % Final    Lymphocyte % 02/04/2025 12.1 (L)  19.6 - 45.3 % Final    Monocyte % 02/04/2025 7.6  5.0 - 12.0 % Final    Eosinophil % 02/04/2025 6.8 (H)  0.3 - 6.2 % Final    Basophil % 02/04/2025 0.2  0.0 - 1.5 % Final    Immature Grans % 02/04/2025 0.3  0.0 - 0.5 % Final    Neutrophils, Absolute 02/04/2025 8.01 (H)  1.70 - 7.00 10*3/mm3 Final    Lymphocytes, Absolute 02/04/2025 1.33  0.70 - 3.10 10*3/mm3 Final    Monocytes, Absolute 02/04/2025 0.83  0.10 - 0.90 10*3/mm3 Final    Eosinophils, Absolute 02/04/2025 0.75 (H)  0.00 - 0.40 10*3/mm3 Final    Basophils, Absolute 02/04/2025 0.02  0.00 - 0.20 10*3/mm3 Final    Immature Grans, Absolute 02/04/2025 0.03  0.00 - 0.05 10*3/mm3 Final   Office Visit on 02/03/2025   Component Date Value Ref Range Status    Wound  Culture 02/03/2025 Scant growth (1+) Normal Skin Nancy   Final    Wound Culture 02/03/2025 Scant growth (1+) Staphylococcus lugdunensis (A)   Final    Gram Stain 02/03/2025 Rare (1+) WBCs seen   Final    Gram Stain 02/03/2025 Rare (1+) Gram positive cocci in pairs   Final   Admission on 01/15/2025, Discharged on 01/15/2025   Component Date Value Ref Range Status    Wound Culture 01/15/2025 Light growth (2+) Normal Skin Nancy   Final    Gram Stain 01/15/2025 No WBCs per low power field   Final    Gram Stain 01/15/2025 No Epithelial cells per low power field   Final    Gram Stain 01/15/2025 Moderate (3+) Gram negative bacilli   Final    Gram Stain 01/15/2025 Few (2+) Gram positive cocci   Final   Office Visit on 11/25/2024   Component Date Value Ref Range Status    Color 11/25/2024 Yellow  Yellow, Straw, Dark Yellow, Norma Final    Clarity, UA 11/25/2024 Cloudy (A)  Clear Final    Specific Gravity  11/25/2024 1.015  1.005 - 1.030 Final    pH, Urine 11/25/2024 6.0  5.0 - 8.0 Final    Leukocytes 11/25/2024 Large (3+) (A)  Negative Final    Nitrite, UA 11/25/2024 Negative  Negative Final    Protein, POC 11/25/2024 Trace (A)  Negative mg/dL Final    Glucose, UA 11/25/2024 Negative  Negative mg/dL Final    Ketones, UA 11/25/2024 Negative  Negative Final    Urobilinogen, UA 11/25/2024 Normal  Normal, 0.2 E.U./dL Final    Bilirubin 11/25/2024 Negative  Negative Final    Blood, UA 11/25/2024 Negative  Negative Final    Lot Number 11/25/2024 [id number]   Final    Expiration Date 11/25/2024 12/12/2024   Final   Admission on 11/16/2024, Discharged on 11/16/2024   Component Date Value Ref Range Status    Color, UA 11/16/2024 Yellow  Yellow, Straw Final    Appearance, UA 11/16/2024 Cloudy (A)  Clear Final    pH, UA 11/16/2024 >=9.0 (H)  5.0 - 8.0 Final    Specific [place], UA 11/16/2024 1.015  1.005 - 1.030 Final    Glucose, UA 11/16/2024 Negative  Negative Final    Ketones, UA 11/16/2024 Negative  Negative Final    Bilirubin,  UA 11/16/2024 Negative  Negative Final    Blood, UA 11/16/2024 Trace (A)  Negative Final    Protein, UA 11/16/2024 100 mg/dL (2+) (A)  Negative Final    Leuk Esterase, UA 11/16/2024 Large (3+) (A)  Negative Final    Nitrite, UA 11/16/2024 Positive (A)  Negative Final    Urobilinogen, UA 11/16/2024 0.2 E.U./dL  0.2 - 1.0 E.U./dL Final    COVID19 11/16/2024 Not Detected  Not Detected - Ref. Range Final    Influenza A PCR 11/16/2024 Not Detected  Not Detected Final    Influenza B PCR 11/16/2024 Not Detected  Not Detected Final    RSV, PCR 11/16/2024 Not Detected  Not Detected Final    RBC, UA 11/16/2024 6-10 (A)  None Seen, 0-2 /HPF Final    WBC, UA 11/16/2024 21-50 (A)  None Seen, 0-2 /HPF Final    Bacteria, UA 11/16/2024 4+ (A)  None Seen /HPF Final    Squamous Epithelial Cells, UA 11/16/2024 0-2  None Seen, 0-2 /HPF Final    Hyaline Casts, UA 11/16/2024 None Seen  None Seen /LPF Final    Triple Phosphate Crystals, UA 11/16/2024 Large/3+  None Seen /HPF Final    Methodology 11/16/2024 Manual Light Microscopy   Final        Radiology Results  XR Chest 2 View    Result Date: 2/9/2025  Impression: Impression: 1. Moderate right pleural effusion and patchy opacity the right lung base, whether atelectasis or pneumonia. Appearance is similar, however, to the atelectasis seen on here 2/4/2025 chest CT scan. 2. Small area of left midlung discoid atelectasis or scarring unchanged. Electronically Signed: Hayden Yi MD  2/9/2025 5:22 PM EST  Workstation ID: [id number]    CT Abdomen Pelvis With Contrast    Result Date: 2/4/2025  Impression: Impression: 1.Stable pathologic retroperitoneal, pelvic and inguinal lymphadenopathy. 2.Mild interval increase in size of a mild to moderate size loculated right pleural effusion. There is also trace left pleural effusion. 3.Stable diffuse bladder wall thickening with surrounding fat stranding suspicious for chronic cystitis versus bladder outlet obstruction. 4.Cholelithiasis and mild  hepatomegaly. 5.Right nephrectomy. Electronically Signed: Vamsi Monterroso MD  2/4/2025 3:52 PM EST  Workstation ID: [id number]    CT Chest With Contrast Diagnostic    Result Date: 2/4/2025  Impression: Impression: 1.Stable pathologic retroperitoneal, pelvic and inguinal lymphadenopathy. 2.Mild interval increase in size of a mild to moderate size loculated right pleural effusion. There is also trace left pleural effusion. 3.Stable diffuse bladder wall thickening with surrounding fat stranding suspicious for chronic cystitis versus bladder outlet obstruction. 4.Cholelithiasis and mild hepatomegaly. 5.Right nephrectomy. Electronically Signed: Vamsi Monterroso MD  2/4/2025 3:52 PM EST  Workstation ID: [id number]      Assessment / Plan         Assessment and Plan     Diagnoses and all orders for this visit:    1. Bronchitis (Primary)  Assessment & Plan:  Influenza and COVID-negative in the office today.  Patient with ongoing cough for the past month.  He has been using a promethazine cough syrup that was given to him by the emergency department and states it has helped some.  He continues to have a cough that gets so violent that it makes him short of air.  Plan to prescribe doxycycline 100 mg p.o. twice daily.  We discussed starting a steroid but the patient states he got palpitations last time he took 1 and would prefer to not take a steroid.  Instructed the patient to follow-up if he does not improve for a chest x-ray.    Orders:  -     doxycycline (VIBRAMYCIN) 100 MG capsule; Take 1 capsule by mouth 2 (Two) Times a Day.  Dispense: 10 capsule; Refill: 0    2. Acute cough  -     POC Influenza A / B  -     POCT SARS-CoV-2 Antigen    3. Type 2 diabetes mellitus without complication, without long-term current use of insulin  Assessment & Plan:  Diabetes is stable.   Continue current treatment regimen.  Diabetes will be reassessed in 3 months    Point-of-care A1c in the office today is 6.5 and stable.    Orders:  -     POC  Glycosylated Hemoglobin (Hb A1C)    4. Abscess of right thigh  Assessment & Plan:  The abscess was drained on 2/3/25 and wound culture grew staph lugdunensis. The patient was prescribed Linezolid 600mg PO BID x 7 days. Patient is feeling much better and states the area is healing.              Health Maintenance  Health Maintenance:   There are no preventive care reminders to display for this patient.       Meds ordered during this visit  New Medications Ordered This Visit   Medications    doxycycline (VIBRAMYCIN) 100 MG capsule     Sig: Take 1 capsule by mouth 2 (Two) Times a Day.     Dispense:  10 capsule     Refill:  0       Meds stopped during this visit:  There are no discontinued medications.     Patient was given instructions and counseling regarding his condition or for health maintenance advice. Please see specific information pulled into the AVS if appropriate.     Follow Up   No follow-ups on file.    Mary Kay Rios DO  [facility]     Dictated Utilizing Dragon Dictation: Part of this note may be an electronic transcription/translation of spoken language to printed text using the Dragon Dictation System.        This document has been electronically signed by Mary Kay Rios DO   February 10, 2025 16:11 EST

## 2025-02-10 NOTE — ASSESSMENT & PLAN NOTE
Influenza and COVID-negative in the office today.  Patient with ongoing cough for the past month.  He has been using a promethazine cough syrup that was given to him by the emergency department and states it has helped some.  He continues to have a cough that gets so violent that it makes him short of air.  Plan to prescribe doxycycline 100 mg p.o. twice daily.  We discussed starting a steroid but the patient states he got palpitations last time he took 1 and would prefer to not take a steroid.  Instructed the patient to follow-up if he does not improve for a chest x-ray.

## 2025-02-10 NOTE — TELEPHONE ENCOUNTER
"  Hub staff attempted to follow warm transfer process and was unsuccessful     Caller: Kiran Belcher \"Calvin\"    Relationship to patient: Self    Best call back number: [phone]    Patient is needing: RETURNING CALL          "

## 2025-02-10 NOTE — ASSESSMENT & PLAN NOTE
Diabetes is stable.   Continue current treatment regimen.  Diabetes will be reassessed in 3 months    Point-of-care A1c in the office today is 6.5 and stable.

## 2025-02-10 NOTE — ASSESSMENT & PLAN NOTE
The abscess was drained on 2/3/25 and wound culture grew staph lugdunensis. The patient was prescribed Linezolid 600mg PO BID x 7 days. Patient is feeling much better and states the area is healing.

## 2025-02-10 NOTE — TELEPHONE ENCOUNTER
Return call to Kiran.  Kiran complains of cough and congestion.  He is concerned this is related to his pulmonary effusion.  Advised that his effusion is stable according to scans since 2024.  Denies SOA.  Recommended to try over the counter mucinex and call us if no improvement.  Kiran states understood

## 2025-02-10 NOTE — TELEPHONE ENCOUNTER
"  Caller: Kiran Belcher \"Calvin\"    Relationship: Self    Best call back number: [phone]    What is the best time to reach you: ANY    Who are you requesting to speak with (clinical staff, provider,  specific staff member): CLINICAL     What was the call regarding: CALVIN IS CALLING WANTING TO KNOW HOW AND IF HE SHOULD PROCEED WITH THE thoracentesis     PLEASE CALL TO DISCUSS      "

## 2025-02-12 ENCOUNTER — HOSPITAL ENCOUNTER (OUTPATIENT)
Facility: HOSPITAL | Age: 65
Discharge: HOME | End: 2025-02-12
Payer: MEDICARE

## 2025-02-12 DIAGNOSIS — R05.1 ACUTE COUGH: ICD-10-CM

## 2025-02-12 RX ORDER — DEXTROMETHORPHAN HYDROBROMIDE AND PROMETHAZINE HYDROCHLORIDE 15; 6.25 MG/5ML; MG/5ML
5 SYRUP ORAL 4 TIMES DAILY PRN
Qty: 120 ML | Refills: 0 | OUTPATIENT
Start: 2025-02-12

## 2025-02-12 RX ORDER — BENZONATATE 100 MG/1
100 CAPSULE ORAL 3 TIMES DAILY PRN
Qty: 60 CAPSULE | Refills: 1 | Status: SHIPPED | OUTPATIENT
Start: 2025-02-12

## 2025-02-12 NOTE — TELEPHONE ENCOUNTER
"Caller: Kiran Belcher \"Calvin\"    Relationship: Self    Best call back number: [phone]     Requested Prescriptions:   Requested Prescriptions     Pending Prescriptions Disp Refills    promethazine-dextromethorphan (PROMETHAZINE-DM) 6.25-15 MG/5ML syrup 120 mL 0     Sig: Take 5 mL by mouth 4 (Four) Times a Day As Needed for Cough.        Pharmacy where request should be sent: [facility] [id number] [address] [phone] [facility] [phone]      Last office visit with prescribing clinician: 12/27/2024   Last telemedicine visit with prescribing clinician: Visit date not found   Next office visit with prescribing clinician: 3/28/2025     Additional details provided by patient: PATIENT HAS A DAY OF MEDICATION ON HAND     Does the patient have less than a 3 day supply:  [x] Yes  [] No    Would you like a call back once the refill request has been completed: [] Yes [] No    If the office needs to give you a call back, can they leave a voicemail: [] Yes [] No    Dede Otoole   02/12/25 11:47 EST         "

## 2025-02-15 ENCOUNTER — PATIENT ROUNDING (BHMG ONLY) (OUTPATIENT)
Dept: URGENT CARE | Facility: CLINIC | Age: 65
End: 2025-02-15
Payer: MEDICARE

## 2025-03-05 ENCOUNTER — HOSPITAL ENCOUNTER (OUTPATIENT)
Facility: HOSPITAL | Age: 65
Discharge: HOME | End: 2025-03-05
Payer: MEDICARE

## 2025-03-12 ENCOUNTER — HOSPITAL ENCOUNTER (OUTPATIENT)
Facility: HOSPITAL | Age: 65
Discharge: HOME | End: 2025-03-12
Admitting: INTERNAL MEDICINE
Payer: MEDICARE

## 2025-03-12 VITALS — SYSTOLIC BLOOD PRESSURE: 143 MMHG | DIASTOLIC BLOOD PRESSURE: 77 MMHG | TEMPERATURE: 97.9 F | HEART RATE: 89 BPM

## 2025-03-12 DIAGNOSIS — K90.9 MALABSORPTION OF IRON: ICD-10-CM

## 2025-03-12 DIAGNOSIS — D50.8 OTHER IRON DEFICIENCY ANEMIA: Primary | ICD-10-CM

## 2025-03-12 PROCEDURE — 96374 THER/PROPH/DIAG INJ IV PUSH: CPT

## 2025-03-12 PROCEDURE — 25010000002 IRON SUCROSE PER 1 MG: Performed by: INTERNAL MEDICINE

## 2025-03-12 RX ORDER — SODIUM CHLORIDE 9 MG/ML
20 INJECTION, SOLUTION INTRAVENOUS ONCE
Status: DISCONTINUED | OUTPATIENT
Start: 2025-03-12 | End: 2025-03-13 | Stop reason: HOSPADM

## 2025-03-12 RX ORDER — FAMOTIDINE 10 MG/ML
20 INJECTION, SOLUTION INTRAVENOUS AS NEEDED
Status: DISCONTINUED | OUTPATIENT
Start: 2025-03-12 | End: 2025-03-13 | Stop reason: HOSPADM

## 2025-03-12 RX ORDER — HYDROCORTISONE SODIUM SUCCINATE 100 MG/2ML
100 INJECTION INTRAMUSCULAR; INTRAVENOUS AS NEEDED
Status: DISCONTINUED | OUTPATIENT
Start: 2025-03-12 | End: 2025-03-13 | Stop reason: HOSPADM

## 2025-03-12 RX ORDER — DIPHENHYDRAMINE HYDROCHLORIDE 50 MG/ML
50 INJECTION INTRAMUSCULAR; INTRAVENOUS AS NEEDED
Status: DISCONTINUED | OUTPATIENT
Start: 2025-03-12 | End: 2025-03-13 | Stop reason: HOSPADM

## 2025-03-12 RX ADMIN — IRON SUCROSE 200 MG: 20 INJECTION, SOLUTION INTRAVENOUS at 13:19

## 2025-03-19 ENCOUNTER — HOSPITAL ENCOUNTER (OUTPATIENT)
Facility: HOSPITAL | Age: 65
Discharge: HOME | End: 2025-03-19
Payer: MEDICARE

## 2025-03-19 DIAGNOSIS — I10 ESSENTIAL HYPERTENSION: ICD-10-CM

## 2025-03-19 DIAGNOSIS — R80.9 TYPE 2 DIABETES MELLITUS WITH MICROALBUMINURIA, WITHOUT LONG-TERM CURRENT USE OF INSULIN: ICD-10-CM

## 2025-03-19 DIAGNOSIS — E11.29 TYPE 2 DIABETES MELLITUS WITH MICROALBUMINURIA, WITHOUT LONG-TERM CURRENT USE OF INSULIN: ICD-10-CM

## 2025-03-19 RX ORDER — GLYBURIDE 5 MG/1
5 TABLET ORAL
Qty: 90 TABLET | Refills: 0 | Status: SHIPPED | OUTPATIENT
Start: 2025-03-19

## 2025-03-19 RX ORDER — AMLODIPINE AND BENAZEPRIL HYDROCHLORIDE 10; 20 MG/1; MG/1
1 CAPSULE ORAL DAILY
Qty: 90 CAPSULE | Refills: 0 | Status: SHIPPED | OUTPATIENT
Start: 2025-03-19

## 2025-03-26 ENCOUNTER — HOSPITAL ENCOUNTER (OUTPATIENT)
Facility: HOSPITAL | Age: 65
Discharge: HOME | End: 2025-03-26
Payer: MEDICARE

## 2025-03-28 ENCOUNTER — OFFICE VISIT (OUTPATIENT)
Dept: FAMILY MEDICINE CLINIC | Facility: CLINIC | Age: 65
End: 2025-03-28
Payer: MEDICARE

## 2025-03-28 VITALS
TEMPERATURE: 98.4 F | DIASTOLIC BLOOD PRESSURE: 70 MMHG | SYSTOLIC BLOOD PRESSURE: 128 MMHG | HEART RATE: 90 BPM | OXYGEN SATURATION: 94 %

## 2025-03-28 DIAGNOSIS — D21.9 FIBROMA: ICD-10-CM

## 2025-03-28 DIAGNOSIS — E11.9 TYPE 2 DIABETES MELLITUS WITHOUT COMPLICATION, UNSPECIFIED WHETHER LONG TERM INSULIN USE: Primary | ICD-10-CM

## 2025-03-28 DIAGNOSIS — I10 BENIGN ESSENTIAL HYPERTENSION: Chronic | ICD-10-CM

## 2025-03-28 DIAGNOSIS — G89.29 CHRONIC LEFT SHOULDER PAIN: ICD-10-CM

## 2025-03-28 DIAGNOSIS — D22.9 NEVUS: ICD-10-CM

## 2025-03-28 DIAGNOSIS — L84 CALLUS OF HEEL: ICD-10-CM

## 2025-03-28 DIAGNOSIS — M25.512 CHRONIC LEFT SHOULDER PAIN: ICD-10-CM

## 2025-03-28 NOTE — PROGRESS NOTES
Established Patient Office Visit        Subjective      Chief Complaint:  Diabetes (Follow up on diabetes )      History of Present Illness: Kiran Belcher is a 64 y.o. male who presents for f/u of easy fatigability. Left shoulder pain and left heel pain. Wound to the left heel .       Patient Active Problem List   Diagnosis    Benign essential hypertension    Depression    Gastroesophageal reflux disease without esophagitis    Hyperlipidemia    NISA on CPAP    Type 2 diabetes mellitus without complications    Spasm    Other obesity due to excess calories    Hx of spinal cord injury    Kidney disorder    Lower extremity weakness    Myalgia    Moderate episode of recurrent major depressive disorder    Dysphagia    Toenail avulsion    H/O kidney removal    Renal cell carcinoma of right kidney    Quadriplegia    Muscle contracture    C5-C7 level spinal injury with complete lesion of spinal cord, without evidence of spinal bone injury    Inability to bear weight    Neurogenic bladder    Venous stasis    B-cell lymphoma    Cancer of kidney    Hypertension    Neutrophilic leukemoid reaction    Personal history of Methicillin resistant Staphylococcus aureus infection    Paraplegia    Other constipation    Iron deficiency anemia    Malabsorption of iron    Hypervolemia    Abscess of right thigh    Bronchitis         Current Outpatient Medications:     albuterol sulfate  (90 Base) MCG/ACT inhaler, Inhale 2 puffs Every 4 (Four) Hours As Needed for Wheezing., Disp: 51 g, Rfl: 0    amLODIPine-benazepril (LOTREL) 10-20 MG per capsule, TAKE 1 CAPSULE BY MOUTH DAILY, Disp: 90 capsule, Rfl: 0    ammonium lactate (AMLACTIN) 12 % cream, Apply 1 g topically to the appropriate area as directed As Needed for Dry Skin. Apply as directed , prescribed by podiatrist, Disp: , Rfl:     aspirin 81 MG chewable tablet, Chew 1 tablet Daily., Disp: , Rfl:     atorvastatin (LIPITOR) 10 MG tablet, Take 1 tablet by mouth Daily., Disp: 90  tablet, Rfl: 3    baclofen (LIORESAL) 20 MG tablet, Take 1 tablet by mouth 3 (Three) Times a Day., Disp: 90 tablet, Rfl: 11    benzonatate (Tessalon Perles) 100 MG capsule, Take 1 capsule by mouth 3 (Three) Times a Day As Needed for Cough., Disp: 60 capsule, Rfl: 1    carvedilol (COREG) 12.5 MG tablet, TAKE 1/2 TABLET BY MOUTH TWICE A DAY WITH MEALS., Disp: 90 tablet, Rfl: 3    citalopram (CeleXA) 20 MG tablet, Take 1 tablet by mouth Daily., Disp: 90 tablet, Rfl: 3    dantrolene (DANTRIUM) 100 MG capsule, TAKE 1 CAPSULE BY MOUTH TWICE A DAY, Disp: 180 capsule, Rfl: 3    Diclofenac Sodium (VOLTAREN) 1 % gel gel, Apply 4 g topically to the appropriate area as directed 4 (Four) Times a Day As Needed (right 4th finger pain)., Disp: 100 g, Rfl: 2    doxycycline (VIBRAMYCIN) 100 MG capsule, Take 1 capsule by mouth 2 (Two) Times a Day., Disp: 10 capsule, Rfl: 0    esomeprazole (nexIUM) 40 MG capsule, TAKE ONE CAPSULE BY MOUTH DAILY, Disp: 90 capsule, Rfl: 3    fesoterodine fumarate (TOVIAZ ER) 8 MG tablet sustained-release 24 hour tablet, Take 1 tablet by mouth Daily., Disp: , Rfl:     fexofenadine (ALLEGRA) 180 MG tablet, Take 1 tablet by mouth Daily., Disp: , Rfl:     fluocinonide (LIDEX) 0.05 % cream, fluocinonide 0.05 % topical cream, Disp: , Rfl:     fluticasone (FLONASE) 50 MCG/ACT nasal spray, SPRAY 2 SPRAYS IN EACH NOSTRIL ONCE DAILY AS DIRECTED BY PROVIDER, Disp: 16 g, Rfl: 11    furosemide (LASIX) 20 MG tablet, TAKE 1 TABLET BY MOUTH DAILY, Disp: 30 tablet, Rfl: 2    glucose blood (Accu-Chek Elke Plus) test strip, 1 each by Other route 2 (Two) Times a Day. Use as instructed, Disp: 200 each, Rfl: 3    glyburide (DIAbeta) 5 MG tablet, TAKE 1 TABLET BY MOUTH DAILY WITH BREAKFAST, Disp: 90 tablet, Rfl: 0    lactobacillus acidophilus (RISAQUAD) capsule capsule, Take 1 capsule by mouth Daily., Disp: 90 capsule, Rfl: 1    meclizine (ANTIVERT) 25 MG tablet, Take 1 tablet by mouth 3 (Three) Times a Day As Needed for  Dizziness., Disp: 12 tablet, Rfl: 0    metFORMIN ER (GLUCOPHAGE-XR) 500 MG 24 hr tablet, TAKE 2 TABLETS BY MOUTH TWICE A DAY, Disp: 360 tablet, Rfl: 3    methenamine (HIPREX) 1 g tablet, Take 1 tablet by mouth 2 (Two) Times a Day With Meals., Disp: , Rfl:     Mirabegron ER (MYRBETRIQ) 50 MG tablet sustained-release 24 hour 24 hr tablet, Take 50 mg by mouth Daily., Disp: , Rfl:     oxymetazoline (AFRIN) 0.05 % nasal spray, Administer 1 spray into the nostril(s) as directed by provider 2 (Two) Times a Day., Disp: 30 mL, Rfl: 0    promethazine-dextromethorphan (PROMETHAZINE-DM) 6.25-15 MG/5ML syrup, Take 5 mL by mouth 4 (Four) Times a Day As Needed for Cough., Disp: 118 mL, Rfl: 0    Triamcinolone Acetonide (Nasacort Allergy 24HR) 55 MCG/ACT nasal inhaler, Administer 2 sprays into the nostril(s) as directed by provider Daily., Disp: , Rfl:        Objective     Physical Exam:   Vital Signs:   /70   Pulse 90   Temp 98.4 °F (36.9 °C) (Infrared)   SpO2 94%      Physical Exam  Constitutional:       General: He is not in acute distress.     Appearance: He is not ill-appearing.   Cardiovascular:      Rate and Rhythm: Normal rate and regular rhythm.   Pulmonary:      Effort: Pulmonary effort is normal.      Breath sounds: Normal breath sounds.   Neurological:      Mental Status: He is alert.   Psychiatric:         Thought Content: Thought content normal.   Left foot 3 cm heel callus left heel, decree sensation to the foot.  Right foot: No wounds decree sensation to foot  Weakness to legs and ankles bilaterally  Some weakness to the right shoulder abduction mild pain  Callus debridement    Date/Time: 3/28/2025 3:40 PM    Performed by: Chay Cameron MD  Authorized by: Chay Cameron MD  Consent: Verbal consent obtained. Written consent obtained  Risks and benefits: risks, benefits and alternatives were discussed  Consent given by: patient  Comments: Left heel cleaned with isopropyl alcohol.  10 blade used to debride  callus.  No bleeding.             Assessment / Plan      Assessment/Plan:   Diagnoses and all orders for this visit:    1. Type 2 diabetes mellitus without complication, unspecified whether long term insulin use (Primary)  Assessment & Plan:  Cont metformin and glyburide       2. Chronic left shoulder pain  -     Ambulatory Referral to Physical Therapy for Evaluation & Treatment    3. Callus of heel  Healing wound with now callus formation status post debridement.  Okay for foam for support and surgical shoe until healed follow-up with dermatology    4. Fibroma  -     Ambulatory Referral to Dermatology    5. Nevus  -     Ambulatory Referral to Dermatology    Other orders  -   Debridement    6.  Hypertension  Continue amlodipine benazepril and carvedilol     Callus debridement will not be billed given this is superficial debridement    Follow Up:   Return in about 2 weeks (around 4/11/2025) for Follow-up foot.    MDM:     Chay Cameron MD  [place] [facility] [place]

## 2025-03-29 DIAGNOSIS — J90 PLEURAL EFFUSION: ICD-10-CM

## 2025-03-29 DIAGNOSIS — R60.0 LEG EDEMA: ICD-10-CM

## 2025-03-29 DIAGNOSIS — R06.00 DYSPNEA, UNSPECIFIED TYPE: ICD-10-CM

## 2025-03-31 RX ORDER — FUROSEMIDE 20 MG/1
20 TABLET ORAL DAILY
Qty: 30 TABLET | Refills: 2 | Status: SHIPPED | OUTPATIENT
Start: 2025-03-31

## 2025-04-07 RX ORDER — ESOMEPRAZOLE MAGNESIUM 40 MG/1
40 CAPSULE, DELAYED RELEASE ORAL DAILY
Qty: 90 CAPSULE | Refills: 3 | Status: SHIPPED | OUTPATIENT
Start: 2025-04-07

## 2025-04-09 ENCOUNTER — HOSPITAL ENCOUNTER (OUTPATIENT)
Facility: HOSPITAL | Age: 65
Discharge: HOME | End: 2025-04-09
Admitting: INTERNAL MEDICINE
Payer: MEDICARE

## 2025-04-09 VITALS
DIASTOLIC BLOOD PRESSURE: 71 MMHG | HEART RATE: 94 BPM | RESPIRATION RATE: 18 BRPM | BODY MASS INDEX: 38.65 KG/M2 | SYSTOLIC BLOOD PRESSURE: 149 MMHG | WEIGHT: 270 LBS | TEMPERATURE: 98.5 F | HEIGHT: 70 IN

## 2025-04-09 DIAGNOSIS — D50.8 OTHER IRON DEFICIENCY ANEMIA: Primary | ICD-10-CM

## 2025-04-09 DIAGNOSIS — K90.9 MALABSORPTION OF IRON: ICD-10-CM

## 2025-04-09 PROCEDURE — 25810000003 SODIUM CHLORIDE 0.9 % SOLUTION: Performed by: INTERNAL MEDICINE

## 2025-04-09 PROCEDURE — 25010000002 IRON SUCROSE PER 1 MG: Performed by: INTERNAL MEDICINE

## 2025-04-09 PROCEDURE — 96374 THER/PROPH/DIAG INJ IV PUSH: CPT

## 2025-04-09 RX ORDER — FAMOTIDINE 10 MG/ML
20 INJECTION, SOLUTION INTRAVENOUS AS NEEDED
Status: DISCONTINUED | OUTPATIENT
Start: 2025-04-09 | End: 2025-04-10 | Stop reason: HOSPADM

## 2025-04-09 RX ORDER — HYDROCORTISONE SODIUM SUCCINATE 100 MG/2ML
100 INJECTION INTRAMUSCULAR; INTRAVENOUS AS NEEDED
Status: DISCONTINUED | OUTPATIENT
Start: 2025-04-09 | End: 2025-04-10 | Stop reason: HOSPADM

## 2025-04-09 RX ORDER — SODIUM CHLORIDE 9 MG/ML
20 INJECTION, SOLUTION INTRAVENOUS ONCE
Status: COMPLETED | OUTPATIENT
Start: 2025-04-09 | End: 2025-04-09

## 2025-04-09 RX ORDER — DIPHENHYDRAMINE HYDROCHLORIDE 50 MG/ML
50 INJECTION, SOLUTION INTRAMUSCULAR; INTRAVENOUS AS NEEDED
Status: DISCONTINUED | OUTPATIENT
Start: 2025-04-09 | End: 2025-04-10 | Stop reason: HOSPADM

## 2025-04-09 RX ADMIN — SODIUM CHLORIDE 20 ML/HR: 9 INJECTION, SOLUTION INTRAVENOUS at 13:15

## 2025-04-09 RX ADMIN — IRON SUCROSE 200 MG: 20 INJECTION, SOLUTION INTRAVENOUS at 13:34

## 2025-04-12 DIAGNOSIS — F32.0 CURRENT MILD EPISODE OF MAJOR DEPRESSIVE DISORDER WITHOUT PRIOR EPISODE: ICD-10-CM

## 2025-04-12 RX ORDER — CITALOPRAM HYDROBROMIDE 20 MG/1
20 TABLET ORAL DAILY
Qty: 90 TABLET | Refills: 3 | Status: SHIPPED | OUTPATIENT
Start: 2025-04-12

## 2025-04-16 ENCOUNTER — HOSPITAL ENCOUNTER (OUTPATIENT)
Facility: HOSPITAL | Age: 65
Discharge: HOME | End: 2025-04-16
Admitting: INTERNAL MEDICINE
Payer: MEDICARE

## 2025-04-16 VITALS
BODY MASS INDEX: 38.65 KG/M2 | HEIGHT: 70 IN | RESPIRATION RATE: 18 BRPM | WEIGHT: 270 LBS | SYSTOLIC BLOOD PRESSURE: 122 MMHG | HEART RATE: 89 BPM | TEMPERATURE: 97.9 F | DIASTOLIC BLOOD PRESSURE: 74 MMHG

## 2025-04-16 DIAGNOSIS — D50.8 OTHER IRON DEFICIENCY ANEMIA: Primary | ICD-10-CM

## 2025-04-16 DIAGNOSIS — K90.9 MALABSORPTION OF IRON: ICD-10-CM

## 2025-04-16 PROCEDURE — 25010000002 IRON SUCROSE PER 1 MG: Performed by: INTERNAL MEDICINE

## 2025-04-16 PROCEDURE — 96374 THER/PROPH/DIAG INJ IV PUSH: CPT

## 2025-04-16 RX ORDER — FAMOTIDINE 10 MG/ML
20 INJECTION, SOLUTION INTRAVENOUS AS NEEDED
Status: DISCONTINUED | OUTPATIENT
Start: 2025-04-16 | End: 2025-04-17 | Stop reason: HOSPADM

## 2025-04-16 RX ORDER — DIPHENHYDRAMINE HYDROCHLORIDE 50 MG/ML
50 INJECTION, SOLUTION INTRAMUSCULAR; INTRAVENOUS AS NEEDED
Status: DISCONTINUED | OUTPATIENT
Start: 2025-04-16 | End: 2025-04-17 | Stop reason: HOSPADM

## 2025-04-16 RX ORDER — SODIUM CHLORIDE 9 MG/ML
20 INJECTION, SOLUTION INTRAVENOUS ONCE
Status: DISCONTINUED | OUTPATIENT
Start: 2025-04-16 | End: 2025-04-17 | Stop reason: HOSPADM

## 2025-04-16 RX ORDER — HYDROCORTISONE SODIUM SUCCINATE 100 MG/2ML
100 INJECTION INTRAMUSCULAR; INTRAVENOUS AS NEEDED
Status: DISCONTINUED | OUTPATIENT
Start: 2025-04-16 | End: 2025-04-17 | Stop reason: HOSPADM

## 2025-04-16 RX ADMIN — IRON SUCROSE 200 MG: 20 INJECTION, SOLUTION INTRAVENOUS at 13:19

## 2025-04-23 ENCOUNTER — HOSPITAL ENCOUNTER (OUTPATIENT)
Facility: HOSPITAL | Age: 65
Discharge: HOME | End: 2025-04-23
Admitting: INTERNAL MEDICINE
Payer: MEDICARE

## 2025-04-23 VITALS
WEIGHT: 270 LBS | HEART RATE: 78 BPM | HEIGHT: 70 IN | TEMPERATURE: 98.7 F | DIASTOLIC BLOOD PRESSURE: 67 MMHG | RESPIRATION RATE: 18 BRPM | BODY MASS INDEX: 38.65 KG/M2 | SYSTOLIC BLOOD PRESSURE: 127 MMHG

## 2025-04-23 DIAGNOSIS — D50.8 OTHER IRON DEFICIENCY ANEMIA: Primary | ICD-10-CM

## 2025-04-23 DIAGNOSIS — K90.9 MALABSORPTION OF IRON: ICD-10-CM

## 2025-04-23 PROCEDURE — 25810000003 SODIUM CHLORIDE 0.9 % SOLUTION: Performed by: INTERNAL MEDICINE

## 2025-04-23 PROCEDURE — 96374 THER/PROPH/DIAG INJ IV PUSH: CPT

## 2025-04-23 PROCEDURE — 25010000002 IRON SUCROSE PER 1 MG: Performed by: INTERNAL MEDICINE

## 2025-04-23 RX ORDER — DIPHENHYDRAMINE HYDROCHLORIDE 50 MG/ML
50 INJECTION, SOLUTION INTRAMUSCULAR; INTRAVENOUS AS NEEDED
Status: DISCONTINUED | OUTPATIENT
Start: 2025-04-23 | End: 2025-04-24 | Stop reason: HOSPADM

## 2025-04-23 RX ORDER — FAMOTIDINE 10 MG/ML
20 INJECTION, SOLUTION INTRAVENOUS AS NEEDED
Status: DISCONTINUED | OUTPATIENT
Start: 2025-04-23 | End: 2025-04-24 | Stop reason: HOSPADM

## 2025-04-23 RX ORDER — SODIUM CHLORIDE 9 MG/ML
20 INJECTION, SOLUTION INTRAVENOUS ONCE
Status: COMPLETED | OUTPATIENT
Start: 2025-04-23 | End: 2025-04-23

## 2025-04-23 RX ORDER — HYDROCORTISONE SODIUM SUCCINATE 100 MG/2ML
100 INJECTION INTRAMUSCULAR; INTRAVENOUS AS NEEDED
Status: DISCONTINUED | OUTPATIENT
Start: 2025-04-23 | End: 2025-04-24 | Stop reason: HOSPADM

## 2025-04-23 RX ADMIN — IRON SUCROSE 200 MG: 20 INJECTION, SOLUTION INTRAVENOUS at 13:29

## 2025-04-23 RX ADMIN — SODIUM CHLORIDE 20 ML/HR: 9 INJECTION, SOLUTION INTRAVENOUS at 13:24

## 2025-04-25 DIAGNOSIS — R00.0 RAPID PULSE: ICD-10-CM

## 2025-04-25 NOTE — TELEPHONE ENCOUNTER
"Caller: Kiran Belcher \"Calvin\"    Relationship: Self    Best call back number:  [phone] (Home)     Requested Prescriptions:   Requested Prescriptions     Pending Prescriptions Disp Refills    carvedilol (COREG) 12.5 MG tablet [Pharmacy Med Name: CARVEDILOL 12.5 MG TABLET] 90 tablet 3     Sig: TAKE 1/2 TABLET BY MOUTH TWICE A DAY WITH MEALS      Pharmacy where request should be sent: [facility] [id number] [address] [phone] [facility] [phone]      Last office visit with prescribing clinician: 3/28/2025   Last telemedicine visit with prescribing clinician: Visit date not found   Next office visit with prescribing clinician: Visit date not found     Does the patient have less than a 3 day supply:  [x] Yes  [] No    Would you like a call back once the refill request has been completed: [] Yes [x] No    If the office needs to give you a call back, can they leave a voicemail: [] Yes [x] No      "

## 2025-04-26 RX ORDER — CARVEDILOL 12.5 MG/1
TABLET ORAL
Qty: 90 TABLET | Refills: 3 | Status: SHIPPED | OUTPATIENT
Start: 2025-04-26

## 2025-04-30 ENCOUNTER — HOSPITAL ENCOUNTER (OUTPATIENT)
Facility: HOSPITAL | Age: 65
Discharge: HOME | End: 2025-04-30
Payer: MEDICARE

## 2025-05-14 ENCOUNTER — HOSPITAL ENCOUNTER (OUTPATIENT)
Facility: HOSPITAL | Age: 65
Discharge: HOME | End: 2025-05-14
Admitting: INTERNAL MEDICINE
Payer: MEDICARE

## 2025-05-14 VITALS
HEART RATE: 80 BPM | BODY MASS INDEX: 38.65 KG/M2 | HEIGHT: 70 IN | SYSTOLIC BLOOD PRESSURE: 121 MMHG | DIASTOLIC BLOOD PRESSURE: 71 MMHG | TEMPERATURE: 98.1 F | WEIGHT: 270 LBS | RESPIRATION RATE: 16 BRPM

## 2025-05-14 DIAGNOSIS — D50.8 OTHER IRON DEFICIENCY ANEMIA: Primary | ICD-10-CM

## 2025-05-14 DIAGNOSIS — K90.9 MALABSORPTION OF IRON: ICD-10-CM

## 2025-05-14 PROCEDURE — 25810000003 SODIUM CHLORIDE 0.9 % SOLUTION: Performed by: INTERNAL MEDICINE

## 2025-05-14 PROCEDURE — 96374 THER/PROPH/DIAG INJ IV PUSH: CPT

## 2025-05-14 PROCEDURE — 25010000002 IRON SUCROSE PER 1 MG: Performed by: INTERNAL MEDICINE

## 2025-05-14 RX ORDER — DIPHENHYDRAMINE HYDROCHLORIDE 50 MG/ML
50 INJECTION, SOLUTION INTRAMUSCULAR; INTRAVENOUS AS NEEDED
Status: DISCONTINUED | OUTPATIENT
Start: 2025-05-14 | End: 2025-05-15 | Stop reason: HOSPADM

## 2025-05-14 RX ORDER — SODIUM CHLORIDE 9 MG/ML
20 INJECTION, SOLUTION INTRAVENOUS ONCE
Status: COMPLETED | OUTPATIENT
Start: 2025-05-14 | End: 2025-05-14

## 2025-05-14 RX ORDER — FAMOTIDINE 10 MG/ML
20 INJECTION, SOLUTION INTRAVENOUS AS NEEDED
Status: DISCONTINUED | OUTPATIENT
Start: 2025-05-14 | End: 2025-05-15 | Stop reason: HOSPADM

## 2025-05-14 RX ORDER — HYDROCORTISONE SODIUM SUCCINATE 100 MG/2ML
100 INJECTION INTRAMUSCULAR; INTRAVENOUS AS NEEDED
Status: DISCONTINUED | OUTPATIENT
Start: 2025-05-14 | End: 2025-05-15 | Stop reason: HOSPADM

## 2025-05-14 RX ADMIN — SODIUM CHLORIDE 20 ML/HR: 9 INJECTION, SOLUTION INTRAVENOUS at 13:23

## 2025-05-14 RX ADMIN — IRON SUCROSE 200 MG: 20 INJECTION, SOLUTION INTRAVENOUS at 13:25

## 2025-05-15 DIAGNOSIS — E78.2 MIXED HYPERLIPIDEMIA: ICD-10-CM

## 2025-05-15 RX ORDER — ATORVASTATIN CALCIUM 10 MG/1
10 TABLET, FILM COATED ORAL DAILY
Qty: 90 TABLET | Refills: 3 | Status: SHIPPED | OUTPATIENT
Start: 2025-05-15

## 2025-06-02 NOTE — TELEPHONE ENCOUNTER
"Caller: Kiran Belcher \"Calvin\"    Relationship to patient: Self    Best call back number: [phone]     Patient is needing: PATIENT CALLED TO CHECK ON THIS MED REQUEST        "

## 2025-06-03 RX ORDER — BACLOFEN 20 MG/1
20 TABLET ORAL 3 TIMES DAILY
Qty: 90 TABLET | Refills: 2 | Status: SHIPPED | OUTPATIENT
Start: 2025-06-03

## 2025-06-04 ENCOUNTER — OFFICE VISIT (OUTPATIENT)
Dept: FAMILY MEDICINE CLINIC | Facility: CLINIC | Age: 65
End: 2025-06-04
Payer: MEDICARE

## 2025-06-04 VITALS
RESPIRATION RATE: 18 BRPM | OXYGEN SATURATION: 96 % | HEART RATE: 90 BPM | BODY MASS INDEX: 38.74 KG/M2 | HEIGHT: 70 IN | DIASTOLIC BLOOD PRESSURE: 66 MMHG | SYSTOLIC BLOOD PRESSURE: 120 MMHG | TEMPERATURE: 98 F

## 2025-06-04 DIAGNOSIS — E11.9 TYPE 2 DIABETES MELLITUS WITHOUT COMPLICATION, UNSPECIFIED WHETHER LONG TERM INSULIN USE: ICD-10-CM

## 2025-06-04 DIAGNOSIS — L72.9 SCROTAL CYST: Primary | ICD-10-CM

## 2025-06-04 DIAGNOSIS — C64.1 RENAL CELL CARCINOMA OF RIGHT KIDNEY: ICD-10-CM

## 2025-06-04 NOTE — ASSESSMENT & PLAN NOTE
Staff post nephrectomy given solitary kidney I would avoid regular use of NSAIDs and Tylenol would be safest treatment for back pain

## 2025-06-04 NOTE — PROGRESS NOTES
Established Patient Office Visit        Subjective      Chief Complaint:  Cyst (Cyst on bottom of testicles)      History of Present Illness: Kiran Belcher is a 64 y.o. male who presents for scrotal cyst.  Drainage seen to underwear and a little painful.    occasional back pain he takes Tylenol for  Patient Active Problem List   Diagnosis    Benign essential hypertension    Depression    Gastroesophageal reflux disease without esophagitis    Hyperlipidemia    NISA on CPAP    Type 2 diabetes mellitus without complications    Spasm    Other obesity due to excess calories    Hx of spinal cord injury    Kidney disorder    Lower extremity weakness    Myalgia    Moderate episode of recurrent major depressive disorder    Dysphagia    Toenail avulsion    H/O kidney removal    Renal cell carcinoma of right kidney    Quadriplegia    Muscle contracture    C5-C7 level spinal injury with complete lesion of spinal cord, without evidence of spinal bone injury    Inability to bear weight    Neurogenic bladder    Venous stasis    B-cell lymphoma    Cancer of kidney    Hypertension    Neutrophilic leukemoid reaction    Personal history of Methicillin resistant Staphylococcus aureus infection    Paraplegia    Other constipation    Iron deficiency anemia    Malabsorption of iron    Hypervolemia    Abscess of right thigh    Bronchitis         Current Outpatient Medications:     albuterol sulfate  (90 Base) MCG/ACT inhaler, Inhale 2 puffs Every 4 (Four) Hours As Needed for Wheezing., Disp: 51 g, Rfl: 0    amLODIPine-benazepril (LOTREL) 10-20 MG per capsule, TAKE 1 CAPSULE BY MOUTH DAILY, Disp: 90 capsule, Rfl: 0    ammonium lactate (AMLACTIN) 12 % cream, Apply 1 g topically to the appropriate area as directed As Needed for Dry Skin. Apply as directed , prescribed by podiatrist, Disp: , Rfl:     aspirin 81 MG chewable tablet, Chew 1 tablet Daily., Disp: , Rfl:     atorvastatin (LIPITOR) 10 MG tablet, TAKE 1 TABLET BY MOUTH  DAILY, Disp: 90 tablet, Rfl: 3    baclofen (LIORESAL) 20 MG tablet, TAKE 1 TABLET BY MOUTH 3 TIMES A DAY, Disp: 90 tablet, Rfl: 2    carvedilol (COREG) 12.5 MG tablet, TAKE 1/2 TABLET BY MOUTH TWICE A DAY WITH MEALS, Disp: 90 tablet, Rfl: 3    citalopram (CeleXA) 20 MG tablet, TAKE 1 TABLET BY MOUTH DAILY, Disp: 90 tablet, Rfl: 3    dantrolene (DANTRIUM) 100 MG capsule, TAKE 1 CAPSULE BY MOUTH TWICE A DAY, Disp: 180 capsule, Rfl: 3    Diclofenac Sodium (VOLTAREN) 1 % gel gel, Apply 4 g topically to the appropriate area as directed 4 (Four) Times a Day As Needed (right 4th finger pain)., Disp: 100 g, Rfl: 2    esomeprazole (nexIUM) 40 MG capsule, TAKE 1 CAPSULE BY MOUTH DAILY, Disp: 90 capsule, Rfl: 3    fexofenadine (ALLEGRA) 180 MG tablet, Take 1 tablet by mouth Daily., Disp: , Rfl:     fluticasone (FLONASE) 50 MCG/ACT nasal spray, SPRAY 2 SPRAYS IN EACH NOSTRIL ONCE DAILY AS DIRECTED BY PROVIDER, Disp: 16 g, Rfl: 11    furosemide (LASIX) 20 MG tablet, TAKE 1 TABLET BY MOUTH DAILY, Disp: 30 tablet, Rfl: 2    glucose blood (Accu-Chek Elke Plus) test strip, 1 each by Other route 2 (Two) Times a Day. Use as instructed, Disp: 200 each, Rfl: 3    glyburide (DIAbeta) 5 MG tablet, TAKE 1 TABLET BY MOUTH DAILY WITH BREAKFAST, Disp: 90 tablet, Rfl: 0    lactobacillus acidophilus (RISAQUAD) capsule capsule, Take 1 capsule by mouth Daily., Disp: 90 capsule, Rfl: 1    meclizine (ANTIVERT) 25 MG tablet, Take 1 tablet by mouth 3 (Three) Times a Day As Needed for Dizziness., Disp: 12 tablet, Rfl: 0    metFORMIN ER (GLUCOPHAGE-XR) 500 MG 24 hr tablet, TAKE 2 TABLETS BY MOUTH TWICE A DAY, Disp: 360 tablet, Rfl: 3    methenamine (HIPREX) 1 g tablet, Take 1 tablet by mouth 2 (Two) Times a Day With Meals. (Patient taking differently: Take 1 tablet by mouth 2 (Two) Times a Day With Meals. Takes once a day), Disp: , Rfl:     Mirabegron ER (MYRBETRIQ) 50 MG tablet sustained-release 24 hour 24 hr tablet, Take 50 mg by mouth Daily., Disp:  ", Rfl:     promethazine-dextromethorphan (PROMETHAZINE-DM) 6.25-15 MG/5ML syrup, Take 5 mL by mouth 4 (Four) Times a Day As Needed for Cough., Disp: 118 mL, Rfl: 0    Triamcinolone Acetonide (Nasacort Allergy 24HR) 55 MCG/ACT nasal inhaler, Administer 2 sprays into the nostril(s) as directed by provider Daily., Disp: , Rfl:     benzonatate (Tessalon Perles) 100 MG capsule, Take 1 capsule by mouth 3 (Three) Times a Day As Needed for Cough. (Patient not taking: Reported on 6/4/2025), Disp: 60 capsule, Rfl: 1    doxycycline (VIBRAMYCIN) 100 MG capsule, Take 1 capsule by mouth 2 (Two) Times a Day. (Patient not taking: Reported on 6/4/2025), Disp: 10 capsule, Rfl: 0    fluocinonide (LIDEX) 0.05 % cream, fluocinonide 0.05 % topical cream (Patient not taking: Reported on 6/4/2025), Disp: , Rfl:     oxymetazoline (AFRIN) 0.05 % nasal spray, Administer 1 spray into the nostril(s) as directed by provider 2 (Two) Times a Day. (Patient not taking: Reported on 6/4/2025), Disp: 30 mL, Rfl: 0       Objective     Physical Exam:   Vital Signs:   /66 (BP Location: Left arm, Patient Position: Sitting, Cuff Size: Adult)   Pulse 90   Temp 98 °F (36.7 °C) (Temporal)   Resp 18   Ht 177.8 cm (70\")   SpO2 96%   BMI 38.74 kg/m²      Physical Exam  Constitutional:       General: He is not in acute distress.     Appearance: He is not ill-appearing.   He is an electronic wheelchair he has notable weakness to legs bilaterally  Numerous nodules and cyst to the scrotum no erythema to the scrotum nor to cyst.  No abscess or cyst seen to the perineum or buttocks    Will Griselda GARNETT present for exam         Assessment / Plan      Assessment/Plan:   Diagnoses and all orders for this visit:    1. Scrotal cyst (Primary)    2. Type 2 diabetes mellitus without complication, unspecified whether long term insulin use  Assessment & Plan:  Continue metformin and glyburide diabetes complicates his acute problem of draining scrotal cyst      3. " Renal cell carcinoma of right kidney  Assessment & Plan:  Status post nephrectomy given solitary kidney I would avoid regular use of NSAIDs and Tylenol would be safest treatment for back pain        No infected cyst seen today no further treatment can discuss further with urology if procedures are desired in the future    Follow-up for worsening redness or worsening pain  Follow Up:   No follow-ups on file.    MDM:     Chay Cameron MD  Family Medicine - [facility]

## 2025-06-18 DIAGNOSIS — E11.29 TYPE 2 DIABETES MELLITUS WITH MICROALBUMINURIA, WITHOUT LONG-TERM CURRENT USE OF INSULIN: ICD-10-CM

## 2025-06-18 DIAGNOSIS — I10 ESSENTIAL HYPERTENSION: ICD-10-CM

## 2025-06-18 DIAGNOSIS — R80.9 TYPE 2 DIABETES MELLITUS WITH MICROALBUMINURIA, WITHOUT LONG-TERM CURRENT USE OF INSULIN: ICD-10-CM

## 2025-06-18 RX ORDER — AMLODIPINE AND BENAZEPRIL HYDROCHLORIDE 10; 20 MG/1; MG/1
1 CAPSULE ORAL DAILY
Qty: 90 CAPSULE | Refills: 3 | Status: SHIPPED | OUTPATIENT
Start: 2025-06-18

## 2025-06-18 RX ORDER — GLYBURIDE 5 MG/1
5 TABLET ORAL
Qty: 90 TABLET | Refills: 3 | Status: SHIPPED | OUTPATIENT
Start: 2025-06-18

## 2025-06-18 NOTE — TELEPHONE ENCOUNTER
Rx Refill Note  Requested Prescriptions     Pending Prescriptions Disp Refills    amLODIPine-benazepril (LOTREL) 10-20 MG per capsule [Pharmacy Med Name: amLODIPine-BENAZEPRIL 10-20 MG CAP] 90 capsule 0     Sig: TAKE 1 CAPSULE BY MOUTH DAILY    glyburide (DIAbeta) 5 MG tablet [Pharmacy Med Name: GLYBURIDE 5 MG TABLET] 90 tablet 0     Sig: TAKE 1 TABLET BY MOUTH DAILY WITH BREAKFAST      Last office visit with prescribing clinician: 6/4/2025   Last telemedicine visit with prescribing clinician: Visit date not found   Next office visit with prescribing clinician: 9/15/2025                         Would you like a call back once the refill request has been completed: [] Yes [] No    If the office needs to give you a call back, can they leave a voicemail: [] Yes [] No    Lakesha Nunez MA  06/18/25, 11:19 EDT

## 2025-06-28 DIAGNOSIS — J90 PLEURAL EFFUSION: ICD-10-CM

## 2025-06-28 DIAGNOSIS — R60.0 LEG EDEMA: ICD-10-CM

## 2025-06-28 DIAGNOSIS — R06.00 DYSPNEA, UNSPECIFIED TYPE: ICD-10-CM

## 2025-06-30 RX ORDER — FUROSEMIDE 20 MG/1
20 TABLET ORAL DAILY
Qty: 30 TABLET | Refills: 2 | Status: SHIPPED | OUTPATIENT
Start: 2025-06-30

## 2025-08-05 ENCOUNTER — LAB (OUTPATIENT)
Facility: HOSPITAL | Age: 65
End: 2025-08-05
Payer: MEDICARE

## 2025-08-05 ENCOUNTER — HOSPITAL ENCOUNTER (OUTPATIENT)
Facility: HOSPITAL | Age: 65
Discharge: HOME | End: 2025-08-05
Payer: MEDICARE

## 2025-08-05 DIAGNOSIS — D50.8 OTHER IRON DEFICIENCY ANEMIA: ICD-10-CM

## 2025-08-05 DIAGNOSIS — C85.10 B-CELL LYMPHOMA, UNSPECIFIED B-CELL LYMPHOMA TYPE, UNSPECIFIED BODY REGION: ICD-10-CM

## 2025-08-05 LAB
ALBUMIN SERPL-MCNC: 3.8 G/DL (ref 3.5–5.2)
ALBUMIN/GLOB SERPL: 1.1 G/DL
ALP SERPL-CCNC: 139 U/L (ref 39–117)
ALT SERPL W P-5'-P-CCNC: 16 U/L (ref 1–41)
ANION GAP SERPL CALCULATED.3IONS-SCNC: 14.7 MMOL/L (ref 5–15)
AST SERPL-CCNC: 18 U/L (ref 1–40)
BASOPHILS # BLD AUTO: 0.02 10*3/MM3 (ref 0–0.2)
BASOPHILS NFR BLD AUTO: 0.2 % (ref 0–1.5)
BILIRUB SERPL-MCNC: 0.3 MG/DL (ref 0–1.2)
BUN SERPL-MCNC: 13.8 MG/DL (ref 8–23)
BUN/CREAT SERPL: 11.8 (ref 7–25)
CALCIUM SPEC-SCNC: 9.6 MG/DL (ref 8.6–10.5)
CHLORIDE SERPL-SCNC: 103 MMOL/L (ref 98–107)
CO2 SERPL-SCNC: 25.3 MMOL/L (ref 22–29)
CREAT BLDA-MCNC: 1.3 MG/DL (ref 0.6–1.3)
CREAT SERPL-MCNC: 1.17 MG/DL (ref 0.76–1.27)
DEPRECATED RDW RBC AUTO: 48.7 FL (ref 37–54)
EGFRCR SERPLBLD CKD-EPI 2021: 70.5 ML/MIN/1.73
EOSINOPHIL # BLD AUTO: 0.48 10*3/MM3 (ref 0–0.4)
EOSINOPHIL NFR BLD AUTO: 4.1 % (ref 0.3–6.2)
ERYTHROCYTE [DISTWIDTH] IN BLOOD BY AUTOMATED COUNT: 14 % (ref 12.3–15.4)
FERRITIN SERPL-MCNC: 481 NG/ML (ref 30–400)
GLOBULIN UR ELPH-MCNC: 3.6 GM/DL
GLUCOSE SERPL-MCNC: 112 MG/DL (ref 65–99)
HCT VFR BLD AUTO: 34.5 % (ref 37.5–51)
HGB BLD-MCNC: 10.8 G/DL (ref 13–17.7)
IMM GRANULOCYTES # BLD AUTO: 0.02 10*3/MM3 (ref 0–0.05)
IMM GRANULOCYTES NFR BLD AUTO: 0.2 % (ref 0–0.5)
IRON 24H UR-MRATE: 29 MCG/DL (ref 59–158)
IRON SATN MFR SERPL: 12 % (ref 20–50)
LDH SERPL-CCNC: 103 U/L (ref 135–225)
LYMPHOCYTES # BLD AUTO: 0.98 10*3/MM3 (ref 0.7–3.1)
LYMPHOCYTES NFR BLD AUTO: 8.5 % (ref 19.6–45.3)
MCH RBC QN AUTO: 29.6 PG (ref 26.6–33)
MCHC RBC AUTO-ENTMCNC: 31.3 G/DL (ref 31.5–35.7)
MCV RBC AUTO: 94.5 FL (ref 79–97)
MONOCYTES # BLD AUTO: 0.74 10*3/MM3 (ref 0.1–0.9)
MONOCYTES NFR BLD AUTO: 6.4 % (ref 5–12)
NEUTROPHILS NFR BLD AUTO: 80.6 % (ref 42.7–76)
NEUTROPHILS NFR BLD AUTO: 9.33 10*3/MM3 (ref 1.7–7)
PLATELET # BLD AUTO: 387 10*3/MM3 (ref 140–450)
PMV BLD AUTO: 9.6 FL (ref 6–12)
POTASSIUM SERPL-SCNC: 4 MMOL/L (ref 3.5–5.2)
PROT SERPL-MCNC: 7.4 G/DL (ref 6–8.5)
RBC # BLD AUTO: 3.65 10*6/MM3 (ref 4.14–5.8)
SODIUM SERPL-SCNC: 143 MMOL/L (ref 136–145)
TIBC SERPL-MCNC: 249 MCG/DL (ref 298–536)
TRANSFERRIN SERPL-MCNC: 167 MG/DL (ref 200–360)
WBC NRBC COR # BLD AUTO: 11.57 10*3/MM3 (ref 3.4–10.8)

## 2025-08-05 PROCEDURE — 83540 ASSAY OF IRON: CPT

## 2025-08-05 PROCEDURE — 85025 COMPLETE CBC W/AUTO DIFF WBC: CPT

## 2025-08-05 PROCEDURE — 36415 COLL VENOUS BLD VENIPUNCTURE: CPT

## 2025-08-05 PROCEDURE — 84466 ASSAY OF TRANSFERRIN: CPT

## 2025-08-05 PROCEDURE — 82728 ASSAY OF FERRITIN: CPT

## 2025-08-05 PROCEDURE — 82565 ASSAY OF CREATININE: CPT

## 2025-08-05 PROCEDURE — 71260 CT THORAX DX C+: CPT

## 2025-08-05 PROCEDURE — 74177 CT ABD & PELVIS W/CONTRAST: CPT

## 2025-08-05 PROCEDURE — 25510000001 IOPAMIDOL 61 % SOLUTION: Performed by: INTERNAL MEDICINE

## 2025-08-05 PROCEDURE — 83615 LACTATE (LD) (LDH) ENZYME: CPT

## 2025-08-05 PROCEDURE — 80053 COMPREHEN METABOLIC PANEL: CPT

## 2025-08-05 RX ORDER — IOPAMIDOL 612 MG/ML
100 INJECTION, SOLUTION INTRAVASCULAR
Status: COMPLETED | OUTPATIENT
Start: 2025-08-05 | End: 2025-08-05

## 2025-08-05 RX ADMIN — IOPAMIDOL 89 ML: 612 INJECTION, SOLUTION INTRAVENOUS at 14:11

## 2025-08-08 ENCOUNTER — OFFICE VISIT (OUTPATIENT)
Dept: ONCOLOGY | Facility: CLINIC | Age: 65
End: 2025-08-08
Payer: MEDICARE

## 2025-08-08 VITALS
HEART RATE: 85 BPM | SYSTOLIC BLOOD PRESSURE: 142 MMHG | HEIGHT: 70 IN | TEMPERATURE: 97.3 F | RESPIRATION RATE: 16 BRPM | BODY MASS INDEX: 38.74 KG/M2 | DIASTOLIC BLOOD PRESSURE: 79 MMHG | OXYGEN SATURATION: 97 %

## 2025-08-08 DIAGNOSIS — D50.8 OTHER IRON DEFICIENCY ANEMIA: ICD-10-CM

## 2025-08-08 DIAGNOSIS — K90.9 MALABSORPTION OF IRON: ICD-10-CM

## 2025-08-08 DIAGNOSIS — C85.10 B-CELL LYMPHOMA, UNSPECIFIED B-CELL LYMPHOMA TYPE, UNSPECIFIED BODY REGION: Primary | ICD-10-CM

## 2025-08-08 RX ORDER — FAMOTIDINE 10 MG/ML
20 INJECTION, SOLUTION INTRAVENOUS AS NEEDED
OUTPATIENT
Start: 2025-08-22

## 2025-08-08 RX ORDER — FAMOTIDINE 10 MG/ML
20 INJECTION, SOLUTION INTRAVENOUS AS NEEDED
OUTPATIENT
Start: 2025-08-15

## 2025-08-08 RX ORDER — DIPHENHYDRAMINE HYDROCHLORIDE 50 MG/ML
50 INJECTION, SOLUTION INTRAMUSCULAR; INTRAVENOUS AS NEEDED
OUTPATIENT
Start: 2025-08-22

## 2025-08-08 RX ORDER — SODIUM CHLORIDE 9 MG/ML
20 INJECTION, SOLUTION INTRAVENOUS ONCE
OUTPATIENT
Start: 2025-08-22

## 2025-08-08 RX ORDER — DIPHENHYDRAMINE HYDROCHLORIDE 50 MG/ML
50 INJECTION, SOLUTION INTRAMUSCULAR; INTRAVENOUS AS NEEDED
OUTPATIENT
Start: 2025-08-15

## 2025-08-08 RX ORDER — HYDROCORTISONE SODIUM SUCCINATE 100 MG/2ML
100 INJECTION INTRAMUSCULAR; INTRAVENOUS AS NEEDED
OUTPATIENT
Start: 2025-08-22

## 2025-08-08 RX ORDER — HYDROCORTISONE SODIUM SUCCINATE 100 MG/2ML
100 INJECTION INTRAMUSCULAR; INTRAVENOUS AS NEEDED
OUTPATIENT
Start: 2025-08-15

## 2025-08-08 RX ORDER — SODIUM CHLORIDE 9 MG/ML
20 INJECTION, SOLUTION INTRAVENOUS ONCE
OUTPATIENT
Start: 2025-08-15

## 2025-08-25 ENCOUNTER — TELEPHONE (OUTPATIENT)
Dept: FAMILY MEDICINE CLINIC | Facility: CLINIC | Age: 65
End: 2025-08-25
Payer: MEDICARE

## 2025-08-25 DIAGNOSIS — S14.115A SPINAL CORD INJURY AT C5-C7 LEVEL WITH COMPLETE SPINAL CORD LESION: Primary | ICD-10-CM

## 2025-08-27 DIAGNOSIS — E11.9 TYPE 2 DIABETES MELLITUS WITHOUT COMPLICATION, WITHOUT LONG-TERM CURRENT USE OF INSULIN: ICD-10-CM

## 2025-08-27 RX ORDER — METFORMIN HYDROCHLORIDE 500 MG/1
1000 TABLET, EXTENDED RELEASE ORAL 2 TIMES DAILY
Qty: 360 TABLET | Refills: 3 | Status: SHIPPED | OUTPATIENT
Start: 2025-08-27